# Patient Record
Sex: FEMALE | Race: WHITE | NOT HISPANIC OR LATINO | Employment: UNEMPLOYED | URBAN - METROPOLITAN AREA
[De-identification: names, ages, dates, MRNs, and addresses within clinical notes are randomized per-mention and may not be internally consistent; named-entity substitution may affect disease eponyms.]

---

## 2017-01-06 RX ORDER — CYCLOBENZAPRINE HCL 10 MG
TABLET ORAL
Qty: 20 TABLET | Refills: 2 | Status: SHIPPED | OUTPATIENT
Start: 2017-01-06 | End: 2017-03-29 | Stop reason: SDUPTHER

## 2017-01-12 RX ORDER — OMEPRAZOLE 40 MG/1
CAPSULE, DELAYED RELEASE ORAL
Qty: 30 CAPSULE | Refills: 5 | Status: SHIPPED | OUTPATIENT
Start: 2017-01-12 | End: 2017-07-11 | Stop reason: SDUPTHER

## 2017-01-13 ENCOUNTER — ANESTHESIA EVENT (OUTPATIENT)
Dept: ENDOSCOPY | Facility: HOSPITAL | Age: 62
End: 2017-01-13
Payer: COMMERCIAL

## 2017-01-13 ENCOUNTER — SURGERY (OUTPATIENT)
Age: 62
End: 2017-01-13

## 2017-01-13 ENCOUNTER — ANESTHESIA (OUTPATIENT)
Dept: ENDOSCOPY | Facility: HOSPITAL | Age: 62
End: 2017-01-13
Payer: COMMERCIAL

## 2017-01-13 PROBLEM — Z12.12 SCREENING FOR COLORECTAL CANCER: Status: ACTIVE | Noted: 2017-01-13

## 2017-01-13 PROBLEM — Z12.11 SCREENING FOR COLORECTAL CANCER: Status: ACTIVE | Noted: 2017-01-13

## 2017-01-13 PROCEDURE — 25000003 PHARM REV CODE 250: Performed by: NURSE ANESTHETIST, CERTIFIED REGISTERED

## 2017-01-13 PROCEDURE — 63600175 PHARM REV CODE 636 W HCPCS: Performed by: NURSE ANESTHETIST, CERTIFIED REGISTERED

## 2017-01-13 RX ORDER — SODIUM CHLORIDE, SODIUM LACTATE, POTASSIUM CHLORIDE, CALCIUM CHLORIDE 600; 310; 30; 20 MG/100ML; MG/100ML; MG/100ML; MG/100ML
INJECTION, SOLUTION INTRAVENOUS CONTINUOUS PRN
Status: DISCONTINUED | OUTPATIENT
Start: 2017-01-13 | End: 2017-01-13

## 2017-01-13 RX ORDER — LIDOCAINE HCL/PF 100 MG/5ML
SYRINGE (ML) INTRAVENOUS
Status: DISCONTINUED | OUTPATIENT
Start: 2017-01-13 | End: 2017-01-13

## 2017-01-13 RX ORDER — PROPOFOL 10 MG/ML
INJECTION, EMULSION INTRAVENOUS
Status: DISCONTINUED | OUTPATIENT
Start: 2017-01-13 | End: 2017-01-13

## 2017-01-13 RX ADMIN — PROPOFOL 30 MG: 10 INJECTION, EMULSION INTRAVENOUS at 07:01

## 2017-01-13 RX ADMIN — SODIUM CHLORIDE, SODIUM LACTATE, POTASSIUM CHLORIDE, AND CALCIUM CHLORIDE: 600; 310; 30; 20 INJECTION, SOLUTION INTRAVENOUS at 07:01

## 2017-01-13 RX ADMIN — PROPOFOL 120 MG: 10 INJECTION, EMULSION INTRAVENOUS at 07:01

## 2017-01-13 RX ADMIN — LIDOCAINE HYDROCHLORIDE 50 MG: 20 INJECTION, SOLUTION INTRAVENOUS at 07:01

## 2017-01-13 NOTE — TRANSFER OF CARE
"Anesthesia Transfer of Care Note    Patient: Silvia Cotton    Procedure(s) Performed: Procedure(s) (LRB):  COLONOSCOPY (N/A)    Patient location: PACU    Anesthesia Type: MAC    Transport from OR: Transported from OR on room air with adequate spontaneous ventilation    Post pain: adequate analgesia    Post assessment: no apparent anesthetic complications    Post vital signs: stable    Level of consciousness: awake and alert    Nausea/Vomiting: no nausea/vomiting    Complications: none          Last vitals:   Visit Vitals    BP (!) 113/58 (BP Location: Left arm, Patient Position: Lying, BP Method: Automatic)    Pulse 80    Temp 36.6 °C (97.9 °F) (Oral)    Resp 16    Ht 5' 7" (1.702 m)    Wt 112 kg (247 lb)    LMP  (Exact Date)    SpO2 96%    Breastfeeding No    BMI 38.69 kg/m2     "

## 2017-01-13 NOTE — ANESTHESIA PREPROCEDURE EVALUATION
01/13/2017  Silvia Cotton is a 61 y.o., female.    [facility] Anesthesia Evaluation    I have reviewed the Patient Summary Reports.    I have reviewed the Nursing Notes.   I have reviewed the Medications.     Review of Systems  Anesthesia Hx:  No problems with previous Anesthesia    Social:  Social Alcohol Use, Non-Smoker    Hematology/Oncology:     Oncology Normal    -- Anemia:   EENT/Dental:   chronic allergic rhinitis   Cardiovascular:   Hypertension, well controlled Cardiac stress test over 5 years due to palpations and placed on medication.  Normal sinus rhythm  Cannot rule out Inferior infarct ,age undetermined  Abnormal ECG   Pulmonary:   Snore   Hepatic/GI:   Bowel Prep. GERD, well controlled 0400 last drink of fluid.   Musculoskeletal:   Arthritis     Neurological:   Neuromuscular Disease,    Psych:   Psychiatric History          Physical Exam  General:  Well nourished, Morbid Obesity    Airway/Jaw/Neck:  Airway Findings: Mallampati: IV     Dental:  Dental Findings: Upper Dentures, Lower Dentures             Anesthesia Plan  Type of Anesthesia, risks & benefits discussed:  Anesthesia Type:  MAC  Patient's Preference:   Intra-op Monitoring Plan:   Intra-op Monitoring Plan Comments:   Post Op Pain Control Plan:   Post Op Pain Control Plan Comments:   Induction:   IV  Beta Blocker:  Patient is on a Beta-Blocker and has received one dose within the past 24 hours (No further documentation required).       Informed Consent: Patient understands risks and agrees with Anesthesia plan.  Questions answered. Anesthesia consent signed with patient.  ASA Score: 2     Day of Surgery Review of History & Physical: I have interviewed and examined the patient. I have reviewed the patient's H&P dated: 01/13/17. There are no significant changes.  H&P update referred to the provider.         Ready For Surgery From Anesthesia  Perspective.

## 2017-01-13 NOTE — ANESTHESIA POSTPROCEDURE EVALUATION
"Anesthesia Post Evaluation    Patient: Silvia Cotton    Procedure(s) Performed: Procedure(s) (LRB):  COLONOSCOPY (N/A)    Final Anesthesia Type: MAC  Patient location during evaluation: PACU  Patient participation: Yes- Able to Participate  Level of consciousness: awake and alert and oriented  Post-procedure vital signs: reviewed and stable  Pain management: adequate  Airway patency: patent  PONV status at discharge: No PONV  Anesthetic complications: no      Cardiovascular status: blood pressure returned to baseline  Respiratory status: unassisted, room air and spontaneous ventilation  Hydration status: euvolemic  Follow-up not needed.        Visit Vitals    BP (!) 113/58 (BP Location: Left arm, Patient Position: Lying, BP Method: Automatic)    Pulse 80    Temp 36.6 °C (97.9 °F) (Oral)    Resp 16    Ht 5' 7" (1.702 m)    Wt 112 kg (247 lb)    LMP  (Exact Date)    SpO2 96%    Breastfeeding No    BMI 38.69 kg/m2       Pain/Lashawn Score: Pain Assessment Performed: Yes (1/13/2017  7:58 AM)  Presence of Pain: denies (1/13/2017  7:58 AM)  Lashawn Score: 10 (1/13/2017  7:58 AM)      "

## 2017-01-13 NOTE — ANESTHESIA RELEASE NOTE
"Anesthesia Release from PACU Note    Patient: Silvia Cotton    Procedure(s) Performed: Procedure(s) (LRB):  COLONOSCOPY (N/A)    Anesthesia type: MAC    Post pain: Adequate analgesia    Post assessment: no apparent anesthetic complications, tolerated procedure well and no evidence of recall    Last Vitals:   Visit Vitals    BP (!) 113/58 (BP Location: Left arm, Patient Position: Lying, BP Method: Automatic)    Pulse 80    Temp 36.6 °C (97.9 °F) (Oral)    Resp 16    Ht 5' 7" (1.702 m)    Wt 112 kg (247 lb)    LMP  (Exact Date)    SpO2 96%    Breastfeeding No    BMI 38.69 kg/m2       Post vital signs: stable    Level of consciousness: awake, alert  and oriented    Nausea/Vomiting: no nausea/no vomiting    Complications: none    Airway Patency: patent    Respiratory: unassisted, spontaneous ventilation, room air    Cardiovascular: stable    Hydration: euvolemic  "

## 2017-02-01 RX ORDER — ATENOLOL 25 MG/1
TABLET ORAL
Qty: 60 TABLET | Refills: 3 | Status: SHIPPED | OUTPATIENT
Start: 2017-02-01 | End: 2017-06-13 | Stop reason: SDUPTHER

## 2017-02-08 RX ORDER — AMITRIPTYLINE HYDROCHLORIDE 50 MG/1
TABLET, FILM COATED ORAL
Qty: 30 TABLET | Refills: 3 | Status: SHIPPED | OUTPATIENT
Start: 2017-02-08 | End: 2017-06-22 | Stop reason: SDUPTHER

## 2017-03-29 RX ORDER — CYCLOBENZAPRINE HCL 10 MG
TABLET ORAL
Qty: 20 TABLET | Refills: 2 | Status: SHIPPED | OUTPATIENT
Start: 2017-03-29 | End: 2017-11-29 | Stop reason: SDUPTHER

## 2017-05-04 RX ORDER — CYANOCOBALAMIN 1000 UG/ML
INJECTION, SOLUTION INTRAMUSCULAR; SUBCUTANEOUS
Qty: 10 ML | Refills: 1 | Status: SHIPPED | OUTPATIENT
Start: 2017-05-04 | End: 2017-10-09 | Stop reason: SDUPTHER

## 2017-05-10 ENCOUNTER — OFFICE VISIT (OUTPATIENT)
Dept: INTERNAL MEDICINE | Facility: CLINIC | Age: 62
End: 2017-05-10
Payer: COMMERCIAL

## 2017-05-10 ENCOUNTER — LAB VISIT (OUTPATIENT)
Dept: LAB | Facility: HOSPITAL | Age: 62
End: 2017-05-10
Attending: FAMILY MEDICINE
Payer: COMMERCIAL

## 2017-05-10 VITALS
TEMPERATURE: 99 F | BODY MASS INDEX: 37.13 KG/M2 | SYSTOLIC BLOOD PRESSURE: 132 MMHG | HEIGHT: 67 IN | HEART RATE: 108 BPM | OXYGEN SATURATION: 98 % | WEIGHT: 236.56 LBS | DIASTOLIC BLOOD PRESSURE: 76 MMHG

## 2017-05-10 DIAGNOSIS — K21.9 GASTROESOPHAGEAL REFLUX DISEASE WITHOUT ESOPHAGITIS: ICD-10-CM

## 2017-05-10 DIAGNOSIS — D50.0 IRON DEFICIENCY ANEMIA DUE TO CHRONIC BLOOD LOSS: ICD-10-CM

## 2017-05-10 DIAGNOSIS — Z01.818 PREOP GENERAL PHYSICAL EXAM: ICD-10-CM

## 2017-05-10 DIAGNOSIS — F51.04 PSYCHOPHYSIOLOGICAL INSOMNIA: ICD-10-CM

## 2017-05-10 DIAGNOSIS — E78.2 MIXED HYPERLIPIDEMIA: ICD-10-CM

## 2017-05-10 DIAGNOSIS — Z01.818 PREOP GENERAL PHYSICAL EXAM: Primary | ICD-10-CM

## 2017-05-10 LAB
ANION GAP SERPL CALC-SCNC: 11 MMOL/L
BASOPHILS # BLD AUTO: 0.03 K/UL
BASOPHILS NFR BLD: 0.3 %
BUN SERPL-MCNC: 12 MG/DL
CALCIUM SERPL-MCNC: 9.7 MG/DL
CHLORIDE SERPL-SCNC: 103 MMOL/L
CO2 SERPL-SCNC: 24 MMOL/L
CREAT SERPL-MCNC: 1 MG/DL
DIFFERENTIAL METHOD: ABNORMAL
EOSINOPHIL # BLD AUTO: 0.2 K/UL
EOSINOPHIL NFR BLD: 2 %
ERYTHROCYTE [DISTWIDTH] IN BLOOD BY AUTOMATED COUNT: 15.3 %
EST. GFR  (AFRICAN AMERICAN): >60 ML/MIN/1.73 M^2
EST. GFR  (NON AFRICAN AMERICAN): >60 ML/MIN/1.73 M^2
GLUCOSE SERPL-MCNC: 79 MG/DL
HCT VFR BLD AUTO: 37.7 %
HGB BLD-MCNC: 12.3 G/DL
LYMPHOCYTES # BLD AUTO: 2.6 K/UL
LYMPHOCYTES NFR BLD: 25 %
MCH RBC QN AUTO: 29.2 PG
MCHC RBC AUTO-ENTMCNC: 32.6 %
MCV RBC AUTO: 90 FL
MONOCYTES # BLD AUTO: 0.7 K/UL
MONOCYTES NFR BLD: 7 %
NEUTROPHILS # BLD AUTO: 6.7 K/UL
NEUTROPHILS NFR BLD: 65.4 %
PLATELET # BLD AUTO: 298 K/UL
PMV BLD AUTO: 11.4 FL
POTASSIUM SERPL-SCNC: 4.5 MMOL/L
RBC # BLD AUTO: 4.21 M/UL
SODIUM SERPL-SCNC: 138 MMOL/L
WBC # BLD AUTO: 10.2 K/UL

## 2017-05-10 PROCEDURE — 93010 ELECTROCARDIOGRAM REPORT: CPT | Mod: S$GLB,,, | Performed by: NUCLEAR MEDICINE

## 2017-05-10 PROCEDURE — 93005 ELECTROCARDIOGRAM TRACING: CPT | Mod: S$GLB,,, | Performed by: FAMILY MEDICINE

## 2017-05-10 PROCEDURE — 80048 BASIC METABOLIC PNL TOTAL CA: CPT

## 2017-05-10 PROCEDURE — 99243 OFF/OP CNSLTJ NEW/EST LOW 30: CPT | Mod: S$GLB,,, | Performed by: FAMILY MEDICINE

## 2017-05-10 PROCEDURE — 99999 PR PBB SHADOW E&M-EST. PATIENT-LVL III: CPT | Mod: PBBFAC,,, | Performed by: FAMILY MEDICINE

## 2017-05-10 PROCEDURE — 85025 COMPLETE CBC W/AUTO DIFF WBC: CPT

## 2017-05-10 PROCEDURE — 36415 COLL VENOUS BLD VENIPUNCTURE: CPT | Mod: PO

## 2017-05-22 RX ORDER — VENLAFAXINE HYDROCHLORIDE 75 MG/1
CAPSULE, EXTENDED RELEASE ORAL
Qty: 90 CAPSULE | Refills: 3 | Status: SHIPPED | OUTPATIENT
Start: 2017-05-22 | End: 2018-05-02 | Stop reason: SDUPTHER

## 2017-06-07 ENCOUNTER — LAB VISIT (OUTPATIENT)
Dept: LAB | Facility: HOSPITAL | Age: 62
End: 2017-06-07
Attending: FAMILY MEDICINE
Payer: COMMERCIAL

## 2017-06-07 DIAGNOSIS — E53.8 B12 DEFICIENCY: ICD-10-CM

## 2017-06-07 DIAGNOSIS — E78.2 MIXED HYPERLIPIDEMIA: ICD-10-CM

## 2017-06-07 LAB
ALBUMIN SERPL BCP-MCNC: 3.5 G/DL
ALP SERPL-CCNC: 82 U/L
ALT SERPL W/O P-5'-P-CCNC: 15 U/L
ANION GAP SERPL CALC-SCNC: 10 MMOL/L
AST SERPL-CCNC: 19 U/L
BASOPHILS # BLD AUTO: 0.02 K/UL
BASOPHILS NFR BLD: 0.3 %
BILIRUB SERPL-MCNC: 0.6 MG/DL
BUN SERPL-MCNC: 10 MG/DL
CALCIUM SERPL-MCNC: 9.4 MG/DL
CHLORIDE SERPL-SCNC: 107 MMOL/L
CHOLEST/HDLC SERPL: 4.4 {RATIO}
CO2 SERPL-SCNC: 24 MMOL/L
CREAT SERPL-MCNC: 1 MG/DL
DIFFERENTIAL METHOD: ABNORMAL
EOSINOPHIL # BLD AUTO: 0.2 K/UL
EOSINOPHIL NFR BLD: 3.6 %
ERYTHROCYTE [DISTWIDTH] IN BLOOD BY AUTOMATED COUNT: 15.9 %
EST. GFR  (AFRICAN AMERICAN): >60 ML/MIN/1.73 M^2
EST. GFR  (NON AFRICAN AMERICAN): >60 ML/MIN/1.73 M^2
GLUCOSE SERPL-MCNC: 94 MG/DL
HCT VFR BLD AUTO: 36.2 %
HDL/CHOLESTEROL RATIO: 22.5 %
HDLC SERPL-MCNC: 151 MG/DL
HDLC SERPL-MCNC: 34 MG/DL
HGB BLD-MCNC: 11.4 G/DL
LDLC SERPL CALC-MCNC: 66.2 MG/DL
LYMPHOCYTES # BLD AUTO: 1.7 K/UL
LYMPHOCYTES NFR BLD: 27.6 %
MCH RBC QN AUTO: 28.9 PG
MCHC RBC AUTO-ENTMCNC: 31.5 %
MCV RBC AUTO: 92 FL
MONOCYTES # BLD AUTO: 0.7 K/UL
MONOCYTES NFR BLD: 10.6 %
NEUTROPHILS # BLD AUTO: 3.6 K/UL
NEUTROPHILS NFR BLD: 57.7 %
NONHDLC SERPL-MCNC: 117 MG/DL
PLATELET # BLD AUTO: 283 K/UL
PMV BLD AUTO: 11.4 FL
POTASSIUM SERPL-SCNC: 3.9 MMOL/L
PROT SERPL-MCNC: 6.9 G/DL
RBC # BLD AUTO: 3.94 M/UL
SODIUM SERPL-SCNC: 141 MMOL/L
TRIGL SERPL-MCNC: 254 MG/DL
WBC # BLD AUTO: 6.15 K/UL

## 2017-06-07 PROCEDURE — 83036 HEMOGLOBIN GLYCOSYLATED A1C: CPT

## 2017-06-07 PROCEDURE — 82607 VITAMIN B-12: CPT

## 2017-06-07 PROCEDURE — 36415 COLL VENOUS BLD VENIPUNCTURE: CPT | Mod: PO

## 2017-06-07 PROCEDURE — 80061 LIPID PANEL: CPT

## 2017-06-07 PROCEDURE — 85025 COMPLETE CBC W/AUTO DIFF WBC: CPT

## 2017-06-07 PROCEDURE — 80053 COMPREHEN METABOLIC PANEL: CPT

## 2017-06-08 ENCOUNTER — OFFICE VISIT (OUTPATIENT)
Dept: FAMILY MEDICINE | Facility: CLINIC | Age: 62
End: 2017-06-08
Payer: COMMERCIAL

## 2017-06-08 VITALS
WEIGHT: 236.25 LBS | RESPIRATION RATE: 16 BRPM | TEMPERATURE: 98 F | DIASTOLIC BLOOD PRESSURE: 70 MMHG | HEIGHT: 69 IN | OXYGEN SATURATION: 97 % | HEART RATE: 80 BPM | BODY MASS INDEX: 34.99 KG/M2 | SYSTOLIC BLOOD PRESSURE: 120 MMHG

## 2017-06-08 DIAGNOSIS — K21.9 GASTROESOPHAGEAL REFLUX DISEASE WITHOUT ESOPHAGITIS: ICD-10-CM

## 2017-06-08 DIAGNOSIS — E78.2 MIXED HYPERLIPIDEMIA: ICD-10-CM

## 2017-06-08 DIAGNOSIS — F51.04 PSYCHOPHYSIOLOGICAL INSOMNIA: ICD-10-CM

## 2017-06-08 DIAGNOSIS — Z00.00 WELL ADULT EXAM: Primary | ICD-10-CM

## 2017-06-08 DIAGNOSIS — E53.8 B12 DEFICIENCY: ICD-10-CM

## 2017-06-08 DIAGNOSIS — D50.0 IRON DEFICIENCY ANEMIA DUE TO CHRONIC BLOOD LOSS: ICD-10-CM

## 2017-06-08 LAB
ESTIMATED AVG GLUCOSE: 114 MG/DL
HBA1C MFR BLD HPLC: 5.6 %
VIT B12 SERPL-MCNC: 1249 PG/ML

## 2017-06-08 PROCEDURE — 99396 PREV VISIT EST AGE 40-64: CPT | Mod: S$GLB,,, | Performed by: FAMILY MEDICINE

## 2017-06-08 PROCEDURE — 99999 PR PBB SHADOW E&M-EST. PATIENT-LVL III: CPT | Mod: PBBFAC,,, | Performed by: FAMILY MEDICINE

## 2017-06-08 RX ORDER — DOCUSATE SODIUM 100 MG/1
100 CAPSULE, LIQUID FILLED ORAL 2 TIMES DAILY
Refills: 0 | COMMUNITY
Start: 2017-05-17 | End: 2017-11-29

## 2017-06-08 RX ORDER — HYDROCODONE BITARTRATE AND ACETAMINOPHEN 10; 325 MG/1; MG/1
TABLET ORAL
COMMUNITY
Start: 2017-06-01 | End: 2017-11-29

## 2017-06-08 RX ORDER — PROMETHAZINE HYDROCHLORIDE 25 MG/1
TABLET ORAL
COMMUNITY
Start: 2017-05-17 | End: 2017-11-29

## 2017-06-08 RX ORDER — MELOXICAM 15 MG/1
TABLET ORAL
Refills: 0 | COMMUNITY
Start: 2017-04-04 | End: 2017-11-29

## 2017-06-08 RX ORDER — OXYCODONE AND ACETAMINOPHEN 7.5; 325 MG/1; MG/1
TABLET ORAL
COMMUNITY
Start: 2017-05-17 | End: 2017-11-29

## 2017-06-08 RX ORDER — ZOLPIDEM TARTRATE 10 MG/1
TABLET ORAL
Qty: 30 TABLET | Refills: 5 | Status: SHIPPED | OUTPATIENT
Start: 2017-06-08 | End: 2017-11-29 | Stop reason: SDUPTHER

## 2017-06-08 NOTE — PROGRESS NOTES
Chief Complaint:    Chief Complaint   Patient presents with    Follow-up       History of Present Illness:  She is here for 6 month follow-up of chronic medical problems,  She has B12 and iron deficiency anemia doing B12 injections once a week B12 levels are finally up.  Blood pressure is normal.  Taking statin drug for hyperlipidemia cholesterol has come down no muscle pain or fatigue.  He recently had knee surgery doing well.  Has chronic insomnia taking Ambien working well.      ROS:  Review of Systems   Constitutional: Negative for activity change, chills, fatigue, fever and unexpected weight change.   HENT: Negative for congestion, ear discharge, ear pain, hearing loss, postnasal drip and rhinorrhea.    Eyes: Negative for pain and visual disturbance.   Respiratory: Negative for cough, chest tightness and shortness of breath.    Cardiovascular: Negative for chest pain and palpitations.   Gastrointestinal: Negative for abdominal pain, diarrhea and vomiting.   Endocrine: Negative for heat intolerance.   Genitourinary: Negative for dysuria, flank pain, frequency and hematuria.   Musculoskeletal: Negative for back pain, gait problem and neck pain.   Skin: Negative for color change and rash.   Neurological: Negative for dizziness, tremors, seizures, numbness and headaches.   Psychiatric/Behavioral: Negative for agitation, hallucinations, self-injury, sleep disturbance and suicidal ideas. The patient is not nervous/anxious.        Past Medical History:   Diagnosis Date    Anemia     Hyperlipidemia     Hypertension     Supraventricular tachycardia        Social History:  Social History     Social History    Marital status:      Spouse name: N/A    Number of children: N/A    Years of education: N/A     Social History Main Topics    Smoking status: Never Smoker    Smokeless tobacco: Never Used    Alcohol use No    Drug use: No    Sexual activity: Yes     Partners: Male     Birth control/ protection:  "None     Other Topics Concern    None     Social History Narrative    None       Family History:   family history is not on file.    Health Maintenance   Topic Date Due    Influenza Vaccine  08/01/2017    Mammogram  11/09/2017    Lipid Panel  06/07/2018    Pap Smear with HPV Cotest  11/04/2019    Colonoscopy  01/13/2022    TETANUS VACCINE  12/01/2024    Hepatitis C Screening  Completed       Physical Exam:    Vital Signs  Temp: 97.6 °F (36.4 °C)  Pulse: 80  Resp: 16  SpO2: 97 %  BP: 120/70  Pain Score: 0-No pain  Height and Weight  Height: 5' 9" (175.3 cm)  Weight: 107.2 kg (236 lb 3.6 oz)  BSA (Calculated - sq m): 2.28 sq meters  BMI (Calculated): 35  Weight in (lb) to have BMI = 25: 168.9]    Body mass index is 34.88 kg/m².    Physical Exam   Constitutional: She is oriented to person, place, and time. She appears well-developed.   HENT:   Mouth/Throat: Oropharynx is clear and moist.   Eyes: Conjunctivae are normal. Pupils are equal, round, and reactive to light.   Neck: Normal range of motion. Neck supple.   Cardiovascular: Normal rate, regular rhythm and normal heart sounds.    No murmur heard.  Pulmonary/Chest: Effort normal and breath sounds normal. No respiratory distress. She has no wheezes. She has no rales. She exhibits no tenderness.   Abdominal: Soft. She exhibits no distension and no mass. There is no tenderness. There is no guarding.   Musculoskeletal: She exhibits no edema or tenderness.   Lymphadenopathy:     She has no cervical adenopathy.   Neurological: She is alert and oriented to person, place, and time. She has normal reflexes.   Skin: Skin is warm and dry.   Psychiatric: She has a normal mood and affect. Her behavior is normal. Judgment and thought content normal.       Lab Results   Component Value Date    CHOL 151 06/07/2017    CHOL 199 12/06/2016    CHOL 179 06/03/2016    TRIG 254 (H) 06/07/2017    TRIG 205 (H) 12/06/2016    TRIG 365 (H) 06/03/2016    HDL 34 (L) 06/07/2017    HDL 37 " "(L) 12/06/2016    HDL 30 (L) 06/03/2016    TOTALCHOLEST 4.4 06/07/2017    TOTALCHOLEST 5.4 (H) 12/06/2016    TOTALCHOLEST 6.0 (H) 06/03/2016    NONHDLCHOL 117 06/07/2017    NONHDLCHOL 162 12/06/2016    NONHDLCHOL 149 06/03/2016       Lab Results   Component Value Date    HGBA1C 5.6 06/07/2017       Assessment:      ICD-10-CM ICD-9-CM   1. Well adult exam Z00.00 V70.0   2. B12 deficiency E53.8 266.2   3. Gastroesophageal reflux disease without esophagitis K21.9 530.81   4. Iron deficiency anemia due to chronic blood loss D50.0 280.0   5. Psychophysiological insomnia F51.04 307.42   6. Mixed hyperlipidemia E78.2 272.2         Plan:    Patient doing well continue current plan  We'll continue to monitor the CBC showed anemia worsens will send back to Dr. Todd for iron infusion  Follow-up 6 months    Orders Placed This Encounter   Procedures    Lipid panel    Comprehensive metabolic panel    CBC auto differential    Vitamin B12       Current Outpatient Prescriptions   Medication Sig Dispense Refill    amitriptyline (ELAVIL) 50 MG tablet TAKE 1 TABLET BY MOUTH EVERY EVENING 30 tablet 3    atenolol (TENORMIN) 25 MG tablet TAKE 1 TABLET BY MOUTH TWICE A DAY 60 tablet 3    cyanocobalamin 1,000 mcg/mL injection INJECT 1MLS WEEKLY FOR 8 WEEKS 10 mL 1    cyclobenzaprine (FLEXERIL) 10 MG tablet TAKE 1 TABLET BY MOUTH EVERY 8 HOURS AS NEEDED FOR MUSCLE SPASMS 20 tablet 2    diclofenac sodium (VOLTAREN) 1 % Gel Apply 2 g topically once daily. 1 Tube 2    fluconazole (DIFLUCAN) 150 MG Tab Take 1 tablet (150 mg total) by mouth every 7 days. (Patient taking differently: Take 150 mg by mouth daily as needed. ) 5 tablet 2    fluticasone (FLONASE) 50 mcg/actuation nasal spray 2 sprays by Each Nare route once daily. 1 Bottle 12    needle, disp, 18 G 18 x 1 " Ndle Use to draw b12 1000 mcg from vail every 7 days for 7 weeks 100 each 0    needle, reusable, 25 G 25 x 1 " Ndle Use to inject B12 1000 mcg every 7 days for 7 weeks " 12 each 2    nystatin (MYCOSTATIN) powder Apply TID prn to affected area 60 g 3    omeprazole (PRILOSEC) 40 MG capsule TAKE ONE CAPSULE BY MOUTH EVERY DAY 30 capsule 5    omeprazole (PRILOSEC) 40 MG capsule TAKE ONE CAPSULE BY MOUTH EVERY DAY 30 capsule 5    rosuvastatin (CRESTOR) 20 MG tablet Take 1 tablet (20 mg total) by mouth once daily. 90 tablet 3    syringe, disposable, 3 mL Syrg Inject B12 1000 mcg every 7 days for 7 weeks 25 each 0    venlafaxine (EFFEXOR-XR) 75 MG 24 hr capsule TAKE ONE CAPSULE BY MOUTH EVERY DAY 90 capsule 3    zolpidem (AMBIEN) 10 mg Tab TAKE 1 TABLET BY MOUTH EVERY EVENING 30 tablet 5    docusate sodium (COLACE) 100 MG capsule Take 100 mg by mouth 2 (two) times daily.  0    hydrocodone-acetaminophen 10-325mg (NORCO)  mg Tab       meloxicam (MOBIC) 15 MG tablet TAKE 1 TABLET BY MOUTH ONCE A DAY AFTER A MEAL AS NEEDED  0    oxycodone-acetaminophen (PERCOCET) 7.5-325 mg per tablet       promethazine (PHENERGAN) 25 MG tablet        No current facility-administered medications for this visit.        Medications Discontinued During This Encounter   Medication Reason    zolpidem (AMBIEN) 10 mg Tab Reorder       Return in about 6 months (around 12/8/2017).      Dr Stephani Valdivia MD    Disclaimer: This note is prepared using voice recognition system and as such is likely to have errors and is not proof read.

## 2017-06-13 RX ORDER — ATENOLOL 25 MG/1
TABLET ORAL
Qty: 60 TABLET | Refills: 3 | Status: SHIPPED | OUTPATIENT
Start: 2017-06-13 | End: 2017-10-04 | Stop reason: SDUPTHER

## 2017-06-13 RX ORDER — CYCLOBENZAPRINE HCL 10 MG
TABLET ORAL
Qty: 20 TABLET | Refills: 2 | Status: SHIPPED | OUTPATIENT
Start: 2017-06-13 | End: 2017-11-18 | Stop reason: SDUPTHER

## 2017-06-19 RX ORDER — AMITRIPTYLINE HYDROCHLORIDE 50 MG/1
TABLET, FILM COATED ORAL
Qty: 30 TABLET | Refills: 3 | Status: CANCELLED | OUTPATIENT
Start: 2017-06-19

## 2017-06-22 ENCOUNTER — PATIENT MESSAGE (OUTPATIENT)
Dept: FAMILY MEDICINE | Facility: CLINIC | Age: 62
End: 2017-06-22

## 2017-06-22 RX ORDER — AMITRIPTYLINE HYDROCHLORIDE 50 MG/1
50 TABLET, FILM COATED ORAL NIGHTLY
Qty: 30 TABLET | Refills: 3 | Status: SHIPPED | OUTPATIENT
Start: 2017-06-22 | End: 2017-10-22 | Stop reason: SDUPTHER

## 2017-07-11 RX ORDER — OMEPRAZOLE 40 MG/1
CAPSULE, DELAYED RELEASE ORAL
Qty: 30 CAPSULE | Refills: 5 | Status: SHIPPED | OUTPATIENT
Start: 2017-07-11 | End: 2017-11-29 | Stop reason: SDUPTHER

## 2017-10-04 RX ORDER — ATENOLOL 25 MG/1
TABLET ORAL
Qty: 60 TABLET | Refills: 3 | Status: SHIPPED | OUTPATIENT
Start: 2017-10-04 | End: 2017-10-12 | Stop reason: SDUPTHER

## 2017-10-10 RX ORDER — CYANOCOBALAMIN 1000 UG/ML
INJECTION, SOLUTION INTRAMUSCULAR; SUBCUTANEOUS
Qty: 10 ML | Refills: 1 | Status: SHIPPED | OUTPATIENT
Start: 2017-10-10 | End: 2018-03-20 | Stop reason: SDUPTHER

## 2017-10-12 RX ORDER — ATENOLOL 25 MG/1
TABLET ORAL
Qty: 60 TABLET | Refills: 3 | Status: SHIPPED | OUTPATIENT
Start: 2017-10-12 | End: 2017-11-29 | Stop reason: SDUPTHER

## 2017-10-22 RX ORDER — AMITRIPTYLINE HYDROCHLORIDE 50 MG/1
50 TABLET, FILM COATED ORAL NIGHTLY
Qty: 30 TABLET | Refills: 3 | Status: SHIPPED | OUTPATIENT
Start: 2017-10-22 | End: 2017-11-29 | Stop reason: SDUPTHER

## 2017-11-01 RX ORDER — ROSUVASTATIN CALCIUM 20 MG/1
20 TABLET, COATED ORAL DAILY
Qty: 90 TABLET | Refills: 3 | Status: SHIPPED | OUTPATIENT
Start: 2017-11-01 | End: 2018-10-15 | Stop reason: SDUPTHER

## 2017-11-19 RX ORDER — CYCLOBENZAPRINE HCL 10 MG
TABLET ORAL
Qty: 20 TABLET | Refills: 2 | Status: SHIPPED | OUTPATIENT
Start: 2017-11-19 | End: 2017-11-29 | Stop reason: SDUPTHER

## 2017-11-24 ENCOUNTER — LAB VISIT (OUTPATIENT)
Dept: LAB | Facility: HOSPITAL | Age: 62
End: 2017-11-24
Attending: FAMILY MEDICINE
Payer: COMMERCIAL

## 2017-11-24 DIAGNOSIS — E53.8 B12 DEFICIENCY: ICD-10-CM

## 2017-11-24 DIAGNOSIS — D50.0 IRON DEFICIENCY ANEMIA DUE TO CHRONIC BLOOD LOSS: ICD-10-CM

## 2017-11-24 DIAGNOSIS — E78.2 MIXED HYPERLIPIDEMIA: ICD-10-CM

## 2017-11-24 LAB
ALBUMIN SERPL BCP-MCNC: 3.6 G/DL
ALP SERPL-CCNC: 81 U/L
ALT SERPL W/O P-5'-P-CCNC: 12 U/L
ANION GAP SERPL CALC-SCNC: 7 MMOL/L
AST SERPL-CCNC: 15 U/L
BASOPHILS # BLD AUTO: 0.04 K/UL
BASOPHILS NFR BLD: 0.6 %
BILIRUB SERPL-MCNC: 0.4 MG/DL
BUN SERPL-MCNC: 11 MG/DL
CALCIUM SERPL-MCNC: 9.3 MG/DL
CHLORIDE SERPL-SCNC: 105 MMOL/L
CHOLEST SERPL-MCNC: 152 MG/DL
CHOLEST/HDLC SERPL: 3.8 {RATIO}
CO2 SERPL-SCNC: 28 MMOL/L
CREAT SERPL-MCNC: 0.9 MG/DL
DIFFERENTIAL METHOD: ABNORMAL
EOSINOPHIL # BLD AUTO: 0.2 K/UL
EOSINOPHIL NFR BLD: 3.2 %
ERYTHROCYTE [DISTWIDTH] IN BLOOD BY AUTOMATED COUNT: 14.5 %
EST. GFR  (AFRICAN AMERICAN): >60 ML/MIN/1.73 M^2
EST. GFR  (NON AFRICAN AMERICAN): >60 ML/MIN/1.73 M^2
GLUCOSE SERPL-MCNC: 102 MG/DL
HCT VFR BLD AUTO: 35.9 %
HDLC SERPL-MCNC: 40 MG/DL
HDLC SERPL: 26.3 %
HGB BLD-MCNC: 11.2 G/DL
IMM GRANULOCYTES # BLD AUTO: 0.01 K/UL
IMM GRANULOCYTES NFR BLD AUTO: 0.1 %
LDLC SERPL CALC-MCNC: 80 MG/DL
LYMPHOCYTES # BLD AUTO: 2.2 K/UL
LYMPHOCYTES NFR BLD: 31.9 %
MCH RBC QN AUTO: 28.3 PG
MCHC RBC AUTO-ENTMCNC: 31.2 G/DL
MCV RBC AUTO: 91 FL
MONOCYTES # BLD AUTO: 0.6 K/UL
MONOCYTES NFR BLD: 8.3 %
NEUTROPHILS # BLD AUTO: 3.8 K/UL
NEUTROPHILS NFR BLD: 55.9 %
NONHDLC SERPL-MCNC: 112 MG/DL
NRBC BLD-RTO: 0 /100 WBC
PLATELET # BLD AUTO: 258 K/UL
PMV BLD AUTO: 11.3 FL
POTASSIUM SERPL-SCNC: 3.8 MMOL/L
PROT SERPL-MCNC: 7 G/DL
RBC # BLD AUTO: 3.96 M/UL
SODIUM SERPL-SCNC: 140 MMOL/L
TRIGL SERPL-MCNC: 160 MG/DL
VIT B12 SERPL-MCNC: 878 PG/ML
WBC # BLD AUTO: 6.78 K/UL

## 2017-11-24 PROCEDURE — 82607 VITAMIN B-12: CPT

## 2017-11-24 PROCEDURE — 36415 COLL VENOUS BLD VENIPUNCTURE: CPT | Mod: PO

## 2017-11-24 PROCEDURE — 80053 COMPREHEN METABOLIC PANEL: CPT

## 2017-11-24 PROCEDURE — 85025 COMPLETE CBC W/AUTO DIFF WBC: CPT

## 2017-11-24 PROCEDURE — 80061 LIPID PANEL: CPT

## 2017-11-29 ENCOUNTER — OFFICE VISIT (OUTPATIENT)
Dept: FAMILY MEDICINE | Facility: CLINIC | Age: 62
End: 2017-11-29
Payer: COMMERCIAL

## 2017-11-29 VITALS
TEMPERATURE: 99 F | HEIGHT: 67 IN | WEIGHT: 242.38 LBS | SYSTOLIC BLOOD PRESSURE: 150 MMHG | DIASTOLIC BLOOD PRESSURE: 84 MMHG | HEART RATE: 97 BPM | BODY MASS INDEX: 38.04 KG/M2 | OXYGEN SATURATION: 98 %

## 2017-11-29 DIAGNOSIS — D50.0 IRON DEFICIENCY ANEMIA DUE TO CHRONIC BLOOD LOSS: ICD-10-CM

## 2017-11-29 DIAGNOSIS — R03.0 ELEVATED BLOOD PRESSURE, SITUATIONAL: ICD-10-CM

## 2017-11-29 DIAGNOSIS — E53.8 B12 DEFICIENCY: ICD-10-CM

## 2017-11-29 DIAGNOSIS — E78.2 MIXED HYPERLIPIDEMIA: ICD-10-CM

## 2017-11-29 DIAGNOSIS — F43.0 STRESS REACTION: ICD-10-CM

## 2017-11-29 DIAGNOSIS — F51.04 PSYCHOPHYSIOLOGICAL INSOMNIA: Primary | ICD-10-CM

## 2017-11-29 PROCEDURE — 99214 OFFICE O/P EST MOD 30 MIN: CPT | Mod: 25,S$GLB,, | Performed by: FAMILY MEDICINE

## 2017-11-29 PROCEDURE — 99999 PR PBB SHADOW E&M-EST. PATIENT-LVL III: CPT | Mod: PBBFAC,,, | Performed by: FAMILY MEDICINE

## 2017-11-29 PROCEDURE — 90686 IIV4 VACC NO PRSV 0.5 ML IM: CPT | Mod: S$GLB,,, | Performed by: FAMILY MEDICINE

## 2017-11-29 PROCEDURE — 90471 IMMUNIZATION ADMIN: CPT | Mod: S$GLB,,, | Performed by: FAMILY MEDICINE

## 2017-11-29 RX ORDER — AMITRIPTYLINE HYDROCHLORIDE 50 MG/1
50 TABLET, FILM COATED ORAL NIGHTLY
Qty: 30 TABLET | Refills: 3 | Status: SHIPPED | OUTPATIENT
Start: 2017-11-29 | End: 2018-06-14 | Stop reason: SDUPTHER

## 2017-11-29 RX ORDER — ZOLPIDEM TARTRATE 10 MG/1
TABLET ORAL
Qty: 30 TABLET | Refills: 5 | Status: SHIPPED | OUTPATIENT
Start: 2017-11-29 | End: 2018-05-30 | Stop reason: SDUPTHER

## 2017-11-29 RX ORDER — LISINOPRIL AND HYDROCHLOROTHIAZIDE 10; 12.5 MG/1; MG/1
1 TABLET ORAL DAILY
Qty: 90 TABLET | Refills: 3 | Status: SHIPPED | OUTPATIENT
Start: 2017-11-29 | End: 2018-05-30

## 2017-11-29 RX ORDER — CYCLOBENZAPRINE HCL 10 MG
TABLET ORAL
Qty: 20 TABLET | Refills: 2 | Status: SHIPPED | OUTPATIENT
Start: 2017-11-29 | End: 2019-01-18

## 2017-11-29 RX ORDER — ATENOLOL 25 MG/1
25 TABLET ORAL 2 TIMES DAILY
Qty: 60 TABLET | Refills: 3 | Status: SHIPPED | OUTPATIENT
Start: 2017-11-29 | End: 2018-03-22 | Stop reason: SDUPTHER

## 2017-11-29 RX ORDER — OMEPRAZOLE 40 MG/1
40 CAPSULE, DELAYED RELEASE ORAL DAILY
Qty: 30 CAPSULE | Refills: 5 | Status: SHIPPED | OUTPATIENT
Start: 2017-11-29 | End: 2018-06-12 | Stop reason: SDUPTHER

## 2017-11-29 NOTE — PROGRESS NOTES
Chief Complaint:    Chief Complaint   Patient presents with    Medication Refill       History of Present Illness:  Patient is a for six-month follow-up,  She has iron deficiency anemia she received  Infusion with hematology but has not presently receiving a long time her H&H is stable she's not taking any iron pills.  She is still on B12 supplementation.  Lipids chemistries are stable she takes Ambien for chronic insomnia.  She's gained some weight after knee surgery has not been able to exercise very much and her blood pressures running high.      ROS:  Review of Systems   Constitutional: Negative for activity change, chills, fatigue, fever and unexpected weight change.   HENT: Negative for congestion, ear discharge, ear pain, hearing loss, postnasal drip and rhinorrhea.    Eyes: Negative for pain and visual disturbance.   Respiratory: Negative for cough, chest tightness and shortness of breath.    Cardiovascular: Negative for chest pain and palpitations.   Gastrointestinal: Negative for abdominal pain, diarrhea and vomiting.   Endocrine: Negative for heat intolerance.   Genitourinary: Negative for dysuria, flank pain, frequency and hematuria.   Musculoskeletal: Negative for back pain, gait problem and neck pain.   Skin: Negative for color change and rash.   Neurological: Negative for dizziness, tremors, seizures, numbness and headaches.   Psychiatric/Behavioral: Negative for agitation, hallucinations, self-injury, sleep disturbance and suicidal ideas. The patient is not nervous/anxious.        Past Medical History:   Diagnosis Date    Anemia     Hyperlipidemia     Hypertension     Supraventricular tachycardia        Social History:  Social History     Social History    Marital status:      Spouse name: N/A    Number of children: N/A    Years of education: N/A     Social History Main Topics    Smoking status: Never Smoker    Smokeless tobacco: Never Used    Alcohol use No    Drug use: No     "Sexual activity: Yes     Partners: Male     Birth control/ protection: None     Other Topics Concern    None     Social History Narrative    None       Family History:   family history is not on file.    Health Maintenance   Topic Date Due    Influenza Vaccine  08/01/2017    Zoster Vaccine  10/12/2017    Mammogram  11/10/2017    Lipid Panel  11/24/2018    Pap Smear with HPV Cotest  11/04/2019    Colonoscopy  01/13/2022    TETANUS VACCINE  12/01/2024    Hepatitis C Screening  Completed       Physical Exam:    Vital Signs  Temp: 98.7 °F (37.1 °C)  Temp src: Tympanic  Pulse: 97  SpO2: 98 %  BP: (!) 150/84  BP Location: Left arm  Patient Position: Sitting  Pain Score: 0-No pain  Height and Weight  Height: 5' 7" (170.2 cm)  Weight: 109.9 kg (242 lb 6.3 oz)  BSA (Calculated - sq m): 2.28 sq meters  BMI (Calculated): 38  Weight in (lb) to have BMI = 25: 159.3]    Body mass index is 37.96 kg/m².    Physical Exam   Constitutional: She is oriented to person, place, and time. She appears well-developed.   HENT:   Mouth/Throat: Oropharynx is clear and moist.   Eyes: Conjunctivae are normal. Pupils are equal, round, and reactive to light.   Neck: Normal range of motion. Neck supple.   Cardiovascular: Normal rate, regular rhythm and normal heart sounds.    No murmur heard.  Pulmonary/Chest: Effort normal and breath sounds normal. No respiratory distress. She has no wheezes. She has no rales. She exhibits no tenderness.   Abdominal: Soft. She exhibits no distension and no mass. There is no tenderness. There is no guarding.   Musculoskeletal: She exhibits no edema or tenderness.   Lymphadenopathy:     She has no cervical adenopathy.   Neurological: She is alert and oriented to person, place, and time. She has normal reflexes.   Skin: Skin is warm and dry.   Psychiatric: She has a normal mood and affect. Her behavior is normal. Judgment and thought content normal.       Lab Results   Component Value Date    CHOL 152 " 11/24/2017    CHOL 151 06/07/2017    CHOL 199 12/06/2016    TRIG 160 (H) 11/24/2017    TRIG 254 (H) 06/07/2017    TRIG 205 (H) 12/06/2016    HDL 40 11/24/2017    HDL 34 (L) 06/07/2017    HDL 37 (L) 12/06/2016    TOTALCHOLEST 3.8 11/24/2017    TOTALCHOLEST 4.4 06/07/2017    TOTALCHOLEST 5.4 (H) 12/06/2016    NONHDLCHOL 112 11/24/2017    NONHDLCHOL 117 06/07/2017    NONHDLCHOL 162 12/06/2016       Lab Results   Component Value Date    HGBA1C 5.6 06/07/2017       Assessment:      ICD-10-CM ICD-9-CM   1. Psychophysiological insomnia F51.04 307.42   2. Mixed hyperlipidemia E78.2 272.2   3. B12 deficiency E53.8 266.2   4. Stress reaction F43.0 308.9   5. Iron deficiency anemia due to chronic blood loss D50.0 280.0   6. Elevated blood pressure, situational R03.0 796.2         Plan:  Patient has been instructed to start monitoring home blood pressure readings twice a day with the omeron 10 blood pressure monitor and if blood pressure starts to run over 140/80 on a consistent basis please go ahead and start on Prinzide 10-12 0.5 mg once daily and keep a log of blood pressure reading and follow-up with Sergey Chavira in one week.  Other medical problems is about stable  She does have some stress going on with her son who is drinking excessive alcohol.  We'll also need to watch venlafaxine since that can also increase blood pressure changing venlafaxine to different medicine also remains an option.  Orders Placed This Encounter   Procedures    Influenza - Quadrivalent (3 years & older) (PF)    Lipid panel    Comprehensive metabolic panel    Hemoglobin A1c    Vitamin B12    CBC auto differential    Iron and TIBC    Iron       Current Outpatient Prescriptions   Medication Sig Dispense Refill    amitriptyline (ELAVIL) 50 MG tablet Take 1 tablet (50 mg total) by mouth every evening. 30 tablet 3    atenolol (TENORMIN) 25 MG tablet Take 1 tablet (25 mg total) by mouth 2 (two) times daily. 60 tablet 3    cyanocobalamin  "1,000 mcg/mL injection INJECT 1MLS WEEKLY FOR 8 WEEKS 10 mL 1    cyclobenzaprine (FLEXERIL) 10 MG tablet TAKE 1 TABLET BY MOUTH EVERY 8 HOURS AS NEEDED FOR MUSCLE SPASMS 20 tablet 2    diclofenac sodium (VOLTAREN) 1 % Gel Apply 2 g topically once daily. 1 Tube 2    fluconazole (DIFLUCAN) 150 MG Tab Take 1 tablet (150 mg total) by mouth every 7 days. (Patient taking differently: Take 150 mg by mouth daily as needed. ) 5 tablet 2    needle, disp, 18 G 18 x 1 " Ndle Use to draw b12 1000 mcg from vail every 7 days for 7 weeks 100 each 0    needle, reusable, 25 G 25 x 1 " Ndle Use to inject B12 1000 mcg every 7 days for 7 weeks 12 each 2    nystatin (MYCOSTATIN) powder Apply TID prn to affected area 60 g 3    omeprazole (PRILOSEC) 40 MG capsule Take 1 capsule (40 mg total) by mouth once daily. 30 capsule 5    rosuvastatin (CRESTOR) 20 MG tablet TAKE 1 TABLET (20 MG TOTAL) BY MOUTH ONCE DAILY. 90 tablet 3    syringe, disposable, 3 mL Syrg Inject B12 1000 mcg every 7 days for 7 weeks 25 each 0    venlafaxine (EFFEXOR-XR) 75 MG 24 hr capsule TAKE ONE CAPSULE BY MOUTH EVERY DAY 90 capsule 3    zolpidem (AMBIEN) 10 mg Tab TAKE 1 TABLET BY MOUTH EVERY EVENING 30 tablet 5    lisinopril-hydrochlorothiazide (PRINZIDE,ZESTORETIC) 10-12.5 mg per tablet Take 1 tablet by mouth once daily. 90 tablet 3     No current facility-administered medications for this visit.        Medications Discontinued During This Encounter   Medication Reason    cyclobenzaprine (FLEXERIL) 10 MG tablet Duplicate Order    docusate sodium (COLACE) 100 MG capsule Patient no longer taking    fluticasone (FLONASE) 50 mcg/actuation nasal spray Patient no longer taking    hydrocodone-acetaminophen 10-325mg (NORCO)  mg Tab Patient no longer taking    meloxicam (MOBIC) 15 MG tablet Patient no longer taking    oxycodone-acetaminophen (PERCOCET) 7.5-325 mg per tablet Patient no longer taking    promethazine (PHENERGAN) 25 MG tablet Patient no " longer taking    zolpidem (AMBIEN) 10 mg Tab Reorder    omeprazole (PRILOSEC) 40 MG capsule Reorder    cyclobenzaprine (FLEXERIL) 10 MG tablet Reorder    atenolol (TENORMIN) 25 MG tablet Reorder    amitriptyline (ELAVIL) 50 MG tablet Reorder       Return for with Ryan in 1 wks.      Stephani Valdivia MD

## 2017-11-29 NOTE — PATIENT INSTRUCTIONS
Eating Heart-Healthy Food: Using the DASH Plan    Eating for your heart doesnt have to be hard or boring. You just need to know how to make healthier choices. The DASH eating plan has been developed to help you do just that. DASH stands for Dietary Approaches to Stop Hypertension. It is a plan that has been proven to be healthier for your heart and to lower your risk for high blood pressure. It can also help lower your risk for cancer, heart disease, osteoporosis, and diabetes.  Choosing from each food group  Choose foods from each of the food groups below each day. Try to get the recommended number of servings for each food group. The serving numbers are based on a diet of 2,000 calories a day. Talk to your doctor if youre unsure about your calorie needs. Along with getting the correct servings, the DASH plan also recommends a sodium intake less than 2,300 mg per day.        Grains  Servings: 6 to 8 a day  A serving is:  · 1 slice bread  · 1 ounce dry cereal  · Half a cup cooked rice, pasta or cereal  Best choices: Whole grains and any grains high in fiber. Vegetables  Servings: 4 to 5 a day  A serving is:  · 1 cup raw leafy vegetable  · Half a cup cut-up raw or cooked vegetable  · Half a cup vegetable juice  Best choices: Fresh or frozen vegetables prepared without added salt or fat.   Fruits  Servings: 4 to 5 a day  A serving is:  · 1 medium fruit  · One-quarter cup dried fruit  · Half a cup fresh, frozen, or canned fruit  · Half a cup of 100% fruit juices  Best choices: A variety of fresh fruits of different colors. Whole fruits are a better choice than fruit juices. Low-fat or fat-free dairy  Servings: 2 to 3 a day  A serving is:  · 1 cup milk  · 1 cup yogurt  · One and a half ounces cheese  Best choices: Skim or 1% milk, low-fat or fat-free yogurt or buttermilk, and low-fat cheeses.         Lean meats, poultry, fish  Servings: 6 or fewer a day  A serving is:  · 1 ounce cooked meats, poultry, or fish  · 1  egg  Best choices: Lean poultry and fish. Trim away visible fat. Broil, grill, roast, or boil instead of frying. Remove skin from poultry before eating. Limit how much red meat you eat.  Nuts, seeds, beans  Servings: 4 to 5 a week  A serving is:  · One-third cup nuts (one and a half ounces)  · 2 tablespoons nut butter or seeds  · Half a cup cooked dry beans or legumes  Best choices: Dry roasted nuts with no salt added, lentils, kidney beans, garbanzo beans, and whole branch beans.   Fats and oils  Servings: 2 to 3 a day  A serving is:  · 1 teaspoon vegetable oil  · 1 teaspoon soft margarine  · 1 tablespoon mayonnaise  · 2 tablespoons salad dressing  Best choices: Nut and vegetable oils (nontropical vegetable oils), such as olive and canola oil. Sweets  Servings: 5 a week or fewer  A serving is:  · 1 tablespoon sugar, maple syrup, or honey  · 1 tablespoon jam or jelly  · 1 half-ounce jelly beans (about 15)  · 1 cup lemonade  Best choices: Dried fruit can be a satisfying sweet. Choose low-fat sweets. And watch your serving sizes!      For more on the DASH eating plan, visit:  www.nhlbi.nih.gov/health/health-topics/topics/dash   Date Last Reviewed: 6/1/2016  © 6420-8074 [employer]. [address] [place] [address], [place] [address]. All rights reserved. This information is not intended as a substitute for professional medical care. Always follow your healthcare professional's instructions.        Discharge Instructions: Taking Your Blood Pressure  Blood pressure is the force of blood as it moves from the heart through the blood vessels. You can take your own blood pressure reading using a digital monitor. Take readings as often as your healthcare provider instructs. Take your readings each time in the same way, using the same arm. Here are guidelines for taking your blood pressure.  The American Heart Association (AHA) recommends purchasing a blood pressure monitor that is validated and approved by the Association  for the Advancement of Medical Instrumentation, the [place] Hypertension Society, and the International Protocol for the Validation of Automated BP Measuring Devices. If the blood pressure monitor is for a senior adult, a pregnant woman, or a child, make certain it is validated for use with such a population. For the most reliable readings, the AHA recommends an automatic, cuff-style, upper arm (bicep) monitor. The readings from finger and wrist monitors are not as reliable as the upper arm monitor.        Step 1. Relax    · Wait at least a half hour after smoking, eating, or exercising. Do not drink coffee, tea, soda, or other caffeinated beverages before checking your blood pressure.   · Sit comfortably at a table. Place the monitor near you.  · Rest for a few minutes before you begin.        Step 2. Wrap the cuff    · Place your arm on the table, palm up. Put your arm in a position that is level with your heart. Wrap the cuff around your upper arm, about an inch above your elbow. Its best to wrap the cuff on bare skin, not over clothing.  · Make sure your cuff fits. If it doesnt wrap around your upper arm, order a larger cuff. A cuff that is too large or too small can result in an inaccurate blood pressure reading.           Step 3. Inflate the cuff    · Pump the cuff until the scale reads 200. If you have a self-inflating cuff, push the button that starts the pump.  · The cuff will tighten, then loosen.  · The numbers will change. When they stop changing, your blood pressure reading will appear.  · If you get a reading that is too high or too low for you, relax for a few minutes. Then do the test again.    Step 4. Write down the results  · Write down your blood pressure numbers. Julito the date and time. Keep your results in one place, such as a notebook.  · Remove the cuff from your arm. Turn off the machine.  · Take the readings with you to your medical appointments.  · If you start a new blood pressure  medicine, or change a blood pressure medicine dose, note the day you started the new drug or dosage on your blood pressure recording sheet. This will help your healthcare provider monitor the effect of medication changes.     Date Last Reviewed: 4/27/2016  © 8634-6671 [employer]. [address] [place] [address]. All rights reserved. This information is not intended as a substitute for professional medical care. Always follow your healthcare professional's instructions.        Step-by-Step  Checking Your Blood Pressure    Date Last Reviewed: 4/27/2016  © 3031-5009 [employer]. [address] [place] [address]. All rights reserved. This information is not intended as a substitute for professional medical care. Always follow your healthcare professional's instructions.

## 2017-12-01 ENCOUNTER — OFFICE VISIT (OUTPATIENT)
Dept: OBSTETRICS AND GYNECOLOGY | Facility: CLINIC | Age: 62
End: 2017-12-01
Payer: COMMERCIAL

## 2017-12-01 VITALS
BODY MASS INDEX: 38.3 KG/M2 | WEIGHT: 244 LBS | SYSTOLIC BLOOD PRESSURE: 128 MMHG | DIASTOLIC BLOOD PRESSURE: 80 MMHG | HEIGHT: 67 IN

## 2017-12-01 DIAGNOSIS — Z12.31 VISIT FOR SCREENING MAMMOGRAM: Primary | ICD-10-CM

## 2017-12-01 DIAGNOSIS — Z01.419 WELL WOMAN EXAM WITH ROUTINE GYNECOLOGICAL EXAM: ICD-10-CM

## 2017-12-01 PROCEDURE — 99396 PREV VISIT EST AGE 40-64: CPT | Mod: S$GLB,,, | Performed by: OBSTETRICS & GYNECOLOGY

## 2017-12-01 PROCEDURE — 99999 PR PBB SHADOW E&M-EST. PATIENT-LVL II: CPT | Mod: PBBFAC,,, | Performed by: OBSTETRICS & GYNECOLOGY

## 2017-12-01 RX ORDER — CLOTRIMAZOLE AND BETAMETHASONE DIPROPIONATE 10; .64 MG/G; MG/G
CREAM TOPICAL
Qty: 45 G | Refills: 1 | Status: SHIPPED | OUTPATIENT
Start: 2017-12-01 | End: 2018-02-18 | Stop reason: SDUPTHER

## 2017-12-06 ENCOUNTER — HOSPITAL ENCOUNTER (OUTPATIENT)
Dept: RADIOLOGY | Facility: HOSPITAL | Age: 62
Discharge: HOME | End: 2017-12-06
Attending: OBSTETRICS & GYNECOLOGY
Payer: COMMERCIAL

## 2017-12-06 ENCOUNTER — OFFICE VISIT (OUTPATIENT)
Dept: FAMILY MEDICINE | Facility: CLINIC | Age: 62
End: 2017-12-06
Payer: COMMERCIAL

## 2017-12-06 VITALS — HEIGHT: 67 IN | WEIGHT: 244 LBS | BODY MASS INDEX: 38.3 KG/M2

## 2017-12-06 VITALS
BODY MASS INDEX: 38.33 KG/M2 | TEMPERATURE: 99 F | HEIGHT: 67 IN | SYSTOLIC BLOOD PRESSURE: 138 MMHG | WEIGHT: 244.19 LBS | DIASTOLIC BLOOD PRESSURE: 88 MMHG | OXYGEN SATURATION: 98 % | HEART RATE: 93 BPM

## 2017-12-06 DIAGNOSIS — I10 WHITE COAT SYNDROME WITH DIAGNOSIS OF HYPERTENSION: ICD-10-CM

## 2017-12-06 DIAGNOSIS — Z12.31 VISIT FOR SCREENING MAMMOGRAM: ICD-10-CM

## 2017-12-06 DIAGNOSIS — I10 HYPERTENSION, UNSPECIFIED TYPE: Primary | ICD-10-CM

## 2017-12-06 PROCEDURE — 99999 PR PBB SHADOW E&M-EST. PATIENT-LVL IV: CPT | Mod: PBBFAC,,,

## 2017-12-06 PROCEDURE — 77067 SCR MAMMO BI INCL CAD: CPT | Mod: 26,,, | Performed by: RADIOLOGY

## 2017-12-06 PROCEDURE — 99212 OFFICE O/P EST SF 10 MIN: CPT | Mod: S$GLB,,,

## 2017-12-06 PROCEDURE — 77067 SCR MAMMO BI INCL CAD: CPT | Mod: TC

## 2017-12-06 NOTE — PROGRESS NOTES
Subjective:       Patient ID: Silvia Cotton is a 62 y.o. female.    Chief Complaint: Follow-up    HPI   Patient is here for two-week follow-up for blood pressure.  She was seen 2 weeks ago and prescribed Zestoretic to start her blood pressure would not come down into normal range.  She bought a new automatic blood pressure cuff and is monitoring her blood pressure at home which has been running in the 110-120 systolically and into the 70s and 80s diastolically.  The patient has not started taking Zestoretic yet.  She denies any chest pain or shortness of breath.  She has started exercising some.  She voices a low-sodium diet.     Review of Systems   Constitutional: Negative for activity change, appetite change, fatigue and unexpected weight change.   HENT: Negative.    Eyes: Negative.    Respiratory: Negative for cough, chest tightness, shortness of breath and wheezing.    Cardiovascular: Negative for chest pain, palpitations and leg swelling.   Gastrointestinal: Negative for constipation, diarrhea, nausea and vomiting.   Endocrine: Negative.    Genitourinary: Negative.    Musculoskeletal: Negative.    Skin: Negative for color change.   Allergic/Immunologic: Negative.    Neurological: Negative for dizziness, weakness and light-headedness.   Hematological: Negative.    Psychiatric/Behavioral: Negative for sleep disturbance.         Objective:      Physical Exam   Constitutional: She is oriented to person, place, and time. She appears well-developed and well-nourished.   HENT:   Head: Normocephalic and atraumatic.   Eyes: Conjunctivae are normal. Pupils are equal, round, and reactive to light. No scleral icterus.   Neck: Normal range of motion. Neck supple.   Cardiovascular: Normal rate, regular rhythm, normal heart sounds and intact distal pulses.  Exam reveals no gallop and no friction rub.    No murmur heard.  Pulmonary/Chest: Effort normal and breath sounds normal. No respiratory distress. She has no wheezes.  She has no rales. She exhibits no tenderness.   Musculoskeletal: Normal range of motion.   Lymphadenopathy:     She has no cervical adenopathy.   Neurological: She is alert and oriented to person, place, and time. She exhibits normal muscle tone. Coordination normal.   Skin: Skin is warm and dry. No rash noted. No erythema. No pallor.   Psychiatric: She has a normal mood and affect. Her behavior is normal. Judgment and thought content normal.   Vitals reviewed.      Assessment:       1. Hypertension, unspecified type    2. White coat syndrome with diagnosis of hypertension          Plan:   Hypertension, unspecified type    White coat syndrome with diagnosis of hypertension  Continue current medication, atenolol, as prescribed adhere to a low-sodium diet and increase exercise.  Continue monitoring blood pressure at home and if the pressure is over 140 systolic or 90 diastolic on a regular basis, begin  taking his Zestoretic as prescribed.           Disclaimer: This note is prepared using voice recognition software.

## 2017-12-06 NOTE — PROGRESS NOTES
HPI:  62 y.o. female patient  presents today for annual exam.  She is c/o rash between her breasts.  She is  and not on HRT.  She has had an endometrial ablation in past.  No other concerns.      Past Medical History:   Diagnosis Date    Anemia     Hyperlipidemia     Hypertension     Supraventricular tachycardia        Past Surgical History:   Procedure Laterality Date    APPENDECTOMY      CHOLECYSTECTOMY      COLONOSCOPY N/A 2017    Procedure: COLONOSCOPY;  Surgeon: Taj Nicholas MD;  Location: [facility];  Service: Endoscopy;  Laterality: N/A;    SHOULDER ARTHROSCOPY      Tonsilectomy      TONSILLECTOMY      Uterine Ablation         REVIEW OF SYSTEMS:  GENERAL:  No fever, chills, fatigue, or weight loss  ABDOMEN:  Normal appetite, no weight loss or abdominal pain  URINARY:  No flank pain, dysuria, or hematuria  REPRODUCTIVE:  No abnormal vaginal bleeding  BREASTS:  No tenderness, masses, or nipple discharge noted of breasts    PHYSICAL EXAM:    APPEARANCE:  Well nourished, well developed, in no acute distress  NECK:  Symmetric without masses or thyromegaly  BREASTS:  Symmetrical, no skin changes or visible lesions.  No palpable masses, nipple discharge, or adenopathy bilaterally.  Candida rash.  ABDOMEN:  Soft, no tenderness or masses, no distension noted  PELVIC:  VULVA:  No lesions.  Normal female genitalia  URETHRAL MEATUS:  Normal size and location.  No lesions.  No prolapse  URETHRA:  No masses, tenderness, prolapse, or scarring  VAGINA:  No lesions or discharge.  No significant cystocele or rectocele  CERVIX:  No lesions.  Normal diameter, no cervical motion tenderness.  UTERUS:  4-6 week size, regular shape, mobile, non-tender, normal position, good support  ADNEXA:  No masses or tenderness  ANUS AND PERINEUM:  No lesions.  No external hemorrhoids    1. Visit for screening mammogram  Mammo Digital Screening Bilat With CAD   2. Well woman exam with routine gynecological exam            PLAN:  MMG scheduled.  Lotrisone Rx.  Patient counseled regarding recommended routine health maintenance for her age.

## 2018-01-19 ENCOUNTER — OFFICE VISIT (OUTPATIENT)
Dept: OPHTHALMOLOGY | Facility: CLINIC | Age: 63
End: 2018-01-19
Payer: COMMERCIAL

## 2018-01-19 DIAGNOSIS — H04.123 DRY EYES, BILATERAL: Primary | ICD-10-CM

## 2018-01-19 DIAGNOSIS — H52.7 REFRACTIVE ERROR: ICD-10-CM

## 2018-01-19 PROCEDURE — 99999 PR PBB SHADOW E&M-EST. PATIENT-LVL I: CPT | Mod: PBBFAC,,, | Performed by: OPTOMETRIST

## 2018-01-19 PROCEDURE — 92015 DETERMINE REFRACTIVE STATE: CPT | Mod: S$GLB,,, | Performed by: OPTOMETRIST

## 2018-01-19 PROCEDURE — 92014 COMPRE OPH EXAM EST PT 1/>: CPT | Mod: S$GLB,,, | Performed by: OPTOMETRIST

## 2018-01-19 NOTE — PROGRESS NOTES
HPI     No visual complaints. Last eye exam 10/12/2016 TRF. Update glasses RX.   Patient wearing old glasses, glasses broke.    Last edited by Tiffani Newberry on 1/19/2018  8:31 AM. (History)            Assessment /Plan     For exam results, see Encounter Report.    Dry eyes, bilateral    Refractive error      AT prn OU    Dispense Final Rx for glasses.  RTC 1 year

## 2018-02-19 RX ORDER — CLOTRIMAZOLE AND BETAMETHASONE DIPROPIONATE 10; .64 MG/G; MG/G
CREAM TOPICAL
Qty: 45 G | Refills: 1 | Status: SHIPPED | OUTPATIENT
Start: 2018-02-19 | End: 2018-04-13 | Stop reason: SDUPTHER

## 2018-02-19 RX ORDER — CYCLOBENZAPRINE HCL 10 MG
TABLET ORAL
Qty: 20 TABLET | Refills: 2 | Status: SHIPPED | OUTPATIENT
Start: 2018-02-19 | End: 2018-04-13 | Stop reason: SDUPTHER

## 2018-03-20 RX ORDER — CYANOCOBALAMIN 1000 UG/ML
INJECTION, SOLUTION INTRAMUSCULAR; SUBCUTANEOUS
Qty: 10 ML | Refills: 1 | Status: SHIPPED | OUTPATIENT
Start: 2018-03-20 | End: 2018-10-23 | Stop reason: SDUPTHER

## 2018-03-22 RX ORDER — ATENOLOL 25 MG/1
25 TABLET ORAL 2 TIMES DAILY
Qty: 60 TABLET | Refills: 3 | Status: SHIPPED | OUTPATIENT
Start: 2018-03-22 | End: 2018-11-12 | Stop reason: SDUPTHER

## 2018-03-27 RX ORDER — ATENOLOL 25 MG/1
25 TABLET ORAL 2 TIMES DAILY
Qty: 60 TABLET | Refills: 3 | Status: SHIPPED | OUTPATIENT
Start: 2018-03-27 | End: 2018-05-30 | Stop reason: SDUPTHER

## 2018-04-13 RX ORDER — CLOTRIMAZOLE AND BETAMETHASONE DIPROPIONATE 10; .64 MG/G; MG/G
CREAM TOPICAL
Qty: 45 G | Refills: 1 | Status: SHIPPED | OUTPATIENT
Start: 2018-04-13 | End: 2018-06-14 | Stop reason: SDUPTHER

## 2018-04-15 RX ORDER — CYCLOBENZAPRINE HCL 10 MG
TABLET ORAL
Qty: 20 TABLET | Refills: 2 | Status: SHIPPED | OUTPATIENT
Start: 2018-04-15 | End: 2018-05-30 | Stop reason: SDUPTHER

## 2018-05-02 RX ORDER — VENLAFAXINE HYDROCHLORIDE 75 MG/1
CAPSULE, EXTENDED RELEASE ORAL
Qty: 90 CAPSULE | Refills: 3 | Status: SHIPPED | OUTPATIENT
Start: 2018-05-02 | End: 2019-04-22 | Stop reason: SDUPTHER

## 2018-05-23 ENCOUNTER — LAB VISIT (OUTPATIENT)
Dept: LAB | Facility: HOSPITAL | Age: 63
End: 2018-05-23
Attending: FAMILY MEDICINE
Payer: COMMERCIAL

## 2018-05-23 DIAGNOSIS — E78.2 MIXED HYPERLIPIDEMIA: ICD-10-CM

## 2018-05-23 DIAGNOSIS — E53.8 B12 DEFICIENCY: ICD-10-CM

## 2018-05-23 DIAGNOSIS — D50.0 IRON DEFICIENCY ANEMIA DUE TO CHRONIC BLOOD LOSS: ICD-10-CM

## 2018-05-23 LAB
ALBUMIN SERPL BCP-MCNC: 3.9 G/DL
ALP SERPL-CCNC: 99 U/L
ALT SERPL W/O P-5'-P-CCNC: 16 U/L
ANION GAP SERPL CALC-SCNC: 10 MMOL/L
AST SERPL-CCNC: 21 U/L
BASOPHILS # BLD AUTO: 0.05 K/UL
BASOPHILS NFR BLD: 0.6 %
BILIRUB SERPL-MCNC: 0.6 MG/DL
BUN SERPL-MCNC: 14 MG/DL
CALCIUM SERPL-MCNC: 10.1 MG/DL
CHLORIDE SERPL-SCNC: 104 MMOL/L
CHOLEST SERPL-MCNC: 163 MG/DL
CHOLEST/HDLC SERPL: 4.7 {RATIO}
CO2 SERPL-SCNC: 26 MMOL/L
CREAT SERPL-MCNC: 1.1 MG/DL
DIFFERENTIAL METHOD: ABNORMAL
EOSINOPHIL # BLD AUTO: 0.3 K/UL
EOSINOPHIL NFR BLD: 3.4 %
ERYTHROCYTE [DISTWIDTH] IN BLOOD BY AUTOMATED COUNT: 14.9 %
EST. GFR  (AFRICAN AMERICAN): >60 ML/MIN/1.73 M^2
EST. GFR  (NON AFRICAN AMERICAN): 54.7 ML/MIN/1.73 M^2
ESTIMATED AVG GLUCOSE: 105 MG/DL
GLUCOSE SERPL-MCNC: 97 MG/DL
HBA1C MFR BLD HPLC: 5.3 %
HCT VFR BLD AUTO: 41.6 %
HDLC SERPL-MCNC: 35 MG/DL
HDLC SERPL: 21.5 %
HGB BLD-MCNC: 13 G/DL
IMM GRANULOCYTES # BLD AUTO: 0.02 K/UL
IMM GRANULOCYTES NFR BLD AUTO: 0.2 %
IRON SERPL-MCNC: 56 UG/DL
IRON SERPL-MCNC: 56 UG/DL
LDLC SERPL CALC-MCNC: 85 MG/DL
LYMPHOCYTES # BLD AUTO: 2.1 K/UL
LYMPHOCYTES NFR BLD: 24.7 %
MCH RBC QN AUTO: 29.1 PG
MCHC RBC AUTO-ENTMCNC: 31.3 G/DL
MCV RBC AUTO: 93 FL
MONOCYTES # BLD AUTO: 0.8 K/UL
MONOCYTES NFR BLD: 9.5 %
NEUTROPHILS # BLD AUTO: 5.3 K/UL
NEUTROPHILS NFR BLD: 61.6 %
NONHDLC SERPL-MCNC: 128 MG/DL
NRBC BLD-RTO: 0 /100 WBC
PLATELET # BLD AUTO: 296 K/UL
PMV BLD AUTO: 11.8 FL
POTASSIUM SERPL-SCNC: 5.3 MMOL/L
PROT SERPL-MCNC: 7.5 G/DL
RBC # BLD AUTO: 4.46 M/UL
SATURATED IRON: 14 %
SODIUM SERPL-SCNC: 140 MMOL/L
TOTAL IRON BINDING CAPACITY: 410 UG/DL
TRANSFERRIN SERPL-MCNC: 277 MG/DL
TRIGL SERPL-MCNC: 215 MG/DL
VIT B12 SERPL-MCNC: 1327 PG/ML
WBC # BLD AUTO: 8.54 K/UL

## 2018-05-23 PROCEDURE — 83540 ASSAY OF IRON: CPT

## 2018-05-23 PROCEDURE — 80053 COMPREHEN METABOLIC PANEL: CPT

## 2018-05-23 PROCEDURE — 83036 HEMOGLOBIN GLYCOSYLATED A1C: CPT

## 2018-05-23 PROCEDURE — 36415 COLL VENOUS BLD VENIPUNCTURE: CPT | Mod: PO

## 2018-05-23 PROCEDURE — 80061 LIPID PANEL: CPT

## 2018-05-23 PROCEDURE — 85025 COMPLETE CBC W/AUTO DIFF WBC: CPT

## 2018-05-23 PROCEDURE — 82607 VITAMIN B-12: CPT

## 2018-05-29 ENCOUNTER — PATIENT OUTREACH (OUTPATIENT)
Dept: ADMINISTRATIVE | Facility: HOSPITAL | Age: 63
End: 2018-05-29

## 2018-05-30 ENCOUNTER — OFFICE VISIT (OUTPATIENT)
Dept: FAMILY MEDICINE | Facility: CLINIC | Age: 63
End: 2018-05-30
Payer: COMMERCIAL

## 2018-05-30 VITALS
HEIGHT: 67 IN | HEART RATE: 92 BPM | OXYGEN SATURATION: 98 % | DIASTOLIC BLOOD PRESSURE: 80 MMHG | SYSTOLIC BLOOD PRESSURE: 128 MMHG | WEIGHT: 245.56 LBS | BODY MASS INDEX: 38.54 KG/M2 | TEMPERATURE: 98 F

## 2018-05-30 DIAGNOSIS — D50.0 IRON DEFICIENCY ANEMIA DUE TO CHRONIC BLOOD LOSS: ICD-10-CM

## 2018-05-30 DIAGNOSIS — F51.04 PSYCHOPHYSIOLOGICAL INSOMNIA: ICD-10-CM

## 2018-05-30 DIAGNOSIS — Z00.00 WELL ADULT EXAM: Primary | ICD-10-CM

## 2018-05-30 DIAGNOSIS — K21.9 GASTROESOPHAGEAL REFLUX DISEASE WITHOUT ESOPHAGITIS: ICD-10-CM

## 2018-05-30 DIAGNOSIS — E78.2 MIXED HYPERLIPIDEMIA: ICD-10-CM

## 2018-05-30 DIAGNOSIS — E53.8 B12 DEFICIENCY: ICD-10-CM

## 2018-05-30 PROCEDURE — 3079F DIAST BP 80-89 MM HG: CPT | Mod: CPTII,S$GLB,, | Performed by: FAMILY MEDICINE

## 2018-05-30 PROCEDURE — 3074F SYST BP LT 130 MM HG: CPT | Mod: CPTII,S$GLB,, | Performed by: FAMILY MEDICINE

## 2018-05-30 PROCEDURE — 99999 PR PBB SHADOW E&M-EST. PATIENT-LVL III: CPT | Mod: PBBFAC,,, | Performed by: FAMILY MEDICINE

## 2018-05-30 PROCEDURE — 99396 PREV VISIT EST AGE 40-64: CPT | Mod: S$GLB,,, | Performed by: FAMILY MEDICINE

## 2018-05-30 RX ORDER — CYCLOBENZAPRINE HCL 10 MG
TABLET ORAL
Qty: 20 TABLET | Refills: 2 | Status: SHIPPED | OUTPATIENT
Start: 2018-05-30 | End: 2018-09-13 | Stop reason: SDUPTHER

## 2018-05-30 RX ORDER — ZOLPIDEM TARTRATE 10 MG/1
TABLET ORAL
Qty: 30 TABLET | Refills: 5 | Status: SHIPPED | OUTPATIENT
Start: 2018-05-30 | End: 2018-11-28 | Stop reason: SDUPTHER

## 2018-05-30 NOTE — PROGRESS NOTES
Chief Complaint:    Chief Complaint   Patient presents with    Follow-up     6 mo f/u        History of Present Illness:  The patient presents today for six-month follow-up,  Doing okay home blood pressure readings are okay.  She has iron deficiency anemia and typically takes iron infusion her CBC is normal but iron levels are slightly low.  Patient's labs otherwise look okay but still doing B12 injections once weekly taking Ambien for sleep depression anxiety stable.    ROS:  Review of Systems   Constitutional: Negative for activity change, chills, fatigue, fever and unexpected weight change.   HENT: Negative for congestion, ear discharge, ear pain, hearing loss, postnasal drip and rhinorrhea.    Eyes: Negative for pain and visual disturbance.   Respiratory: Negative for cough, chest tightness and shortness of breath.    Cardiovascular: Negative for chest pain and palpitations.   Gastrointestinal: Negative for abdominal pain, diarrhea and vomiting.   Endocrine: Negative for heat intolerance.   Genitourinary: Negative for dysuria, flank pain, frequency and hematuria.   Musculoskeletal: Negative for back pain, gait problem and neck pain.   Skin: Negative for color change and rash.   Neurological: Negative for dizziness, tremors, seizures, numbness and headaches.   Psychiatric/Behavioral: Negative for agitation, hallucinations, self-injury, sleep disturbance and suicidal ideas. The patient is not nervous/anxious.        Past Medical History:   Diagnosis Date    Anemia     Hyperlipidemia     Hypertension     Supraventricular tachycardia        Social History:  Social History     Social History    Marital status:      Spouse name: N/A    Number of children: N/A    Years of education: N/A     Social History Main Topics    Smoking status: Never Smoker    Smokeless tobacco: Never Used    Alcohol use No    Drug use: No    Sexual activity: Yes     Partners: Male     Birth control/ protection: None  "    Other Topics Concern    None     Social History Narrative    None       Family History:   family history includes Cataracts in her father and mother.    Health Maintenance   Topic Date Due    Zoster Vaccine  10/12/2017    Influenza Vaccine  08/01/2018    Mammogram  12/06/2018    Lipid Panel  05/23/2019    Pap Smear with HPV Cotest  11/04/2019    Colonoscopy  01/13/2022    TETANUS VACCINE  12/01/2024    Hepatitis C Screening  Completed       Physical Exam:    Vital Signs  Temp: 98.2 °F (36.8 °C)  Temp src: Tympanic  Pulse: 92  SpO2: 98 %  BP: 128/80  BP Location: Left arm  Patient Position: Sitting  Pain Score: 0-No pain  Height and Weight  Height: 5' 7" (170.2 cm)  Weight: 111.4 kg (245 lb 9.5 oz)  BSA (Calculated - sq m): 2.29 sq meters  BMI (Calculated): 38.5  Weight in (lb) to have BMI = 25: 159.3]    Body mass index is 38.47 kg/m².    Physical Exam   Constitutional: She is oriented to person, place, and time. She appears well-developed.   HENT:   Mouth/Throat: Oropharynx is clear and moist.   Eyes: Conjunctivae are normal. Pupils are equal, round, and reactive to light.   Neck: Normal range of motion. Neck supple.   Cardiovascular: Normal rate, regular rhythm and normal heart sounds.    No murmur heard.  Pulmonary/Chest: Effort normal and breath sounds normal. No respiratory distress. She has no wheezes. She has no rales. She exhibits no tenderness.   Abdominal: Soft. She exhibits no distension and no mass. There is no tenderness. There is no guarding.   Musculoskeletal: She exhibits no edema or tenderness.   Lymphadenopathy:     She has no cervical adenopathy.   Neurological: She is alert and oriented to person, place, and time. She has normal reflexes.   Skin: Skin is warm and dry.   Psychiatric: She has a normal mood and affect. Her behavior is normal. Judgment and thought content normal.       Results for orders placed or performed in visit on 05/23/18   Lipid panel   Result Value Ref Range    " Cholesterol 163 120 - 199 mg/dL    Triglycerides 215 (H) 30 - 150 mg/dL    HDL 35 (L) 40 - 75 mg/dL    LDL Cholesterol 85.0 63.0 - 159.0 mg/dL    HDL/Chol Ratio 21.5 20.0 - 50.0 %    Total Cholesterol/HDL Ratio 4.7 2.0 - 5.0    Non-HDL Cholesterol 128 mg/dL   Comprehensive metabolic panel   Result Value Ref Range    Sodium 140 136 - 145 mmol/L    Potassium 5.3 (H) 3.5 - 5.1 mmol/L    Chloride 104 95 - 110 mmol/L    CO2 26 23 - 29 mmol/L    Glucose 97 70 - 110 mg/dL    BUN, Bld 14 6 - 20 mg/dL    Creatinine 1.1 0.5 - 1.4 mg/dL    Calcium 10.1 8.7 - 10.5 mg/dL    Total Protein 7.5 6.0 - 8.4 g/dL    Albumin 3.9 3.5 - 5.2 g/dL    Total Bilirubin 0.6 0.1 - 1.0 mg/dL    Alkaline Phosphatase 99 55 - 135 U/L    AST 21 10 - 40 U/L    ALT 16 10 - 44 U/L    Anion Gap 10 8 - 16 mmol/L    eGFR if African American >60.0 >60 mL/min/1.73 m^2    eGFR if non  54.7 (A) >60 mL/min/1.73 m^2   Hemoglobin A1c   Result Value Ref Range    Hemoglobin A1C 5.3 4.0 - 5.6 %    Estimated Avg Glucose 105 68 - 131 mg/dL   Vitamin B12   Result Value Ref Range    Vitamin B-12 1327 (H) 210 - 950 pg/mL   CBC auto differential   Result Value Ref Range    WBC 8.54 3.90 - 12.70 K/uL    RBC 4.46 4.00 - 5.40 M/uL    Hemoglobin 13.0 12.0 - 16.0 g/dL    Hematocrit 41.6 37.0 - 48.5 %    MCV 93 82 - 98 fL    MCH 29.1 27.0 - 31.0 pg    MCHC 31.3 (L) 32.0 - 36.0 g/dL    RDW 14.9 (H) 11.5 - 14.5 %    Platelets 296 150 - 350 K/uL    MPV 11.8 9.2 - 12.9 fL    Immature Granulocytes 0.2 0.0 - 0.5 %    Gran # (ANC) 5.3 1.8 - 7.7 K/uL    Immature Grans (Abs) 0.02 0.00 - 0.04 K/uL    Lymph # 2.1 1.0 - 4.8 K/uL    Mono # 0.8 0.3 - 1.0 K/uL    Eos # 0.3 0.0 - 0.5 K/uL    Baso # 0.05 0.00 - 0.20 K/uL    nRBC 0 0 /100 WBC    Gran% 61.6 38.0 - 73.0 %    Lymph% 24.7 18.0 - 48.0 %    Mono% 9.5 4.0 - 15.0 %    Eosinophil% 3.4 0.0 - 8.0 %    Basophil% 0.6 0.0 - 1.9 %    Differential Method Automated    Iron and TIBC   Result Value Ref Range    Iron 56 30 - 160  "ug/dL    Transferrin 277 200 - 375 mg/dL    TIBC 410 250 - 450 ug/dL    Saturated Iron 14 (L) 20 - 50 %   Iron   Result Value Ref Range    Iron 56 30 - 160 ug/dL         Lab Results   Component Value Date    HGBA1C 5.3 05/23/2018       Assessment:      ICD-10-CM ICD-9-CM   1. Well adult exam Z00.00 V70.0   2. B12 deficiency E53.8 266.2   3. Gastroesophageal reflux disease without esophagitis K21.9 530.81   4. Mixed hyperlipidemia E78.2 272.2   5. Iron deficiency anemia due to chronic blood loss D50.0 280.0   6. Psychophysiological insomnia F51.04 307.42         Plan:    She is to continue current medication plan  Do recommend exercise and weight loss.  Patient's Ambien refill today.      Orders Placed This Encounter   Procedures    Comprehensive metabolic panel    CBC auto differential    Lipid panel       Current Outpatient Prescriptions   Medication Sig Dispense Refill    amitriptyline (ELAVIL) 50 MG tablet Take 1 tablet (50 mg total) by mouth every evening. 30 tablet 3    atenolol (TENORMIN) 25 MG tablet TAKE 1 TABLET (25 MG TOTAL) BY MOUTH 2 (TWO) TIMES DAILY. 60 tablet 3    clotrimazole-betamethasone 1-0.05% (LOTRISONE) cream APPLY TO AFFECTED AREA 2 TIMES DAILY (Patient taking differently: APPLY TO AFFECTED AREA 2 TIMES DAILY PRN) 45 g 1    cyanocobalamin 1,000 mcg/mL injection INJECT 1MLS WEEKLY FOR 8 WEEKS 10 mL 1    cyclobenzaprine (FLEXERIL) 10 MG tablet TAKE 1 TABLET BY MOUTH EVERY 8 HOURS AS NEEDED FOR MUSCLE SPASMS 20 tablet 2    diclofenac sodium (VOLTAREN) 1 % Gel Apply 2 g topically once daily. 1 Tube 2    needle, disp, 18 G 18 x 1 " Ndle Use to draw b12 1000 mcg from vail every 7 days for 7 weeks 100 each 0    needle, reusable, 25 G 25 x 1 " Ndle Use to inject B12 1000 mcg every 7 days for 7 weeks 12 each 2    nystatin (MYCOSTATIN) powder Apply TID prn to affected area 60 g 3    omeprazole (PRILOSEC) 40 MG capsule Take 1 capsule (40 mg total) by mouth once daily. 30 capsule 5    " rosuvastatin (CRESTOR) 20 MG tablet TAKE 1 TABLET (20 MG TOTAL) BY MOUTH ONCE DAILY. 90 tablet 3    syringe, disposable, 3 mL Syrg Inject B12 1000 mcg every 7 days for 7 weeks 25 each 0    venlafaxine (EFFEXOR-XR) 75 MG 24 hr capsule TAKE ONE CAPSULE BY MOUTH EVERY DAY 90 capsule 3    zolpidem (AMBIEN) 10 mg Tab TAKE 1 TABLET BY MOUTH EVERY EVENING 30 tablet 5     No current facility-administered medications for this visit.        Medications Discontinued During This Encounter   Medication Reason    atenolol (TENORMIN) 25 MG tablet Duplicate Order    cyclobenzaprine (FLEXERIL) 10 MG tablet Duplicate Order    lisinopril-hydrochlorothiazide (PRINZIDE,ZESTORETIC) 10-12.5 mg per tablet     zolpidem (AMBIEN) 10 mg Tab Reorder       Follow-up in about 6 months (around 11/30/2018).      Stephani Valdivia MD

## 2018-06-12 RX ORDER — OMEPRAZOLE 40 MG/1
40 CAPSULE, DELAYED RELEASE ORAL DAILY
Qty: 30 CAPSULE | Refills: 5 | Status: SHIPPED | OUTPATIENT
Start: 2018-06-12 | End: 2018-12-27 | Stop reason: SDUPTHER

## 2018-06-14 RX ORDER — AMITRIPTYLINE HYDROCHLORIDE 50 MG/1
50 TABLET, FILM COATED ORAL NIGHTLY
Qty: 30 TABLET | Refills: 3 | Status: SHIPPED | OUTPATIENT
Start: 2018-06-14 | End: 2018-10-14 | Stop reason: SDUPTHER

## 2018-06-14 RX ORDER — CLOTRIMAZOLE AND BETAMETHASONE DIPROPIONATE 10; .64 MG/G; MG/G
CREAM TOPICAL
Qty: 45 G | Refills: 1 | Status: SHIPPED | OUTPATIENT
Start: 2018-06-14 | End: 2020-09-16 | Stop reason: SDUPTHER

## 2018-09-13 RX ORDER — CYCLOBENZAPRINE HCL 10 MG
TABLET ORAL
Qty: 20 TABLET | Refills: 2 | Status: SHIPPED | OUTPATIENT
Start: 2018-09-13 | End: 2019-01-10 | Stop reason: SDUPTHER

## 2018-10-14 RX ORDER — AMITRIPTYLINE HYDROCHLORIDE 50 MG/1
50 TABLET, FILM COATED ORAL NIGHTLY
Qty: 30 TABLET | Refills: 3 | Status: SHIPPED | OUTPATIENT
Start: 2018-10-14 | End: 2019-02-05 | Stop reason: SDUPTHER

## 2018-10-15 RX ORDER — ROSUVASTATIN CALCIUM 20 MG/1
20 TABLET, COATED ORAL DAILY
Qty: 90 TABLET | Refills: 3 | Status: SHIPPED | OUTPATIENT
Start: 2018-10-15 | End: 2019-11-20 | Stop reason: SDUPTHER

## 2018-10-23 RX ORDER — CYANOCOBALAMIN 1000 UG/ML
INJECTION, SOLUTION INTRAMUSCULAR; SUBCUTANEOUS
Qty: 10 ML | Refills: 1 | Status: SHIPPED | OUTPATIENT
Start: 2018-10-23 | End: 2020-09-16

## 2018-10-26 RX ORDER — CYANOCOBALAMIN 1000 UG/ML
INJECTION, SOLUTION INTRAMUSCULAR; SUBCUTANEOUS
Qty: 10 ML | Refills: 1 | Status: SHIPPED | OUTPATIENT
Start: 2018-10-26 | End: 2019-01-10 | Stop reason: SDUPTHER

## 2018-11-12 RX ORDER — ATENOLOL 25 MG/1
TABLET ORAL
Qty: 60 TABLET | Refills: 3 | Status: SHIPPED | OUTPATIENT
Start: 2018-11-12 | End: 2019-03-21 | Stop reason: SDUPTHER

## 2018-11-23 ENCOUNTER — LAB VISIT (OUTPATIENT)
Dept: LAB | Facility: HOSPITAL | Age: 63
End: 2018-11-23
Attending: FAMILY MEDICINE
Payer: COMMERCIAL

## 2018-11-23 DIAGNOSIS — D50.0 IRON DEFICIENCY ANEMIA DUE TO CHRONIC BLOOD LOSS: ICD-10-CM

## 2018-11-23 DIAGNOSIS — E78.2 MIXED HYPERLIPIDEMIA: ICD-10-CM

## 2018-11-23 LAB
ALBUMIN SERPL BCP-MCNC: 4 G/DL
ALP SERPL-CCNC: 74 U/L
ALT SERPL W/O P-5'-P-CCNC: 15 U/L
ANION GAP SERPL CALC-SCNC: 9 MMOL/L
AST SERPL-CCNC: 19 U/L
BASOPHILS # BLD AUTO: 0.05 K/UL
BASOPHILS NFR BLD: 0.6 %
BILIRUB SERPL-MCNC: 0.5 MG/DL
BUN SERPL-MCNC: 14 MG/DL
CALCIUM SERPL-MCNC: 9.8 MG/DL
CHLORIDE SERPL-SCNC: 105 MMOL/L
CHOLEST SERPL-MCNC: 113 MG/DL
CHOLEST/HDLC SERPL: 3.8 {RATIO}
CO2 SERPL-SCNC: 26 MMOL/L
CREAT SERPL-MCNC: 1 MG/DL
DIFFERENTIAL METHOD: ABNORMAL
EOSINOPHIL # BLD AUTO: 0.3 K/UL
EOSINOPHIL NFR BLD: 3.4 %
ERYTHROCYTE [DISTWIDTH] IN BLOOD BY AUTOMATED COUNT: 14.5 %
EST. GFR  (AFRICAN AMERICAN): >60 ML/MIN/1.73 M^2
EST. GFR  (NON AFRICAN AMERICAN): >60 ML/MIN/1.73 M^2
GLUCOSE SERPL-MCNC: 107 MG/DL
HCT VFR BLD AUTO: 38.8 %
HDLC SERPL-MCNC: 30 MG/DL
HDLC SERPL: 26.5 %
HGB BLD-MCNC: 11.8 G/DL
IMM GRANULOCYTES # BLD AUTO: 0.02 K/UL
IMM GRANULOCYTES NFR BLD AUTO: 0.2 %
LDLC SERPL CALC-MCNC: 53 MG/DL
LYMPHOCYTES # BLD AUTO: 2.3 K/UL
LYMPHOCYTES NFR BLD: 25.6 %
MCH RBC QN AUTO: 27.9 PG
MCHC RBC AUTO-ENTMCNC: 30.4 G/DL
MCV RBC AUTO: 92 FL
MONOCYTES # BLD AUTO: 0.7 K/UL
MONOCYTES NFR BLD: 8.2 %
NEUTROPHILS # BLD AUTO: 5.6 K/UL
NEUTROPHILS NFR BLD: 62 %
NONHDLC SERPL-MCNC: 83 MG/DL
NRBC BLD-RTO: 0 /100 WBC
PLATELET # BLD AUTO: 249 K/UL
PMV BLD AUTO: 13 FL
POTASSIUM SERPL-SCNC: 4.1 MMOL/L
PROT SERPL-MCNC: 7.3 G/DL
RBC # BLD AUTO: 4.23 M/UL
SODIUM SERPL-SCNC: 140 MMOL/L
TRIGL SERPL-MCNC: 150 MG/DL
WBC # BLD AUTO: 9.02 K/UL

## 2018-11-23 PROCEDURE — 85025 COMPLETE CBC W/AUTO DIFF WBC: CPT

## 2018-11-23 PROCEDURE — 80053 COMPREHEN METABOLIC PANEL: CPT

## 2018-11-23 PROCEDURE — 80061 LIPID PANEL: CPT

## 2018-11-23 PROCEDURE — 36415 COLL VENOUS BLD VENIPUNCTURE: CPT | Mod: PO

## 2018-11-28 ENCOUNTER — OFFICE VISIT (OUTPATIENT)
Dept: FAMILY MEDICINE | Facility: CLINIC | Age: 63
End: 2018-11-28
Payer: COMMERCIAL

## 2018-11-28 ENCOUNTER — PATIENT MESSAGE (OUTPATIENT)
Dept: FAMILY MEDICINE | Facility: CLINIC | Age: 63
End: 2018-11-28

## 2018-11-28 VITALS
OXYGEN SATURATION: 98 % | WEIGHT: 220.81 LBS | HEIGHT: 69 IN | SYSTOLIC BLOOD PRESSURE: 118 MMHG | DIASTOLIC BLOOD PRESSURE: 74 MMHG | TEMPERATURE: 99 F | BODY MASS INDEX: 32.71 KG/M2 | HEART RATE: 96 BPM

## 2018-11-28 DIAGNOSIS — D50.0 IRON DEFICIENCY ANEMIA DUE TO CHRONIC BLOOD LOSS: ICD-10-CM

## 2018-11-28 DIAGNOSIS — E78.2 MIXED HYPERLIPIDEMIA: ICD-10-CM

## 2018-11-28 DIAGNOSIS — K21.9 GASTROESOPHAGEAL REFLUX DISEASE WITHOUT ESOPHAGITIS: ICD-10-CM

## 2018-11-28 DIAGNOSIS — M53.3 CHRONIC LEFT SI JOINT PAIN: Primary | ICD-10-CM

## 2018-11-28 DIAGNOSIS — R73.01 IMPAIRED FASTING BLOOD SUGAR: ICD-10-CM

## 2018-11-28 DIAGNOSIS — G89.29 CHRONIC LEFT SI JOINT PAIN: Primary | ICD-10-CM

## 2018-11-28 DIAGNOSIS — F51.04 PSYCHOPHYSIOLOGICAL INSOMNIA: ICD-10-CM

## 2018-11-28 DIAGNOSIS — E53.8 B12 DEFICIENCY: ICD-10-CM

## 2018-11-28 DIAGNOSIS — F43.0 STRESS REACTION: ICD-10-CM

## 2018-11-28 DIAGNOSIS — R00.0 TACHYCARDIA: ICD-10-CM

## 2018-11-28 PROCEDURE — 3078F DIAST BP <80 MM HG: CPT | Mod: CPTII,S$GLB,, | Performed by: FAMILY MEDICINE

## 2018-11-28 PROCEDURE — 3074F SYST BP LT 130 MM HG: CPT | Mod: CPTII,S$GLB,, | Performed by: FAMILY MEDICINE

## 2018-11-28 PROCEDURE — 99214 OFFICE O/P EST MOD 30 MIN: CPT | Mod: S$GLB,,, | Performed by: FAMILY MEDICINE

## 2018-11-28 PROCEDURE — 99999 PR PBB SHADOW E&M-EST. PATIENT-LVL III: CPT | Mod: PBBFAC,,, | Performed by: FAMILY MEDICINE

## 2018-11-28 PROCEDURE — 3008F BODY MASS INDEX DOCD: CPT | Mod: CPTII,S$GLB,, | Performed by: FAMILY MEDICINE

## 2018-11-28 RX ORDER — ZOLPIDEM TARTRATE 10 MG/1
TABLET ORAL
Qty: 30 TABLET | Refills: 5 | Status: SHIPPED | OUTPATIENT
Start: 2018-11-28 | End: 2019-05-24 | Stop reason: SDUPTHER

## 2018-11-28 NOTE — PROGRESS NOTES
Chief Complaint:    Chief Complaint   Patient presents with    Follow-up       History of Present Illness:    Patient presents today for six-month follow-up  She has also been having some back pain starts from the left side radiates down the left leg almost up to the midthigh it can last a day or so moving makes it worse.  No trauma she gets this 3 to 4 times a month.    Patient has lost weight watching her diet and exercising.  A lipids have come down.    She has iron deficiency anemia on iron infusion has been awhile since she has seen Hematology.    Taking Ambien for chronic insomnia    ROS:  Review of Systems   Constitutional: Negative for activity change, chills, fatigue, fever and unexpected weight change.   HENT: Negative for congestion, ear discharge, ear pain, hearing loss, postnasal drip and rhinorrhea.    Eyes: Negative for pain and visual disturbance.   Respiratory: Negative for cough, chest tightness and shortness of breath.    Cardiovascular: Negative for chest pain and palpitations.   Gastrointestinal: Negative for abdominal pain, diarrhea and vomiting.   Endocrine: Negative for heat intolerance.   Genitourinary: Negative for dysuria, flank pain, frequency and hematuria.   Musculoskeletal: Positive for back pain. Negative for gait problem and neck pain.   Skin: Negative for color change and rash.   Neurological: Negative for dizziness, tremors, seizures, numbness and headaches.   Psychiatric/Behavioral: Negative for agitation, hallucinations, self-injury, sleep disturbance and suicidal ideas. The patient is not nervous/anxious.        Past Medical History:   Diagnosis Date    Anemia     Hyperlipidemia     Hypertension     Supraventricular tachycardia        Social History:  Social History     Socioeconomic History    Marital status:      Spouse name: None    Number of children: None    Years of education: None    Highest education level: None   Social Needs    Financial resource  "strain: None    Food insecurity - worry: None    Food insecurity - inability: None    Transportation needs - medical: None    Transportation needs - non-medical: None   Occupational History    None   Tobacco Use    Smoking status: Never Smoker    Smokeless tobacco: Never Used   Substance and Sexual Activity    Alcohol use: No    Drug use: No    Sexual activity: Yes     Partners: Male     Birth control/protection: None   Other Topics Concern    None   Social History Narrative    None       Family History:   family history includes Cataracts in her father and mother.    Health Maintenance   Topic Date Due    Zoster Vaccine  10/12/2017    Mammogram  12/06/2018    Pap Smear with HPV Cotest  11/04/2019    Lipid Panel  11/23/2019    Colonoscopy  01/13/2022    TETANUS VACCINE  12/01/2024    Hepatitis C Screening  Completed    Influenza Vaccine  Completed       Physical Exam:    Vital Signs  Temp: 98.5 °F (36.9 °C)  Temp src: Tympanic  Pulse: 96  SpO2: 98 %  BP: 118/74  BP Location: Left arm  Patient Position: Sitting  Pain Score: 0-No pain  Height and Weight  Height: 5' 9" (175.3 cm)  Weight: 100.1 kg (220 lb 12.7 oz)  BSA (Calculated - sq m): 2.21 sq meters  BMI (Calculated): 32.7  Weight in (lb) to have BMI = 25: 168.9]    Body mass index is 32.61 kg/m².    Physical Exam   Constitutional: She is oriented to person, place, and time. She appears well-developed.   HENT:   Mouth/Throat: Oropharynx is clear and moist.   Eyes: Conjunctivae are normal. Pupils are equal, round, and reactive to light.   Neck: Normal range of motion. Neck supple.   Cardiovascular: Normal rate, regular rhythm and normal heart sounds.   No murmur heard.  Pulmonary/Chest: Effort normal and breath sounds normal. No respiratory distress. She has no wheezes. She has no rales. She exhibits no tenderness.   Abdominal: Soft. She exhibits no distension and no mass. There is no tenderness. There is no guarding.   Musculoskeletal: She " exhibits no edema or tenderness.        Back:    Straight leg raising test negative bilaterally, bilateral knee jerk ankle jerk intact.  No sensory loss bilateral legs.   Lymphadenopathy:     She has no cervical adenopathy.   Neurological: She is alert and oriented to person, place, and time. She has normal reflexes.   Skin: Skin is warm and dry.   Psychiatric: She has a normal mood and affect. Her behavior is normal. Judgment and thought content normal.         Assessment:      ICD-10-CM ICD-9-CM   1. Chronic left SI joint pain M53.3 724.6    G89.29 338.29   2. Mixed hyperlipidemia E78.2 272.2   3. B12 deficiency E53.8 266.2   4. Psychophysiological insomnia F51.04 307.42   5. Tachycardia R00.0 785.0   6. Stress reaction F43.0 308.9   7. Gastroesophageal reflux disease without esophagitis K21.9 530.81   8. Iron deficiency anemia due to chronic blood loss D50.0 280.0   9. Impaired fasting blood sugar R73.01 790.21         Plan:    Recommend SI joint injection with pain management thereafter we can recommend physical therapy.  Please get back with Hematology for iron infusion since the anemia seems to be coming back  Continue with the lifestyle changes and weight loss.  Other medical problems as above stable continue current meds and plan  Please keep appointment for mammogram.      Orders Placed This Encounter   Procedures    Comprehensive metabolic panel    CBC auto differential    Lipid panel    Hemoglobin A1c    Ambulatory referral to Pain Clinic       Current Outpatient Medications   Medication Sig Dispense Refill    amitriptyline (ELAVIL) 50 MG tablet TAKE 1 TABLET (50 MG TOTAL) BY MOUTH EVERY EVENING. 30 tablet 3    atenolol (TENORMIN) 25 MG tablet TAKE 1 TABLET BY MOUTH TWICE A DAY 60 tablet 3    clotrimazole-betamethasone 1-0.05% (LOTRISONE) cream APPLY TO AFFECTED AREA 2 TIMES DAILY 45 g 1    cyanocobalamin 1,000 mcg/mL injection INJECT 1 ML WEEKLY FOR 8 WEEKS 10 mL 1    cyanocobalamin 1,000  "mcg/mL injection INJECT 1 ML WEEKLY FOR 8 WEEKS 10 mL 1    cyclobenzaprine (FLEXERIL) 10 MG tablet TAKE 1 TABLET BY MOUTH EVERY 8 HOURS AS NEEDED FOR MUSCLE SPASMS 20 tablet 2    cyclobenzaprine (FLEXERIL) 10 MG tablet TAKE 1 TABLET BY MOUTH EVERY 8 HOURS AS NEEDED FOR MUSCLE SPASMS 20 tablet 2    diclofenac sodium (VOLTAREN) 1 % Gel Apply 2 g topically once daily. 1 Tube 2    needle, disp, 18 G 18 x 1 " Ndle Use to draw b12 1000 mcg from vail every 7 days for 7 weeks 100 each 0    needle, reusable, 25 G 25 x 1 " Ndle Use to inject B12 1000 mcg every 7 days for 7 weeks 12 each 2    nystatin (MYCOSTATIN) powder Apply TID prn to affected area 60 g 3    omeprazole (PRILOSEC) 40 MG capsule TAKE 1 CAPSULE (40 MG TOTAL) BY MOUTH ONCE DAILY. 30 capsule 5    rosuvastatin (CRESTOR) 20 MG tablet TAKE 1 TABLET (20 MG TOTAL) BY MOUTH ONCE DAILY. 90 tablet 3    syringe, disposable, 3 mL Syrg Inject B12 1000 mcg every 7 days for 7 weeks 25 each 0    venlafaxine (EFFEXOR-XR) 75 MG 24 hr capsule TAKE ONE CAPSULE BY MOUTH EVERY DAY 90 capsule 3    zolpidem (AMBIEN) 10 mg Tab TAKE 1 TABLET BY MOUTH EVERY EVENING 30 tablet 5     No current facility-administered medications for this visit.        Medications Discontinued During This Encounter   Medication Reason    zolpidem (AMBIEN) 10 mg Tab Reorder       No Follow-up on file.      Stephani Valdivia MD              "

## 2018-12-19 ENCOUNTER — PATIENT MESSAGE (OUTPATIENT)
Dept: FAMILY MEDICINE | Facility: CLINIC | Age: 63
End: 2018-12-19

## 2018-12-19 RX ORDER — MECLIZINE HCL 12.5 MG 12.5 MG/1
12.5 TABLET ORAL 3 TIMES DAILY PRN
Qty: 20 TABLET | Refills: 0 | Status: SHIPPED | OUTPATIENT
Start: 2018-12-19 | End: 2018-12-21 | Stop reason: SDUPTHER

## 2018-12-21 RX ORDER — MECLIZINE HCL 12.5 MG 12.5 MG/1
12.5 TABLET ORAL 3 TIMES DAILY PRN
Qty: 90 TABLET | Refills: 0 | Status: SHIPPED | OUTPATIENT
Start: 2018-12-21 | End: 2021-03-08 | Stop reason: SDUPTHER

## 2018-12-27 RX ORDER — OMEPRAZOLE 40 MG/1
40 CAPSULE, DELAYED RELEASE ORAL DAILY
Qty: 90 CAPSULE | Refills: 3 | Status: SHIPPED | OUTPATIENT
Start: 2018-12-27 | End: 2020-01-11

## 2019-01-10 ENCOUNTER — OFFICE VISIT (OUTPATIENT)
Dept: OBSTETRICS AND GYNECOLOGY | Facility: CLINIC | Age: 64
End: 2019-01-10
Payer: COMMERCIAL

## 2019-01-10 ENCOUNTER — HOSPITAL ENCOUNTER (OUTPATIENT)
Dept: RADIOLOGY | Facility: HOSPITAL | Age: 64
Discharge: HOME | End: 2019-01-10
Attending: PAIN MEDICINE
Payer: COMMERCIAL

## 2019-01-10 ENCOUNTER — OFFICE VISIT (OUTPATIENT)
Dept: PAIN MEDICINE | Facility: CLINIC | Age: 64
End: 2019-01-10
Payer: COMMERCIAL

## 2019-01-10 ENCOUNTER — TELEPHONE (OUTPATIENT)
Dept: PAIN MEDICINE | Facility: CLINIC | Age: 64
End: 2019-01-10

## 2019-01-10 VITALS
HEIGHT: 69 IN | DIASTOLIC BLOOD PRESSURE: 76 MMHG | BODY MASS INDEX: 32.09 KG/M2 | WEIGHT: 216.69 LBS | SYSTOLIC BLOOD PRESSURE: 118 MMHG

## 2019-01-10 VITALS
SYSTOLIC BLOOD PRESSURE: 137 MMHG | HEIGHT: 69 IN | DIASTOLIC BLOOD PRESSURE: 77 MMHG | WEIGHT: 220.69 LBS | RESPIRATION RATE: 16 BRPM | HEART RATE: 101 BPM | BODY MASS INDEX: 32.69 KG/M2

## 2019-01-10 DIAGNOSIS — Z12.31 ENCOUNTER FOR SCREENING MAMMOGRAM FOR BREAST CANCER: ICD-10-CM

## 2019-01-10 DIAGNOSIS — M53.3 SACROILIAC JOINT DYSFUNCTION: Primary | ICD-10-CM

## 2019-01-10 DIAGNOSIS — G89.29 CHRONIC LEFT-SIDED LOW BACK PAIN WITHOUT SCIATICA: ICD-10-CM

## 2019-01-10 DIAGNOSIS — Z01.419 WELL WOMAN EXAM WITH ROUTINE GYNECOLOGICAL EXAM: Primary | ICD-10-CM

## 2019-01-10 DIAGNOSIS — M54.50 CHRONIC LEFT-SIDED LOW BACK PAIN WITHOUT SCIATICA: ICD-10-CM

## 2019-01-10 PROCEDURE — 3074F SYST BP LT 130 MM HG: CPT | Mod: CPTII,S$GLB,, | Performed by: OBSTETRICS & GYNECOLOGY

## 2019-01-10 PROCEDURE — 3078F PR MOST RECENT DIASTOLIC BLOOD PRESSURE < 80 MM HG: ICD-10-PCS | Mod: CPTII,S$GLB,, | Performed by: OBSTETRICS & GYNECOLOGY

## 2019-01-10 PROCEDURE — 3078F PR MOST RECENT DIASTOLIC BLOOD PRESSURE < 80 MM HG: ICD-10-PCS | Mod: CPTII,S$GLB,, | Performed by: PAIN MEDICINE

## 2019-01-10 PROCEDURE — 99396 PREV VISIT EST AGE 40-64: CPT | Mod: S$GLB,,, | Performed by: OBSTETRICS & GYNECOLOGY

## 2019-01-10 PROCEDURE — 3078F DIAST BP <80 MM HG: CPT | Mod: CPTII,S$GLB,, | Performed by: OBSTETRICS & GYNECOLOGY

## 2019-01-10 PROCEDURE — 72100 X-RAY EXAM L-S SPINE 2/3 VWS: CPT | Mod: 26,,, | Performed by: RADIOLOGY

## 2019-01-10 PROCEDURE — 3008F PR BODY MASS INDEX (BMI) DOCUMENTED: ICD-10-PCS | Mod: CPTII,S$GLB,, | Performed by: PAIN MEDICINE

## 2019-01-10 PROCEDURE — 3075F PR MOST RECENT SYSTOLIC BLOOD PRESS GE 130-139MM HG: ICD-10-PCS | Mod: CPTII,S$GLB,, | Performed by: PAIN MEDICINE

## 2019-01-10 PROCEDURE — 3078F DIAST BP <80 MM HG: CPT | Mod: CPTII,S$GLB,, | Performed by: PAIN MEDICINE

## 2019-01-10 PROCEDURE — 87624 HPV HI-RISK TYP POOLED RSLT: CPT

## 2019-01-10 PROCEDURE — 72100 X-RAY EXAM L-S SPINE 2/3 VWS: CPT | Mod: TC

## 2019-01-10 PROCEDURE — 99204 PR OFFICE/OUTPT VISIT, NEW, LEVL IV, 45-59 MIN: ICD-10-PCS | Mod: S$GLB,,, | Performed by: PAIN MEDICINE

## 2019-01-10 PROCEDURE — 99396 PR PREVENTIVE VISIT,EST,40-64: ICD-10-PCS | Mod: S$GLB,,, | Performed by: OBSTETRICS & GYNECOLOGY

## 2019-01-10 PROCEDURE — 3074F PR MOST RECENT SYSTOLIC BLOOD PRESSURE < 130 MM HG: ICD-10-PCS | Mod: CPTII,S$GLB,, | Performed by: OBSTETRICS & GYNECOLOGY

## 2019-01-10 PROCEDURE — 99999 PR PBB SHADOW E&M-EST. PATIENT-LVL V: CPT | Mod: PBBFAC,,, | Performed by: PAIN MEDICINE

## 2019-01-10 PROCEDURE — 99999 PR PBB SHADOW E&M-EST. PATIENT-LVL III: ICD-10-PCS | Mod: PBBFAC,,, | Performed by: OBSTETRICS & GYNECOLOGY

## 2019-01-10 PROCEDURE — 72100 XR LUMBAR SPINE AP AND LATERAL: ICD-10-PCS | Mod: 26,,, | Performed by: RADIOLOGY

## 2019-01-10 PROCEDURE — 99204 OFFICE O/P NEW MOD 45 MIN: CPT | Mod: S$GLB,,, | Performed by: PAIN MEDICINE

## 2019-01-10 PROCEDURE — 3075F SYST BP GE 130 - 139MM HG: CPT | Mod: CPTII,S$GLB,, | Performed by: PAIN MEDICINE

## 2019-01-10 PROCEDURE — 99999 PR PBB SHADOW E&M-EST. PATIENT-LVL III: CPT | Mod: PBBFAC,,, | Performed by: OBSTETRICS & GYNECOLOGY

## 2019-01-10 PROCEDURE — 3008F BODY MASS INDEX DOCD: CPT | Mod: CPTII,S$GLB,, | Performed by: PAIN MEDICINE

## 2019-01-10 PROCEDURE — 88175 CYTOPATH C/V AUTO FLUID REDO: CPT

## 2019-01-10 PROCEDURE — 99999 PR PBB SHADOW E&M-EST. PATIENT-LVL V: ICD-10-PCS | Mod: PBBFAC,,, | Performed by: PAIN MEDICINE

## 2019-01-10 RX ORDER — GABAPENTIN 300 MG/1
CAPSULE ORAL
Qty: 90 CAPSULE | Refills: 3 | Status: SHIPPED | OUTPATIENT
Start: 2019-01-10 | End: 2019-05-24

## 2019-01-10 RX ORDER — MELOXICAM 15 MG/1
15 TABLET ORAL DAILY
Qty: 30 TABLET | Refills: 3 | Status: SHIPPED | OUTPATIENT
Start: 2019-01-10 | End: 2019-03-26 | Stop reason: SDUPTHER

## 2019-01-10 NOTE — H&P (VIEW-ONLY)
Chief Pain Complaint:  Chief Complaint   Patient presents with    Hip Pain     yoan hip pain      History of Present Illness:   This patient is a 63 y.o. female who presents today complaining of the above noted pain/s. The patient describes the pain as follows.  Ms. Cotton has history of chronic left SI joint pain. She has been having symptoms on off for several years however over the last 6 months her symptoms have become constant.  She describes as a constant dull aching sensation which occasionally can be sharp and is worse when she is trying to walk up stairs, bending over, and getting up out of a chair.  She reports that there is some radiation to the left posterior thigh to approximately the inferior aspect of the buttock.  She denies having numbness and weakness in her legs.  She has been participating in physical therapy at home for several weeks due to the cost of attending physical therapy being approximately 75 dollars per visit.  She has found that she took her 's 600 mg gabapentin 1 time and found this to be helpful in addition to taking 1 of his tizanidine tablets.  She also takes Advil over-the-counter which does provide some benefit.  She has used Flexeril and Voltaren gel in the past which have not been helpful.  She also takes amitriptyline and venlafaxine currently.  Pain is currently 5/10.    Previous Therapy:  Medications:Flexeril and Amitriptyline, venlafaxine  Injections: None  Surgeries: None  Physical Therapy:  Currently participating in home physical therapy exercises    Past Surgical History:   Procedure Laterality Date    APPENDECTOMY      CHOLECYSTECTOMY      COLONOSCOPY N/A 1/13/2017    [facility]    ROBOTIC ASSISTED -LAPAROSCOPIC CHOLECYSTECTOMY N/A 3/23/2015    [facility] OR    SHOULDER ARTHROSCOPY      Tonsilectomy      TONSILLECTOMY      Uterine Ablation       Imaging / Labs / Studies (reviewed on  1/10/2019):    Review of Systems:  Review of Systems   Constitutional: Negative for fever.   Eyes: Negative for blurred vision.   Respiratory: Negative for cough and wheezing.    Cardiovascular: Negative for chest pain and orthopnea.   Gastrointestinal: Negative for constipation, diarrhea, nausea and vomiting.   Genitourinary: Negative for dysuria.   Musculoskeletal: Positive for back pain.   Skin: Negative for itching and rash.   Neurological: Negative for focal weakness.   Endo/Heme/Allergies: Does not bruise/bleed easily.       Physical Exam:  There were no vitals taken for this visit. (reviewed on 1/10/2019)\  General    Constitutional: She is oriented to person, place, and time. She appears well-developed and well-nourished.   HENT:   Head: Normocephalic and atraumatic.   Eyes: EOM are normal.   Neck: Neck supple.   Pulmonary/Chest: Effort normal.   Abdominal: She exhibits no distension.   Neurological: She is alert and oriented to person, place, and time. No cranial nerve deficit.   Psychiatric: She has a normal mood and affect.     General Musculoskeletal Exam   Gait: normal     Back (L-Spine & T-Spine) / Neck (C-Spine) Exam     Tenderness Left paramedian tenderness of the Lower L-Spine and Sacrum.     Back (L-Spine & T-Spine) Range of Motion   Lateral bend right: normal   Lateral bend left: normal   Rotation right: normal   Rotation left: normal     Spinal Sensation   Right Side Sensation  L-Spine Level: normal  Left Side Sensation  L-Spine Level: normal    Comments:  Mildly increased pain with left lateral bending of lumbar spine; positive tenderness to the left PSIS, negative on the right; positive left Sepideh's, negative right Sepideh's      Muscle Strength   Right Lower Extremity   Hip Flexion: 5/5   Quadriceps:  5/5   Hamstrin/5   Anterior tibial:  5/5/5  Left Lower Extremity   Hip Flexion: 5/5   Quadriceps:  5/5   Hamstrin/5   Anterior tibial:  5/5/5     Reflexes     Left Side  Quadriceps:   2+  Achilles:  2+  Ankle Clonus:  absent    Right Side   Quadriceps:  2+  Achilles:  2+  Ankle Clonus:  absent      Assessment  Sacroiliitis    1. 63 y.o. year old patient with PMH of   Past Medical History:   Diagnosis Date    Anemia     Hyperlipidemia     Hypertension     Supraventricular tachycardia       presenting with pain located left buttock  2. Pain Generators / Etiology:  Sacroiliac joint dysfunction  3. Failed Meds (E- Effective, NE- Not Effective):  Gabapentin-effective, tizanidine-effective, Advil-effective  4. Physical Therapy - currently participating in home physical therapy exercises for approximately last 6 weeks  5. Psychological comorbidities - None  6. Anticoagulants / Antiplatelets: None     PLAN:  1. Medications:  Prescribed gabapentin 300 mg t.i.d., prescribed Mobic 15 mg daily and caution patient to avoid other anti-inflammatory medications; can consider adding tizanidine in the future  2. PT - continue performing home physical therapy exercises  3. Psychological - none  4. Labs - obtain  none; reviewed labs from 11/23/2018, creatinine 1.0  5. Imaging - obtain lumbar x-ray to evaluate SI joints and lumbar spine  6. Interventions - schedule left SI joint injection  7. Referrals - none  8. Records - none  9. Follow up visit - follow up in clinic in 6 weeks  10. Patient Questions - answered all of the patient's questions regarding diagnosis, therapy, and treatment    CHANTAL Galvez MD  Interventional Pain  [facility] [place]

## 2019-01-10 NOTE — PROGRESS NOTES
HPI:  63 y.o. female patient  presents today for annual exam.  She is  and is not on HRT.  No complaints.      Past Medical History:   Diagnosis Date    Anemia     Hyperlipidemia     Hypertension     Supraventricular tachycardia        Past Surgical History:   Procedure Laterality Date    APPENDECTOMY      CHOLECYSTECTOMY      COLONOSCOPY N/A 2017    [facility] [place] [facility]    ROBOTIC ASSISTED -LAPAROSCOPIC CHOLECYSTECTOMY N/A 3/23/2015    [facility]    SHOULDER ARTHROSCOPY      Tonsilectomy      TONSILLECTOMY      Uterine Ablation         REVIEW OF SYSTEMS:  GENERAL:  No fever, chills, fatigue, or weight loss  ABDOMEN:  Normal appetite, no weight loss or abdominal pain  URINARY:  No flank pain, dysuria, or hematuria  REPRODUCTIVE:  No abnormal vaginal bleeding  BREASTS:  No tenderness, masses, or nipple discharge noted of breasts    PHYSICAL EXAM:    APPEARANCE:  Well nourished, well developed, in no acute distress  NECK:  Symmetric without masses or thyromegaly  BREASTS:  Symmetrical, no skin changes or visible lesions.  No palpable masses, nipple discharge, or adenopathy bilaterally.  ABDOMEN:  Soft, no tenderness or masses, no distension noted  PELVIC:  VULVA:  No lesions.  Normal female genitalia  URETHRAL MEATUS:  Normal size and location.  No lesions.  No prolapse  URETHRA:  No masses, tenderness, prolapse, or scarring  VAGINA:  No lesions or discharge.  No significant cystocele or rectocele  CERVIX:  No lesions.  Normal diameter, no cervical motion tenderness.  UTERUS:  4-6 week size, regular shape, mobile, non-tender, normal position, good support  ADNEXA:  No masses or tenderness  ANUS AND PERINEUM:  No lesions.  No external hemorrhoids    1. Well woman exam with routine gynecological exam     2. Encounter for screening mammogram for breast cancer  Mammo Digital Screening Bilat         PLAN:  MMG ordered.  Patient counseled  regarding recommended routine health maintenance for her age.

## 2019-01-10 NOTE — PATIENT INSTRUCTIONS
- prescribed gabapentin 300mg and using increasing dose schedule  - prescribed mobic 15mg daily; avoid other anti-inflammatories  - will schedule for left SI joint injection  - continue physical therapy exercises at home  - follow up in 6 weeks

## 2019-01-10 NOTE — PROGRESS NOTES
Chief Pain Complaint:  Chief Complaint   Patient presents with    Hip Pain     yoan hip pain      History of Present Illness:   This patient is a 63 y.o. female who presents today complaining of the above noted pain/s. The patient describes the pain as follows.  Ms. Cotton has history of chronic left SI joint pain. She has been having symptoms on off for several years however over the last 6 months her symptoms have become constant.  She describes as a constant dull aching sensation which occasionally can be sharp and is worse when she is trying to walk up stairs, bending over, and getting up out of a chair.  She reports that there is some radiation to the left posterior thigh to approximately the inferior aspect of the buttock.  She denies having numbness and weakness in her legs.  She has been participating in physical therapy at home for several weeks due to the cost of attending physical therapy being approximately 75 dollars per visit.  She has found that she took her 's 600 mg gabapentin 1 time and found this to be helpful in addition to taking 1 of his tizanidine tablets.  She also takes Advil over-the-counter which does provide some benefit.  She has used Flexeril and Voltaren gel in the past which have not been helpful.  She also takes amitriptyline and venlafaxine currently.  Pain is currently 5/10.    Previous Therapy:  Medications:Flexeril and Amitriptyline, venlafaxine  Injections: None  Surgeries: None  Physical Therapy:  Currently participating in home physical therapy exercises    Past Surgical History:   Procedure Laterality Date    APPENDECTOMY      CHOLECYSTECTOMY      COLONOSCOPY N/A 1/13/2017    [facility] END[facility]    ROBOTIC ASSISTED -LAPAROSCOPIC CHOLECYSTECTOMY N/A 3/23/2015    [facility] OR    SHOULDER ARTHROSCOPY      Tonsilectomy      TONSILLECTOMY      Uterine Ablation       Imaging / Labs / Studies (reviewed on  1/10/2019):    Review of Systems:  Review of Systems   Constitutional: Negative for fever.   Eyes: Negative for blurred vision.   Respiratory: Negative for cough and wheezing.    Cardiovascular: Negative for chest pain and orthopnea.   Gastrointestinal: Negative for constipation, diarrhea, nausea and vomiting.   Genitourinary: Negative for dysuria.   Musculoskeletal: Positive for back pain.   Skin: Negative for itching and rash.   Neurological: Negative for focal weakness.   Endo/Heme/Allergies: Does not bruise/bleed easily.       Physical Exam:  There were no vitals taken for this visit. (reviewed on 1/10/2019)\  General    Constitutional: She is oriented to person, place, and time. She appears well-developed and well-nourished.   HENT:   Head: Normocephalic and atraumatic.   Eyes: EOM are normal.   Neck: Neck supple.   Pulmonary/Chest: Effort normal.   Abdominal: She exhibits no distension.   Neurological: She is alert and oriented to person, place, and time. No cranial nerve deficit.   Psychiatric: She has a normal mood and affect.     General Musculoskeletal Exam   Gait: normal     Back (L-Spine & T-Spine) / Neck (C-Spine) Exam     Tenderness Left paramedian tenderness of the Lower L-Spine and Sacrum.     Back (L-Spine & T-Spine) Range of Motion   Lateral bend right: normal   Lateral bend left: normal   Rotation right: normal   Rotation left: normal     Spinal Sensation   Right Side Sensation  L-Spine Level: normal  Left Side Sensation  L-Spine Level: normal    Comments:  Mildly increased pain with left lateral bending of lumbar spine; positive tenderness to the left PSIS, negative on the right; positive left Sepideh's, negative right Sepideh's      Muscle Strength   Right Lower Extremity   Hip Flexion: 5/5   Quadriceps:  5/5   Hamstrin/5   Anterior tibial:  5/5/5  Left Lower Extremity   Hip Flexion: 5/5   Quadriceps:  5/5   Hamstrin/5   Anterior tibial:  5/5/5     Reflexes     Left Side  Quadriceps:   2+  Achilles:  2+  Ankle Clonus:  absent    Right Side   Quadriceps:  2+  Achilles:  2+  Ankle Clonus:  absent      Assessment  Sacroiliitis    1. 63 y.o. year old patient with PMH of   Past Medical History:   Diagnosis Date    Anemia     Hyperlipidemia     Hypertension     Supraventricular tachycardia       presenting with pain located left buttock  2. Pain Generators / Etiology:  Sacroiliac joint dysfunction  3. Failed Meds (E- Effective, NE- Not Effective):  Gabapentin-effective, tizanidine-effective, Advil-effective  4. Physical Therapy - currently participating in home physical therapy exercises for approximately last 6 weeks  5. Psychological comorbidities - None  6. Anticoagulants / Antiplatelets: None     PLAN:  1. Medications:  Prescribed gabapentin 300 mg t.i.d., prescribed Mobic 15 mg daily and caution patient to avoid other anti-inflammatory medications; can consider adding tizanidine in the future  2. PT - continue performing home physical therapy exercises  3. Psychological - none  4. Labs - obtain  none; reviewed labs from 11/23/2018, creatinine 1.0  5. Imaging - obtain lumbar x-ray to evaluate SI joints and lumbar spine  6. Interventions - schedule left SI joint injection  7. Referrals - none  8. Records - none  9. Follow up visit - follow up in clinic in 6 weeks  10. Patient Questions - answered all of the patient's questions regarding diagnosis, therapy, and treatment    CHANTAL Galvez MD  Interventional Pain  [facility] [place]

## 2019-01-10 NOTE — TELEPHONE ENCOUNTER
Contacted [facility] and [employer]. [facility] was able to fill medication after running medication through proper account. Called and informed patient of situation and to let her know that medication will be filled. Pt verbalized understanding.     ----- Message from Denisse Maya sent at 1/10/2019  3:10 PM CST -----  Contact: Patient  Patient called to speak with the nurse; she called [facility] to check on the status of her prescription. They told her that the physicians license was  so they couldn't fill the prescription. Please call the pharmacy.    [facility]/pharmacy #[id number] - [place], [place] - [address] [address]  [address] [address] [address]  Phone: [phone] Fax: [phone]    She can be contacted at [phone].    Thanks,  Denisse

## 2019-01-10 NOTE — LETTER
January 10, 2019      Stephani Valdivia MD  [address] [address] [address]           [facility]  [address] [facility] [address]  Phone: [phone]  Fax: [phone]          Patient: Silvia Cotton   MR Number: [id number]   YOB: 1955   Date of Visit: 1/10/2019       Dear Dr. Stephani Valdivia:    Thank you for referring Silvia Cotton to me for evaluation. Attached you will find relevant portions of my assessment and plan of care.    If you have questions, please do not hesitate to call me. I look forward to following Silvia Cotton along with you.    Sincerely,    Rojas Galvez MD    Enclosure  CC:  No Recipients    If you would like to receive this communication electronically, please contact [email or web address] or [phone] to request more information on [employer] access.    For providers and/or their staff who would like to refer a patient to [facility], please contact us through our one-stop-shop provider referral line, [facility], at [phone].    If you feel you have received this communication in error or would no longer like to receive these types of communications, please e-mail [email or web address]

## 2019-01-11 ENCOUNTER — HOSPITAL ENCOUNTER (OUTPATIENT)
Dept: RADIOLOGY | Facility: HOSPITAL | Age: 64
Discharge: HOME | End: 2019-01-11
Attending: OBSTETRICS & GYNECOLOGY
Payer: COMMERCIAL

## 2019-01-11 DIAGNOSIS — Z12.31 ENCOUNTER FOR SCREENING MAMMOGRAM FOR BREAST CANCER: ICD-10-CM

## 2019-01-11 PROCEDURE — 77067 SCR MAMMO BI INCL CAD: CPT | Mod: TC

## 2019-01-11 PROCEDURE — 77067 SCR MAMMO BI INCL CAD: CPT | Mod: 26,,, | Performed by: RADIOLOGY

## 2019-01-11 PROCEDURE — 77067 MAMMO DIGITAL SCREENING BILAT WITH CAD: ICD-10-PCS | Mod: 26,,, | Performed by: RADIOLOGY

## 2019-01-15 ENCOUNTER — HOSPITAL ENCOUNTER (OUTPATIENT)
Facility: HOSPITAL | Age: 64
Discharge: HOME | End: 2019-01-15
Attending: PAIN MEDICINE | Admitting: PAIN MEDICINE
Payer: COMMERCIAL

## 2019-01-15 VITALS
OXYGEN SATURATION: 97 % | RESPIRATION RATE: 16 BRPM | DIASTOLIC BLOOD PRESSURE: 80 MMHG | TEMPERATURE: 98 F | SYSTOLIC BLOOD PRESSURE: 143 MMHG | HEART RATE: 92 BPM

## 2019-01-15 DIAGNOSIS — G89.29 CHRONIC LEFT-SIDED LOW BACK PAIN WITHOUT SCIATICA: ICD-10-CM

## 2019-01-15 DIAGNOSIS — M53.3 SACROILIAC JOINT DYSFUNCTION: ICD-10-CM

## 2019-01-15 DIAGNOSIS — M54.50 CHRONIC LEFT-SIDED LOW BACK PAIN WITHOUT SCIATICA: ICD-10-CM

## 2019-01-15 PROCEDURE — 63600175 PHARM REV CODE 636 W HCPCS

## 2019-01-15 PROCEDURE — 63600175 PHARM REV CODE 636 W HCPCS: Performed by: PAIN MEDICINE

## 2019-01-15 PROCEDURE — 27096 PR INJECTION,SACROILIAC JOINT: ICD-10-PCS | Mod: LT,,, | Performed by: PAIN MEDICINE

## 2019-01-15 PROCEDURE — 27096 INJECT SACROILIAC JOINT: CPT | Mod: LT,,, | Performed by: PAIN MEDICINE

## 2019-01-15 RX ORDER — METHYLPREDNISOLONE ACETATE 40 MG/ML
INJECTION, SUSPENSION INTRA-ARTICULAR; INTRALESIONAL; INTRAMUSCULAR; SOFT TISSUE
Status: DISCONTINUED | OUTPATIENT
Start: 2019-01-15 | End: 2019-01-15 | Stop reason: HOSPADM

## 2019-01-15 NOTE — PLAN OF CARE
Problem: Adult Inpatient Plan of Care  Goal: Plan of Care Review  Outcome: Outcome(s) achieved Date Met: 01/15/19  Patient discharged home in stable condition via wheelchair with ride. Verbalized understanding of discharge instructions. Patient voiced no complaints at this time. Patient stood at side of bed, walked steps with no new motor or sensory deficits. Neurologically intact.

## 2019-01-15 NOTE — OP NOTE
PROCEDURE: Sacroiliac joint injection under fluoroscopic guidance     SIDE: left      PROCEDURE DATE: 1/15/2019    PREOPERATIVE DIAGNOSIS: Sacroiliitis  POSTOPERATIVE DIAGNOSIS: Sacroiliitis    PROVIDER: CHANTAL Galvez MD  Assistant(s): None    ANESTHESIA: Local, No Sedation    >> 0 mg of VERSED    >> 0 mcg of FENTANYL     INDICATION: The patient has a history of pain due to sacroiliitis unresponsive to conservative treatments. Fluoroscopy was used to optimize visualization of needle placement and to maximize safety.     PROCEDURE DESCRIPTION: The patient was seen and identified in the preoperative area. Risks, benefits, complications, and alternatives were discussed with the patient. The patient agreed to proceed with the procedure and signed the consent. The site and side of the procedure was identified and marked. An IV was not placed for this procedure.     The patient was taken to the procedural suite. The patient was positioned in prone orientation on procedure table. A time out was performed prior to any intervention. The procedure, site, side, and allergies were stated and agreed to by all present. The lumbosacral area was widely prepped with ChloraPrep. The procedural site was draped in usual sterile fashion. Vital signs were closely monitored throughout this procedure. Conscious sedation was not used for this procedure.    The fluoroscopic camera was placed in contralateral oblique view and was adjusted until the anterior and posterior joint margins of the targeted sacroiliac joint aligned in linear array. The lower pole of the joint was identified, marked, and localized with 1% Lidocaine. A 25 gauge 3.5 inch spinal needle was introduced and advanced to the joint under fluoroscopic guidance. The joint space was entered and after negative aspiration, 2 mL of injectate was posited into the joint space. The needle was then withdrawn outside of the joint space and after negative aspiration 1 mL of solution  was injected outside of the joint space. The injectant solution used was comprised of 3 mL of 1% PF Lidocaine and 1 mL of Methylprednisolone (40 mg/mL). This techniques was performed for the above noted joint/s.    Description of Findings: Not applicable    Prosthetic devices, grafts, tissues, or devices implanted: None    Specimen Removed: No    Estimated Blood Loss: minimal    COMPLICATIONS: None    DISPOSITION / PLANS: The patient was transferred to the recovery area in a stable condition for observation. The patient was reexamined prior to discharge. There was no evidence of acute neurologic injury following the procedure.  Patient was discharged from the recovery room after meeting discharge criteria. Home discharge instructions were given to the patient by the staff.

## 2019-01-16 LAB
HPV HR 12 DNA CVX QL NAA+PROBE: POSITIVE
HPV16 AG SPEC QL: NEGATIVE
HPV18 DNA SPEC QL NAA+PROBE: NEGATIVE

## 2019-01-17 ENCOUNTER — PATIENT MESSAGE (OUTPATIENT)
Dept: FAMILY MEDICINE | Facility: CLINIC | Age: 64
End: 2019-01-17

## 2019-01-17 ENCOUNTER — PATIENT MESSAGE (OUTPATIENT)
Dept: OBSTETRICS AND GYNECOLOGY | Facility: CLINIC | Age: 64
End: 2019-01-17

## 2019-01-18 ENCOUNTER — PATIENT MESSAGE (OUTPATIENT)
Dept: PAIN MEDICINE | Facility: CLINIC | Age: 64
End: 2019-01-18

## 2019-01-18 RX ORDER — METHOCARBAMOL 500 MG/1
500 TABLET, FILM COATED ORAL 2 TIMES DAILY PRN
Qty: 60 TABLET | Refills: 0 | Status: SHIPPED | OUTPATIENT
Start: 2019-01-18 | End: 2019-01-24

## 2019-01-23 ENCOUNTER — PATIENT MESSAGE (OUTPATIENT)
Dept: PAIN MEDICINE | Facility: CLINIC | Age: 64
End: 2019-01-23

## 2019-01-24 RX ORDER — TIZANIDINE 4 MG/1
TABLET ORAL
Qty: 30 TABLET | Refills: 0 | Status: SHIPPED | OUTPATIENT
Start: 2019-01-24 | End: 2019-05-24

## 2019-02-05 RX ORDER — AMITRIPTYLINE HYDROCHLORIDE 50 MG/1
50 TABLET, FILM COATED ORAL NIGHTLY
Qty: 30 TABLET | Refills: 3 | Status: SHIPPED | OUTPATIENT
Start: 2019-02-05 | End: 2019-04-20 | Stop reason: SDUPTHER

## 2019-03-21 RX ORDER — ATENOLOL 25 MG/1
TABLET ORAL
Qty: 60 TABLET | Refills: 3 | Status: SHIPPED | OUTPATIENT
Start: 2019-03-21 | End: 2019-07-21 | Stop reason: SDUPTHER

## 2019-03-26 RX ORDER — MELOXICAM 15 MG/1
TABLET ORAL
Qty: 30 TABLET | Refills: 3 | Status: SHIPPED | OUTPATIENT
Start: 2019-03-26 | End: 2019-10-18 | Stop reason: SDUPTHER

## 2019-04-18 ENCOUNTER — PATIENT MESSAGE (OUTPATIENT)
Dept: FAMILY MEDICINE | Facility: CLINIC | Age: 64
End: 2019-04-18

## 2019-04-21 RX ORDER — AMITRIPTYLINE HYDROCHLORIDE 50 MG/1
TABLET, FILM COATED ORAL
Qty: 30 TABLET | Refills: 1 | Status: SHIPPED | OUTPATIENT
Start: 2019-04-21 | End: 2020-06-16 | Stop reason: SDUPTHER

## 2019-04-22 RX ORDER — VENLAFAXINE HYDROCHLORIDE 75 MG/1
CAPSULE, EXTENDED RELEASE ORAL
Qty: 90 CAPSULE | Refills: 3 | Status: SHIPPED | OUTPATIENT
Start: 2019-04-22 | End: 2020-06-16 | Stop reason: SDUPTHER

## 2019-05-22 ENCOUNTER — LAB VISIT (OUTPATIENT)
Dept: LAB | Facility: HOSPITAL | Age: 64
End: 2019-05-22
Attending: FAMILY MEDICINE
Payer: COMMERCIAL

## 2019-05-22 DIAGNOSIS — D50.0 IRON DEFICIENCY ANEMIA DUE TO CHRONIC BLOOD LOSS: ICD-10-CM

## 2019-05-22 DIAGNOSIS — R73.01 IMPAIRED FASTING BLOOD SUGAR: ICD-10-CM

## 2019-05-22 DIAGNOSIS — E78.2 MIXED HYPERLIPIDEMIA: ICD-10-CM

## 2019-05-22 LAB
ALBUMIN SERPL BCP-MCNC: 3.8 G/DL (ref 3.5–5.2)
ALP SERPL-CCNC: 77 U/L (ref 55–135)
ALT SERPL W/O P-5'-P-CCNC: 15 U/L (ref 10–44)
ANION GAP SERPL CALC-SCNC: 9 MMOL/L (ref 8–16)
AST SERPL-CCNC: 19 U/L (ref 10–40)
BASOPHILS # BLD AUTO: 0.04 K/UL (ref 0–0.2)
BASOPHILS NFR BLD: 0.5 % (ref 0–1.9)
BILIRUB SERPL-MCNC: 0.4 MG/DL (ref 0.1–1)
BUN SERPL-MCNC: 17 MG/DL (ref 8–23)
CALCIUM SERPL-MCNC: 10 MG/DL (ref 8.7–10.5)
CHLORIDE SERPL-SCNC: 106 MMOL/L (ref 95–110)
CHOLEST SERPL-MCNC: 157 MG/DL (ref 120–199)
CHOLEST/HDLC SERPL: 4.5 {RATIO} (ref 2–5)
CO2 SERPL-SCNC: 25 MMOL/L (ref 23–29)
CREAT SERPL-MCNC: 1 MG/DL (ref 0.5–1.4)
DIFFERENTIAL METHOD: ABNORMAL
EOSINOPHIL # BLD AUTO: 0.3 K/UL (ref 0–0.5)
EOSINOPHIL NFR BLD: 4 % (ref 0–8)
ERYTHROCYTE [DISTWIDTH] IN BLOOD BY AUTOMATED COUNT: 14.9 % (ref 11.5–14.5)
EST. GFR  (AFRICAN AMERICAN): >60 ML/MIN/1.73 M^2
EST. GFR  (NON AFRICAN AMERICAN): >60 ML/MIN/1.73 M^2
GLUCOSE SERPL-MCNC: 88 MG/DL (ref 70–110)
HCT VFR BLD AUTO: 39.4 % (ref 37–48.5)
HDLC SERPL-MCNC: 35 MG/DL (ref 40–75)
HDLC SERPL: 22.3 % (ref 20–50)
HGB BLD-MCNC: 12 G/DL (ref 12–16)
IMM GRANULOCYTES # BLD AUTO: 0.02 K/UL (ref 0–0.04)
IMM GRANULOCYTES NFR BLD AUTO: 0.3 % (ref 0–0.5)
LDLC SERPL CALC-MCNC: 86.6 MG/DL (ref 63–159)
LYMPHOCYTES # BLD AUTO: 2.1 K/UL (ref 1–4.8)
LYMPHOCYTES NFR BLD: 27.4 % (ref 18–48)
MCH RBC QN AUTO: 28.2 PG (ref 27–31)
MCHC RBC AUTO-ENTMCNC: 30.5 G/DL (ref 32–36)
MCV RBC AUTO: 93 FL (ref 82–98)
MONOCYTES # BLD AUTO: 0.7 K/UL (ref 0.3–1)
MONOCYTES NFR BLD: 8.8 % (ref 4–15)
NEUTROPHILS # BLD AUTO: 4.6 K/UL (ref 1.8–7.7)
NEUTROPHILS NFR BLD: 59 % (ref 38–73)
NONHDLC SERPL-MCNC: 122 MG/DL
NRBC BLD-RTO: 0 /100 WBC
PLATELET # BLD AUTO: 254 K/UL (ref 150–350)
PMV BLD AUTO: 12.1 FL (ref 9.2–12.9)
POTASSIUM SERPL-SCNC: 4.2 MMOL/L (ref 3.5–5.1)
PROT SERPL-MCNC: 7 G/DL (ref 6–8.4)
RBC # BLD AUTO: 4.25 M/UL (ref 4–5.4)
SODIUM SERPL-SCNC: 140 MMOL/L (ref 136–145)
TRIGL SERPL-MCNC: 177 MG/DL (ref 30–150)
WBC # BLD AUTO: 7.82 K/UL (ref 3.9–12.7)

## 2019-05-22 PROCEDURE — 83036 HEMOGLOBIN GLYCOSYLATED A1C: CPT

## 2019-05-22 PROCEDURE — 80053 COMPREHEN METABOLIC PANEL: CPT

## 2019-05-22 PROCEDURE — 36415 COLL VENOUS BLD VENIPUNCTURE: CPT | Mod: PO

## 2019-05-22 PROCEDURE — 80061 LIPID PANEL: CPT

## 2019-05-22 PROCEDURE — 85025 COMPLETE CBC W/AUTO DIFF WBC: CPT

## 2019-05-23 LAB
ESTIMATED AVG GLUCOSE: 103 MG/DL (ref 68–131)
HBA1C MFR BLD HPLC: 5.2 % (ref 4–5.6)

## 2019-05-24 ENCOUNTER — PATIENT MESSAGE (OUTPATIENT)
Dept: FAMILY MEDICINE | Facility: CLINIC | Age: 64
End: 2019-05-24

## 2019-05-24 ENCOUNTER — OFFICE VISIT (OUTPATIENT)
Dept: FAMILY MEDICINE | Facility: CLINIC | Age: 64
End: 2019-05-24
Payer: COMMERCIAL

## 2019-05-24 VITALS
OXYGEN SATURATION: 99 % | TEMPERATURE: 98 F | BODY MASS INDEX: 30.36 KG/M2 | DIASTOLIC BLOOD PRESSURE: 78 MMHG | WEIGHT: 205 LBS | HEART RATE: 103 BPM | SYSTOLIC BLOOD PRESSURE: 110 MMHG | HEIGHT: 69 IN

## 2019-05-24 DIAGNOSIS — Z00.00 WELL ADULT EXAM: Primary | ICD-10-CM

## 2019-05-24 DIAGNOSIS — E78.2 MIXED HYPERLIPIDEMIA: ICD-10-CM

## 2019-05-24 DIAGNOSIS — K21.9 GASTROESOPHAGEAL REFLUX DISEASE WITHOUT ESOPHAGITIS: ICD-10-CM

## 2019-05-24 DIAGNOSIS — M76.892 TENDINITIS OF HIP, LEFT: ICD-10-CM

## 2019-05-24 DIAGNOSIS — D50.0 IRON DEFICIENCY ANEMIA DUE TO CHRONIC BLOOD LOSS: ICD-10-CM

## 2019-05-24 DIAGNOSIS — F51.04 PSYCHOPHYSIOLOGICAL INSOMNIA: ICD-10-CM

## 2019-05-24 DIAGNOSIS — E53.8 B12 DEFICIENCY: ICD-10-CM

## 2019-05-24 PROCEDURE — 3078F PR MOST RECENT DIASTOLIC BLOOD PRESSURE < 80 MM HG: ICD-10-PCS | Mod: CPTII,S$GLB,, | Performed by: FAMILY MEDICINE

## 2019-05-24 PROCEDURE — 3074F PR MOST RECENT SYSTOLIC BLOOD PRESSURE < 130 MM HG: ICD-10-PCS | Mod: CPTII,S$GLB,, | Performed by: FAMILY MEDICINE

## 2019-05-24 PROCEDURE — 99999 PR PBB SHADOW E&M-EST. PATIENT-LVL III: ICD-10-PCS | Mod: PBBFAC,,, | Performed by: FAMILY MEDICINE

## 2019-05-24 PROCEDURE — 99396 PR PREVENTIVE VISIT,EST,40-64: ICD-10-PCS | Mod: S$GLB,,, | Performed by: FAMILY MEDICINE

## 2019-05-24 PROCEDURE — 3078F DIAST BP <80 MM HG: CPT | Mod: CPTII,S$GLB,, | Performed by: FAMILY MEDICINE

## 2019-05-24 PROCEDURE — 3074F SYST BP LT 130 MM HG: CPT | Mod: CPTII,S$GLB,, | Performed by: FAMILY MEDICINE

## 2019-05-24 PROCEDURE — 99999 PR PBB SHADOW E&M-EST. PATIENT-LVL III: CPT | Mod: PBBFAC,,, | Performed by: FAMILY MEDICINE

## 2019-05-24 PROCEDURE — 99396 PREV VISIT EST AGE 40-64: CPT | Mod: S$GLB,,, | Performed by: FAMILY MEDICINE

## 2019-05-24 RX ORDER — ZOLPIDEM TARTRATE 10 MG/1
TABLET ORAL
Qty: 30 TABLET | Refills: 5 | Status: SHIPPED | OUTPATIENT
Start: 2019-05-24 | End: 2019-11-21 | Stop reason: SDUPTHER

## 2019-05-24 RX ORDER — NYSTATIN 100000 [USP'U]/G
POWDER TOPICAL
Qty: 60 G | Refills: 3 | Status: SHIPPED | OUTPATIENT
Start: 2019-05-24 | End: 2020-09-16 | Stop reason: SDUPTHER

## 2019-05-24 NOTE — PROGRESS NOTES
Chief Complaint:    Chief Complaint   Patient presents with    Annual Exam       History of Present Illness:    Presents today for six-month follow-up:  Blood pressure stable  She complains of some hip pain   undergoing some stress she has chronic anemia which is improved.  On B12 injections next  Lipid stable    ROS:  Review of Systems   Constitutional: Negative for activity change, chills, fatigue, fever and unexpected weight change.   HENT: Negative for congestion, ear discharge, ear pain, hearing loss, postnasal drip and rhinorrhea.    Eyes: Negative for pain and visual disturbance.   Respiratory: Negative for cough, chest tightness and shortness of breath.    Cardiovascular: Negative for chest pain and palpitations.   Gastrointestinal: Negative for abdominal pain, diarrhea and vomiting.   Endocrine: Negative for heat intolerance.   Genitourinary: Negative for dysuria, flank pain, frequency and hematuria.   Musculoskeletal: Negative for back pain, gait problem and neck pain.   Skin: Negative for color change and rash.   Neurological: Negative for dizziness, tremors, seizures, numbness and headaches.   Psychiatric/Behavioral: Negative for agitation, hallucinations, self-injury, sleep disturbance and suicidal ideas. The patient is not nervous/anxious.        Past Medical History:   Diagnosis Date    Anemia     Hyperlipidemia     Hypertension     Supraventricular tachycardia        Social History:  Social History     Socioeconomic History    Marital status:      Spouse name: Not on file    Number of children: Not on file    Years of education: Not on file    Highest education level: Not on file   Occupational History    Not on file   Social Needs    Financial resource strain: Hard    Food insecurity:     Worry: Sometimes true     Inability: Sometimes true    Transportation needs:     Medical: No     Non-medical: No   Tobacco Use    Smoking status: Never Smoker    Smokeless tobacco: Never  "Used   Substance and Sexual Activity    Alcohol use: No     Frequency: Never     Binge frequency: Never    Drug use: No    Sexual activity: Yes     Partners: Male     Birth control/protection: None   Lifestyle    Physical activity:     Days per week: 3 days     Minutes per session: Not on file    Stress: Rather much   Relationships    Social connections:     Talks on phone: More than three times a week     Gets together: Three times a week     Attends [employer] service: Not on file     Active member of club or organization: No     Attends meetings of clubs or organizations: Never     Relationship status:    Other Topics Concern    Not on file   Social History Narrative    Not on file       Family History:   family history includes Cataracts in her father and mother.    Health Maintenance   Topic Date Due    Influenza Vaccine  08/01/2019    Mammogram  01/11/2020    Lipid Panel  05/22/2020    Pap Smear with HPV Cotest  01/10/2022    Colonoscopy  01/13/2022    TETANUS VACCINE  12/01/2024    Hepatitis C Screening  Completed       Physical Exam:    Vital Signs  Temp: 97.9 °F (36.6 °C)  Temp src: Oral  Pulse: 103  SpO2: 99 %  BP: 110/78  BP Location: Left arm  Patient Position: Sitting  Pain Score: 0-No pain  Height and Weight  Height: 5' 9" (175.3 cm)  Weight: 93 kg (205 lb 0.4 oz)  BSA (Calculated - sq m): 2.13 sq meters  BMI (Calculated): 30.3  Weight in (lb) to have BMI = 25: 168.9]    Body mass index is 30.28 kg/m².    Physical Exam   Constitutional: She is oriented to person, place, and time. She appears well-developed.   HENT:   Mouth/Throat: Oropharynx is clear and moist.   Eyes: Pupils are equal, round, and reactive to light. Conjunctivae are normal.   Neck: Normal range of motion. Neck supple.   Cardiovascular: Normal rate, regular rhythm and normal heart sounds.   No murmur heard.  Pulmonary/Chest: Effort normal and breath sounds normal. No respiratory distress. She has no wheezes. She has " "no rales. She exhibits no tenderness.   Abdominal: Soft. She exhibits no distension and no mass. There is no tenderness. There is no guarding.   Musculoskeletal: She exhibits no edema or tenderness.        Legs:  Lymphadenopathy:     She has no cervical adenopathy.   Neurological: She is alert and oriented to person, place, and time. She has normal reflexes.   Skin: Skin is warm and dry.   Psychiatric: She has a normal mood and affect. Her behavior is normal. Judgment and thought content normal.         Assessment:      ICD-10-CM ICD-9-CM   1. Well adult exam Z00.00 V70.0   2. Tendinitis of hip, left M76.892 727.09   3. Psychophysiological insomnia F51.04 307.42   4. Mixed hyperlipidemia E78.2 272.2   5. Gastroesophageal reflux disease without esophagitis K21.9 530.81   6. Iron deficiency anemia due to chronic blood loss D50.0 280.0   7. B12 deficiency E53.8 266.2         Plan:    Recommend doing some daily hip exercises  Please work on stress reduction  Medical problems as above stable continue current meds and plan  Follow-up 6 months      Orders Placed This Encounter   Procedures    Comprehensive metabolic panel    Lipid panel    CBC auto differential    Vitamin B12       Current Outpatient Medications   Medication Sig Dispense Refill    amitriptyline (ELAVIL) 50 MG tablet TAKE 1 TABLET BY MOUTH EVERY EVENING. 30 tablet 1    atenolol (TENORMIN) 25 MG tablet TAKE 1 TABLET BY MOUTH TWICE A DAY 60 tablet 3    clotrimazole-betamethasone 1-0.05% (LOTRISONE) cream APPLY TO AFFECTED AREA 2 TIMES DAILY 45 g 1    cyanocobalamin 1,000 mcg/mL injection INJECT 1 ML WEEKLY FOR 8 WEEKS 10 mL 1    meclizine (ANTIVERT) 12.5 mg tablet Take 1 tablet (12.5 mg total) by mouth 3 (three) times daily as needed. 90 tablet 0    meloxicam (MOBIC) 15 MG tablet TAKE 1 TABLET BY MOUTH EVERY DAY 30 tablet 3    needle, disp, 18 G 18 x 1 " Ndle Use to draw b12 1000 mcg from vail every 7 days for 7 weeks 100 each 0    needle, " "reusable, 25 G 25 x 1 " Ndle Use to inject B12 1000 mcg every 7 days for 7 weeks 12 each 2    nystatin (MYCOSTATIN) powder Apply TID prn to affected area 60 g 3    omeprazole (PRILOSEC) 40 MG capsule Take 1 capsule (40 mg total) by mouth once daily. 90 capsule 3    rosuvastatin (CRESTOR) 20 MG tablet TAKE 1 TABLET (20 MG TOTAL) BY MOUTH ONCE DAILY. 90 tablet 3    syringe, disposable, 3 mL Syrg Inject B12 1000 mcg every 7 days for 7 weeks 25 each 0    venlafaxine (EFFEXOR-XR) 75 MG 24 hr capsule TAKE 1 CAPSULE BY MOUTH EVERY DAY 90 capsule 3    zolpidem (AMBIEN) 10 mg Tab TAKE 1 TABLET BY MOUTH EVERY EVENING 30 tablet 5     No current facility-administered medications for this visit.        Medications Discontinued During This Encounter   Medication Reason    diclofenac sodium (VOLTAREN) 1 % Gel Patient no longer taking    gabapentin (NEURONTIN) 300 MG capsule Patient no longer taking    tiZANidine (ZANAFLEX) 4 MG tablet Patient no longer taking    nystatin (MYCOSTATIN) powder Reorder    zolpidem (AMBIEN) 10 mg Tab Reorder       Follow up in about 6 months (around 11/24/2019).      Stephani Valdivia MD              "

## 2019-07-22 RX ORDER — ATENOLOL 25 MG/1
TABLET ORAL
Qty: 180 TABLET | Refills: 1 | Status: SHIPPED | OUTPATIENT
Start: 2019-07-22 | End: 2019-12-18 | Stop reason: SDUPTHER

## 2019-09-08 RX ORDER — AMITRIPTYLINE HYDROCHLORIDE 50 MG/1
TABLET, FILM COATED ORAL
Qty: 30 TABLET | Refills: 3 | Status: SHIPPED | OUTPATIENT
Start: 2019-09-08 | End: 2020-02-09

## 2019-10-09 ENCOUNTER — PATIENT MESSAGE (OUTPATIENT)
Dept: ADMINISTRATIVE | Facility: OTHER | Age: 64
End: 2019-10-09

## 2019-10-10 DIAGNOSIS — Z13.79 GENETIC SCREENING: ICD-10-CM

## 2019-10-22 RX ORDER — MELOXICAM 15 MG/1
TABLET ORAL
Qty: 90 TABLET | Refills: 1 | Status: SHIPPED | OUTPATIENT
Start: 2019-10-22 | End: 2019-10-29

## 2019-10-25 ENCOUNTER — LAB VISIT (OUTPATIENT)
Dept: LAB | Facility: HOSPITAL | Age: 64
End: 2019-10-25
Attending: FAMILY MEDICINE
Payer: COMMERCIAL

## 2019-10-25 DIAGNOSIS — D50.0 IRON DEFICIENCY ANEMIA DUE TO CHRONIC BLOOD LOSS: ICD-10-CM

## 2019-10-25 DIAGNOSIS — E53.8 B12 DEFICIENCY: ICD-10-CM

## 2019-10-25 DIAGNOSIS — E78.2 MIXED HYPERLIPIDEMIA: ICD-10-CM

## 2019-10-25 LAB
ALBUMIN SERPL BCP-MCNC: 3.8 G/DL (ref 3.5–5.2)
ALP SERPL-CCNC: 63 U/L (ref 55–135)
ALT SERPL W/O P-5'-P-CCNC: 14 U/L (ref 10–44)
ANION GAP SERPL CALC-SCNC: 5 MMOL/L (ref 8–16)
AST SERPL-CCNC: 18 U/L (ref 10–40)
BASOPHILS # BLD AUTO: 0.04 K/UL (ref 0–0.2)
BASOPHILS NFR BLD: 0.6 % (ref 0–1.9)
BILIRUB SERPL-MCNC: 0.4 MG/DL (ref 0.1–1)
BUN SERPL-MCNC: 18 MG/DL (ref 8–23)
CALCIUM SERPL-MCNC: 9.3 MG/DL (ref 8.7–10.5)
CHLORIDE SERPL-SCNC: 105 MMOL/L (ref 95–110)
CHOLEST SERPL-MCNC: 228 MG/DL (ref 120–199)
CHOLEST/HDLC SERPL: 5.2 {RATIO} (ref 2–5)
CO2 SERPL-SCNC: 29 MMOL/L (ref 23–29)
CREAT SERPL-MCNC: 1.1 MG/DL (ref 0.5–1.4)
DIFFERENTIAL METHOD: ABNORMAL
EOSINOPHIL # BLD AUTO: 0.3 K/UL (ref 0–0.5)
EOSINOPHIL NFR BLD: 3.5 % (ref 0–8)
ERYTHROCYTE [DISTWIDTH] IN BLOOD BY AUTOMATED COUNT: 15.1 % (ref 11.5–14.5)
EST. GFR  (AFRICAN AMERICAN): >60 ML/MIN/1.73 M^2
EST. GFR  (NON AFRICAN AMERICAN): 53.9 ML/MIN/1.73 M^2
GLUCOSE SERPL-MCNC: 89 MG/DL (ref 70–110)
HCT VFR BLD AUTO: 41.4 % (ref 37–48.5)
HDLC SERPL-MCNC: 44 MG/DL (ref 40–75)
HDLC SERPL: 19.3 % (ref 20–50)
HGB BLD-MCNC: 12.2 G/DL (ref 12–16)
IMM GRANULOCYTES # BLD AUTO: 0.02 K/UL (ref 0–0.04)
IMM GRANULOCYTES NFR BLD AUTO: 0.3 % (ref 0–0.5)
LDLC SERPL CALC-MCNC: 145.8 MG/DL (ref 63–159)
LYMPHOCYTES # BLD AUTO: 2 K/UL (ref 1–4.8)
LYMPHOCYTES NFR BLD: 28.6 % (ref 18–48)
MCH RBC QN AUTO: 27.8 PG (ref 27–31)
MCHC RBC AUTO-ENTMCNC: 29.5 G/DL (ref 32–36)
MCV RBC AUTO: 94 FL (ref 82–98)
MONOCYTES # BLD AUTO: 0.7 K/UL (ref 0.3–1)
MONOCYTES NFR BLD: 10.1 % (ref 4–15)
NEUTROPHILS # BLD AUTO: 4.1 K/UL (ref 1.8–7.7)
NEUTROPHILS NFR BLD: 56.9 % (ref 38–73)
NONHDLC SERPL-MCNC: 184 MG/DL
NRBC BLD-RTO: 0 /100 WBC
PLATELET # BLD AUTO: 287 K/UL (ref 150–350)
PMV BLD AUTO: 11.4 FL (ref 9.2–12.9)
POTASSIUM SERPL-SCNC: 4.6 MMOL/L (ref 3.5–5.1)
PROT SERPL-MCNC: 7.1 G/DL (ref 6–8.4)
RBC # BLD AUTO: 4.39 M/UL (ref 4–5.4)
SODIUM SERPL-SCNC: 139 MMOL/L (ref 136–145)
TRIGL SERPL-MCNC: 191 MG/DL (ref 30–150)
VIT B12 SERPL-MCNC: 371 PG/ML (ref 210–950)
WBC # BLD AUTO: 7.13 K/UL (ref 3.9–12.7)

## 2019-10-25 PROCEDURE — 80053 COMPREHEN METABOLIC PANEL: CPT

## 2019-10-25 PROCEDURE — 85025 COMPLETE CBC W/AUTO DIFF WBC: CPT

## 2019-10-25 PROCEDURE — 80061 LIPID PANEL: CPT

## 2019-10-25 PROCEDURE — 82607 VITAMIN B-12: CPT

## 2019-10-25 PROCEDURE — 36415 COLL VENOUS BLD VENIPUNCTURE: CPT | Mod: PO

## 2019-10-29 ENCOUNTER — OFFICE VISIT (OUTPATIENT)
Dept: FAMILY MEDICINE | Facility: CLINIC | Age: 64
End: 2019-10-29
Payer: COMMERCIAL

## 2019-10-29 ENCOUNTER — OFFICE VISIT (OUTPATIENT)
Dept: OPHTHALMOLOGY | Facility: CLINIC | Age: 64
End: 2019-10-29
Payer: COMMERCIAL

## 2019-10-29 VITALS
OXYGEN SATURATION: 98 % | HEIGHT: 69 IN | DIASTOLIC BLOOD PRESSURE: 72 MMHG | RESPIRATION RATE: 18 BRPM | HEART RATE: 90 BPM | TEMPERATURE: 98 F | BODY MASS INDEX: 32.44 KG/M2 | WEIGHT: 219 LBS | SYSTOLIC BLOOD PRESSURE: 122 MMHG

## 2019-10-29 DIAGNOSIS — F43.0 STRESS REACTION: ICD-10-CM

## 2019-10-29 DIAGNOSIS — H25.13 CATARACT, NUCLEAR SCLEROTIC SENILE, BILATERAL: Primary | ICD-10-CM

## 2019-10-29 DIAGNOSIS — E53.8 B12 DEFICIENCY: Primary | ICD-10-CM

## 2019-10-29 DIAGNOSIS — K21.9 GASTROESOPHAGEAL REFLUX DISEASE WITHOUT ESOPHAGITIS: ICD-10-CM

## 2019-10-29 DIAGNOSIS — H52.7 REFRACTIVE ERROR: ICD-10-CM

## 2019-10-29 DIAGNOSIS — E78.2 MIXED HYPERLIPIDEMIA: ICD-10-CM

## 2019-10-29 PROCEDURE — 3008F BODY MASS INDEX DOCD: CPT | Mod: CPTII,S$GLB,, | Performed by: FAMILY MEDICINE

## 2019-10-29 PROCEDURE — 3078F PR MOST RECENT DIASTOLIC BLOOD PRESSURE < 80 MM HG: ICD-10-PCS | Mod: CPTII,S$GLB,, | Performed by: FAMILY MEDICINE

## 2019-10-29 PROCEDURE — 3074F SYST BP LT 130 MM HG: CPT | Mod: CPTII,S$GLB,, | Performed by: FAMILY MEDICINE

## 2019-10-29 PROCEDURE — 92015 DETERMINE REFRACTIVE STATE: CPT | Mod: S$GLB,,, | Performed by: OPTOMETRIST

## 2019-10-29 PROCEDURE — 3074F PR MOST RECENT SYSTOLIC BLOOD PRESSURE < 130 MM HG: ICD-10-PCS | Mod: CPTII,S$GLB,, | Performed by: FAMILY MEDICINE

## 2019-10-29 PROCEDURE — 92014 PR EYE EXAM, EST PATIENT,COMPREHESV: ICD-10-PCS | Mod: S$GLB,,, | Performed by: OPTOMETRIST

## 2019-10-29 PROCEDURE — 99214 OFFICE O/P EST MOD 30 MIN: CPT | Mod: S$GLB,,, | Performed by: FAMILY MEDICINE

## 2019-10-29 PROCEDURE — 3078F DIAST BP <80 MM HG: CPT | Mod: CPTII,S$GLB,, | Performed by: FAMILY MEDICINE

## 2019-10-29 PROCEDURE — 99999 PR PBB SHADOW E&M-EST. PATIENT-LVL III: ICD-10-PCS | Mod: PBBFAC,,, | Performed by: FAMILY MEDICINE

## 2019-10-29 PROCEDURE — 92015 PR REFRACTION: ICD-10-PCS | Mod: S$GLB,,, | Performed by: OPTOMETRIST

## 2019-10-29 PROCEDURE — 99214 PR OFFICE/OUTPT VISIT, EST, LEVL IV, 30-39 MIN: ICD-10-PCS | Mod: S$GLB,,, | Performed by: FAMILY MEDICINE

## 2019-10-29 PROCEDURE — 92014 COMPRE OPH EXAM EST PT 1/>: CPT | Mod: S$GLB,,, | Performed by: OPTOMETRIST

## 2019-10-29 PROCEDURE — 99999 PR PBB SHADOW E&M-EST. PATIENT-LVL III: CPT | Mod: PBBFAC,,, | Performed by: FAMILY MEDICINE

## 2019-10-29 PROCEDURE — 99999 PR PBB SHADOW E&M-EST. PATIENT-LVL I: ICD-10-PCS | Mod: PBBFAC,,, | Performed by: OPTOMETRIST

## 2019-10-29 PROCEDURE — 3008F PR BODY MASS INDEX (BMI) DOCUMENTED: ICD-10-PCS | Mod: CPTII,S$GLB,, | Performed by: FAMILY MEDICINE

## 2019-10-29 PROCEDURE — 99999 PR PBB SHADOW E&M-EST. PATIENT-LVL I: CPT | Mod: PBBFAC,,, | Performed by: OPTOMETRIST

## 2019-10-29 NOTE — PROGRESS NOTES
Chief Complaint:    Chief Complaint   Patient presents with    Follow-up     blood work       History of Present Illness:    Presents today for six-month follow-up:  Blood pressure stable  She is not exercising lipids have gone up because she acknowledges that she is not eating healthy mostly eating junk food.  undergoing some stress she has chronic anemia which is improved.  Not on B12 injections levels are dropping  ROS:  Review of Systems   Constitutional: Negative for activity change, chills, fatigue, fever and unexpected weight change.   HENT: Negative for congestion, ear discharge, ear pain, hearing loss, postnasal drip and rhinorrhea.    Eyes: Negative for pain and visual disturbance.   Respiratory: Negative for cough, chest tightness and shortness of breath.    Cardiovascular: Negative for chest pain and palpitations.   Gastrointestinal: Negative for abdominal pain, diarrhea and vomiting.   Endocrine: Negative for heat intolerance.   Genitourinary: Negative for dysuria, flank pain, frequency and hematuria.   Musculoskeletal: Negative for back pain, gait problem and neck pain.   Skin: Negative for color change and rash.   Neurological: Negative for dizziness, tremors, seizures, numbness and headaches.   Psychiatric/Behavioral: Negative for agitation, hallucinations, self-injury, sleep disturbance and suicidal ideas. The patient is not nervous/anxious.        Past Medical History:   Diagnosis Date    Anemia     Hyperlipidemia     Hypertension     Supraventricular tachycardia        Social History:  Social History     Socioeconomic History    Marital status:      Spouse name: Not on file    Number of children: Not on file    Years of education: Not on file    Highest education level: Not on file   Occupational History    Not on file   Social Needs    Financial resource strain: Hard    Food insecurity:     Worry: Sometimes true     Inability: Sometimes true    Transportation needs:      "Medical: No     Non-medical: No   Tobacco Use    Smoking status: Never Smoker    Smokeless tobacco: Never Used   Substance and Sexual Activity    Alcohol use: No     Frequency: Never     Binge frequency: Never    Drug use: No    Sexual activity: Yes     Partners: Male     Birth control/protection: None   Lifestyle    Physical activity:     Days per week: 3 days     Minutes per session: Not on file    Stress: Rather much   Relationships    Social connections:     Talks on phone: More than three times a week     Gets together: Three times a week     Attends [employer]regational service: Not on file     Active member of club or organization: No     Attends meetings of clubs or organizations: Never     Relationship status:    Other Topics Concern    Not on file   Social History Narrative    Not on file       Family History:   family history includes Cataracts in her father and mother.    Health Maintenance   Topic Date Due    Mammogram  01/11/2020    Lipid Panel  10/25/2020    Pap Smear with HPV Cotest  01/10/2022    Colonoscopy  01/13/2022    TETANUS VACCINE  12/01/2024    Hepatitis C Screening  Completed       Physical Exam:    Vital Signs  Temp: 97.5 °F (36.4 °C)  Temp src: Temporal  Pulse: 90  Resp: 18  SpO2: 98 %  BP: 122/72  BP Location: Right arm  Patient Position: Sitting  Pain Score: 0-No pain  Height and Weight  Height: 5' 9" (175.3 cm)  Weight: 99.3 kg (219 lb 0.4 oz)  BSA (Calculated - sq m): 2.2 sq meters  BMI (Calculated): 32.4  Weight in (lb) to have BMI = 25: 168.9]    Body mass index is 32.34 kg/m².    Physical Exam   Constitutional: She is oriented to person, place, and time. She appears well-developed.   HENT:   Mouth/Throat: Oropharynx is clear and moist.   Eyes: Pupils are equal, round, and reactive to light. Conjunctivae are normal.   Neck: Normal range of motion. Neck supple.   Cardiovascular: Normal rate, regular rhythm and normal heart sounds.   No murmur heard.  Pulmonary/Chest: " "Effort normal and breath sounds normal. No respiratory distress. She has no wheezes. She has no rales. She exhibits no tenderness.   Abdominal: Soft. She exhibits no distension and no mass. There is no tenderness. There is no guarding.   Musculoskeletal: She exhibits no edema or tenderness.   Lymphadenopathy:     She has no cervical adenopathy.   Neurological: She is alert and oriented to person, place, and time. She has normal reflexes.   Skin: Skin is warm and dry.   Psychiatric: She has a normal mood and affect. Her behavior is normal. Judgment and thought content normal.         Assessment:      ICD-10-CM ICD-9-CM   1. B12 deficiency E53.8 266.2   2. Mixed hyperlipidemia E78.2 272.2   3. Stress reaction F43.0 308.9   4. Gastroesophageal reflux disease without esophagitis K21.9 530.81         Plan:    Please resume B12 injections  Start eating healthy and start on a exercise program some time for personal care is extremely important.   Follow-up 6 months      Orders Placed This Encounter   Procedures    Comprehensive metabolic panel    CBC auto differential    Lipid panel    Vitamin B12       Current Outpatient Medications   Medication Sig Dispense Refill    amitriptyline (ELAVIL) 50 MG tablet TAKE 1 TABLET BY MOUTH EVERY EVENING. 30 tablet 1    atenolol (TENORMIN) 25 MG tablet TAKE 1 TABLET BY MOUTH TWICE A  tablet 1    clotrimazole-betamethasone 1-0.05% (LOTRISONE) cream APPLY TO AFFECTED AREA 2 TIMES DAILY 45 g 1    meclizine (ANTIVERT) 12.5 mg tablet Take 1 tablet (12.5 mg total) by mouth 3 (three) times daily as needed. 90 tablet 0    needle, disp, 18 G 18 x 1 " Ndle Use to draw b12 1000 mcg from vail every 7 days for 7 weeks 100 each 0    needle, reusable, 25 G 25 x 1 " Ndle Use to inject B12 1000 mcg every 7 days for 7 weeks 12 each 2    nystatin (MYCOSTATIN) powder Apply TID prn to affected area 60 g 3    omeprazole (PRILOSEC) 40 MG capsule Take 1 capsule (40 mg total) by mouth once " daily. 90 capsule 3    rosuvastatin (CRESTOR) 20 MG tablet TAKE 1 TABLET (20 MG TOTAL) BY MOUTH ONCE DAILY. 90 tablet 3    syringe, disposable, 3 mL Syrg Inject B12 1000 mcg every 7 days for 7 weeks 25 each 0    venlafaxine (EFFEXOR-XR) 75 MG 24 hr capsule TAKE 1 CAPSULE BY MOUTH EVERY DAY 90 capsule 3    zolpidem (AMBIEN) 10 mg Tab TAKE 1 TABLET BY MOUTH EVERY EVENING 30 tablet 5    amitriptyline (ELAVIL) 50 MG tablet TAKE 1 TABLET BY MOUTH EVERY EVENING. 30 tablet 3    cyanocobalamin 1,000 mcg/mL injection INJECT 1 ML WEEKLY FOR 8 WEEKS (Patient not taking: Reported on 10/29/2019) 10 mL 1     No current facility-administered medications for this visit.        Medications Discontinued During This Encounter   Medication Reason    meloxicam (MOBIC) 15 MG tablet        Follow up in about 6 months (around 4/29/2020).      Stephani Valdivia MD

## 2019-10-29 NOTE — PROGRESS NOTES
HPI     No visual complaints.   Last eye exam 01/19/2018 [facility].   Update glasses RX.      Last edited by Tiffani Newberry on 10/29/2019 10:57 AM. (History)            Assessment /Plan     For exam results, see Encounter Report.    Cataract, nuclear sclerotic senile, bilateral    Refractive error      Minimal cataracts OU, not surgical    Dispense Final Rx for glasses.  RTC 1 year  Discussed above and answered questions.

## 2019-11-20 RX ORDER — ROSUVASTATIN CALCIUM 20 MG/1
TABLET, COATED ORAL
Qty: 90 TABLET | Refills: 3 | Status: SHIPPED | OUTPATIENT
Start: 2019-11-20 | End: 2020-09-16 | Stop reason: SDUPTHER

## 2019-11-21 RX ORDER — ZOLPIDEM TARTRATE 10 MG/1
TABLET ORAL
Qty: 30 TABLET | Refills: 4 | Status: SHIPPED | OUTPATIENT
Start: 2019-11-21 | End: 2020-04-22 | Stop reason: SDUPTHER

## 2019-12-18 RX ORDER — ATENOLOL 25 MG/1
TABLET ORAL
Qty: 180 TABLET | Refills: 1 | Status: SHIPPED | OUTPATIENT
Start: 2019-12-18 | End: 2020-06-16 | Stop reason: SDUPTHER

## 2020-01-11 RX ORDER — OMEPRAZOLE 40 MG/1
CAPSULE, DELAYED RELEASE ORAL
Qty: 90 CAPSULE | Refills: 3 | Status: SHIPPED | OUTPATIENT
Start: 2020-01-11 | End: 2020-09-16 | Stop reason: SDUPTHER

## 2020-02-09 RX ORDER — AMITRIPTYLINE HYDROCHLORIDE 50 MG/1
TABLET, FILM COATED ORAL
Qty: 30 TABLET | Refills: 3 | Status: SHIPPED | OUTPATIENT
Start: 2020-02-09 | End: 2020-06-15 | Stop reason: SDUPTHER

## 2020-04-22 RX ORDER — ZOLPIDEM TARTRATE 10 MG/1
TABLET ORAL
Qty: 30 TABLET | Refills: 4 | Status: SHIPPED | OUTPATIENT
Start: 2020-04-22 | End: 2020-09-16 | Stop reason: SDUPTHER

## 2020-04-22 NOTE — TELEPHONE ENCOUNTER
Pt says on her last visit you said you would let her fill her ambien for 1 year due to being between on insurance. Please advise

## 2020-06-15 ENCOUNTER — PATIENT MESSAGE (OUTPATIENT)
Dept: FAMILY MEDICINE | Facility: CLINIC | Age: 65
End: 2020-06-15

## 2020-06-16 RX ORDER — AMITRIPTYLINE HYDROCHLORIDE 50 MG/1
50 TABLET, FILM COATED ORAL NIGHTLY
Qty: 30 TABLET | Refills: 0 | Status: SHIPPED | OUTPATIENT
Start: 2020-06-16 | End: 2020-06-16

## 2020-06-17 RX ORDER — AMITRIPTYLINE HYDROCHLORIDE 50 MG/1
50 TABLET, FILM COATED ORAL NIGHTLY
Qty: 90 TABLET | Refills: 1 | Status: SHIPPED | OUTPATIENT
Start: 2020-06-17 | End: 2020-09-16 | Stop reason: SDUPTHER

## 2020-06-17 RX ORDER — VENLAFAXINE HYDROCHLORIDE 75 MG/1
75 CAPSULE, EXTENDED RELEASE ORAL DAILY
Qty: 90 CAPSULE | Refills: 1 | Status: SHIPPED | OUTPATIENT
Start: 2020-06-17 | End: 2020-09-16 | Stop reason: SDUPTHER

## 2020-06-17 RX ORDER — ATENOLOL 25 MG/1
25 TABLET ORAL 2 TIMES DAILY
Qty: 180 TABLET | Refills: 1 | Status: SHIPPED | OUTPATIENT
Start: 2020-06-17 | End: 2020-09-16 | Stop reason: SDUPTHER

## 2020-08-28 DIAGNOSIS — Z12.39 BREAST CANCER SCREENING: ICD-10-CM

## 2020-09-10 ENCOUNTER — LAB VISIT (OUTPATIENT)
Dept: LAB | Facility: HOSPITAL | Age: 65
End: 2020-09-10
Attending: FAMILY MEDICINE
Payer: COMMERCIAL

## 2020-09-10 DIAGNOSIS — F43.0 STRESS REACTION: ICD-10-CM

## 2020-09-10 DIAGNOSIS — K21.9 GASTROESOPHAGEAL REFLUX DISEASE WITHOUT ESOPHAGITIS: ICD-10-CM

## 2020-09-10 DIAGNOSIS — E53.8 B12 DEFICIENCY: ICD-10-CM

## 2020-09-10 DIAGNOSIS — E78.2 MIXED HYPERLIPIDEMIA: ICD-10-CM

## 2020-09-10 LAB
ALBUMIN SERPL BCP-MCNC: 4 G/DL (ref 3.5–5.2)
ALP SERPL-CCNC: 68 U/L (ref 55–135)
ALT SERPL W/O P-5'-P-CCNC: 9 U/L (ref 10–44)
ANION GAP SERPL CALC-SCNC: 7 MMOL/L (ref 8–16)
AST SERPL-CCNC: 14 U/L (ref 10–40)
BASOPHILS # BLD AUTO: 0.04 K/UL (ref 0–0.2)
BASOPHILS NFR BLD: 0.5 % (ref 0–1.9)
BILIRUB SERPL-MCNC: 0.6 MG/DL (ref 0.1–1)
BUN SERPL-MCNC: 13 MG/DL (ref 8–23)
CALCIUM SERPL-MCNC: 9.4 MG/DL (ref 8.7–10.5)
CHLORIDE SERPL-SCNC: 106 MMOL/L (ref 95–110)
CHOLEST SERPL-MCNC: 138 MG/DL (ref 120–199)
CHOLEST/HDLC SERPL: 3.5 {RATIO} (ref 2–5)
CO2 SERPL-SCNC: 27 MMOL/L (ref 23–29)
CREAT SERPL-MCNC: 1 MG/DL (ref 0.5–1.4)
DIFFERENTIAL METHOD: ABNORMAL
EOSINOPHIL # BLD AUTO: 0.2 K/UL (ref 0–0.5)
EOSINOPHIL NFR BLD: 2 % (ref 0–8)
ERYTHROCYTE [DISTWIDTH] IN BLOOD BY AUTOMATED COUNT: 14.6 % (ref 11.5–14.5)
EST. GFR  (AFRICAN AMERICAN): >60 ML/MIN/1.73 M^2
EST. GFR  (NON AFRICAN AMERICAN): >60 ML/MIN/1.73 M^2
GLUCOSE SERPL-MCNC: 102 MG/DL (ref 70–110)
HCT VFR BLD AUTO: 38.4 % (ref 37–48.5)
HDLC SERPL-MCNC: 40 MG/DL (ref 40–75)
HDLC SERPL: 29 % (ref 20–50)
HGB BLD-MCNC: 11.5 G/DL (ref 12–16)
IMM GRANULOCYTES # BLD AUTO: 0.01 K/UL (ref 0–0.04)
IMM GRANULOCYTES NFR BLD AUTO: 0.1 % (ref 0–0.5)
LDLC SERPL CALC-MCNC: 65.6 MG/DL (ref 63–159)
LYMPHOCYTES # BLD AUTO: 1.9 K/UL (ref 1–4.8)
LYMPHOCYTES NFR BLD: 25.7 % (ref 18–48)
MCH RBC QN AUTO: 28.8 PG (ref 27–31)
MCHC RBC AUTO-ENTMCNC: 29.9 G/DL (ref 32–36)
MCV RBC AUTO: 96 FL (ref 82–98)
MONOCYTES # BLD AUTO: 0.6 K/UL (ref 0.3–1)
MONOCYTES NFR BLD: 8.3 % (ref 4–15)
NEUTROPHILS # BLD AUTO: 4.8 K/UL (ref 1.8–7.7)
NEUTROPHILS NFR BLD: 63.4 % (ref 38–73)
NONHDLC SERPL-MCNC: 98 MG/DL
NRBC BLD-RTO: 0 /100 WBC
PLATELET # BLD AUTO: 247 K/UL (ref 150–350)
PMV BLD AUTO: 12.2 FL (ref 9.2–12.9)
POTASSIUM SERPL-SCNC: 4.2 MMOL/L (ref 3.5–5.1)
PROT SERPL-MCNC: 7.2 G/DL (ref 6–8.4)
RBC # BLD AUTO: 4 M/UL (ref 4–5.4)
SODIUM SERPL-SCNC: 140 MMOL/L (ref 136–145)
TRIGL SERPL-MCNC: 162 MG/DL (ref 30–150)
WBC # BLD AUTO: 7.51 K/UL (ref 3.9–12.7)

## 2020-09-10 PROCEDURE — 80061 LIPID PANEL: CPT

## 2020-09-10 PROCEDURE — 85025 COMPLETE CBC W/AUTO DIFF WBC: CPT

## 2020-09-10 PROCEDURE — 80053 COMPREHEN METABOLIC PANEL: CPT

## 2020-09-10 PROCEDURE — 36415 COLL VENOUS BLD VENIPUNCTURE: CPT | Mod: PO

## 2020-09-10 PROCEDURE — 82607 VITAMIN B-12: CPT

## 2020-09-11 ENCOUNTER — TELEPHONE (OUTPATIENT)
Dept: OBSTETRICS AND GYNECOLOGY | Facility: CLINIC | Age: 65
End: 2020-09-11

## 2020-09-11 ENCOUNTER — OFFICE VISIT (OUTPATIENT)
Dept: OBSTETRICS AND GYNECOLOGY | Facility: CLINIC | Age: 65
End: 2020-09-11
Payer: COMMERCIAL

## 2020-09-11 ENCOUNTER — HOSPITAL ENCOUNTER (OUTPATIENT)
Dept: RADIOLOGY | Facility: HOSPITAL | Age: 65
Discharge: HOME | End: 2020-09-11
Attending: FAMILY MEDICINE
Payer: COMMERCIAL

## 2020-09-11 VITALS
HEIGHT: 69 IN | WEIGHT: 219 LBS | SYSTOLIC BLOOD PRESSURE: 124 MMHG | WEIGHT: 230.63 LBS | BODY MASS INDEX: 34.16 KG/M2 | BODY MASS INDEX: 32.44 KG/M2 | DIASTOLIC BLOOD PRESSURE: 80 MMHG | HEIGHT: 69 IN

## 2020-09-11 DIAGNOSIS — Z12.39 BREAST CANCER SCREENING: ICD-10-CM

## 2020-09-11 DIAGNOSIS — Z00.00 PREVENTATIVE HEALTH CARE: Primary | ICD-10-CM

## 2020-09-11 DIAGNOSIS — Z78.0 MENOPAUSE: ICD-10-CM

## 2020-09-11 DIAGNOSIS — Z01.419 PAPANICOLAOU SMEAR, AS PART OF ROUTINE GYNECOLOGICAL EXAMINATION: ICD-10-CM

## 2020-09-11 PROBLEM — Z12.12 SCREENING FOR COLORECTAL CANCER: Status: RESOLVED | Noted: 2017-01-13 | Resolved: 2020-09-11

## 2020-09-11 PROBLEM — Z12.11 SCREENING FOR COLORECTAL CANCER: Status: RESOLVED | Noted: 2017-01-13 | Resolved: 2020-09-11

## 2020-09-11 LAB — VIT B12 SERPL-MCNC: 273 PG/ML (ref 210–950)

## 2020-09-11 PROCEDURE — 99396 PREV VISIT EST AGE 40-64: CPT | Mod: S$GLB,,, | Performed by: NURSE PRACTITIONER

## 2020-09-11 PROCEDURE — 77063 MAMMO DIGITAL SCREENING BILAT WITH TOMOSYNTHESIS_CAD: ICD-10-PCS | Mod: 26,,, | Performed by: RADIOLOGY

## 2020-09-11 PROCEDURE — 77063 BREAST TOMOSYNTHESIS BI: CPT | Mod: 26,,, | Performed by: RADIOLOGY

## 2020-09-11 PROCEDURE — 77067 SCR MAMMO BI INCL CAD: CPT | Mod: TC

## 2020-09-11 PROCEDURE — 3008F BODY MASS INDEX DOCD: CPT | Mod: CPTII,S$GLB,, | Performed by: NURSE PRACTITIONER

## 2020-09-11 PROCEDURE — 3079F PR MOST RECENT DIASTOLIC BLOOD PRESSURE 80-89 MM HG: ICD-10-PCS | Mod: CPTII,S$GLB,, | Performed by: NURSE PRACTITIONER

## 2020-09-11 PROCEDURE — 3074F PR MOST RECENT SYSTOLIC BLOOD PRESSURE < 130 MM HG: ICD-10-PCS | Mod: CPTII,S$GLB,, | Performed by: NURSE PRACTITIONER

## 2020-09-11 PROCEDURE — 77067 MAMMO DIGITAL SCREENING BILAT WITH TOMOSYNTHESIS_CAD: ICD-10-PCS | Mod: 26,,, | Performed by: RADIOLOGY

## 2020-09-11 PROCEDURE — 99396 PR PREVENTIVE VISIT,EST,40-64: ICD-10-PCS | Mod: S$GLB,,, | Performed by: NURSE PRACTITIONER

## 2020-09-11 PROCEDURE — 99999 PR PBB SHADOW E&M-EST. PATIENT-LVL III: ICD-10-PCS | Mod: PBBFAC,,, | Performed by: NURSE PRACTITIONER

## 2020-09-11 PROCEDURE — 87624 HPV HI-RISK TYP POOLED RSLT: CPT

## 2020-09-11 PROCEDURE — 3008F PR BODY MASS INDEX (BMI) DOCUMENTED: ICD-10-PCS | Mod: CPTII,S$GLB,, | Performed by: NURSE PRACTITIONER

## 2020-09-11 PROCEDURE — 3079F DIAST BP 80-89 MM HG: CPT | Mod: CPTII,S$GLB,, | Performed by: NURSE PRACTITIONER

## 2020-09-11 PROCEDURE — 88175 CYTOPATH C/V AUTO FLUID REDO: CPT

## 2020-09-11 PROCEDURE — 3074F SYST BP LT 130 MM HG: CPT | Mod: CPTII,S$GLB,, | Performed by: NURSE PRACTITIONER

## 2020-09-11 PROCEDURE — 77067 SCR MAMMO BI INCL CAD: CPT | Mod: 26,,, | Performed by: RADIOLOGY

## 2020-09-11 PROCEDURE — 99999 PR PBB SHADOW E&M-EST. PATIENT-LVL III: CPT | Mod: PBBFAC,,, | Performed by: NURSE PRACTITIONER

## 2020-09-11 NOTE — TELEPHONE ENCOUNTER
----- Message from Wilma Campos sent at 9/11/2020  8:10 AM CDT -----  Contact: Silvia Vega miss a call and would like a call back at [phone], Regards to a message she got about an early appointment.    Thanks  Td

## 2020-09-11 NOTE — PROGRESS NOTES
CC: Well woman exam    Silvia Cotton is a 64 y.o. female  presents for well woman exam.  LMP: . No AUB. No pelvic pain. Last cervical cytology was normal, HPV positive. Mammogram results pending. Is sexually active. No issues, problems, or complaints.      Past Medical History:   Diagnosis Date    Anemia     Hyperlipidemia     Hypertension     Supraventricular tachycardia      Past Surgical History:   Procedure Laterality Date    APPENDECTOMY      CHOLECYSTECTOMY      COLONOSCOPY N/A 2017    Procedure: COLONOSCOPY;  Surgeon: Taj Nicholas MD;  Location: [facility];  Service: Endoscopy;  Laterality: N/A;    SHOULDER ARTHROSCOPY      Tonsilectomy      TONSILLECTOMY      Uterine Ablation       Social History     Socioeconomic History    Marital status:      Spouse name: Not on file    Number of children: Not on file    Years of education: Not on file    Highest education level: Not on file   Occupational History    Not on file   Social Needs    Financial resource strain: Hard    Food insecurity     Worry: Sometimes true     Inability: Sometimes true    Transportation needs     Medical: No     Non-medical: No   Tobacco Use    Smoking status: Never Smoker    Smokeless tobacco: Never Used   Substance and Sexual Activity    Alcohol use: No     Frequency: Never     Binge frequency: Never    Drug use: No    Sexual activity: Yes     Partners: Male     Birth control/protection: None   Lifestyle    Physical activity     Days per week: 3 days     Minutes per session: Not on file    Stress: Rather much   Relationships    Social connections     Talks on phone: More than three times a week     Gets together: Three times a week     Attends [employer] service: Not on file     Active member of club or organization: No     Attends meetings of clubs or organizations: Never     Relationship status:    Other Topics Concern    Not on file   Social History Narrative    Not on  "file     Family History   Problem Relation Age of Onset    Cataracts Mother     Cataracts Father     Hypertension Sister     Hypertension Brother     Hypertension Sister     Glaucoma Neg Hx     Diabetes Neg Hx     Breast cancer Neg Hx     Colon cancer Neg Hx     Ovarian cancer Neg Hx      OB History        1    Para   1    Term   1            AB        Living   1       SAB        TAB        Ectopic        Multiple        Live Births   1                 /80   Ht 5' 9" (1.753 m)   Wt 104.6 kg (230 lb 9.6 oz)   BMI 34.05 kg/m²       ROS:  GENERAL: Denies weight gain or weight loss. Feeling well overall.   SKIN: Denies rash or lesions.   HEAD: Denies head injury or headache.   NODES: Denies enlarged lymph nodes.   CHEST: Denies chest pain or shortness of breath.   CARDIOVASCULAR: Denies palpitations or left sided chest pain.   ABDOMEN: No abdominal pain, constipation, diarrhea, nausea, vomiting or rectal bleeding.   URINARY: No frequency, dysuria, hematuria, or burning on urination.  REPRODUCTIVE: See HPI.   BREASTS: The patient performs breast self-examination and denies pain, lumps, or nipple discharge.   HEMATOLOGIC: No easy bruisability or excessive bleeding.   MUSCULOSKELETAL: Denies joint pain or swelling.   NEUROLOGIC: Denies syncope or weakness.   PSYCHIATRIC: Denies depression, anxiety or mood swings.    PHYSICAL EXAM:  APPEARANCE: Obese female, in no acute distress.  AFFECT: WNL, alert and oriented x 3  SKIN: No acne or hirsutism  NECK: Neck symmetric without masses or thyromegaly  NODES: No inguinal, cervical, axillary, or femoral lymph node enlargement  CHEST: Good respiratory effect  ABDOMEN: Soft.  No tenderness or masses.  No hepatosplenomegaly.  No hernias.  BREASTS: Symmetrical, no skin changes or visible lesions.  No palpable masses, nipple discharge bilaterally.  PELVIC: Normal external genitalia without lesions.  Normal hair distribution.  Adequate perineal body, " normal urethral meatus.  Vagina atrophy without lesions or discharge.  Cervix pink, without lesions, discharge or tenderness.  No significant cystocele or rectocele.  Bimanual exam shows uterus to be normal size, regular, mobile and nontender.  Adnexa without masses or tenderness.    EXTREMITIES: No edema.    1. Preventative health care  Liquid-Based Pap Smear, Screening    HPV High Risk Genotypes, PCR   2. Papanicolaou smear, as part of routine gynecological examination  Liquid-Based Pap Smear, Screening    HPV High Risk Genotypes, PCR   3. Menopause  DXA Bone Density Spine And Hip    PLAN:  Pap exam  Dexa scan  Patient was counseled today on A.C.S. Pap guidelines and recommendations for yearly pelvic exams, mammograms and monthly self breast exams; to see her PCP for other health maintenance.

## 2020-09-15 ENCOUNTER — APPOINTMENT (OUTPATIENT)
Dept: RADIOLOGY | Facility: HOSPITAL | Age: 65
End: 2020-09-15
Attending: NURSE PRACTITIONER
Payer: COMMERCIAL

## 2020-09-15 DIAGNOSIS — Z78.0 MENOPAUSE: ICD-10-CM

## 2020-09-15 PROCEDURE — 77080 DXA BONE DENSITY AXIAL: CPT | Mod: TC

## 2020-09-15 PROCEDURE — 77080 DXA BONE DENSITY AXIAL: CPT | Mod: 26,,, | Performed by: RADIOLOGY

## 2020-09-15 PROCEDURE — 77080 DEXA BONE DENSITY SPINE HIP: ICD-10-PCS | Mod: 26,,, | Performed by: RADIOLOGY

## 2020-09-16 ENCOUNTER — OFFICE VISIT (OUTPATIENT)
Dept: FAMILY MEDICINE | Facility: CLINIC | Age: 65
End: 2020-09-16
Payer: COMMERCIAL

## 2020-09-16 VITALS
OXYGEN SATURATION: 98 % | HEART RATE: 95 BPM | SYSTOLIC BLOOD PRESSURE: 130 MMHG | TEMPERATURE: 98 F | DIASTOLIC BLOOD PRESSURE: 80 MMHG | HEIGHT: 69 IN | WEIGHT: 230.81 LBS | BODY MASS INDEX: 34.18 KG/M2

## 2020-09-16 DIAGNOSIS — D50.0 IRON DEFICIENCY ANEMIA DUE TO CHRONIC BLOOD LOSS: ICD-10-CM

## 2020-09-16 DIAGNOSIS — Z00.00 WELL ADULT EXAM: Primary | ICD-10-CM

## 2020-09-16 DIAGNOSIS — E78.2 MIXED HYPERLIPIDEMIA: ICD-10-CM

## 2020-09-16 DIAGNOSIS — E53.8 B12 DEFICIENCY: ICD-10-CM

## 2020-09-16 DIAGNOSIS — K21.9 GASTROESOPHAGEAL REFLUX DISEASE WITHOUT ESOPHAGITIS: ICD-10-CM

## 2020-09-16 DIAGNOSIS — F51.04 PSYCHOPHYSIOLOGICAL INSOMNIA: ICD-10-CM

## 2020-09-16 PROCEDURE — 99999 PR PBB SHADOW E&M-EST. PATIENT-LVL IV: CPT | Mod: PBBFAC,,, | Performed by: FAMILY MEDICINE

## 2020-09-16 PROCEDURE — 99999 PR PBB SHADOW E&M-EST. PATIENT-LVL IV: ICD-10-PCS | Mod: PBBFAC,,, | Performed by: FAMILY MEDICINE

## 2020-09-16 PROCEDURE — 3075F SYST BP GE 130 - 139MM HG: CPT | Mod: CPTII,S$GLB,, | Performed by: FAMILY MEDICINE

## 2020-09-16 PROCEDURE — 3008F BODY MASS INDEX DOCD: CPT | Mod: CPTII,S$GLB,, | Performed by: FAMILY MEDICINE

## 2020-09-16 PROCEDURE — 3079F DIAST BP 80-89 MM HG: CPT | Mod: CPTII,S$GLB,, | Performed by: FAMILY MEDICINE

## 2020-09-16 PROCEDURE — 3008F PR BODY MASS INDEX (BMI) DOCUMENTED: ICD-10-PCS | Mod: CPTII,S$GLB,, | Performed by: FAMILY MEDICINE

## 2020-09-16 PROCEDURE — 90686 IIV4 VACC NO PRSV 0.5 ML IM: CPT | Mod: S$GLB,,, | Performed by: FAMILY MEDICINE

## 2020-09-16 PROCEDURE — 90686 FLU VACCINE (QUAD) GREATER THAN OR EQUAL TO 3YO PRESERVATIVE FREE IM: ICD-10-PCS | Mod: S$GLB,,, | Performed by: FAMILY MEDICINE

## 2020-09-16 PROCEDURE — 99396 PR PREVENTIVE VISIT,EST,40-64: ICD-10-PCS | Mod: 25,S$GLB,, | Performed by: FAMILY MEDICINE

## 2020-09-16 PROCEDURE — 3079F PR MOST RECENT DIASTOLIC BLOOD PRESSURE 80-89 MM HG: ICD-10-PCS | Mod: CPTII,S$GLB,, | Performed by: FAMILY MEDICINE

## 2020-09-16 PROCEDURE — 90471 IMMUNIZATION ADMIN: CPT | Mod: S$GLB,,, | Performed by: FAMILY MEDICINE

## 2020-09-16 PROCEDURE — 90471 FLU VACCINE (QUAD) GREATER THAN OR EQUAL TO 3YO PRESERVATIVE FREE IM: ICD-10-PCS | Mod: S$GLB,,, | Performed by: FAMILY MEDICINE

## 2020-09-16 PROCEDURE — 3075F PR MOST RECENT SYSTOLIC BLOOD PRESS GE 130-139MM HG: ICD-10-PCS | Mod: CPTII,S$GLB,, | Performed by: FAMILY MEDICINE

## 2020-09-16 PROCEDURE — 99396 PREV VISIT EST AGE 40-64: CPT | Mod: 25,S$GLB,, | Performed by: FAMILY MEDICINE

## 2020-09-16 RX ORDER — ROSUVASTATIN CALCIUM 20 MG/1
20 TABLET, COATED ORAL DAILY
Qty: 90 TABLET | Refills: 3 | Status: SHIPPED | OUTPATIENT
Start: 2020-09-16 | End: 2021-03-08 | Stop reason: SDUPTHER

## 2020-09-16 RX ORDER — CLOTRIMAZOLE AND BETAMETHASONE DIPROPIONATE 10; .64 MG/G; MG/G
CREAM TOPICAL
Qty: 45 G | Refills: 2 | Status: SHIPPED | OUTPATIENT
Start: 2020-09-16 | End: 2021-03-08 | Stop reason: SDUPTHER

## 2020-09-16 RX ORDER — OMEPRAZOLE 40 MG/1
40 CAPSULE, DELAYED RELEASE ORAL DAILY
Qty: 90 CAPSULE | Refills: 3 | Status: SHIPPED | OUTPATIENT
Start: 2020-09-16 | End: 2021-03-08 | Stop reason: SDUPTHER

## 2020-09-16 RX ORDER — AMITRIPTYLINE HYDROCHLORIDE 50 MG/1
50 TABLET, FILM COATED ORAL NIGHTLY
Qty: 90 TABLET | Refills: 3 | Status: SHIPPED | OUTPATIENT
Start: 2020-09-16 | End: 2021-03-08 | Stop reason: SDUPTHER

## 2020-09-16 RX ORDER — INDOMETHACIN 25 MG/1
25 CAPSULE ORAL 3 TIMES DAILY PRN
COMMUNITY
Start: 2020-09-12 | End: 2020-09-19

## 2020-09-16 RX ORDER — ZOLPIDEM TARTRATE 10 MG/1
TABLET ORAL
Qty: 30 TABLET | Refills: 5 | Status: SHIPPED | OUTPATIENT
Start: 2020-09-16 | End: 2021-03-08 | Stop reason: SDUPTHER

## 2020-09-16 RX ORDER — NYSTATIN 100000 [USP'U]/G
POWDER TOPICAL
Qty: 60 G | Refills: 3 | Status: SHIPPED | OUTPATIENT
Start: 2020-09-16 | End: 2021-03-08 | Stop reason: SDUPTHER

## 2020-09-16 RX ORDER — CYANOCOBALAMIN 1000 UG/ML
1000 INJECTION, SOLUTION INTRAMUSCULAR; SUBCUTANEOUS WEEKLY
Qty: 30 ML | Refills: 12 | Status: SHIPPED | OUTPATIENT
Start: 2020-09-16 | End: 2022-01-24 | Stop reason: SDUPTHER

## 2020-09-16 RX ORDER — VENLAFAXINE HYDROCHLORIDE 75 MG/1
75 CAPSULE, EXTENDED RELEASE ORAL DAILY
Qty: 90 CAPSULE | Refills: 3 | Status: SHIPPED | OUTPATIENT
Start: 2020-09-16 | End: 2021-03-08 | Stop reason: SDUPTHER

## 2020-09-16 RX ORDER — ATENOLOL 25 MG/1
25 TABLET ORAL 2 TIMES DAILY
Qty: 180 TABLET | Refills: 3 | Status: SHIPPED | OUTPATIENT
Start: 2020-09-16 | End: 2021-03-08 | Stop reason: SDUPTHER

## 2020-09-16 RX ORDER — SYRINGE WITH NEEDLE, 1 ML 27GX1/2"
1 SYRINGE, EMPTY DISPOSABLE MISCELLANEOUS WEEKLY
Qty: 20 SYRINGE | Refills: 2 | COMMUNITY
Start: 2020-09-16 | End: 2023-02-08 | Stop reason: SDUPTHER

## 2020-09-16 NOTE — PROGRESS NOTES
Chief Complaint:    Chief Complaint   Patient presents with    Annual Exam       History of Present Illness:    Presents today for six-month follow-up:  Blood pressure stable  She complains of some hip pain   She has chronic iron deficiency anemia will seeing iron infusion there is slight drop in her H&H.  She is unable to tolerate oral iron.  B12 levels are also low because she has not been doing B12 for a while  Taking Ambien for chronic insomnia.  Recently restarted her exercise program.    Lipid stable    ROS:  Review of Systems   Constitutional: Negative for activity change, chills, fatigue, fever and unexpected weight change.   HENT: Negative for congestion, ear discharge, ear pain, hearing loss, postnasal drip and rhinorrhea.    Eyes: Negative for pain and visual disturbance.   Respiratory: Negative for cough, chest tightness and shortness of breath.    Cardiovascular: Negative for chest pain and palpitations.   Gastrointestinal: Negative for abdominal pain, diarrhea and vomiting.   Endocrine: Negative for heat intolerance.   Genitourinary: Negative for dysuria, flank pain, frequency and hematuria.   Musculoskeletal: Negative for back pain, gait problem and neck pain.   Skin: Negative for color change and rash.   Neurological: Negative for dizziness, tremors, seizures, numbness and headaches.   Psychiatric/Behavioral: Negative for agitation, hallucinations, self-injury, sleep disturbance and suicidal ideas. The patient is not nervous/anxious.        Past Medical History:   Diagnosis Date    Anemia     Hyperlipidemia     Hypertension     Supraventricular tachycardia        Social History:  Social History     Socioeconomic History    Marital status:      Spouse name: Not on file    Number of children: Not on file    Years of education: Not on file    Highest education level: Not on file   Occupational History    Not on file   Social Needs    Financial resource strain: Somewhat hard    Food  "insecurity     Worry: Never true     Inability: Never true    Transportation needs     Medical: No     Non-medical: No   Tobacco Use    Smoking status: Never Smoker    Smokeless tobacco: Never Used   Substance and Sexual Activity    Alcohol use: No     Frequency: Never     Binge frequency: Never    Drug use: No    Sexual activity: Yes     Partners: Male     Birth control/protection: None   Lifestyle    Physical activity     Days per week: 6 days     Minutes per session: 30 min    Stress: Very much   Relationships    Social connections     Talks on phone: More than three times a week     Gets together: Once a week     Attends Spiritism service: Not on file     Active member of club or organization: No     Attends meetings of clubs or organizations: Never     Relationship status:    Other Topics Concern    Not on file   Social History Narrative    Not on file       Family History:   family history includes Cataracts in her father and mother; Hypertension in her brother, sister, and sister.    Health Maintenance   Topic Date Due    Lipid Panel  09/10/2021    Mammogram  09/11/2021    TETANUS VACCINE  12/01/2024    Hepatitis C Screening  Completed       Physical Exam:    Vital Signs  Temp: 97.5 °F (36.4 °C)  Temp src: Temporal  Pulse: 95  SpO2: 98 %  BP: 130/80  BP Location: Left arm  Patient Position: Sitting  Pain Score: 0-No pain  Height and Weight  Height: 5' 9" (175.3 cm)  Weight: 104.7 kg (230 lb 13.2 oz)  BSA (Calculated - sq m): 2.26 sq meters  BMI (Calculated): 34.1  Weight in (lb) to have BMI = 25: 168.9]    Body mass index is 34.09 kg/m².    Physical Exam  Constitutional:       Appearance: She is well-developed.   Eyes:      Conjunctiva/sclera: Conjunctivae normal.      Pupils: Pupils are equal, round, and reactive to light.   Neck:      Musculoskeletal: Normal range of motion and neck supple.   Cardiovascular:      Rate and Rhythm: Normal rate and regular rhythm.      Heart sounds: " Normal heart sounds. No murmur.   Pulmonary:      Effort: Pulmonary effort is normal. No respiratory distress.      Breath sounds: Normal breath sounds. No wheezing or rales.   Chest:      Chest wall: No tenderness.   Abdominal:      General: There is no distension.      Palpations: Abdomen is soft. There is no mass.      Tenderness: There is no abdominal tenderness. There is no guarding.   Musculoskeletal:         General: No tenderness.   Lymphadenopathy:      Cervical: No cervical adenopathy.   Skin:     General: Skin is warm and dry.   Neurological:      Mental Status: She is alert and oriented to person, place, and time.      Deep Tendon Reflexes: Reflexes are normal and symmetric.   Psychiatric:         Behavior: Behavior normal.         Thought Content: Thought content normal.         Judgment: Judgment normal.           Assessment:      ICD-10-CM ICD-9-CM   1. Well adult exam  Z00.00 V70.0   2. B12 deficiency  E53.8 266.2   3. Psychophysiological insomnia  F51.04 307.42   4. Iron deficiency anemia due to chronic blood loss  D50.0 280.0   5. Mixed hyperlipidemia  E78.2 272.2   6. Gastroesophageal reflux disease without esophagitis  K21.9 530.81         Plan:    Please continue with exercise program  Will resume B12 injections weekly  Other medical problems stable  Will recheck a CBC in 6 months if it drops further will need referral to Hematology.  Follow-up 6 months    Orders Placed This Encounter   Procedures    CBC auto differential    Comprehensive metabolic panel    Hemoglobin A1C    Lipid Panel    Vitamin B12       Current Outpatient Medications   Medication Sig Dispense Refill    amitriptyline (ELAVIL) 50 MG tablet Take 1 tablet (50 mg total) by mouth every evening. 90 tablet 3    atenoloL (TENORMIN) 25 MG tablet Take 1 tablet (25 mg total) by mouth 2 (two) times daily. 180 tablet 3    clotrimazole-betamethasone 1-0.05% (LOTRISONE) cream APPLY TO AFFECTED AREA 2 TIMES DAILY 45 g 2     "indomethacin (INDOCIN) 25 MG capsule Take 25 mg by mouth 3 (three) times daily as needed.      meclizine (ANTIVERT) 12.5 mg tablet Take 1 tablet (12.5 mg total) by mouth 3 (three) times daily as needed. 90 tablet 0    needle, disp, 18 G 18 x 1 " Ndle Use to draw b12 1000 mcg from vail every 7 days for 7 weeks 100 each 0    needle, reusable, 25 G 25 x 1 " Ndle Use to inject B12 1000 mcg every 7 days for 7 weeks 12 each 2    nystatin (MYCOSTATIN) powder Apply TID prn to affected area 60 g 3    omeprazole (PRILOSEC) 40 MG capsule Take 1 capsule (40 mg total) by mouth once daily. 90 capsule 3    rosuvastatin (CRESTOR) 20 MG tablet Take 1 tablet (20 mg total) by mouth once daily. 90 tablet 3    syringe, disposable, 3 mL Syrg Inject B12 1000 mcg every 7 days for 7 weeks 25 each 0    venlafaxine (EFFEXOR-XR) 75 MG 24 hr capsule Take 1 capsule (75 mg total) by mouth once daily. 90 capsule 3    zolpidem (AMBIEN) 10 mg Tab TAKE 1 TABLET BY MOUTH EVERY DAY IN THE EVENING 30 tablet 5    cyanocobalamin 1,000 mcg/mL injection Inject 1 mL (1,000 mcg total) into the muscle once a week. 30 mL 12    syringe with needle 1 mL 25 gauge x 1" Syrg 1 each by Misc.(Non-Drug; Combo Route) route once a week. 20 Syringe 2     No current facility-administered medications for this visit.        Medications Discontinued During This Encounter   Medication Reason    cyanocobalamin 1,000 mcg/mL injection Patient no longer taking    clotrimazole-betamethasone 1-0.05% (LOTRISONE) cream Reorder    nystatin (MYCOSTATIN) powder Reorder    rosuvastatin (CRESTOR) 20 MG tablet Reorder    omeprazole (PRILOSEC) 40 MG capsule Reorder    zolpidem (AMBIEN) 10 mg Tab Reorder    amitriptyline (ELAVIL) 50 MG tablet Reorder    atenoloL (TENORMIN) 25 MG tablet Reorder    venlafaxine (EFFEXOR-XR) 75 MG 24 hr capsule Reorder       Follow up in about 6 months (around 3/16/2021).      Stephani Valdivia MD                "

## 2020-09-16 NOTE — PROGRESS NOTES
Flu vacc. Given into right deltoid. Client tolerated well .no bleeding noted. Suggested 15 min wait

## 2020-09-18 LAB
HPV HR 12 DNA SPEC QL NAA+PROBE: POSITIVE
HPV16 AG SPEC QL: NEGATIVE
HPV18 DNA SPEC QL NAA+PROBE: NEGATIVE

## 2020-09-25 ENCOUNTER — TELEPHONE (OUTPATIENT)
Dept: OBSTETRICS AND GYNECOLOGY | Facility: CLINIC | Age: 65
End: 2020-09-25

## 2020-09-25 NOTE — TELEPHONE ENCOUNTER
Spoke with Reyna from Cytology states they will be working on this pap smear in a day or two. Notified pt. Patient verbalized understanding

## 2020-09-26 LAB
FINAL PATHOLOGIC DIAGNOSIS: NORMAL
Lab: NORMAL

## 2020-09-27 NOTE — PROGRESS NOTES
Please call patient and inform her that the pap cytology was normal, but the HPV was positive for the second yr. Needs a colpo.

## 2020-10-26 ENCOUNTER — PROCEDURE VISIT (OUTPATIENT)
Dept: OBSTETRICS AND GYNECOLOGY | Facility: CLINIC | Age: 65
End: 2020-10-26
Payer: COMMERCIAL

## 2020-10-26 VITALS
TEMPERATURE: 98 F | SYSTOLIC BLOOD PRESSURE: 116 MMHG | WEIGHT: 230.38 LBS | DIASTOLIC BLOOD PRESSURE: 78 MMHG | HEIGHT: 69 IN | BODY MASS INDEX: 34.12 KG/M2

## 2020-10-26 DIAGNOSIS — B97.7 HIGH RISK HPV INFECTION: Primary | ICD-10-CM

## 2020-10-26 PROCEDURE — 88342 IMHCHEM/IMCYTCHM 1ST ANTB: CPT | Mod: 26,,, | Performed by: PATHOLOGY

## 2020-10-26 PROCEDURE — 57454 BX/CURETT OF CERVIX W/SCOPE: CPT | Mod: S$GLB,,, | Performed by: OBSTETRICS & GYNECOLOGY

## 2020-10-26 PROCEDURE — 88342 CHG IMMUNOCYTOCHEMISTRY: ICD-10-PCS | Mod: 26,,, | Performed by: PATHOLOGY

## 2020-10-26 PROCEDURE — 88305 TISSUE EXAM BY PATHOLOGIST: CPT | Mod: 26,,, | Performed by: PATHOLOGY

## 2020-10-26 PROCEDURE — 88305 TISSUE EXAM BY PATHOLOGIST: CPT | Performed by: PATHOLOGY

## 2020-10-26 PROCEDURE — 57454 PR COLPOSC,CERVIX W/ADJ VAG,W/BX & CURRETAG: ICD-10-PCS | Mod: S$GLB,,, | Performed by: OBSTETRICS & GYNECOLOGY

## 2020-10-26 PROCEDURE — 88342 IMHCHEM/IMCYTCHM 1ST ANTB: CPT | Performed by: PATHOLOGY

## 2020-10-26 PROCEDURE — 88305 TISSUE EXAM BY PATHOLOGIST: ICD-10-PCS | Mod: 26,,, | Performed by: PATHOLOGY

## 2020-10-28 NOTE — PROCEDURES
Procedures   COLPOSCOPY:    Date: 10/28/2020  Support Staff: Ludivina Villaseñor    65 y.o. year old female patient  Postmenopausal pt presents for colposcopy.    Her most recent papsmear showed normal HPV+ (non-16/18) x 2 years on 2020    PRE-COLPOSCOPY PROCEDURE COUNSELING:  Discussed the abnormal pap test findings, HPV, need for colposcopy and possible biopsies to determine a diagnosis and plan of care, treatments available, the minimal risks of bleeding and infection with a colposcopy, alternatives to colposcopy and she agrees to proceed.    COLPOSCOPY EXAM:   TIME OUT PERFORMED. There were no vulvar lesions suggestive of condyloma present. The cervix was visualized with a speculum. Acetic acid was applied. Abnormalities: flat. Biopsy done at 11, 5. ECC done  Specimens placed in formalin and sent to pathology. Monsel's solution was applied at biopsy sites to stop bleeding.    EBL: <5cc     The speculum was removed. The patient tolerated the procedure well.    IMPRESSION:   Abnormal Pap 795.09    POST COLPOSCOPY COUNSELING:   Manage post colposcopy cramping with NSAIDs, Tylenol or Rx per [facility].  Avoid anything in vagina (intercourse, douching, tampons) one week after the procedure.  Expect a clumpy blackish vaginal discharge (Monsel's solution) for several days.  Report bleeding heavier than a period, worsening pain, fever > 101.0 F, or foul-smelling vaginal discharge.  Importance of follow-up stressed.    Counseling lasted approximately 15 minutes and all her questions were answered.    FOLLOW-UP:   Based on biopsy results.

## 2020-11-02 LAB
COMMENT: NORMAL
FINAL PATHOLOGIC DIAGNOSIS: NORMAL
GROSS: NORMAL

## 2020-11-03 ENCOUNTER — PATIENT MESSAGE (OUTPATIENT)
Dept: OBSTETRICS AND GYNECOLOGY | Facility: HOSPITAL | Age: 65
End: 2020-11-03

## 2021-03-07 ENCOUNTER — PATIENT MESSAGE (OUTPATIENT)
Dept: FAMILY MEDICINE | Facility: CLINIC | Age: 66
End: 2021-03-07

## 2021-03-08 RX ORDER — MECLIZINE HCL 12.5 MG 12.5 MG/1
12.5 TABLET ORAL 3 TIMES DAILY PRN
Qty: 90 TABLET | Refills: 1 | Status: SHIPPED | OUTPATIENT
Start: 2021-03-08 | End: 2022-11-07 | Stop reason: SDUPTHER

## 2021-03-08 RX ORDER — VENLAFAXINE HYDROCHLORIDE 75 MG/1
75 CAPSULE, EXTENDED RELEASE ORAL DAILY
Qty: 90 CAPSULE | Refills: 1 | Status: SHIPPED | OUTPATIENT
Start: 2021-03-08 | End: 2021-10-26 | Stop reason: SDUPTHER

## 2021-03-08 RX ORDER — ROSUVASTATIN CALCIUM 20 MG/1
20 TABLET, COATED ORAL DAILY
Qty: 90 TABLET | Refills: 1 | Status: SHIPPED | OUTPATIENT
Start: 2021-03-08 | End: 2021-10-26 | Stop reason: SDUPTHER

## 2021-03-08 RX ORDER — NYSTATIN 100000 [USP'U]/G
POWDER TOPICAL
Qty: 60 G | Refills: 3 | Status: SHIPPED | OUTPATIENT
Start: 2021-03-08 | End: 2022-11-07 | Stop reason: SDUPTHER

## 2021-03-08 RX ORDER — ZOLPIDEM TARTRATE 10 MG/1
TABLET ORAL
Qty: 30 TABLET | Refills: 5 | Status: SHIPPED | OUTPATIENT
Start: 2021-03-08 | End: 2022-08-08

## 2021-03-08 RX ORDER — ATENOLOL 25 MG/1
25 TABLET ORAL 2 TIMES DAILY
Qty: 180 TABLET | Refills: 1 | Status: SHIPPED | OUTPATIENT
Start: 2021-03-08 | End: 2021-10-26 | Stop reason: SDUPTHER

## 2021-03-08 RX ORDER — CLOTRIMAZOLE AND BETAMETHASONE DIPROPIONATE 10; .64 MG/G; MG/G
CREAM TOPICAL
Qty: 45 G | Refills: 2 | Status: SHIPPED | OUTPATIENT
Start: 2021-03-08 | End: 2024-02-13

## 2021-03-08 RX ORDER — AMITRIPTYLINE HYDROCHLORIDE 50 MG/1
50 TABLET, FILM COATED ORAL NIGHTLY
Qty: 90 TABLET | Refills: 1 | Status: SHIPPED | OUTPATIENT
Start: 2021-03-08 | End: 2021-10-26 | Stop reason: SDUPTHER

## 2021-03-08 RX ORDER — OMEPRAZOLE 40 MG/1
40 CAPSULE, DELAYED RELEASE ORAL DAILY
Qty: 90 CAPSULE | Refills: 1 | Status: SHIPPED | OUTPATIENT
Start: 2021-03-08 | End: 2021-10-26 | Stop reason: SDUPTHER

## 2021-09-29 DIAGNOSIS — Z12.31 OTHER SCREENING MAMMOGRAM: ICD-10-CM

## 2021-10-26 ENCOUNTER — PATIENT MESSAGE (OUTPATIENT)
Dept: FAMILY MEDICINE | Facility: CLINIC | Age: 66
End: 2021-10-26
Payer: COMMERCIAL

## 2021-10-26 ENCOUNTER — PATIENT OUTREACH (OUTPATIENT)
Dept: ADMINISTRATIVE | Facility: HOSPITAL | Age: 66
End: 2021-10-26
Payer: COMMERCIAL

## 2021-10-26 ENCOUNTER — PATIENT MESSAGE (OUTPATIENT)
Dept: ADMINISTRATIVE | Facility: HOSPITAL | Age: 66
End: 2021-10-26
Payer: COMMERCIAL

## 2021-10-26 DIAGNOSIS — F51.04 PSYCHOPHYSIOLOGICAL INSOMNIA: Primary | ICD-10-CM

## 2021-10-26 RX ORDER — ATENOLOL 25 MG/1
25 TABLET ORAL 2 TIMES DAILY
Qty: 180 TABLET | Refills: 0 | Status: SHIPPED | OUTPATIENT
Start: 2021-10-26 | End: 2022-04-18

## 2021-10-26 RX ORDER — ZOLPIDEM TARTRATE 10 MG/1
TABLET ORAL
Qty: 30 TABLET | Refills: 5 | Status: CANCELLED | OUTPATIENT
Start: 2021-10-26

## 2021-10-26 RX ORDER — AMITRIPTYLINE HYDROCHLORIDE 50 MG/1
50 TABLET, FILM COATED ORAL NIGHTLY
Qty: 90 TABLET | Refills: 0 | Status: SHIPPED | OUTPATIENT
Start: 2021-10-26 | End: 2022-01-24 | Stop reason: SDUPTHER

## 2021-10-26 RX ORDER — VENLAFAXINE HYDROCHLORIDE 75 MG/1
75 CAPSULE, EXTENDED RELEASE ORAL DAILY
Qty: 90 CAPSULE | Refills: 0 | Status: SHIPPED | OUTPATIENT
Start: 2021-10-26 | End: 2022-01-24 | Stop reason: SDUPTHER

## 2021-10-26 RX ORDER — ROSUVASTATIN CALCIUM 20 MG/1
20 TABLET, COATED ORAL DAILY
Qty: 90 TABLET | Refills: 0 | Status: SHIPPED | OUTPATIENT
Start: 2021-10-26 | End: 2022-01-24 | Stop reason: SDUPTHER

## 2021-10-26 RX ORDER — ZOLPIDEM TARTRATE 10 MG/1
TABLET ORAL
Qty: 30 TABLET | Refills: 2 | OUTPATIENT
Start: 2021-10-26

## 2021-10-26 RX ORDER — OMEPRAZOLE 40 MG/1
40 CAPSULE, DELAYED RELEASE ORAL DAILY
Qty: 90 CAPSULE | Refills: 0 | Status: SHIPPED | OUTPATIENT
Start: 2021-10-26 | End: 2022-02-01 | Stop reason: SDUPTHER

## 2021-10-27 RX ORDER — TRAZODONE HYDROCHLORIDE 50 MG/1
50 TABLET ORAL NIGHTLY
Qty: 30 TABLET | Refills: 2 | Status: SHIPPED | OUTPATIENT
Start: 2021-10-27 | End: 2022-01-24 | Stop reason: SDUPTHER

## 2022-01-03 ENCOUNTER — PATIENT MESSAGE (OUTPATIENT)
Dept: FAMILY MEDICINE | Facility: CLINIC | Age: 67
End: 2022-01-03
Payer: COMMERCIAL

## 2022-01-14 ENCOUNTER — LAB VISIT (OUTPATIENT)
Dept: LAB | Facility: HOSPITAL | Age: 67
End: 2022-01-14
Attending: FAMILY MEDICINE
Payer: COMMERCIAL

## 2022-01-14 ENCOUNTER — TELEPHONE (OUTPATIENT)
Dept: FAMILY MEDICINE | Facility: CLINIC | Age: 67
End: 2022-01-14
Payer: COMMERCIAL

## 2022-01-14 DIAGNOSIS — Z00.00 WELL ADULT EXAM: ICD-10-CM

## 2022-01-14 DIAGNOSIS — D50.0 IRON DEFICIENCY ANEMIA DUE TO CHRONIC BLOOD LOSS: ICD-10-CM

## 2022-01-14 DIAGNOSIS — E53.8 B12 DEFICIENCY: ICD-10-CM

## 2022-01-14 DIAGNOSIS — Z00.00 WELL ADULT EXAM: Primary | ICD-10-CM

## 2022-01-14 DIAGNOSIS — E78.2 MIXED HYPERLIPIDEMIA: ICD-10-CM

## 2022-01-14 PROCEDURE — 80061 LIPID PANEL: CPT | Performed by: FAMILY MEDICINE

## 2022-01-14 PROCEDURE — 85025 COMPLETE CBC W/AUTO DIFF WBC: CPT | Performed by: FAMILY MEDICINE

## 2022-01-14 PROCEDURE — 36415 COLL VENOUS BLD VENIPUNCTURE: CPT | Mod: PO | Performed by: FAMILY MEDICINE

## 2022-01-14 PROCEDURE — 82607 VITAMIN B-12: CPT | Performed by: FAMILY MEDICINE

## 2022-01-14 PROCEDURE — 80053 COMPREHEN METABOLIC PANEL: CPT | Performed by: FAMILY MEDICINE

## 2022-01-14 PROCEDURE — 83036 HEMOGLOBIN GLYCOSYLATED A1C: CPT | Performed by: FAMILY MEDICINE

## 2022-01-15 LAB
ALBUMIN SERPL BCP-MCNC: 3.6 G/DL (ref 3.5–5.2)
ALP SERPL-CCNC: 73 U/L (ref 55–135)
ALT SERPL W/O P-5'-P-CCNC: 20 U/L (ref 10–44)
ANION GAP SERPL CALC-SCNC: 11 MMOL/L (ref 8–16)
AST SERPL-CCNC: 27 U/L (ref 10–40)
BASOPHILS # BLD AUTO: 0.06 K/UL (ref 0–0.2)
BASOPHILS NFR BLD: 0.9 % (ref 0–1.9)
BILIRUB SERPL-MCNC: 0.6 MG/DL (ref 0.1–1)
BUN SERPL-MCNC: 10 MG/DL (ref 8–23)
CALCIUM SERPL-MCNC: 9.3 MG/DL (ref 8.7–10.5)
CHLORIDE SERPL-SCNC: 105 MMOL/L (ref 95–110)
CHOLEST SERPL-MCNC: 160 MG/DL (ref 120–199)
CHOLEST/HDLC SERPL: 3.9 {RATIO} (ref 2–5)
CO2 SERPL-SCNC: 23 MMOL/L (ref 23–29)
CREAT SERPL-MCNC: 1 MG/DL (ref 0.5–1.4)
DIFFERENTIAL METHOD: ABNORMAL
EOSINOPHIL # BLD AUTO: 0.2 K/UL (ref 0–0.5)
EOSINOPHIL NFR BLD: 2.9 % (ref 0–8)
ERYTHROCYTE [DISTWIDTH] IN BLOOD BY AUTOMATED COUNT: 14.8 % (ref 11.5–14.5)
EST. GFR  (AFRICAN AMERICAN): >60 ML/MIN/1.73 M^2
EST. GFR  (NON AFRICAN AMERICAN): 59.7 ML/MIN/1.73 M^2
ESTIMATED AVG GLUCOSE: 114 MG/DL (ref 68–131)
GLUCOSE SERPL-MCNC: 97 MG/DL (ref 70–110)
HBA1C MFR BLD: 5.6 % (ref 4–5.6)
HCT VFR BLD AUTO: 38.8 % (ref 37–48.5)
HDLC SERPL-MCNC: 41 MG/DL (ref 40–75)
HDLC SERPL: 25.6 % (ref 20–50)
HGB BLD-MCNC: 12 G/DL (ref 12–16)
IMM GRANULOCYTES # BLD AUTO: 0.02 K/UL (ref 0–0.04)
IMM GRANULOCYTES NFR BLD AUTO: 0.3 % (ref 0–0.5)
LDLC SERPL CALC-MCNC: 77.6 MG/DL (ref 63–159)
LYMPHOCYTES # BLD AUTO: 2 K/UL (ref 1–4.8)
LYMPHOCYTES NFR BLD: 28 % (ref 18–48)
MCH RBC QN AUTO: 28.6 PG (ref 27–31)
MCHC RBC AUTO-ENTMCNC: 30.9 G/DL (ref 32–36)
MCV RBC AUTO: 93 FL (ref 82–98)
MONOCYTES # BLD AUTO: 0.6 K/UL (ref 0.3–1)
MONOCYTES NFR BLD: 8 % (ref 4–15)
NEUTROPHILS # BLD AUTO: 4.2 K/UL (ref 1.8–7.7)
NEUTROPHILS NFR BLD: 59.9 % (ref 38–73)
NONHDLC SERPL-MCNC: 119 MG/DL
NRBC BLD-RTO: 0 /100 WBC
PLATELET # BLD AUTO: 286 K/UL (ref 150–450)
PMV BLD AUTO: 11.5 FL (ref 9.2–12.9)
POTASSIUM SERPL-SCNC: 4 MMOL/L (ref 3.5–5.1)
PROT SERPL-MCNC: 6.8 G/DL (ref 6–8.4)
RBC # BLD AUTO: 4.19 M/UL (ref 4–5.4)
SODIUM SERPL-SCNC: 139 MMOL/L (ref 136–145)
TRIGL SERPL-MCNC: 207 MG/DL (ref 30–150)
VIT B12 SERPL-MCNC: 554 PG/ML (ref 210–950)
WBC # BLD AUTO: 6.96 K/UL (ref 3.9–12.7)

## 2022-01-24 ENCOUNTER — PATIENT MESSAGE (OUTPATIENT)
Dept: FAMILY MEDICINE | Facility: CLINIC | Age: 67
End: 2022-01-24
Payer: COMMERCIAL

## 2022-01-24 RX ORDER — ROSUVASTATIN CALCIUM 20 MG/1
20 TABLET, COATED ORAL DAILY
Qty: 90 TABLET | Refills: 0 | Status: SHIPPED | OUTPATIENT
Start: 2022-01-24 | End: 2022-04-26 | Stop reason: SDUPTHER

## 2022-01-24 RX ORDER — VENLAFAXINE HYDROCHLORIDE 75 MG/1
75 CAPSULE, EXTENDED RELEASE ORAL DAILY
Qty: 90 CAPSULE | Refills: 0 | Status: SHIPPED | OUTPATIENT
Start: 2022-01-24 | End: 2022-04-29 | Stop reason: SDUPTHER

## 2022-01-24 RX ORDER — CYANOCOBALAMIN 1000 UG/ML
1000 INJECTION, SOLUTION INTRAMUSCULAR; SUBCUTANEOUS WEEKLY
Qty: 30 ML | Refills: 12 | Status: SHIPPED | OUTPATIENT
Start: 2022-01-24 | End: 2023-02-08 | Stop reason: SDUPTHER

## 2022-01-24 RX ORDER — AMITRIPTYLINE HYDROCHLORIDE 50 MG/1
50 TABLET, FILM COATED ORAL NIGHTLY
Qty: 90 TABLET | Refills: 0 | Status: SHIPPED | OUTPATIENT
Start: 2022-01-24 | End: 2022-04-29 | Stop reason: SDUPTHER

## 2022-01-24 RX ORDER — TRAZODONE HYDROCHLORIDE 50 MG/1
50 TABLET ORAL NIGHTLY
Qty: 30 TABLET | Refills: 2 | Status: SHIPPED | OUTPATIENT
Start: 2022-01-24 | End: 2022-02-01 | Stop reason: SDUPTHER

## 2022-02-01 ENCOUNTER — OFFICE VISIT (OUTPATIENT)
Dept: FAMILY MEDICINE | Facility: CLINIC | Age: 67
End: 2022-02-01
Payer: COMMERCIAL

## 2022-02-01 DIAGNOSIS — E78.2 MIXED HYPERLIPIDEMIA: ICD-10-CM

## 2022-02-01 DIAGNOSIS — E53.8 B12 DEFICIENCY: ICD-10-CM

## 2022-02-01 DIAGNOSIS — F51.04 PSYCHOPHYSIOLOGICAL INSOMNIA: ICD-10-CM

## 2022-02-01 DIAGNOSIS — D50.0 IRON DEFICIENCY ANEMIA DUE TO CHRONIC BLOOD LOSS: ICD-10-CM

## 2022-02-01 DIAGNOSIS — Z12.11 COLON CANCER SCREENING: ICD-10-CM

## 2022-02-01 DIAGNOSIS — R03.0 ELEVATED BLOOD PRESSURE READING WITHOUT DIAGNOSIS OF HYPERTENSION: Primary | ICD-10-CM

## 2022-02-01 PROCEDURE — 99214 OFFICE O/P EST MOD 30 MIN: CPT | Mod: 25,S$GLB,, | Performed by: FAMILY MEDICINE

## 2022-02-01 PROCEDURE — 3008F BODY MASS INDEX DOCD: CPT | Mod: CPTII,S$GLB,, | Performed by: FAMILY MEDICINE

## 2022-02-01 PROCEDURE — 90694 VACC AIIV4 NO PRSRV 0.5ML IM: CPT | Mod: S$GLB,,, | Performed by: FAMILY MEDICINE

## 2022-02-01 PROCEDURE — 3044F PR MOST RECENT HEMOGLOBIN A1C LEVEL <7.0%: ICD-10-PCS | Mod: CPTII,S$GLB,, | Performed by: FAMILY MEDICINE

## 2022-02-01 PROCEDURE — 3008F PR BODY MASS INDEX (BMI) DOCUMENTED: ICD-10-PCS | Mod: CPTII,S$GLB,, | Performed by: FAMILY MEDICINE

## 2022-02-01 PROCEDURE — 90471 FLU VACCINE - QUADRIVALENT - ADJUVANTED: ICD-10-PCS | Mod: S$GLB,,, | Performed by: FAMILY MEDICINE

## 2022-02-01 PROCEDURE — 90471 IMMUNIZATION ADMIN: CPT | Mod: S$GLB,,, | Performed by: FAMILY MEDICINE

## 2022-02-01 PROCEDURE — 99214 PR OFFICE/OUTPT VISIT, EST, LEVL IV, 30-39 MIN: ICD-10-PCS | Mod: 25,S$GLB,, | Performed by: FAMILY MEDICINE

## 2022-02-01 PROCEDURE — 99999 PR PBB SHADOW E&M-EST. PATIENT-LVL V: ICD-10-PCS | Mod: PBBFAC,,, | Performed by: FAMILY MEDICINE

## 2022-02-01 PROCEDURE — 99999 PR PBB SHADOW E&M-EST. PATIENT-LVL V: CPT | Mod: PBBFAC,,, | Performed by: FAMILY MEDICINE

## 2022-02-01 PROCEDURE — 3044F HG A1C LEVEL LT 7.0%: CPT | Mod: CPTII,S$GLB,, | Performed by: FAMILY MEDICINE

## 2022-02-01 PROCEDURE — 90694 FLU VACCINE - QUADRIVALENT - ADJUVANTED: ICD-10-PCS | Mod: S$GLB,,, | Performed by: FAMILY MEDICINE

## 2022-02-01 RX ORDER — TRAZODONE HYDROCHLORIDE 100 MG/1
100 TABLET ORAL NIGHTLY
Qty: 30 TABLET | Refills: 5 | Status: SHIPPED | OUTPATIENT
Start: 2022-02-01 | End: 2022-08-08 | Stop reason: SDUPTHER

## 2022-02-01 RX ORDER — TRAZODONE HYDROCHLORIDE 50 MG/1
50 TABLET ORAL NIGHTLY
Qty: 30 TABLET | Refills: 5 | Status: SHIPPED | OUTPATIENT
Start: 2022-02-01 | End: 2022-02-01

## 2022-02-01 RX ORDER — OMEPRAZOLE 40 MG/1
40 CAPSULE, DELAYED RELEASE ORAL DAILY
Qty: 90 CAPSULE | Refills: 2 | Status: SHIPPED | OUTPATIENT
Start: 2022-02-01 | End: 2022-08-08 | Stop reason: SDUPTHER

## 2022-02-01 NOTE — PROGRESS NOTES
Chief Complaint:    Chief Complaint   Patient presents with    Follow-up    Medication Refill       History of Present Illness:    Presents today for six-month follow-up:  Blood pressure elevated. Will check blood pressure next 4-5 days.  Tachycardia.   A1C is 5.6  Mammogram on 02/04.  Colonoscopy scheduled.    She has chronic iron deficiency anemia will seeing iron infusion there is slight drop in her H&H.  She is unable to tolerate oral iron.  B12 levels are 554. Compliant with B12 vitamin.   No longer taking Ambien for chronic insomnia.  Recently restarted her exercise program.    Lipid stable    ROS:  Review of Systems   Constitutional: Negative for activity change, chills, fatigue, fever and unexpected weight change.   HENT: Negative for congestion, ear discharge, ear pain, hearing loss, postnasal drip and rhinorrhea.    Eyes: Negative for pain and visual disturbance.   Respiratory: Negative for cough, chest tightness and shortness of breath.    Cardiovascular: Negative for chest pain and palpitations.   Gastrointestinal: Negative for abdominal pain, diarrhea and vomiting.   Endocrine: Negative for heat intolerance.   Genitourinary: Negative for dysuria, flank pain, frequency and hematuria.   Musculoskeletal: Negative for back pain, gait problem and neck pain.   Skin: Negative for color change and rash.   Neurological: Negative for dizziness, tremors, seizures, numbness and headaches.   Psychiatric/Behavioral: Negative for agitation, hallucinations, self-injury, sleep disturbance and suicidal ideas. The patient is not nervous/anxious.        Past Medical History:   Diagnosis Date    Anemia     Hyperlipidemia     Hypertension     Supraventricular tachycardia        Social History:  Social History     Socioeconomic History    Marital status:    Tobacco Use    Smoking status: Never Smoker    Smokeless tobacco: Never Used   Substance and Sexual Activity    Alcohol use: No    Drug use: No     "Sexual activity: Not Currently     Partners: Male     Birth control/protection: Post-menopausal       Family History:   family history includes Cataracts in her father and mother; Hypertension in her brother, sister, and sister.    Health Maintenance   Topic Date Due    Mammogram  09/11/2021    Lipid Panel  01/14/2023    TETANUS VACCINE  12/01/2024    Hepatitis C Screening  Completed       Physical Exam:    Vital Signs  Temp: 97.3 °F (36.3 °C)  Temp src: Temporal  Pulse: (!) 128  SpO2: 97 %  BP: (!) 164/102  Pain Score: 0-No pain  Height and Weight  Height: 5' 9" (175.3 cm)  Weight: 117.3 kg (258 lb 7.8 oz)  BSA (Calculated - sq m): 2.39 sq meters  BMI (Calculated): 38.2  Weight in (lb) to have BMI = 25: 168.9]    Body mass index is 38.17 kg/m².    Physical Exam  Constitutional:       Appearance: She is well-developed.   Eyes:      Conjunctiva/sclera: Conjunctivae normal.      Pupils: Pupils are equal, round, and reactive to light.   Cardiovascular:      Rate and Rhythm: Normal rate and regular rhythm.      Heart sounds: Normal heart sounds. No murmur heard.      Pulmonary:      Effort: Pulmonary effort is normal. No respiratory distress.      Breath sounds: Normal breath sounds. No wheezing or rales.   Chest:      Chest wall: No tenderness.   Abdominal:      General: There is no distension.      Palpations: Abdomen is soft. There is no mass.      Tenderness: There is no abdominal tenderness. There is no guarding.   Musculoskeletal:         General: No tenderness.      Cervical back: Normal range of motion and neck supple.   Lymphadenopathy:      Cervical: No cervical adenopathy.   Skin:     General: Skin is warm and dry.   Neurological:      Mental Status: She is alert and oriented to person, place, and time.      Deep Tendon Reflexes: Reflexes are normal and symmetric.   Psychiatric:         Behavior: Behavior normal.         Thought Content: Thought content normal.         Judgment: Judgment normal. "           Assessment:    Elevated blood pressure reading without diagnosis of hypertension    Psychophysiological insomnia    B12 deficiency  -     Vitamin B12; Future; Expected date: 08/01/2022    Mixed hyperlipidemia  -     Comprehensive Metabolic Panel; Future; Expected date: 08/03/2022  -     Lipid Panel; Future; Expected date: 08/03/2022  -     CBC Auto Differential; Future; Expected date: 08/03/2022    Colon cancer screening  -     Case Request Endoscopy: COLONOSCOPY    Iron deficiency anemia due to chronic blood loss  -     Comprehensive Metabolic Panel; Future; Expected date: 08/03/2022    Other orders  -     omeprazole (PRILOSEC) 40 MG capsule; Take 1 capsule (40 mg total) by mouth once daily.  Dispense: 90 capsule; Refill: 2  -     Discontinue: traZODone (DESYREL) 50 MG tablet; Take 1 tablet (50 mg total) by mouth every evening.  Dispense: 30 tablet; Refill: 5  -     traZODone (DESYREL) 100 MG tablet; Take 1 tablet (100 mg total) by mouth every evening.  Dispense: 30 tablet; Refill: 5        Plan:    Check blood pressure daily for the next 4-5 days. Aim for < 140/90.   Mammogram and colonoscopy.  Refill trazodone (stop 50 mg, start 100 mg) and Prilosec.   Get influenza vaccine.  Please continue with exercise program  continue B12 injections weekly  Other medical problems stable  .  Follow-up 6 months    Orders Placed This Encounter   Procedures    Comprehensive Metabolic Panel    Lipid Panel    CBC Auto Differential    Vitamin B12       Current Outpatient Medications   Medication Sig Dispense Refill    amitriptyline (ELAVIL) 50 MG tablet Take 1 tablet (50 mg total) by mouth every evening. 90 tablet 0    atenoloL (TENORMIN) 25 MG tablet Take 1 tablet (25 mg total) by mouth 2 (two) times daily. 180 tablet 0    clotrimazole-betamethasone 1-0.05% (LOTRISONE) cream APPLY TO AFFECTED AREA 2 TIMES DAILY 45 g 2    cyanocobalamin 1,000 mcg/mL injection Inject 1 mL (1,000 mcg total) into the muscle once a  "week. 30 mL 12    meclizine (ANTIVERT) 12.5 mg tablet Take 1 tablet (12.5 mg total) by mouth 3 (three) times daily as needed for Dizziness. 90 tablet 1    needle, disp, 18 G 18 x 1 " Ndle Use to draw b12 1000 mcg from vail every 7 days for 7 weeks 100 each 0    needle, reusable, 25 G 25 x 1 " Ndle Use to inject B12 1000 mcg every 7 days for 7 weeks 12 each 2    rosuvastatin (CRESTOR) 20 MG tablet Take 1 tablet (20 mg total) by mouth once daily. 90 tablet 0    syringe with needle 1 mL 25 gauge x 1" Syrg 1 each by Misc.(Non-Drug; Combo Route) route once a week. 20 Syringe 2    syringe, disposable, 3 mL Syrg Inject B12 1000 mcg every 7 days for 7 weeks 25 each 0    venlafaxine (EFFEXOR-XR) 75 MG 24 hr capsule Take 1 capsule (75 mg total) by mouth once daily. 90 capsule 0    zolpidem (AMBIEN) 10 mg Tab TAKE 1 TABLET BY MOUTH EVERY DAY IN THE EVENING 30 tablet 5    nystatin (MYCOSTATIN) powder Apply TID prn to affected area (Patient not taking: Reported on 2/1/2022) 60 g 3    omeprazole (PRILOSEC) 40 MG capsule Take 1 capsule (40 mg total) by mouth once daily. 90 capsule 2    traZODone (DESYREL) 100 MG tablet Take 1 tablet (100 mg total) by mouth every evening. 30 tablet 5     No current facility-administered medications for this visit.       Medications Discontinued During This Encounter   Medication Reason    omeprazole (PRILOSEC) 40 MG capsule Reorder    traZODone (DESYREL) 50 MG tablet Reorder    traZODone (DESYREL) 50 MG tablet        No follow-ups on file.      Stephani Valdivia MD  Scribe Attestation:   I, Carmen Pagan, am scribing for, and in the presence of, Dr.Arif Valdivia I performed the above scribed service and the documentation accurately describes the services I performed. I attest to the accuracy of the note.    I, Dr. Stephani Valdivia, reviewed documentation as scribed above. I performed the services described in this documentation.  I agree that the record reflects my personal performance and is " accurate and complete. Stephani Valdivia MD.  10/04/2021

## 2022-02-04 ENCOUNTER — CLINICAL SUPPORT (OUTPATIENT)
Dept: FAMILY MEDICINE | Facility: CLINIC | Age: 67
End: 2022-02-04
Payer: COMMERCIAL

## 2022-02-04 ENCOUNTER — TELEPHONE (OUTPATIENT)
Dept: FAMILY MEDICINE | Facility: CLINIC | Age: 67
End: 2022-02-04

## 2022-02-04 ENCOUNTER — HOSPITAL ENCOUNTER (OUTPATIENT)
Dept: RADIOLOGY | Facility: HOSPITAL | Age: 67
Discharge: HOME | End: 2022-02-04
Attending: FAMILY MEDICINE
Payer: COMMERCIAL

## 2022-02-04 VITALS — WEIGHT: 258.63 LBS | BODY MASS INDEX: 38.31 KG/M2 | HEIGHT: 69 IN

## 2022-02-04 DIAGNOSIS — R03.0 ELEVATED BLOOD PRESSURE READING WITHOUT DIAGNOSIS OF HYPERTENSION: Primary | ICD-10-CM

## 2022-02-04 DIAGNOSIS — Z12.31 OTHER SCREENING MAMMOGRAM: ICD-10-CM

## 2022-02-04 PROCEDURE — 77063 BREAST TOMOSYNTHESIS BI: CPT | Mod: 26,,, | Performed by: RADIOLOGY

## 2022-02-04 PROCEDURE — 77063 BREAST TOMOSYNTHESIS BI: CPT | Mod: TC

## 2022-02-04 PROCEDURE — 77067 SCR MAMMO BI INCL CAD: CPT | Mod: 26,,, | Performed by: RADIOLOGY

## 2022-02-04 PROCEDURE — 77067 MAMMO DIGITAL SCREENING BILAT WITH TOMO: ICD-10-PCS | Mod: 26,,, | Performed by: RADIOLOGY

## 2022-02-04 PROCEDURE — 77067 SCR MAMMO BI INCL CAD: CPT | Mod: TC

## 2022-02-04 PROCEDURE — 77063 MAMMO DIGITAL SCREENING BILAT WITH TOMO: ICD-10-PCS | Mod: 26,,, | Performed by: RADIOLOGY

## 2022-02-04 NOTE — TELEPHONE ENCOUNTER
Came in for blood pressure check , it was 156/92. She showed me her numbers from home and they range 120's/60-70's     She will come by one day next week and bring her machine so I can calibrate it

## 2022-02-04 NOTE — PROGRESS NOTES
Blood pressure check 156/92 , her numbers from home are 120's/60's I will report this to Dr Valdivia. She will come next week and bring her machine so we can calibrate

## 2022-02-06 VITALS
TEMPERATURE: 97 F | BODY MASS INDEX: 38.29 KG/M2 | WEIGHT: 258.5 LBS | HEIGHT: 69 IN | HEART RATE: 128 BPM | OXYGEN SATURATION: 97 %

## 2022-02-07 RX ORDER — SOD SULF/POT CHLORIDE/MAG SULF 1.479 G
12 TABLET ORAL DAILY
Qty: 24 TABLET | Refills: 0 | Status: ON HOLD | OUTPATIENT
Start: 2022-02-07 | End: 2022-03-10 | Stop reason: HOSPADM

## 2022-02-09 ENCOUNTER — OFFICE VISIT (OUTPATIENT)
Dept: OBSTETRICS AND GYNECOLOGY | Facility: CLINIC | Age: 67
End: 2022-02-09
Payer: COMMERCIAL

## 2022-02-09 VITALS
HEIGHT: 69 IN | BODY MASS INDEX: 39.18 KG/M2 | WEIGHT: 264.56 LBS | DIASTOLIC BLOOD PRESSURE: 92 MMHG | SYSTOLIC BLOOD PRESSURE: 156 MMHG

## 2022-02-09 DIAGNOSIS — Z12.4 PAPANICOLAOU SMEAR FOR CERVICAL CANCER SCREENING: ICD-10-CM

## 2022-02-09 DIAGNOSIS — Z01.419 ROUTINE GYNECOLOGICAL EXAMINATION: Primary | ICD-10-CM

## 2022-02-09 PROCEDURE — 1160F RVW MEDS BY RX/DR IN RCRD: CPT | Mod: CPTII,S$GLB,, | Performed by: NURSE PRACTITIONER

## 2022-02-09 PROCEDURE — 99396 PREV VISIT EST AGE 40-64: CPT | Mod: S$GLB,,, | Performed by: NURSE PRACTITIONER

## 2022-02-09 PROCEDURE — 99396 PR PREVENTIVE VISIT,EST,40-64: ICD-10-PCS | Mod: S$GLB,,, | Performed by: NURSE PRACTITIONER

## 2022-02-09 PROCEDURE — 99999 PR PBB SHADOW E&M-EST. PATIENT-LVL IV: ICD-10-PCS | Mod: PBBFAC,,, | Performed by: NURSE PRACTITIONER

## 2022-02-09 PROCEDURE — 3008F PR BODY MASS INDEX (BMI) DOCUMENTED: ICD-10-PCS | Mod: CPTII,S$GLB,, | Performed by: NURSE PRACTITIONER

## 2022-02-09 PROCEDURE — 3080F PR MOST RECENT DIASTOLIC BLOOD PRESSURE >= 90 MM HG: ICD-10-PCS | Mod: CPTII,S$GLB,, | Performed by: NURSE PRACTITIONER

## 2022-02-09 PROCEDURE — 3044F HG A1C LEVEL LT 7.0%: CPT | Mod: CPTII,S$GLB,, | Performed by: NURSE PRACTITIONER

## 2022-02-09 PROCEDURE — 1159F PR MEDICATION LIST DOCUMENTED IN MEDICAL RECORD: ICD-10-PCS | Mod: CPTII,S$GLB,, | Performed by: NURSE PRACTITIONER

## 2022-02-09 PROCEDURE — 88175 CYTOPATH C/V AUTO FLUID REDO: CPT | Performed by: PATHOLOGY

## 2022-02-09 PROCEDURE — 3077F SYST BP >= 140 MM HG: CPT | Mod: CPTII,S$GLB,, | Performed by: NURSE PRACTITIONER

## 2022-02-09 PROCEDURE — 88141 PR  CYTOPATH CERV/VAG INTERPRET: ICD-10-PCS | Mod: ,,, | Performed by: PATHOLOGY

## 2022-02-09 PROCEDURE — 3077F PR MOST RECENT SYSTOLIC BLOOD PRESSURE >= 140 MM HG: ICD-10-PCS | Mod: CPTII,S$GLB,, | Performed by: NURSE PRACTITIONER

## 2022-02-09 PROCEDURE — 3080F DIAST BP >= 90 MM HG: CPT | Mod: CPTII,S$GLB,, | Performed by: NURSE PRACTITIONER

## 2022-02-09 PROCEDURE — 87624 HPV HI-RISK TYP POOLED RSLT: CPT | Performed by: NURSE PRACTITIONER

## 2022-02-09 PROCEDURE — 3008F BODY MASS INDEX DOCD: CPT | Mod: CPTII,S$GLB,, | Performed by: NURSE PRACTITIONER

## 2022-02-09 PROCEDURE — 1159F MED LIST DOCD IN RCRD: CPT | Mod: CPTII,S$GLB,, | Performed by: NURSE PRACTITIONER

## 2022-02-09 PROCEDURE — 99999 PR PBB SHADOW E&M-EST. PATIENT-LVL IV: CPT | Mod: PBBFAC,,, | Performed by: NURSE PRACTITIONER

## 2022-02-09 PROCEDURE — 3044F PR MOST RECENT HEMOGLOBIN A1C LEVEL <7.0%: ICD-10-PCS | Mod: CPTII,S$GLB,, | Performed by: NURSE PRACTITIONER

## 2022-02-09 PROCEDURE — 88141 CYTOPATH C/V INTERPRET: CPT | Mod: ,,, | Performed by: PATHOLOGY

## 2022-02-09 PROCEDURE — 1160F PR REVIEW ALL MEDS BY PRESCRIBER/CLIN PHARMACIST DOCUMENTED: ICD-10-PCS | Mod: CPTII,S$GLB,, | Performed by: NURSE PRACTITIONER

## 2022-02-09 NOTE — PROGRESS NOTES
CC: Well woman exam    HPI  Silvia Cotton is a 66 y.o. female  presents for a well woman exam.  LMP: No LMP recorded. Patient is postmenopausal..  No issues, problems, or complaints.    Past Medical History:   Diagnosis Date    Anemia     Hyperlipidemia     Hypertension     Supraventricular tachycardia      Past Surgical History:   Procedure Laterality Date    APPENDECTOMY      CHOLECYSTECTOMY      COLONOSCOPY N/A 2017    Procedure: COLONOSCOPY;  Surgeon: Taj Nicholas MD;  Location: [facility];  Service: Endoscopy;  Laterality: N/A;    SHOULDER ARTHROSCOPY      Tonsilectomy      TONSILLECTOMY      Uterine Ablation       Social History     Socioeconomic History    Marital status:    Tobacco Use    Smoking status: Never Smoker    Smokeless tobacco: Never Used   Substance and Sexual Activity    Alcohol use: No    Drug use: No    Sexual activity: Not Currently     Partners: Male     Birth control/protection: Post-menopausal     Family History   Problem Relation Age of Onset    Cataracts Mother     Cataracts Father     Hypertension Sister     Hypertension Brother     Hypertension Sister     Glaucoma Neg Hx     Diabetes Neg Hx     Breast cancer Neg Hx     Colon cancer Neg Hx     Ovarian cancer Neg Hx      OB History        1    Para   1    Term   1            AB        Living   1       SAB        IAB        Ectopic        Multiple        Live Births   1                 Current Outpatient Medications:     amitriptyline (ELAVIL) 50 MG tablet, Take 1 tablet (50 mg total) by mouth every evening., Disp: 90 tablet, Rfl: 0    atenoloL (TENORMIN) 25 MG tablet, Take 1 tablet (25 mg total) by mouth 2 (two) times daily., Disp: 180 tablet, Rfl: 0    clotrimazole-betamethasone 1-0.05% (LOTRISONE) cream, APPLY TO AFFECTED AREA 2 TIMES DAILY, Disp: 45 g, Rfl: 2    cyanocobalamin 1,000 mcg/mL injection, Inject 1 mL (1,000 mcg total) into the muscle once a week.,  "Disp: 30 mL, Rfl: 12    meclizine (ANTIVERT) 12.5 mg tablet, Take 1 tablet (12.5 mg total) by mouth 3 (three) times daily as needed for Dizziness., Disp: 90 tablet, Rfl: 1    needle, disp, 18 G 18 x 1 " Ndle, Use to draw b12 1000 mcg from vail every 7 days for 7 weeks, Disp: 100 each, Rfl: 0    needle, reusable, 25 G 25 x 1 " Ndle, Use to inject B12 1000 mcg every 7 days for 7 weeks, Disp: 12 each, Rfl: 2    nystatin (MYCOSTATIN) powder, Apply TID prn to affected area, Disp: 60 g, Rfl: 3    omeprazole (PRILOSEC) 40 MG capsule, Take 1 capsule (40 mg total) by mouth once daily., Disp: 90 capsule, Rfl: 2    rosuvastatin (CRESTOR) 20 MG tablet, Take 1 tablet (20 mg total) by mouth once daily., Disp: 90 tablet, Rfl: 0    sod sulf-pot chloride-mag sulf (SUTAB) 1.479-0.188- 0.225 gram tablet, Take 12 tablets by mouth once daily. Take according to package instructions with indicated amount of water., Disp: 24 tablet, Rfl: 0    syringe with needle 1 mL 25 gauge x 1" Syrg, 1 each by Misc.(Non-Drug; Combo Route) route once a week., Disp: 20 Syringe, Rfl: 2    syringe, disposable, 3 mL Syrg, Inject B12 1000 mcg every 7 days for 7 weeks, Disp: 25 each, Rfl: 0    traZODone (DESYREL) 100 MG tablet, Take 1 tablet (100 mg total) by mouth every evening., Disp: 30 tablet, Rfl: 5    venlafaxine (EFFEXOR-XR) 75 MG 24 hr capsule, Take 1 capsule (75 mg total) by mouth once daily., Disp: 90 capsule, Rfl: 0    zolpidem (AMBIEN) 10 mg Tab, TAKE 1 TABLET BY MOUTH EVERY DAY IN THE EVENING, Disp: 30 tablet, Rfl: 5    GYNECOLOGY HISTORY:  postmenopausal     DATA REVIEWED:  Last pap: 2020 Colpo CIN1     BP (!) 156/92   Ht 5' 9" (1.753 m)   Wt 120 kg (264 lb 8.8 oz)   BMI 39.07 kg/m²     Review of Systems  See HPI   Physical Exam  Constitutional:       Appearance: She is well-developed.   Genitourinary:      Vulva, inguinal canal, urethra, bladder, vagina, cervix, uterus, right adnexa, left adnexa and rectum normal.      Pelvic exam " was performed with patient supine.   Breasts:      Right: Normal.      Left: Normal.       HENT:      Head: Normocephalic.      Nose: Nose normal.      Mouth/Throat:      Mouth: Mucous membranes are moist.   Eyes:      Extraocular Movements: Extraocular movements intact.   Pulmonary:      Effort: Pulmonary effort is normal.   Abdominal:      General: Abdomen is flat.      Palpations: Abdomen is soft.   Musculoskeletal:         General: Normal range of motion.      Cervical back: Normal range of motion.   Neurological:      Mental Status: She is alert and oriented to person, place, and time.   Skin:     General: Skin is warm and dry.   Psychiatric:         Mood and Affect: Mood normal.         Behavior: Behavior normal.         Thought Content: Thought content normal.         Judgment: Judgment normal.   Vitals reviewed.           Routine gynecological examination    Papanicolaou smear for cervical cancer screening  -     Liquid-Based Pap Smear, Screening  -     HPV High Risk Genotypes, PCR        Patient was counseled today on A.C.S. Pap guidelines (Q3) and recommendations for yearly pelvic exams, yearly mammograms starting age 40, and clinical breast exams; to see her PCP for other health maintenance.

## 2022-02-10 ENCOUNTER — CLINICAL SUPPORT (OUTPATIENT)
Dept: FAMILY MEDICINE | Facility: CLINIC | Age: 67
End: 2022-02-10
Payer: COMMERCIAL

## 2022-02-10 ENCOUNTER — TELEPHONE (OUTPATIENT)
Dept: FAMILY MEDICINE | Facility: CLINIC | Age: 67
End: 2022-02-10

## 2022-02-10 VITALS — DIASTOLIC BLOOD PRESSURE: 96 MMHG | SYSTOLIC BLOOD PRESSURE: 150 MMHG

## 2022-02-10 PROCEDURE — 99999 PR PBB SHADOW E&M-EST. PATIENT-LVL I: CPT | Mod: PBBFAC,,,

## 2022-02-10 PROCEDURE — 99999 PR PBB SHADOW E&M-EST. PATIENT-LVL I: ICD-10-PCS | Mod: PBBFAC,,,

## 2022-02-10 NOTE — TELEPHONE ENCOUNTER
Patient presented to the clinic for blood pressure check and to calibrate her machine.   Patient's machine registered 147/92, manually I read 150/96.  Saw GYN yesterday and they recorded 156/92    Patient has been keeping a record at home.     2/2 - 127/82  2/3 at 1022 am - 115/60  2/3 at 635 pm - 120/68  2/6 - 146/91 recheck - 144/80 stated she was a little stressed.  2/7 - 126/68  2/10 - 136/86       Only taking atenolol 25 mg twice a day.

## 2022-02-10 NOTE — PROGRESS NOTES
Patient presented to the clinic for blood pressure check and to calibrate her machine.   Patient's machine registered 147/92, manually I read 150/96.  Saw GYN yesterday and they recorded 156/92    Patient has been keeping a record at home.     2/2 - 127/82  2/3 at 1022 am - 115/60  2/3 at 635 pm - 120/68  2/6 - 146/91 recheck - 144/80 stated she was a little stressed.  2/7 - 126/68  2/10 - 136/86

## 2022-02-10 NOTE — TELEPHONE ENCOUNTER
Patient has been keeping a record at home.      2/2 - 127/82  2/3 at 1022 am - 115/60  2/3 at 635 pm - 120/68 2/6 - 146/91 recheck - 144/80 stated she was a little stressed.  2/7 - 126/68  2/10 - 136/86

## 2022-02-11 LAB
HPV HR 12 DNA SPEC QL NAA+PROBE: NEGATIVE
HPV16 AG SPEC QL: NEGATIVE
HPV18 DNA SPEC QL NAA+PROBE: NEGATIVE

## 2022-02-16 LAB
FINAL PATHOLOGIC DIAGNOSIS: NORMAL
Lab: NORMAL

## 2022-02-18 ENCOUNTER — PATIENT MESSAGE (OUTPATIENT)
Dept: FAMILY MEDICINE | Facility: CLINIC | Age: 67
End: 2022-02-18
Payer: COMMERCIAL

## 2022-03-03 ENCOUNTER — OFFICE VISIT (OUTPATIENT)
Dept: OTOLARYNGOLOGY | Facility: CLINIC | Age: 67
End: 2022-03-03
Payer: COMMERCIAL

## 2022-03-03 VITALS
SYSTOLIC BLOOD PRESSURE: 145 MMHG | TEMPERATURE: 98 F | BODY MASS INDEX: 39.21 KG/M2 | HEART RATE: 88 BPM | HEIGHT: 69 IN | WEIGHT: 264.75 LBS | DIASTOLIC BLOOD PRESSURE: 85 MMHG

## 2022-03-03 DIAGNOSIS — H69.93 ETD (EUSTACHIAN TUBE DYSFUNCTION), BILATERAL: Primary | ICD-10-CM

## 2022-03-03 DIAGNOSIS — M26.609 TMJ (TEMPOROMANDIBULAR JOINT SYNDROME): ICD-10-CM

## 2022-03-03 PROCEDURE — 3079F DIAST BP 80-89 MM HG: CPT | Mod: CPTII,S$GLB,, | Performed by: STUDENT IN AN ORGANIZED HEALTH CARE EDUCATION/TRAINING PROGRAM

## 2022-03-03 PROCEDURE — 1159F MED LIST DOCD IN RCRD: CPT | Mod: CPTII,S$GLB,, | Performed by: STUDENT IN AN ORGANIZED HEALTH CARE EDUCATION/TRAINING PROGRAM

## 2022-03-03 PROCEDURE — 99203 OFFICE O/P NEW LOW 30 MIN: CPT | Mod: S$GLB,,, | Performed by: STUDENT IN AN ORGANIZED HEALTH CARE EDUCATION/TRAINING PROGRAM

## 2022-03-03 PROCEDURE — 3077F PR MOST RECENT SYSTOLIC BLOOD PRESSURE >= 140 MM HG: ICD-10-PCS | Mod: CPTII,S$GLB,, | Performed by: STUDENT IN AN ORGANIZED HEALTH CARE EDUCATION/TRAINING PROGRAM

## 2022-03-03 PROCEDURE — 3008F PR BODY MASS INDEX (BMI) DOCUMENTED: ICD-10-PCS | Mod: CPTII,S$GLB,, | Performed by: STUDENT IN AN ORGANIZED HEALTH CARE EDUCATION/TRAINING PROGRAM

## 2022-03-03 PROCEDURE — 3044F HG A1C LEVEL LT 7.0%: CPT | Mod: CPTII,S$GLB,, | Performed by: STUDENT IN AN ORGANIZED HEALTH CARE EDUCATION/TRAINING PROGRAM

## 2022-03-03 PROCEDURE — 3077F SYST BP >= 140 MM HG: CPT | Mod: CPTII,S$GLB,, | Performed by: STUDENT IN AN ORGANIZED HEALTH CARE EDUCATION/TRAINING PROGRAM

## 2022-03-03 PROCEDURE — 99203 PR OFFICE/OUTPT VISIT, NEW, LEVL III, 30-44 MIN: ICD-10-PCS | Mod: S$GLB,,, | Performed by: STUDENT IN AN ORGANIZED HEALTH CARE EDUCATION/TRAINING PROGRAM

## 2022-03-03 PROCEDURE — 3008F BODY MASS INDEX DOCD: CPT | Mod: CPTII,S$GLB,, | Performed by: STUDENT IN AN ORGANIZED HEALTH CARE EDUCATION/TRAINING PROGRAM

## 2022-03-03 PROCEDURE — 3044F PR MOST RECENT HEMOGLOBIN A1C LEVEL <7.0%: ICD-10-PCS | Mod: CPTII,S$GLB,, | Performed by: STUDENT IN AN ORGANIZED HEALTH CARE EDUCATION/TRAINING PROGRAM

## 2022-03-03 PROCEDURE — 1159F PR MEDICATION LIST DOCUMENTED IN MEDICAL RECORD: ICD-10-PCS | Mod: CPTII,S$GLB,, | Performed by: STUDENT IN AN ORGANIZED HEALTH CARE EDUCATION/TRAINING PROGRAM

## 2022-03-03 PROCEDURE — 3079F PR MOST RECENT DIASTOLIC BLOOD PRESSURE 80-89 MM HG: ICD-10-PCS | Mod: CPTII,S$GLB,, | Performed by: STUDENT IN AN ORGANIZED HEALTH CARE EDUCATION/TRAINING PROGRAM

## 2022-03-03 PROCEDURE — 99999 PR PBB SHADOW E&M-EST. PATIENT-LVL IV: ICD-10-PCS | Mod: PBBFAC,,, | Performed by: STUDENT IN AN ORGANIZED HEALTH CARE EDUCATION/TRAINING PROGRAM

## 2022-03-03 PROCEDURE — 99999 PR PBB SHADOW E&M-EST. PATIENT-LVL IV: CPT | Mod: PBBFAC,,, | Performed by: STUDENT IN AN ORGANIZED HEALTH CARE EDUCATION/TRAINING PROGRAM

## 2022-03-03 RX ORDER — IBUPROFEN 600 MG/1
600 TABLET ORAL 2 TIMES DAILY
Qty: 20 TABLET | Refills: 0 | Status: SHIPPED | OUTPATIENT
Start: 2022-03-03 | End: 2022-03-13

## 2022-03-03 RX ORDER — FLUTICASONE PROPIONATE 50 MCG
1 SPRAY, SUSPENSION (ML) NASAL DAILY
Qty: 16 G | Refills: 0 | Status: SHIPPED | OUTPATIENT
Start: 2022-03-03 | End: 2024-02-13

## 2022-03-03 NOTE — PROGRESS NOTES
"Chief complaint:   Chief Complaint   Patient presents with    Otalgia     Right ear pain, worse in PM when laying down. Pressure throughout day with worsening, throbbing in PM. Onset "Few month."        History of Present Illness:     Ms. Cotton is a 66 y.o. female presenting for evaluation of right ear pain, notices more when she lays down on her right ear, but present all the time. Onset of this chief complaint was about a few months ago.  Additional symptoms that also have been associated are pressure, tenderness . Worsened after URI about 1 month ago and worsened after that. Not necessarily worse with eating. The patient has tried NSAIDs with some relief.  The patient denies hearing loss, vertigo, tinnitus.      Denies similar prior episode.     The patient significant eustachian tube risk factors: ear pressure, ear pain, sensation of clogging, ear symptoms with a cold or sinusitis, popping or crackling sensation, ringing in the ear, and muffled hearing.     The patient also denies pain deep within the ear, tenderness around the ear canal or pre-auricular area, or headaches.       The patient denies significant hearing loss risk factors, ototoxic medication exposure, chronic vestibular suppressant use, head/ facial/ evelyn trauma, and otologic surgery.      Review of Systems     A complete 10 point ROS was completed and are positive as per above HPI.    Otherwise negative for fever, diplopia, chest pain, shortness of breath, vomiting, blood in urine, joint pain, skin rash, seizures and unusual bleeding.       History        Past Medical History:   Past Medical History:   Diagnosis Date    Anemia     Hyperlipidemia     Hypertension     Supraventricular tachycardia     .          Past Surgical History:  Past Surgical History:   Procedure Laterality Date    APPENDECTOMY      CHOLECYSTECTOMY      COLONOSCOPY N/A 1/13/2017    Procedure: COLONOSCOPY;  Surgeon: Taj Nicholas MD;  Location: [facility] END[facility];  " Service: Endoscopy;  Laterality: N/A;    SHOULDER ARTHROSCOPY      Tonsilectomy      TONSILLECTOMY      Uterine Ablation     .         Medications: Medication list was reviewed. She  has a current medication list which includes the following prescription(s): amitriptyline, atenolol, clotrimazole-betamethasone 1-0.05%, cyanocobalamin, meclizine, needle (disp) 18 g, needle (reusable) 25 gauge, nystatin, omeprazole, rosuvastatin, sutab, syringe with needle, syringe (disposable), trazodone, venlafaxine, zolpidem, fluticasone propionate, and ibuprofen.         Allergies: Review of patient's allergies indicates:  No Known Allergies         Family history: family history includes Cataracts in her father and mother; Hypertension in her brother, sister, and sister.         Social History          Alcohol use:  reports no history of alcohol use.            Tobacco:  reports that she has never smoked. She has never used smokeless tobacco.         Please see the patient's intake form for full details of past medical history, past surgical history, family history, social history and review of systems. ?This information was reviewed by me and noted.      Physical Examination     General: Well developed, well nourished, well hydrated. Verbal with a strong voice and not dysphonic.     Head/Face: Normocephalic, atraumatic. No scars or lesions. Facial musculature equal. No TMJ tenderness    Eyes: No scleral icterus or conjunctival hemorrhage. EOMI. PERRLA.     Ears:     · Right ear: No gross deformity. EAC is clear of debris and erythema. The TM is intact with a pneumatized middle ear. No signs of retraction, fluid or infection.      · Left ear: No gross deformity. EAC is clear of debris and erythema. The TM is intact with a pneumatized middle ear. No signs of retraction, fluid or infection.     512 Hz Tuning  Fork:      Silver midline     Rinne Right air conduction >bone conduction     Rinne Left air conduction >bone  conduction     Nose: No gross deformity or lesions. No purulent discharge. No significant NSD.      Mouth/Oropharynx: Lips without any lesions. No mucosal lesions within the oropharynx. No tonsillar exudate or lesions. Pharyngeal walls symmetrical. Uvula midline. Tongue midline without lesions.     Neck: Trachea midline. No masses. No thyromegaly or nodules palpated.     Lymphatic: No lymphadenopathy in the neck.     Extremities: No cyanosis. Warm and well-perfused.     Skin: No scars or lesions on face or neck.      Neurologic: Moving all extremities without gross abnormality.CN II-XII grossly intact. House-Brackmann 1/6. No signs of nystagmus.      Psych: Alert and oriented to person, place, and time with an appropriate mood and affect.     Labs    CBC 1/14/22  WBC 6.9  Hgb 11.5    A1C - 5.6     Assessment/Plan    ETD (Eustachian tube dysfunction), bilateral   TMJ syndrome    Exam unremarkable, but symptoms could be related to TMJ. Will plan to treat with course of ibuprofen, soft diet, avoid chewing gum.     Possibly some mild Eustachian Tube Dysfunction symptoms as well - will start FLoanse consistently.     Return to clinic 3-4 weeks. Consider scan if no improvement.            Sabas Patiño MD  [facility]   [facility] [address]  [place] [address]  P: [phone]  F: [phone]

## 2022-03-10 ENCOUNTER — ANESTHESIA (OUTPATIENT)
Dept: ENDOSCOPY | Facility: HOSPITAL | Age: 67
End: 2022-03-10
Payer: COMMERCIAL

## 2022-03-10 ENCOUNTER — ANESTHESIA EVENT (OUTPATIENT)
Dept: ENDOSCOPY | Facility: HOSPITAL | Age: 67
End: 2022-03-10
Payer: COMMERCIAL

## 2022-03-10 ENCOUNTER — HOSPITAL ENCOUNTER (OUTPATIENT)
Facility: HOSPITAL | Age: 67
Discharge: HOME | End: 2022-03-10
Attending: INTERNAL MEDICINE | Admitting: INTERNAL MEDICINE
Payer: COMMERCIAL

## 2022-03-10 DIAGNOSIS — Z12.11 COLON CANCER SCREENING: Primary | ICD-10-CM

## 2022-03-10 PROCEDURE — G0105 COLORECTAL SCRN; HI RISK IND: HCPCS | Mod: ,,, | Performed by: INTERNAL MEDICINE

## 2022-03-10 PROCEDURE — 37000008 HC ANESTHESIA 1ST 15 MINUTES: Performed by: INTERNAL MEDICINE

## 2022-03-10 PROCEDURE — 37000009 HC ANESTHESIA EA ADD 15 MINS: Performed by: INTERNAL MEDICINE

## 2022-03-10 PROCEDURE — 63600175 PHARM REV CODE 636 W HCPCS: Performed by: NURSE ANESTHETIST, CERTIFIED REGISTERED

## 2022-03-10 PROCEDURE — 25000003 PHARM REV CODE 250: Performed by: NURSE ANESTHETIST, CERTIFIED REGISTERED

## 2022-03-10 PROCEDURE — G0105 COLORECTAL SCRN; HI RISK IND: HCPCS | Performed by: INTERNAL MEDICINE

## 2022-03-10 PROCEDURE — G0105 COLORECTAL SCRN; HI RISK IND: ICD-10-PCS | Mod: ,,, | Performed by: INTERNAL MEDICINE

## 2022-03-10 RX ORDER — SODIUM CHLORIDE, SODIUM LACTATE, POTASSIUM CHLORIDE, CALCIUM CHLORIDE 600; 310; 30; 20 MG/100ML; MG/100ML; MG/100ML; MG/100ML
INJECTION, SOLUTION INTRAVENOUS CONTINUOUS PRN
Status: DISCONTINUED | OUTPATIENT
Start: 2022-03-10 | End: 2022-03-10

## 2022-03-10 RX ORDER — PROPOFOL 10 MG/ML
VIAL (ML) INTRAVENOUS
Status: DISCONTINUED | OUTPATIENT
Start: 2022-03-10 | End: 2022-03-10

## 2022-03-10 RX ORDER — LIDOCAINE HYDROCHLORIDE 10 MG/ML
INJECTION, SOLUTION EPIDURAL; INFILTRATION; INTRACAUDAL; PERINEURAL
Status: DISCONTINUED | OUTPATIENT
Start: 2022-03-10 | End: 2022-03-10

## 2022-03-10 RX ADMIN — PROPOFOL 80 MG: 10 INJECTION, EMULSION INTRAVENOUS at 06:03

## 2022-03-10 RX ADMIN — PROPOFOL 50 MG: 10 INJECTION, EMULSION INTRAVENOUS at 06:03

## 2022-03-10 RX ADMIN — PROPOFOL 40 MG: 10 INJECTION, EMULSION INTRAVENOUS at 06:03

## 2022-03-10 RX ADMIN — SODIUM CHLORIDE, SODIUM LACTATE, POTASSIUM CHLORIDE, AND CALCIUM CHLORIDE: 600; 310; 30; 20 INJECTION, SOLUTION INTRAVENOUS at 06:03

## 2022-03-10 RX ADMIN — LIDOCAINE HYDROCHLORIDE 50 MG: 10 INJECTION, SOLUTION EPIDURAL; INFILTRATION; INTRACAUDAL; PERINEURAL at 06:03

## 2022-03-10 RX ADMIN — PROPOFOL 30 MG: 10 INJECTION, EMULSION INTRAVENOUS at 06:03

## 2022-03-10 NOTE — TRANSFER OF CARE
"Anesthesia Transfer of Care Note    Patient: Silvia Cotton    Procedure(s) Performed: Procedure(s) (LRB):  COLONOSCOPY (N/A)    Patient location: GI    Anesthesia Type: MAC    Transport from OR: Transported from OR on room air with adequate spontaneous ventilation    Post pain: adequate analgesia    Post assessment: no apparent anesthetic complications and tolerated procedure well    Post vital signs: stable    Level of consciousness: awake, alert and oriented    Nausea/Vomiting: no nausea/vomiting    Complications: none    Transfer of care protocol was followed      Last vitals:   Visit Vitals  BP (!) 142/72   Pulse 79   Temp 36.9 °C (98.4 °F) (Temporal)   Resp 18   Ht 5' 9" (1.753 m)   Wt 117.9 kg (260 lb)   SpO2 95%   Breastfeeding No   BMI 38.40 kg/m²     "

## 2022-03-10 NOTE — PROVATION PATIENT INSTRUCTIONS
Discharge Summary/Instructions after an Endoscopic Procedure  Patient Name: Silvia Cotton  Patient MRN: [id number]  Patient YOB: 1955  Thursday, March 10, 2022 Bee Brock MD  Dear patient,  As a result of recent federal legislation (The Federal Cures Act), you may   receive lab or pathology results from your procedure in your [facility]   account before your physician is able to contact you. Your physician or   their representative will relay the results to you with their   recommendations at their soonest availability.  Thank you,  RESTRICTIONS:  During your procedure today, you received medications for sedation.  These   medications may affect your judgment, balance and coordination.  Therefore,   for 24 hours, you have the following restrictions:   - DO NOT drive a car, operate machinery, make legal/financial decisions,   sign important papers or drink alcohol.    ACTIVITY:  Today: no heavy lifting, straining or running due to procedural   sedation/anesthesia.  The following day: return to full activity including work.  DIET:  Eat and drink normally unless instructed otherwise.     TREATMENT FOR COMMON SIDE EFFECTS:  - Mild abdominal pain, nausea, belching, bloating or excessive gas:  rest,   eat lightly and use a heating pad.  - Sore Throat: treat with throat lozenges and/or gargle with warm salt   water.  - Because air was used during the procedure, expelling large amounts of air   from your rectum or belching is normal.  - If a bowel prep was taken, you may not have a bowel movement for 1-3 days.    This is normal.  SYMPTOMS TO WATCH FOR AND REPORT TO YOUR PHYSICIAN:  1. Abdominal pain or bloating, other than gas cramps.  2. Chest pain.  3. Back pain.  4. Signs of infection such as: chills or fever occurring within 24 hours   after the procedure.  5. Rectal bleeding, which would show as bright red, maroon, or black stools.   (A tablespoon of blood from the rectum is not serious, especially if    hemorrhoids are present.)  6. Vomiting.  7. Weakness or dizziness.  GO DIRECTLY TO THE NEAREST EMERGENCY ROOM IF YOU HAVE ANY OF THE FOLLOWING:      Difficulty breathing              Chills and/or fever over 101 F   Persistent vomiting and/or vomiting blood   Severe abdominal pain   Severe chest pain   Black, tarry stools   Bleeding- more than one tablespoon   Any other symptom or condition that you feel may need urgent attention  Your doctor recommends these additional instructions:  If any biopsies were taken, your doctors clinic will contact you in 1 to 2   weeks with any results.  - Patient has a contact number available for emergencies.  The signs and   symptoms of potential delayed complications were discussed with the   patient.  Return to normal activities tomorrow.  Written discharge   instructions were provided to the patient.   - Discharge patient to home (via wheelchair).   - Resume previous diet today.   - Continue present medications.   - Repeat colonoscopy in 5 years for screening purposes.  For questions, problems or results please call your physician Bee Brock MD at Work:  [phone]  If you have any questions about the above instructions, call the GI   department at [phone] or call the endoscopy unit at [phone]   from 7am until 3 pm.  [facility] [place], EMERGENCY ROOM PHONE NUMBER:   [phone]  IF A COMPLICATION OR EMERGENCY SITUATION ARISES AND YOU ARE UNABLE TO REACH   YOUR PHYSICIAN - GO DIRECTLY TO THE EMERGENCY ROOM.  I have read or have had read to me these discharge instructions for my   procedure and have received a written copy.  I understand these   instructions and will follow-up with my physician if I have any questions.     __________________________________       _____________________________________  Nurse Signature                                          Patient/Designated   Responsible Party Signature  MD Bee Marcelino  MD Delmy  3/10/2022 7:17:53 AM  This report has been verified and signed electronically.  Dear patient,  As a result of recent federal legislation (The Federal Cures Act), you may   receive lab or pathology results from your procedure in your [facility]   account before your physician is able to contact you. Your physician or   their representative will relay the results to you with their   recommendations at their soonest availability.  Thank you,  PROVATION

## 2022-03-10 NOTE — H&P
PRE PROCEDURE H&P    Patient Name: Silvia Cotton  MRN: [id number]  : 1955  Date of Procedure:  3/10/2022  Referring Physician: Stephani Valdivia MD  Primary Physician: Stephani Valdivia MD  Procedure Physician: Bee Brock MD       Planned Procedure: Colonoscopy  Diagnosis: previous adenomatous polyp  Chief Complaint: Same as above    HPI: Patient is an 66 y.o. female is here for the above.     Last colonoscopy:   Family history: negative   Anticoagulation: none     Past Medical History:   Past Medical History:   Diagnosis Date    Anemia     Hyperlipidemia     Hypertension     Supraventricular tachycardia         Past Surgical History:  Past Surgical History:   Procedure Laterality Date    APPENDECTOMY      CHOLECYSTECTOMY      COLONOSCOPY N/A 2017    Procedure: COLONOSCOPY;  Surgeon: Taj Nicholas MD;  Location: [facility];  Service: Endoscopy;  Laterality: N/A;    SHOULDER ARTHROSCOPY      Tonsilectomy      TONSILLECTOMY      Uterine Ablation          Home Medications:  Prior to Admission medications    Medication Sig Start Date End Date Taking? Authorizing Provider   amitriptyline (ELAVIL) 50 MG tablet Take 1 tablet (50 mg total) by mouth every evening. 22  Yes Stephani Valdivia MD   atenoloL (TENORMIN) 25 MG tablet Take 1 tablet (25 mg total) by mouth 2 (two) times daily. 10/26/21  Yes Stephani Valdivia MD   clotrimazole-betamethasone 1-0.05% (LOTRISONE) cream APPLY TO AFFECTED AREA 2 TIMES DAILY 3/8/21  Yes Stephani Valdivia MD   cyanocobalamin 1,000 mcg/mL injection Inject 1 mL (1,000 mcg total) into the muscle once a week. 22  Yes Stephani Valdivia MD   fluticasone propionate (FLONASE) 50 mcg/actuation nasal spray 1 spray (50 mcg total) by Each Nostril route once daily. 3/3/22  Yes Sabas Patiño MD   ibuprofen (ADVIL,MOTRIN) 600 MG tablet Take 1 tablet (600 mg total) by mouth 2 (two) times a day. for 10 days 3/3/22 3/13/22 Yes Sabas Patiño MD   meclizine (ANTIVERT) 12.5 mg tablet  "Take 1 tablet (12.5 mg total) by mouth 3 (three) times daily as needed for Dizziness. 3/8/21  Yes Stephani Valdivia MD   needle, disp, 18 G 18 x 1 " Ndle Use to draw b12 1000 mcg from vail every 7 days for 7 weeks 12/3/15  Yes Stephani Valdivia MD   needle, reusable, 25 G 25 x 1 " Ndle Use to inject B12 1000 mcg every 7 days for 7 weeks 12/3/15  Yes Stephani Valdivia MD   nystatin (MYCOSTATIN) powder Apply TID prn to affected area 3/8/21  Yes Stephani Valdivia MD   omeprazole (PRILOSEC) 40 MG capsule Take 1 capsule (40 mg total) by mouth once daily. 2/1/22  Yes Stephani Valdivia MD   rosuvastatin (CRESTOR) 20 MG tablet Take 1 tablet (20 mg total) by mouth once daily. 1/24/22  Yes Stephani Valdivia MD   sod sulf-pot chloride-mag sulf (SUTAB) 1.479-0.188- 0.225 gram tablet Take 12 tablets by mouth once daily. Take according to package instructions with indicated amount of water. 2/7/22  Yes Garry Arce PA-C   syringe with needle 1 mL 25 gauge x 1" Syrg 1 each by Misc.(Non-Drug; Combo Route) route once a week. 9/16/20  Yes Stephani Valdivia MD   syringe, disposable, 3 mL Syrg Inject B12 1000 mcg every 7 days for 7 weeks 12/3/15  Yes Stephani Valdivia MD   traZODone (DESYREL) 100 MG tablet Take 1 tablet (100 mg total) by mouth every evening. 2/1/22 2/1/23 Yes Stephani Valdivia MD   venlafaxine (EFFEXOR-XR) 75 MG 24 hr capsule Take 1 capsule (75 mg total) by mouth once daily. 1/24/22  Yes Stephani Valdivia MD   zolpidem (AMBIEN) 10 mg Tab TAKE 1 TABLET BY MOUTH EVERY DAY IN THE EVENING 3/8/21   Stephani Valdivia MD        Allergies:  Review of patient's allergies indicates:  No Known Allergies     Social History:   Social History     Socioeconomic History    Marital status:    Tobacco Use    Smoking status: Never Smoker    Smokeless tobacco: Never Used   Substance and Sexual Activity    Alcohol use: No    Drug use: No    Sexual activity: Not Currently     Partners: Male     Birth control/protection: Post-menopausal       Family History:  Family " "History   Problem Relation Age of Onset    Cataracts Mother     Cataracts Father     Hypertension Sister     Hypertension Brother     Hypertension Sister     Glaucoma Neg Hx     Diabetes Neg Hx     Breast cancer Neg Hx     Colon cancer Neg Hx     Ovarian cancer Neg Hx        ROS: No acute cardiac events, no acute respiratory complaints.     Physical Exam (all patients):    BP (!) 142/72   Pulse 79   Temp 98.4 °F (36.9 °C) (Temporal)   Resp 18   Ht 5' 9" (1.753 m)   Wt 117.9 kg (260 lb)   SpO2 95%   Breastfeeding No   BMI 38.40 kg/m²   Lungs: Clear to auscultation bilaterally, respirations unlabored  Heart: Regular rate and rhythm, S1 and S2 normal, no obvious murmurs  Abdomen:         Soft, non-tender, bowel sounds normal, no masses, no organomegaly    Lab Results   Component Value Date    WBC 6.96 01/14/2022    MCV 93 01/14/2022    RDW 14.8 (H) 01/14/2022     01/14/2022    GLU 97 01/14/2022    HGBA1C 5.6 01/14/2022    BUN 10 01/14/2022     01/14/2022    K 4.0 01/14/2022     01/14/2022        SEDATION PLAN: per anesthesia      History reviewed, vital signs satisfactory, cardiopulmonary status satisfactory, sedation options, risks and plans have been discussed with the patient  All their questions were answered and the patient agrees to the sedation procedures as planned and the patient is deemed an appropriate candidate for the sedation as planned.    Procedure explained to patient, informed consent obtained and placed in chart.    Bee Brock  3/10/2022  6:43 AM   "

## 2022-03-10 NOTE — PLAN OF CARE
Dr Salazar came to bedside to discuss findings. Vital signs stable, no patient c/o nausea/vomitting, no abdominal pain, no gi bleeding. Patient to be discharged from unit.

## 2022-03-10 NOTE — ANESTHESIA RELEASE NOTE
"Anesthesia Release from PACU Note    Patient: Sivlia Cotton    Procedure(s) Performed: Procedure(s) (LRB):  COLONOSCOPY (N/A)    Anesthesia type: MAC    Post pain: Adequate analgesia    Post assessment: no apparent anesthetic complications and tolerated procedure well    Last Vitals:   Visit Vitals  BP (!) 106/54   Pulse 68   Temp 36.4 °C (97.5 °F) (Temporal)   Resp 20   Ht 5' 9" (1.753 m)   Wt 117.9 kg (260 lb)   SpO2 (!) 93%   Breastfeeding No   BMI 38.40 kg/m²       Post vital signs: stable    Level of consciousness: awake, alert  and oriented    Nausea/Vomiting: no nausea/no vomiting    Complications: none    Airway Patency: patent    Respiratory: unassisted, spontaneous ventilation, room air    Cardiovascular: stable and blood pressure at baseline    Hydration: euvolemic  "

## 2022-03-10 NOTE — ANESTHESIA POSTPROCEDURE EVALUATION
Anesthesia Post Evaluation    Patient: Silvia Cotton    Procedure(s) Performed: Procedure(s) (LRB):  COLONOSCOPY (N/A)    Final Anesthesia Type: MAC      Patient location during evaluation: GI PACU  Patient participation: Yes- Able to Participate  Level of consciousness: awake and alert and oriented  Post-procedure vital signs: reviewed and stable  Pain management: adequate  Airway patency: patent  JUAN DAVID mitigation strategies: Multimodal analgesia  PONV status at discharge: No PONV  Anesthetic complications: no      Cardiovascular status: hemodynamically stable  Respiratory status: unassisted, spontaneous ventilation and room air  Hydration status: euvolemic  Follow-up not needed.          Vitals Value Taken Time   /54 03/10/22 0721   Temp 36.4 °C (97.5 °F) 03/10/22 0704   Pulse 68 03/10/22 0721   Resp 20 03/10/22 0721   SpO2 93 % 03/10/22 0721         No case tracking events are documented in the log.      Pain/Lashawn Score: Lashawn Score: 10 (3/10/2022  7:21 AM)

## 2022-03-10 NOTE — ANESTHESIA PREPROCEDURE EVALUATION
03/10/2022  Silvia Cotton is a 66 y.o., female.      Pre-op Assessment    I have reviewed the Patient Summary Reports.     I have reviewed the Nursing Notes. I have reviewed the NPO Status.   I have reviewed the Medications.     Review of Systems  Anesthesia Hx:  No problems with previous Anesthesia    Social:  Non-Smoker, Social Alcohol Use    Hematology/Oncology:  Hematology Normal   Oncology Normal     EENT/Dental:EENT/Dental Normal   Cardiovascular:   Hypertension, well controlled hyperlipidemia    Pulmonary:  Pulmonary Normal    Renal/:  Renal/ Normal     Hepatic/GI:   Bowel Prep. GERD, well controlled    Musculoskeletal:   Arthritis     Neurological:   Peripheral Neuropathy    Endocrine:  Endocrine Normal  Obesity / BMI > 30  Dermatological:  Skin Normal    Psych:  Psychiatric Normal           Physical Exam  General: Well nourished, Cooperative, Alert and Oriented    Airway:  Mallampati: III   Mouth Opening: Normal  TM Distance: Normal  Tongue: Normal  Neck ROM: Normal ROM    Dental:  Dentures        Anesthesia Plan  Type of Anesthesia, risks & benefits discussed:    Anesthesia Type: MAC  Intra-op Monitoring Plan: Standard ASA Monitors  Post Op Pain Control Plan: multimodal analgesia  Informed Consent: Informed consent signed with the Patient and all parties understand the risks and agree with anesthesia plan.  All questions answered.   ASA Score: 2  Day of Surgery Review of History & Physical: H&P Update referred to the surgeon/provider.    Ready For Surgery From Anesthesia Perspective.     .

## 2022-03-11 VITALS
WEIGHT: 260 LBS | DIASTOLIC BLOOD PRESSURE: 54 MMHG | OXYGEN SATURATION: 93 % | RESPIRATION RATE: 20 BRPM | TEMPERATURE: 98 F | BODY MASS INDEX: 38.51 KG/M2 | SYSTOLIC BLOOD PRESSURE: 106 MMHG | HEIGHT: 69 IN | HEART RATE: 68 BPM

## 2022-04-18 RX ORDER — ATENOLOL 25 MG/1
TABLET ORAL
Qty: 180 TABLET | Refills: 0 | Status: SHIPPED | OUTPATIENT
Start: 2022-04-18 | End: 2022-07-28 | Stop reason: SDUPTHER

## 2022-04-18 NOTE — TELEPHONE ENCOUNTER
No new care gaps identified.  Powered by [employer]. Reference number: [id number].   4/17/2022 7:40:37 PM CDT

## 2022-04-18 NOTE — TELEPHONE ENCOUNTER
Refill Routing Note   Medication(s) are not appropriate for processing by [facility] for the following reason(s):      - Patient has been seen in the ED/Hospital since the last PCP visit    [facility] action(s):  Defer          Medication reconciliation completed: No     Appointments  past 12m or future 3m with PCP    Date Provider   Last Visit   2/1/2022 Stephani Valdivia MD   Next Visit   8/8/2022 Stephani Valdivia MD   ED visits in past 90 days: 0        Note composed:10:15 AM 04/18/2022

## 2022-04-26 RX ORDER — ROSUVASTATIN CALCIUM 20 MG/1
20 TABLET, COATED ORAL DAILY
Qty: 90 TABLET | Refills: 0 | Status: SHIPPED | OUTPATIENT
Start: 2022-04-26 | End: 2022-07-28 | Stop reason: SDUPTHER

## 2022-04-26 NOTE — TELEPHONE ENCOUNTER
No new care gaps identified.  Powered by [employer]. Reference number: [id number].   4/26/2022 2:01:30 PM CDT

## 2022-04-28 ENCOUNTER — PATIENT MESSAGE (OUTPATIENT)
Dept: FAMILY MEDICINE | Facility: CLINIC | Age: 67
End: 2022-04-28
Payer: COMMERCIAL

## 2022-04-28 RX ORDER — ROSUVASTATIN CALCIUM 20 MG/1
TABLET, COATED ORAL
Qty: 90 TABLET | Refills: 0 | OUTPATIENT
Start: 2022-04-28

## 2022-04-28 NOTE — TELEPHONE ENCOUNTER
No new care gaps identified.  Powered by [employer]. Reference number: [id number].   4/28/2022 8:00:41 AM CDT

## 2022-04-29 RX ORDER — VENLAFAXINE HYDROCHLORIDE 75 MG/1
75 CAPSULE, EXTENDED RELEASE ORAL DAILY
Qty: 90 CAPSULE | Refills: 1 | Status: SHIPPED | OUTPATIENT
Start: 2022-04-29 | End: 2022-08-08 | Stop reason: SDUPTHER

## 2022-04-29 RX ORDER — AMITRIPTYLINE HYDROCHLORIDE 50 MG/1
50 TABLET, FILM COATED ORAL NIGHTLY
Qty: 90 TABLET | Refills: 1 | Status: SHIPPED | OUTPATIENT
Start: 2022-04-29 | End: 2022-08-08 | Stop reason: SDUPTHER

## 2022-04-29 NOTE — TELEPHONE ENCOUNTER
No new care gaps identified.  Powered by [employer] by [employer]. Reference number: [id number].   4/29/2022 10:09:35 AM CDT

## 2022-07-28 ENCOUNTER — PATIENT MESSAGE (OUTPATIENT)
Dept: FAMILY MEDICINE | Facility: CLINIC | Age: 67
End: 2022-07-28
Payer: COMMERCIAL

## 2022-07-28 RX ORDER — ROSUVASTATIN CALCIUM 20 MG/1
20 TABLET, COATED ORAL DAILY
Qty: 30 TABLET | Refills: 0 | Status: SHIPPED | OUTPATIENT
Start: 2022-07-28 | End: 2022-08-08 | Stop reason: SDUPTHER

## 2022-07-28 RX ORDER — ATENOLOL 25 MG/1
25 TABLET ORAL 2 TIMES DAILY
Qty: 60 TABLET | Refills: 0 | Status: SHIPPED | OUTPATIENT
Start: 2022-07-28 | End: 2022-08-08 | Stop reason: SDUPTHER

## 2022-07-28 NOTE — TELEPHONE ENCOUNTER
No new care gaps identified.  [facility] Embedded Care Gaps. Reference number: [id number]. 7/28/2022   10:55:11 AM ANIYAT

## 2022-08-02 ENCOUNTER — LAB VISIT (OUTPATIENT)
Dept: LAB | Facility: HOSPITAL | Age: 67
End: 2022-08-02
Attending: FAMILY MEDICINE
Payer: COMMERCIAL

## 2022-08-02 DIAGNOSIS — E78.2 MIXED HYPERLIPIDEMIA: ICD-10-CM

## 2022-08-02 DIAGNOSIS — D50.0 IRON DEFICIENCY ANEMIA DUE TO CHRONIC BLOOD LOSS: ICD-10-CM

## 2022-08-02 DIAGNOSIS — E53.8 B12 DEFICIENCY: ICD-10-CM

## 2022-08-02 LAB
ALBUMIN SERPL BCP-MCNC: 4.2 G/DL (ref 3.5–5.2)
ALP SERPL-CCNC: 74 U/L (ref 55–135)
ALT SERPL W/O P-5'-P-CCNC: 14 U/L (ref 10–44)
ANION GAP SERPL CALC-SCNC: 7 MMOL/L (ref 8–16)
AST SERPL-CCNC: 19 U/L (ref 10–40)
BASOPHILS # BLD AUTO: 0.04 K/UL (ref 0–0.2)
BASOPHILS NFR BLD: 0.5 % (ref 0–1.9)
BILIRUB SERPL-MCNC: 0.7 MG/DL (ref 0.1–1)
BUN SERPL-MCNC: 13 MG/DL (ref 8–23)
CALCIUM SERPL-MCNC: 9.9 MG/DL (ref 8.7–10.5)
CHLORIDE SERPL-SCNC: 106 MMOL/L (ref 95–110)
CHOLEST SERPL-MCNC: 137 MG/DL (ref 120–199)
CHOLEST/HDLC SERPL: 3.9 {RATIO} (ref 2–5)
CO2 SERPL-SCNC: 26 MMOL/L (ref 23–29)
CREAT SERPL-MCNC: 1.1 MG/DL (ref 0.5–1.4)
DIFFERENTIAL METHOD: ABNORMAL
EOSINOPHIL # BLD AUTO: 0.3 K/UL (ref 0–0.5)
EOSINOPHIL NFR BLD: 3.6 % (ref 0–8)
ERYTHROCYTE [DISTWIDTH] IN BLOOD BY AUTOMATED COUNT: 15.5 % (ref 11.5–14.5)
EST. GFR  (NO RACE VARIABLE): 56.1 ML/MIN/1.73 M^2
GLUCOSE SERPL-MCNC: 96 MG/DL (ref 70–110)
HCT VFR BLD AUTO: 38.5 % (ref 37–48.5)
HDLC SERPL-MCNC: 35 MG/DL (ref 40–75)
HDLC SERPL: 25.5 % (ref 20–50)
HGB BLD-MCNC: 12.2 G/DL (ref 12–16)
IMM GRANULOCYTES # BLD AUTO: 0.02 K/UL (ref 0–0.04)
IMM GRANULOCYTES NFR BLD AUTO: 0.3 % (ref 0–0.5)
LDLC SERPL CALC-MCNC: 63.6 MG/DL (ref 63–159)
LYMPHOCYTES # BLD AUTO: 2.3 K/UL (ref 1–4.8)
LYMPHOCYTES NFR BLD: 30 % (ref 18–48)
MCH RBC QN AUTO: 29.8 PG (ref 27–31)
MCHC RBC AUTO-ENTMCNC: 31.7 G/DL (ref 32–36)
MCV RBC AUTO: 94 FL (ref 82–98)
MONOCYTES # BLD AUTO: 0.7 K/UL (ref 0.3–1)
MONOCYTES NFR BLD: 9.6 % (ref 4–15)
NEUTROPHILS # BLD AUTO: 4.2 K/UL (ref 1.8–7.7)
NEUTROPHILS NFR BLD: 56 % (ref 38–73)
NONHDLC SERPL-MCNC: 102 MG/DL
NRBC BLD-RTO: 0 /100 WBC
PLATELET # BLD AUTO: 252 K/UL (ref 150–450)
PMV BLD AUTO: 11.7 FL (ref 9.2–12.9)
POTASSIUM SERPL-SCNC: 4.8 MMOL/L (ref 3.5–5.1)
PROT SERPL-MCNC: 6.9 G/DL (ref 6–8.4)
RBC # BLD AUTO: 4.09 M/UL (ref 4–5.4)
SODIUM SERPL-SCNC: 139 MMOL/L (ref 136–145)
TRIGL SERPL-MCNC: 192 MG/DL (ref 30–150)
VIT B12 SERPL-MCNC: >2000 PG/ML (ref 210–950)
WBC # BLD AUTO: 7.5 K/UL (ref 3.9–12.7)

## 2022-08-02 PROCEDURE — 82607 VITAMIN B-12: CPT | Performed by: FAMILY MEDICINE

## 2022-08-02 PROCEDURE — 36415 COLL VENOUS BLD VENIPUNCTURE: CPT | Mod: PO | Performed by: FAMILY MEDICINE

## 2022-08-02 PROCEDURE — 80061 LIPID PANEL: CPT | Performed by: FAMILY MEDICINE

## 2022-08-02 PROCEDURE — 85025 COMPLETE CBC W/AUTO DIFF WBC: CPT | Performed by: FAMILY MEDICINE

## 2022-08-02 PROCEDURE — 80053 COMPREHEN METABOLIC PANEL: CPT | Performed by: FAMILY MEDICINE

## 2022-08-08 ENCOUNTER — OFFICE VISIT (OUTPATIENT)
Dept: FAMILY MEDICINE | Facility: CLINIC | Age: 67
End: 2022-08-08
Payer: COMMERCIAL

## 2022-08-08 VITALS
HEIGHT: 69 IN | OXYGEN SATURATION: 97 % | WEIGHT: 223.56 LBS | SYSTOLIC BLOOD PRESSURE: 110 MMHG | BODY MASS INDEX: 33.11 KG/M2 | DIASTOLIC BLOOD PRESSURE: 78 MMHG | HEART RATE: 92 BPM

## 2022-08-08 DIAGNOSIS — Z00.00 WELL ADULT EXAM: Primary | ICD-10-CM

## 2022-08-08 DIAGNOSIS — E78.2 MIXED HYPERLIPIDEMIA: ICD-10-CM

## 2022-08-08 DIAGNOSIS — E53.8 B12 DEFICIENCY: ICD-10-CM

## 2022-08-08 DIAGNOSIS — D50.0 IRON DEFICIENCY ANEMIA DUE TO CHRONIC BLOOD LOSS: ICD-10-CM

## 2022-08-08 DIAGNOSIS — N32.89 BLADDER SPASMS: ICD-10-CM

## 2022-08-08 DIAGNOSIS — R25.2 LEG CRAMPS: ICD-10-CM

## 2022-08-08 DIAGNOSIS — E66.9 CLASS 1 OBESITY WITH BODY MASS INDEX (BMI) OF 33.0 TO 33.9 IN ADULT, UNSPECIFIED OBESITY TYPE, UNSPECIFIED WHETHER SERIOUS COMORBIDITY PRESENT: ICD-10-CM

## 2022-08-08 PROCEDURE — 1159F PR MEDICATION LIST DOCUMENTED IN MEDICAL RECORD: ICD-10-PCS | Mod: CPTII,S$GLB,, | Performed by: FAMILY MEDICINE

## 2022-08-08 PROCEDURE — 3044F HG A1C LEVEL LT 7.0%: CPT | Mod: CPTII,S$GLB,, | Performed by: FAMILY MEDICINE

## 2022-08-08 PROCEDURE — 99396 PREV VISIT EST AGE 40-64: CPT | Mod: S$GLB,,, | Performed by: FAMILY MEDICINE

## 2022-08-08 PROCEDURE — 1159F MED LIST DOCD IN RCRD: CPT | Mod: CPTII,S$GLB,, | Performed by: FAMILY MEDICINE

## 2022-08-08 PROCEDURE — 3074F PR MOST RECENT SYSTOLIC BLOOD PRESSURE < 130 MM HG: ICD-10-PCS | Mod: CPTII,S$GLB,, | Performed by: FAMILY MEDICINE

## 2022-08-08 PROCEDURE — 3074F SYST BP LT 130 MM HG: CPT | Mod: CPTII,S$GLB,, | Performed by: FAMILY MEDICINE

## 2022-08-08 PROCEDURE — 3078F DIAST BP <80 MM HG: CPT | Mod: CPTII,S$GLB,, | Performed by: FAMILY MEDICINE

## 2022-08-08 PROCEDURE — 3044F PR MOST RECENT HEMOGLOBIN A1C LEVEL <7.0%: ICD-10-PCS | Mod: CPTII,S$GLB,, | Performed by: FAMILY MEDICINE

## 2022-08-08 PROCEDURE — 3078F PR MOST RECENT DIASTOLIC BLOOD PRESSURE < 80 MM HG: ICD-10-PCS | Mod: CPTII,S$GLB,, | Performed by: FAMILY MEDICINE

## 2022-08-08 PROCEDURE — 99396 PR PREVENTIVE VISIT,EST,40-64: ICD-10-PCS | Mod: S$GLB,,, | Performed by: FAMILY MEDICINE

## 2022-08-08 PROCEDURE — 3008F BODY MASS INDEX DOCD: CPT | Mod: CPTII,S$GLB,, | Performed by: FAMILY MEDICINE

## 2022-08-08 PROCEDURE — 3008F PR BODY MASS INDEX (BMI) DOCUMENTED: ICD-10-PCS | Mod: CPTII,S$GLB,, | Performed by: FAMILY MEDICINE

## 2022-08-08 PROCEDURE — 99999 PR PBB SHADOW E&M-EST. PATIENT-LVL III: ICD-10-PCS | Mod: PBBFAC,,, | Performed by: FAMILY MEDICINE

## 2022-08-08 PROCEDURE — 1160F RVW MEDS BY RX/DR IN RCRD: CPT | Mod: CPTII,S$GLB,, | Performed by: FAMILY MEDICINE

## 2022-08-08 PROCEDURE — 99999 PR PBB SHADOW E&M-EST. PATIENT-LVL III: CPT | Mod: PBBFAC,,, | Performed by: FAMILY MEDICINE

## 2022-08-08 PROCEDURE — 1160F PR REVIEW ALL MEDS BY PRESCRIBER/CLIN PHARMACIST DOCUMENTED: ICD-10-PCS | Mod: CPTII,S$GLB,, | Performed by: FAMILY MEDICINE

## 2022-08-08 RX ORDER — VENLAFAXINE HYDROCHLORIDE 75 MG/1
75 CAPSULE, EXTENDED RELEASE ORAL DAILY
Qty: 90 CAPSULE | Refills: 1 | Status: SHIPPED | OUTPATIENT
Start: 2022-08-08 | End: 2022-11-07 | Stop reason: SDUPTHER

## 2022-08-08 RX ORDER — TRAZODONE HYDROCHLORIDE 100 MG/1
100 TABLET ORAL NIGHTLY
Qty: 90 TABLET | Refills: 1 | Status: SHIPPED | OUTPATIENT
Start: 2022-08-08 | End: 2022-11-07 | Stop reason: SDUPTHER

## 2022-08-08 RX ORDER — ATENOLOL 25 MG/1
25 TABLET ORAL 2 TIMES DAILY
Qty: 180 TABLET | Refills: 1 | Status: SHIPPED | OUTPATIENT
Start: 2022-08-08 | End: 2022-11-07 | Stop reason: SDUPTHER

## 2022-08-08 RX ORDER — ROSUVASTATIN CALCIUM 20 MG/1
20 TABLET, COATED ORAL DAILY
Qty: 90 TABLET | Refills: 1 | Status: SHIPPED | OUTPATIENT
Start: 2022-08-08 | End: 2022-11-07 | Stop reason: SDUPTHER

## 2022-08-08 RX ORDER — TOPIRAMATE 50 MG/1
50 TABLET, FILM COATED ORAL 2 TIMES DAILY
Qty: 60 TABLET | Refills: 11 | Status: SHIPPED | OUTPATIENT
Start: 2022-08-08 | End: 2022-11-07 | Stop reason: SDUPTHER

## 2022-08-08 RX ORDER — OMEPRAZOLE 40 MG/1
40 CAPSULE, DELAYED RELEASE ORAL DAILY
Qty: 90 CAPSULE | Refills: 1 | Status: SHIPPED | OUTPATIENT
Start: 2022-08-08 | End: 2022-11-07 | Stop reason: SDUPTHER

## 2022-08-08 RX ORDER — AMITRIPTYLINE HYDROCHLORIDE 50 MG/1
50 TABLET, FILM COATED ORAL NIGHTLY
Qty: 90 TABLET | Refills: 1 | Status: SHIPPED | OUTPATIENT
Start: 2022-08-08 | End: 2022-10-30

## 2022-08-08 NOTE — PROGRESS NOTES
Chief Complaint:    Chief Complaint   Patient presents with    Follow-up     6 mo f/u        History of Present Illness:  Patient with palpitations presents today for a six month follow-up    CBC normal.   Cholesterol is good.   Liver/kidney function good.  She has restless legs. Tried magnesium in the past.   Weight loss of 37 pounds since last visit. She's following her 's diet, he had gastric sleeve surgery.     She has chronic iron deficiency anemia will seeing iron infusion there is slight drop in her H&H.  She is unable to tolerate oral iron.  B12 levels are >2000. She does B12 injections weekly.        ROS:  Review of Systems   Constitutional: Negative for activity change, chills, fatigue, fever and unexpected weight change.   HENT: Negative for congestion, ear discharge, ear pain, hearing loss, postnasal drip and rhinorrhea.    Eyes: Negative for pain and visual disturbance.   Respiratory: Negative for cough, chest tightness and shortness of breath.    Cardiovascular: Negative for chest pain and palpitations.   Gastrointestinal: Negative for abdominal pain, diarrhea and vomiting.   Endocrine: Negative for heat intolerance.   Genitourinary: Negative for dysuria, flank pain, frequency and hematuria.   Musculoskeletal: Positive for arthralgias and myalgias. Negative for back pain, gait problem and neck pain.   Skin: Negative for color change and rash.   Neurological: Negative for dizziness, tremors, seizures, numbness and headaches.   Psychiatric/Behavioral: Negative for agitation, hallucinations, self-injury, sleep disturbance and suicidal ideas. The patient is not nervous/anxious.    All other systems reviewed and are negative.      Past Medical History:   Diagnosis Date    Anemia     Hyperlipidemia     Hypertension     Supraventricular tachycardia        Social History:  Social History     Socioeconomic History    Marital status:    Tobacco Use    Smoking status: Never Smoker     "Smokeless tobacco: Never Used   Substance and Sexual Activity    Alcohol use: No    Drug use: No    Sexual activity: Not Currently     Partners: Male     Birth control/protection: Post-menopausal     Social Determinants of Health     Financial Resource Strain: Medium Risk    Difficulty of Paying Living Expenses: Somewhat hard   Food Insecurity: No Food Insecurity    Worried About Running Out of Food in the Last Year: Never true    Ran Out of Food in the Last Year: Never true   Transportation Needs: No Transportation Needs    Lack of Transportation (Medical): No    Lack of Transportation (Non-Medical): No   Physical Activity: Insufficiently Active    Days of Exercise per Week: 5 days    Minutes of Exercise per Session: 20 min   Stress: No Stress Concern Present    Feeling of Stress : Not at all   Social Connections: Unknown    Frequency of Communication with Friends and Family: More than three times a week    Frequency of Social Gatherings with Friends and Family: More than three times a week    Active Member of Clubs or Organizations: No    Marital Status:    Housing Stability: Low Risk     Unable to Pay for Housing in the Last Year: No    Number of Places Lived in the Last Year: 1    Unstable Housing in the Last Year: No       Family History:   family history includes Cataracts in her father and mother; Hypertension in her brother, sister, and sister.    Health Maintenance   Topic Date Due    Mammogram  02/04/2023    Lipid Panel  08/02/2023    TETANUS VACCINE  12/01/2024    Hepatitis C Screening  Completed       Physical Exam:    Vital Signs  Pulse: 92  SpO2: 97 %  BP: 110/78  Pain Score: 0-No pain  Height and Weight  Height: 5' 9" (175.3 cm)  Weight: 101.4 kg (223 lb 8.7 oz)  BSA (Calculated - sq m): 2.22 sq meters  BMI (Calculated): 33  Weight in (lb) to have BMI = 25: 168.9]    Body mass index is 33.01 kg/m².    Physical Exam  Vitals and nursing note reviewed.   Constitutional:       " Appearance: Normal appearance. She is not toxic-appearing.   HENT:      Head: Normocephalic and atraumatic.      Right Ear: Tympanic membrane normal.      Left Ear: Tympanic membrane normal.   Eyes:      Extraocular Movements: Extraocular movements intact.      Pupils: Pupils are equal, round, and reactive to light.   Cardiovascular:      Rate and Rhythm: Normal rate and regular rhythm.      Heart sounds: Normal heart sounds.   Pulmonary:      Effort: Pulmonary effort is normal.      Breath sounds: Normal breath sounds. No wheezing, rhonchi or rales.   Abdominal:      General: Bowel sounds are normal. There is no distension.      Palpations: Abdomen is soft.      Tenderness: There is no abdominal tenderness.   Musculoskeletal:         General: Normal range of motion.      Cervical back: Normal range of motion.   Skin:     General: Skin is warm and dry.      Capillary Refill: Capillary refill takes less than 2 seconds.   Neurological:      General: No focal deficit present.      Mental Status: She is alert and oriented to person, place, and time.   Psychiatric:         Mood and Affect: Mood normal.         Behavior: Behavior normal.         Judgment: Judgment normal.           Assessment:    Well adult exam  -     Hemoglobin A1C; Future; Expected date: 02/07/2023  -     Microalbumin/Creatinine Ratio, Urine; Future; Expected date: 02/08/2023    Iron deficiency anemia due to chronic blood loss  -     Iron and TIBC; Future; Expected date: 08/08/2022    Mixed hyperlipidemia  -     Comprehensive Metabolic Panel; Future; Expected date: 02/07/2023  -     Lipid Panel; Future; Expected date: 02/07/2023    B12 deficiency    Bladder spasms    Leg cramps    Class 1 obesity with body mass index (BMI) of 33.0 to 33.9 in adult, unspecified obesity type, unspecified whether serious comorbidity present    Other orders  -     venlafaxine (EFFEXOR-XR) 75 MG 24 hr capsule; Take 1 capsule (75 mg total) by mouth once daily.  Dispense: 90  capsule; Refill: 1  -     traZODone (DESYREL) 100 MG tablet; Take 1 tablet (100 mg total) by mouth every evening.  Dispense: 90 tablet; Refill: 1  -     rosuvastatin (CRESTOR) 20 MG tablet; Take 1 tablet (20 mg total) by mouth once daily.  Dispense: 90 tablet; Refill: 1  -     atenoloL (TENORMIN) 25 MG tablet; Take 1 tablet (25 mg total) by mouth 2 (two) times daily.  Dispense: 180 tablet; Refill: 1  -     amitriptyline (ELAVIL) 50 MG tablet; Take 1 tablet (50 mg total) by mouth every evening.  Dispense: 90 tablet; Refill: 1  -     omeprazole (PRILOSEC) 40 MG capsule; Take 1 capsule (40 mg total) by mouth once daily.  Dispense: 90 capsule; Refill: 1  -     topiramate (TOPAMAX) 50 MG tablet; Take 1 tablet (50 mg total) by mouth 2 (two) times daily.  Dispense: 60 tablet; Refill: 11            Plan:  Discussed with patient that she should avoid NSAIDs to prevent further kidney damage.   Recommend 6 oz tonic water twice per day, magnesium tablets for leg cramps.  Recommend Topamax 50 mg for class 1 obesity.   Continue B12 injections biweekly  Other medical problems stable  See labs below.   Follow-up 6 months  Orders Placed This Encounter   Procedures    Iron and TIBC    Hemoglobin A1C    Comprehensive Metabolic Panel    Lipid Panel    Microalbumin/Creatinine Ratio, Urine     Current Outpatient Medications   Medication Sig Dispense Refill    amitriptyline (ELAVIL) 50 MG tablet Take 1 tablet (50 mg total) by mouth every evening. 90 tablet 1    atenoloL (TENORMIN) 25 MG tablet Take 1 tablet (25 mg total) by mouth 2 (two) times daily. 180 tablet 1    clotrimazole-betamethasone 1-0.05% (LOTRISONE) cream APPLY TO AFFECTED AREA 2 TIMES DAILY 45 g 2    cyanocobalamin 1,000 mcg/mL injection Inject 1 mL (1,000 mcg total) into the muscle once a week. 30 mL 12    fluticasone propionate (FLONASE) 50 mcg/actuation nasal spray 1 spray (50 mcg total) by Each Nostril route once daily. 16 g 0    meclizine (ANTIVERT) 12.5 mg  "tablet Take 1 tablet (12.5 mg total) by mouth 3 (three) times daily as needed for Dizziness. 90 tablet 1    needle, disp, 18 G 18 x 1 " Ndle Use to draw b12 1000 mcg from vail every 7 days for 7 weeks 100 each 0    needle, reusable, 25 G 25 x 1 " Ndle Use to inject B12 1000 mcg every 7 days for 7 weeks 12 each 2    nystatin (MYCOSTATIN) powder Apply TID prn to affected area 60 g 3    omeprazole (PRILOSEC) 40 MG capsule Take 1 capsule (40 mg total) by mouth once daily. 90 capsule 1    rosuvastatin (CRESTOR) 20 MG tablet Take 1 tablet (20 mg total) by mouth once daily. 90 tablet 1    syringe with needle 1 mL 25 gauge x 1" Syrg 1 each by Misc.(Non-Drug; Combo Route) route once a week. 20 Syringe 2    syringe, disposable, 3 mL Syrg Inject B12 1000 mcg every 7 days for 7 weeks 25 each 0    topiramate (TOPAMAX) 50 MG tablet Take 1 tablet (50 mg total) by mouth 2 (two) times daily. 60 tablet 11    traZODone (DESYREL) 100 MG tablet Take 1 tablet (100 mg total) by mouth every evening. 90 tablet 1    venlafaxine (EFFEXOR-XR) 75 MG 24 hr capsule Take 1 capsule (75 mg total) by mouth once daily. 90 capsule 1     No current facility-administered medications for this visit.       Medications Discontinued During This Encounter   Medication Reason    zolpidem (AMBIEN) 10 mg Tab Patient no longer taking    omeprazole (PRILOSEC) 40 MG capsule Reorder    traZODone (DESYREL) 100 MG tablet Reorder    amitriptyline (ELAVIL) 50 MG tablet Reorder    venlafaxine (EFFEXOR-XR) 75 MG 24 hr capsule Reorder    atenoloL (TENORMIN) 25 MG tablet Reorder    rosuvastatin (CRESTOR) 20 MG tablet Reorder       Follow up in about 6 months (around 2/8/2023).      Stephani Valdivia MD  Scribe Attestation:   ICarmen, am scribing for, and in the presence of, Dr.Arif Valdivia I performed the above scribed service and the documentation accurately describes the services I performed. I attest to the accuracy of the note.    I, Dr. Styles " Skip, reviewed documentation as scribed above. I performed the services described in this documentation.  I agree that the record reflects my personal performance and is accurate and complete. Stephani Valdivia MD.  08/08/2022

## 2022-08-25 ENCOUNTER — PATIENT MESSAGE (OUTPATIENT)
Dept: FAMILY MEDICINE | Facility: CLINIC | Age: 67
End: 2022-08-25
Payer: COMMERCIAL

## 2022-08-25 ENCOUNTER — PATIENT MESSAGE (OUTPATIENT)
Dept: GASTROENTEROLOGY | Facility: CLINIC | Age: 67
End: 2022-08-25
Payer: COMMERCIAL

## 2022-08-31 ENCOUNTER — PATIENT MESSAGE (OUTPATIENT)
Dept: FAMILY MEDICINE | Facility: CLINIC | Age: 67
End: 2022-08-31
Payer: COMMERCIAL

## 2022-08-31 NOTE — TELEPHONE ENCOUNTER
C/o neck pain, has been painting and gardening.  She has been taking Tylenol and Ibuprofen , she said that she has CKD stage III   Asking for muscle relaxer

## 2022-08-31 NOTE — TELEPHONE ENCOUNTER
No new care gaps identified.  [facility] Embedded Care Gaps. Reference number: [id number]. 8/31/2022   5:41:37 PM CDT

## 2022-09-01 RX ORDER — TIZANIDINE 4 MG/1
4 TABLET ORAL 2 TIMES DAILY
Qty: 20 TABLET | Refills: 0 | Status: SHIPPED | OUTPATIENT
Start: 2022-09-01 | End: 2022-09-11

## 2022-10-06 ENCOUNTER — PATIENT MESSAGE (OUTPATIENT)
Dept: FAMILY MEDICINE | Facility: CLINIC | Age: 67
End: 2022-10-06
Payer: MEDICARE

## 2022-10-07 ENCOUNTER — TELEPHONE (OUTPATIENT)
Dept: FAMILY MEDICINE | Facility: CLINIC | Age: 67
End: 2022-10-07
Payer: MEDICARE

## 2022-10-07 RX ORDER — SEMAGLUTIDE 1.34 MG/ML
0.25 INJECTION, SOLUTION SUBCUTANEOUS
Qty: 4 PEN | Refills: 2 | Status: SHIPPED | OUTPATIENT
Start: 2022-10-07 | End: 2022-11-07 | Stop reason: SDUPTHER

## 2022-10-07 NOTE — TELEPHONE ENCOUNTER
No new care gaps identified.  [facility] Embedded Care Gaps. Reference number: [id number]. 10/07/2022   3:44:41 PM CDT

## 2022-10-16 ENCOUNTER — OFFICE VISIT (OUTPATIENT)
Dept: URGENT CARE | Facility: CLINIC | Age: 67
End: 2022-10-16
Payer: MEDICARE

## 2022-10-16 VITALS
SYSTOLIC BLOOD PRESSURE: 123 MMHG | HEIGHT: 69 IN | OXYGEN SATURATION: 100 % | BODY MASS INDEX: 31.84 KG/M2 | DIASTOLIC BLOOD PRESSURE: 58 MMHG | TEMPERATURE: 97 F | WEIGHT: 215 LBS | HEART RATE: 78 BPM | RESPIRATION RATE: 18 BRPM

## 2022-10-16 DIAGNOSIS — R35.0 FREQUENT URINATION: Primary | ICD-10-CM

## 2022-10-16 DIAGNOSIS — R68.83 CHILLS (WITHOUT FEVER): ICD-10-CM

## 2022-10-16 DIAGNOSIS — R30.0 DYSURIA: ICD-10-CM

## 2022-10-16 LAB
BILIRUB UR QL STRIP: NEGATIVE
GLUCOSE UR QL STRIP: NEGATIVE
KETONES UR QL STRIP: NEGATIVE
LEUKOCYTE ESTERASE UR QL STRIP: NEGATIVE
PH, POC UA: 5.5
POC BLOOD, URINE: NEGATIVE
POC NITRATES, URINE: NEGATIVE
PROT UR QL STRIP: NEGATIVE
SP GR UR STRIP: 1.01 (ref 1–1.03)
UROBILINOGEN UR STRIP-ACNC: NORMAL (ref 0.1–1.1)

## 2022-10-16 PROCEDURE — 3078F PR MOST RECENT DIASTOLIC BLOOD PRESSURE < 80 MM HG: ICD-10-PCS | Mod: CPTII,S$GLB,, | Performed by: NURSE PRACTITIONER

## 2022-10-16 PROCEDURE — 81003 POCT URINALYSIS, DIPSTICK, AUTOMATED, W/O SCOPE: ICD-10-PCS | Mod: QW,S$GLB,, | Performed by: NURSE PRACTITIONER

## 2022-10-16 PROCEDURE — 3044F HG A1C LEVEL LT 7.0%: CPT | Mod: CPTII,S$GLB,, | Performed by: NURSE PRACTITIONER

## 2022-10-16 PROCEDURE — 3074F PR MOST RECENT SYSTOLIC BLOOD PRESSURE < 130 MM HG: ICD-10-PCS | Mod: CPTII,S$GLB,, | Performed by: NURSE PRACTITIONER

## 2022-10-16 PROCEDURE — 1159F MED LIST DOCD IN RCRD: CPT | Mod: CPTII,S$GLB,, | Performed by: NURSE PRACTITIONER

## 2022-10-16 PROCEDURE — 87086 URINE CULTURE/COLONY COUNT: CPT | Performed by: NURSE PRACTITIONER

## 2022-10-16 PROCEDURE — 99214 PR OFFICE/OUTPT VISIT, EST, LEVL IV, 30-39 MIN: ICD-10-PCS | Mod: S$GLB,,, | Performed by: NURSE PRACTITIONER

## 2022-10-16 PROCEDURE — 99214 OFFICE O/P EST MOD 30 MIN: CPT | Mod: S$GLB,,, | Performed by: NURSE PRACTITIONER

## 2022-10-16 PROCEDURE — 3044F PR MOST RECENT HEMOGLOBIN A1C LEVEL <7.0%: ICD-10-PCS | Mod: CPTII,S$GLB,, | Performed by: NURSE PRACTITIONER

## 2022-10-16 PROCEDURE — 81003 URINALYSIS AUTO W/O SCOPE: CPT | Mod: QW,S$GLB,, | Performed by: NURSE PRACTITIONER

## 2022-10-16 PROCEDURE — 3074F SYST BP LT 130 MM HG: CPT | Mod: CPTII,S$GLB,, | Performed by: NURSE PRACTITIONER

## 2022-10-16 PROCEDURE — 1160F RVW MEDS BY RX/DR IN RCRD: CPT | Mod: CPTII,S$GLB,, | Performed by: NURSE PRACTITIONER

## 2022-10-16 PROCEDURE — 3078F DIAST BP <80 MM HG: CPT | Mod: CPTII,S$GLB,, | Performed by: NURSE PRACTITIONER

## 2022-10-16 PROCEDURE — 1159F PR MEDICATION LIST DOCUMENTED IN MEDICAL RECORD: ICD-10-PCS | Mod: CPTII,S$GLB,, | Performed by: NURSE PRACTITIONER

## 2022-10-16 PROCEDURE — 1160F PR REVIEW ALL MEDS BY PRESCRIBER/CLIN PHARMACIST DOCUMENTED: ICD-10-PCS | Mod: CPTII,S$GLB,, | Performed by: NURSE PRACTITIONER

## 2022-10-16 RX ORDER — PHENAZOPYRIDINE HYDROCHLORIDE 200 MG/1
200 TABLET, FILM COATED ORAL 3 TIMES DAILY PRN
Qty: 6 TABLET | Refills: 0 | Status: SHIPPED | OUTPATIENT
Start: 2022-10-16 | End: 2022-10-18

## 2022-10-16 RX ORDER — NITROFURANTOIN 25; 75 MG/1; MG/1
100 CAPSULE ORAL 2 TIMES DAILY
Qty: 14 CAPSULE | Refills: 0 | Status: SHIPPED | OUTPATIENT
Start: 2022-10-16 | End: 2022-10-23

## 2022-10-16 NOTE — PATIENT INSTRUCTIONS
Increase fluids (avoid caffeine or sugary beverages as these will irritate the bladder).   Take your antibiotics exactly as prescribed and make sure to complete entire course even if you begin to feel better. This will prevent recurrence of infection and antibiotic resistance.   We have prescribed an antibiotic that covers most bacteria that cause urinary tract infections, however, if your urine culture shows that you have any bacterial resistance to the antibiotic you were prescribed or an unusual bacterial strain, we will notify you and make any necessary changes. Urine cultures usually result in about three days.   Be sure you are wiping front to back.  Avoid holding urine when you feel as though you have to urinate.  Please follow up with your primary care provider if your symptoms do not improve within 2-3 days or sooner for any new or worsening symptoms.  For any severe pain, bright red blood in urine, difficulty urinating, fever that does not improve with Tylenol or Ibuprofen, inability to urinate, incontinence of urine or bowels, or for any other urgent concerns, please go to the ER for immediate evaluation.        Please follow up with your Primary care provider within 2-5 days if your signs and symptoms have not resolved or worsen.     If your condition worsens or fails to improve we recommend that you receive another evaluation at the emergency room immediately or contact your primary medical clinic to discuss your concerns.   You must understand that you have received an Urgent Care treatment only and that you may be released before all of your medical problems are known or treated. You, the patient, will arrange for follow up care as instructed.     RED FLAGS/WARNING SYMPTOMS DISCUSSED WITH PATIENT THAT WOULD WARRANT EMERGENT MEDICAL ATTENTION. PATIENT VERBALIZED UNDERSTANDING.

## 2022-10-16 NOTE — PROGRESS NOTES
"Subjective:       Patient ID: Silvia Cotton is a 67 y.o. female.    Vitals:  height is 5' 9" (1.753 m) and weight is 97.5 kg (215 lb). Her temperature is 96.9 °F (36.1 °C). Her blood pressure is 123/58 (abnormal) and her pulse is 78. Her respiration is 18 and oxygen saturation is 100%.     Chief Complaint: Urinary Tract Infection    PT states that she is having a burning sensation when she urine's. She also states that she is aching when she goes. All her symptoms started on Thursday       Patient reports she has a burning sensation and frequent urination since Thursday.  Reports she has chills and been feeling achy since then.  Reports in the past she would get frequent UTIs and this is how they would always start off    Urinary Tract Infection   This is a new problem. The current episode started in the past 7 days. The problem occurs every urination. The problem has been unchanged. The quality of the pain is described as aching and burning. The pain is at a severity of 9/10. The pain is moderate. There has been no fever. She is Not sexually active. Associated symptoms include frequency. Pertinent negatives include no behavior changes, chills, discharge, flank pain, hematuria, hesitancy, nausea, possible pregnancy, sweats, urgency, vomiting, weight loss, bubble bath use, constipation, rash or withholding. She has tried nothing for the symptoms. The treatment provided no relief. There is no history of catheterization, diabetes insipidus, diabetes mellitus, genitourinary reflux, hypertension, kidney stones, recurrent UTIs, a single kidney, STD, urinary stasis or a urological procedure.     Constitution: Negative for chills and fever.   Gastrointestinal:  Negative for nausea, vomiting and constipation.   Genitourinary:  Positive for dysuria and frequency. Negative for urgency, flank pain and hematuria.   Skin:  Negative for rash.     Objective:      Physical Exam   Constitutional: She appears well-developed.  " Non-toxic appearance. She does not appear ill. No distress.   HENT:   Head: Normocephalic and atraumatic.   Ears:   Right Ear: External ear normal.   Left Ear: External ear normal.   Nose: Nose normal.   Eyes: Conjunctivae and EOM are normal.   Neck: Neck supple.   Cardiovascular: Normal rate and regular rhythm.   Pulmonary/Chest: Effort normal and breath sounds normal.   Abdominal: Normal appearance. There is no left CVA tenderness and no right CVA tenderness.   Musculoskeletal: Normal range of motion.         General: Normal range of motion.   Neurological: no focal deficit. She is alert. She displays no weakness. Gait normal.   Skin: Skin is warm, dry, not diaphoretic, not pale and no rash.   Psychiatric: Her behavior is normal.     Results for orders placed or performed in visit on 10/16/22   POCT Urinalysis, Dipstick, Automated, W/O Scope   Result Value Ref Range    POC Blood, Urine Negative Negative    POC Bilirubin, Urine Negative Negative    POC Urobilinogen, Urine norm 0.1 - 1.1    POC Ketones, Urine Negative Negative    POC Protein, Urine Negative Negative    POC Nitrates, Urine Negative Negative    POC Glucose, Urine Negative Negative    pH, UA 5.5     POC Specific Gravity, Urine 1.015 1.003 - 1.029    POC Leukocytes, Urine Negative Negative           Assessment:       1. Frequent urination    2. Dysuria    3. Chills (without fever)            Plan:         Frequent urination  -     Urine culture    Dysuria  -     POCT Urinalysis, Dipstick, Automated, W/O Scope  -     Urine culture    Chills (without fever)         Pt instructed to take full course of antibiotics. Antibiotics may need to be adjusted pending urine culture results. Pt advised to drink plenty of fluids and avoid caffeine or sugary beverages. Recommend to rtc or follow up with primary care provider if symptoms do not improve within 2-3 days or sooner for any new or worsening symptoms.    Patient agreed with plan of care and verbalized  understanding       Patient Instructions   Increase fluids (avoid caffeine or sugary beverages as these will irritate the bladder).   Take your antibiotics exactly as prescribed and make sure to complete entire course even if you begin to feel better. This will prevent recurrence of infection and antibiotic resistance.   We have prescribed an antibiotic that covers most bacteria that cause urinary tract infections, however, if your urine culture shows that you have any bacterial resistance to the antibiotic you were prescribed or an unusual bacterial strain, we will notify you and make any necessary changes. Urine cultures usually result in about three days.   Be sure you are wiping front to back.  Avoid holding urine when you feel as though you have to urinate.  Please follow up with your primary care provider if your symptoms do not improve within 2-3 days or sooner for any new or worsening symptoms.  For any severe pain, bright red blood in urine, difficulty urinating, fever that does not improve with Tylenol or Ibuprofen, inability to urinate, incontinence of urine or bowels, or for any other urgent concerns, please go to the ER for immediate evaluation.        Please follow up with your Primary care provider within 2-5 days if your signs and symptoms have not resolved or worsen.     If your condition worsens or fails to improve we recommend that you receive another evaluation at the emergency room immediately or contact your primary medical clinic to discuss your concerns.   You must understand that you have received an Urgent Care treatment only and that you may be released before all of your medical problems are known or treated. You, the patient, will arrange for follow up care as instructed.     RED FLAGS/WARNING SYMPTOMS DISCUSSED WITH PATIENT THAT WOULD WARRANT EMERGENT MEDICAL ATTENTION. PATIENT VERBALIZED UNDERSTANDING.

## 2022-10-17 ENCOUNTER — TELEPHONE (OUTPATIENT)
Dept: URGENT CARE | Facility: CLINIC | Age: 67
End: 2022-10-17
Payer: MEDICARE

## 2022-10-17 LAB — BACTERIA UR CULT: NO GROWTH

## 2022-10-17 NOTE — TELEPHONE ENCOUNTER
Patient notified of no growth on urine culture.  Advised that she can stop the antibiotics.  All questions answered.    Jaziel Newberry PA-C

## 2022-11-01 ENCOUNTER — PATIENT MESSAGE (OUTPATIENT)
Dept: FAMILY MEDICINE | Facility: CLINIC | Age: 67
End: 2022-11-01
Payer: MEDICARE

## 2022-11-07 RX ORDER — OMEPRAZOLE 40 MG/1
40 CAPSULE, DELAYED RELEASE ORAL DAILY
Qty: 90 CAPSULE | Refills: 1 | Status: SHIPPED | OUTPATIENT
Start: 2022-11-07 | End: 2023-07-30

## 2022-11-07 RX ORDER — NYSTATIN 100000 [USP'U]/G
POWDER TOPICAL
Qty: 60 G | Refills: 3 | Status: SHIPPED | OUTPATIENT
Start: 2022-11-07 | End: 2024-02-13

## 2022-11-07 RX ORDER — SEMAGLUTIDE 1.34 MG/ML
0.25 INJECTION, SOLUTION SUBCUTANEOUS
Qty: 4 PEN | Refills: 2 | Status: SHIPPED | OUTPATIENT
Start: 2022-11-07 | End: 2022-11-08 | Stop reason: SDUPTHER

## 2022-11-07 RX ORDER — VENLAFAXINE HYDROCHLORIDE 75 MG/1
75 CAPSULE, EXTENDED RELEASE ORAL DAILY
Qty: 90 CAPSULE | Refills: 1 | Status: SHIPPED | OUTPATIENT
Start: 2022-11-07 | End: 2023-04-04

## 2022-11-07 RX ORDER — ROSUVASTATIN CALCIUM 20 MG/1
20 TABLET, COATED ORAL DAILY
Qty: 90 TABLET | Refills: 1 | Status: SHIPPED | OUTPATIENT
Start: 2022-11-07 | End: 2023-04-04

## 2022-11-07 RX ORDER — TIZANIDINE 4 MG/1
4 TABLET ORAL 2 TIMES DAILY
Qty: 20 TABLET | Refills: 2 | Status: SHIPPED | OUTPATIENT
Start: 2022-11-07 | End: 2023-03-13

## 2022-11-07 RX ORDER — TRAZODONE HYDROCHLORIDE 100 MG/1
100 TABLET ORAL NIGHTLY
Qty: 90 TABLET | Refills: 1 | Status: SHIPPED | OUTPATIENT
Start: 2022-11-07 | End: 2023-04-04

## 2022-11-07 RX ORDER — TOPIRAMATE 50 MG/1
50 TABLET, FILM COATED ORAL 2 TIMES DAILY
Qty: 60 TABLET | Refills: 11 | Status: SHIPPED | OUTPATIENT
Start: 2022-11-07 | End: 2023-02-27

## 2022-11-07 RX ORDER — MECLIZINE HCL 12.5 MG 12.5 MG/1
12.5 TABLET ORAL 3 TIMES DAILY PRN
Qty: 90 TABLET | Refills: 1 | Status: SHIPPED | OUTPATIENT
Start: 2022-11-07 | End: 2024-02-13

## 2022-11-07 RX ORDER — ATENOLOL 25 MG/1
25 TABLET ORAL 2 TIMES DAILY
Qty: 180 TABLET | Refills: 1 | Status: SHIPPED | OUTPATIENT
Start: 2022-11-07 | End: 2023-04-04

## 2022-11-07 RX ORDER — AMITRIPTYLINE HYDROCHLORIDE 50 MG/1
50 TABLET, FILM COATED ORAL NIGHTLY
Qty: 90 TABLET | Refills: 3 | Status: SHIPPED | OUTPATIENT
Start: 2022-11-07 | End: 2023-12-29

## 2022-11-07 NOTE — TELEPHONE ENCOUNTER
No new care gaps identified.  [facility] Embedded Care Gaps. Reference number: [id number]. 11/07/2022   4:57:21 PM CST

## 2022-11-08 ENCOUNTER — PATIENT MESSAGE (OUTPATIENT)
Dept: FAMILY MEDICINE | Facility: CLINIC | Age: 67
End: 2022-11-08
Payer: MEDICARE

## 2022-11-08 RX ORDER — SEMAGLUTIDE 1.34 MG/ML
0.25 INJECTION, SOLUTION SUBCUTANEOUS
Qty: 4 PEN | Refills: 2 | Status: SHIPPED | OUTPATIENT
Start: 2022-11-08 | End: 2023-08-15

## 2022-11-08 NOTE — TELEPHONE ENCOUNTER
No new care gaps identified.  [facility] Embedded Care Gaps. Reference number: [id number]. 11/08/2022   2:42:44 PM CST

## 2022-11-15 ENCOUNTER — OFFICE VISIT (OUTPATIENT)
Dept: URGENT CARE | Facility: CLINIC | Age: 67
End: 2022-11-15
Payer: MEDICARE

## 2022-11-15 VITALS
SYSTOLIC BLOOD PRESSURE: 130 MMHG | WEIGHT: 215 LBS | DIASTOLIC BLOOD PRESSURE: 84 MMHG | TEMPERATURE: 98 F | OXYGEN SATURATION: 96 % | HEIGHT: 69 IN | HEART RATE: 92 BPM | RESPIRATION RATE: 18 BRPM | BODY MASS INDEX: 31.84 KG/M2

## 2022-11-15 DIAGNOSIS — J20.9 ACUTE BRONCHITIS, UNSPECIFIED ORGANISM: Primary | ICD-10-CM

## 2022-11-15 PROCEDURE — 3008F PR BODY MASS INDEX (BMI) DOCUMENTED: ICD-10-PCS | Mod: CPTII,S$GLB,, | Performed by: PHYSICIAN ASSISTANT

## 2022-11-15 PROCEDURE — 3075F PR MOST RECENT SYSTOLIC BLOOD PRESS GE 130-139MM HG: ICD-10-PCS | Mod: CPTII,S$GLB,, | Performed by: PHYSICIAN ASSISTANT

## 2022-11-15 PROCEDURE — 3044F HG A1C LEVEL LT 7.0%: CPT | Mod: CPTII,S$GLB,, | Performed by: PHYSICIAN ASSISTANT

## 2022-11-15 PROCEDURE — 1159F PR MEDICATION LIST DOCUMENTED IN MEDICAL RECORD: ICD-10-PCS | Mod: CPTII,S$GLB,, | Performed by: PHYSICIAN ASSISTANT

## 2022-11-15 PROCEDURE — 99214 PR OFFICE/OUTPT VISIT, EST, LEVL IV, 30-39 MIN: ICD-10-PCS | Mod: S$GLB,,, | Performed by: PHYSICIAN ASSISTANT

## 2022-11-15 PROCEDURE — 99214 OFFICE O/P EST MOD 30 MIN: CPT | Mod: S$GLB,,, | Performed by: PHYSICIAN ASSISTANT

## 2022-11-15 PROCEDURE — 1159F MED LIST DOCD IN RCRD: CPT | Mod: CPTII,S$GLB,, | Performed by: PHYSICIAN ASSISTANT

## 2022-11-15 PROCEDURE — 3079F PR MOST RECENT DIASTOLIC BLOOD PRESSURE 80-89 MM HG: ICD-10-PCS | Mod: CPTII,S$GLB,, | Performed by: PHYSICIAN ASSISTANT

## 2022-11-15 PROCEDURE — 3079F DIAST BP 80-89 MM HG: CPT | Mod: CPTII,S$GLB,, | Performed by: PHYSICIAN ASSISTANT

## 2022-11-15 PROCEDURE — 3008F BODY MASS INDEX DOCD: CPT | Mod: CPTII,S$GLB,, | Performed by: PHYSICIAN ASSISTANT

## 2022-11-15 PROCEDURE — 3044F PR MOST RECENT HEMOGLOBIN A1C LEVEL <7.0%: ICD-10-PCS | Mod: CPTII,S$GLB,, | Performed by: PHYSICIAN ASSISTANT

## 2022-11-15 PROCEDURE — 1160F RVW MEDS BY RX/DR IN RCRD: CPT | Mod: CPTII,S$GLB,, | Performed by: PHYSICIAN ASSISTANT

## 2022-11-15 PROCEDURE — 3075F SYST BP GE 130 - 139MM HG: CPT | Mod: CPTII,S$GLB,, | Performed by: PHYSICIAN ASSISTANT

## 2022-11-15 PROCEDURE — 1160F PR REVIEW ALL MEDS BY PRESCRIBER/CLIN PHARMACIST DOCUMENTED: ICD-10-PCS | Mod: CPTII,S$GLB,, | Performed by: PHYSICIAN ASSISTANT

## 2022-11-15 RX ORDER — ALBUTEROL SULFATE 90 UG/1
1-2 AEROSOL, METERED RESPIRATORY (INHALATION) EVERY 4 HOURS PRN
Qty: 8 G | Refills: 0 | Status: SHIPPED | OUTPATIENT
Start: 2022-11-15 | End: 2023-02-13

## 2022-11-15 RX ORDER — PREDNISONE 20 MG/1
20 TABLET ORAL DAILY
Qty: 5 TABLET | Refills: 0 | Status: SHIPPED | OUTPATIENT
Start: 2022-11-15 | End: 2022-11-20

## 2022-11-15 RX ORDER — PROMETHAZINE HYDROCHLORIDE AND DEXTROMETHORPHAN HYDROBROMIDE 6.25; 15 MG/5ML; MG/5ML
5 SYRUP ORAL NIGHTLY PRN
Qty: 118 ML | Refills: 0 | Status: SHIPPED | OUTPATIENT
Start: 2022-11-15 | End: 2022-11-25

## 2022-11-15 NOTE — PROGRESS NOTES
"Subjective:       Patient ID: Silvia Cotton is a 67 y.o. female.    Vitals:  height is 5' 9" (1.753 m) and weight is 97.5 kg (215 lb). Her temperature is 97.8 °F (36.6 °C). Her blood pressure is 130/84 and her pulse is 92. Her respiration is 18 and oxygen saturation is 96%.     Chief Complaint: Cough    Pt has been sick since Saturday. Pt c/o cough and slight ear pain. Pt denies fever.  States grandkids had some URI type symptoms last week.  She has tried multiple OTC meds with little relief.  Denies fever, body aches, or chills.      Cough  This is a new problem. The current episode started in the past 7 days. The problem has been unchanged. The problem occurs constantly. The cough is Non-productive. Associated symptoms include ear pain. Pertinent negatives include no chest pain, chills, ear congestion, fever, headaches, heartburn, hemoptysis, myalgias, nasal congestion, postnasal drip, rash, rhinorrhea, sore throat, shortness of breath, sweats, weight loss or wheezing. Nothing aggravates the symptoms. She has tried nothing for the symptoms. The treatment provided no relief. There is no history of asthma, bronchiectasis, bronchitis, COPD, emphysema, environmental allergies or pneumonia.     Constitution: Negative for chills and fever.   HENT:  Positive for ear pain. Negative for postnasal drip and sore throat.    Cardiovascular:  Negative for chest pain.   Respiratory:  Positive for cough. Negative for bloody sputum, shortness of breath and wheezing.    Gastrointestinal:  Negative for heartburn.   Musculoskeletal:  Negative for muscle ache.   Skin:  Negative for rash.   Allergic/Immunologic: Negative for environmental allergies.   Neurological:  Negative for headaches.     Objective:      Physical Exam   Constitutional: She is oriented to person, place, and time. She appears well-developed.   HENT:   Head: Normocephalic and atraumatic.   Ears:   Right Ear: External ear normal.   Left Ear: External ear normal. "   Nose: Nose normal.   Mouth/Throat: Oropharynx is clear and moist. Mucous membranes are moist.   Eyes: Conjunctivae and EOM are normal. Pupils are equal, round, and reactive to light.   Neck: Neck supple.   Cardiovascular: Normal rate, regular rhythm, normal heart sounds and normal pulses.   Pulmonary/Chest: Effort normal and breath sounds normal. She has no decreased breath sounds. She has no wheezes. She has no rhonchi. She has no rales.   Musculoskeletal: Normal range of motion.         General: Normal range of motion.   Neurological: She is alert and oriented to person, place, and time.   Skin: Skin is warm and dry.   Vitals reviewed.      Assessment:       1. Acute bronchitis, unspecified organism          Plan:         Acute bronchitis, unspecified organism  -     albuterol (PROVENTIL/VENTOLIN HFA) 90 mcg/actuation inhaler; Inhale 1-2 puffs into the lungs every 4 (four) hours as needed for Wheezing or Shortness of Breath (cough).  Dispense: 8 g; Refill: 0  -     predniSONE (DELTASONE) 20 MG tablet; Take 1 tablet (20 mg total) by mouth once daily. for 5 days  Dispense: 5 tablet; Refill: 0  -     promethazine-dextromethorphan (PROMETHAZINE-DM) 6.25-15 mg/5 mL Syrp; Take 5 mLs by mouth nightly as needed (cough).  Dispense: 118 mL; Refill: 0         Increase fluids.  Get plenty of rest.   Normal saline nasal wash to irrigate sinuses and for congestion/runny nose.  Cool mist humidifier/vaporizer.  Practice good handwashing.  Mucinex for cough and chest congestion.  Tylenol or Ibuprofen for fever, headache and body aches.  Warm salt water gargles for throat comfort.  Chloraseptic spray or lozenges for throat comfort.  See PCP or go to ER if symptoms worsen or fail to improve with treatment.

## 2022-11-22 ENCOUNTER — PATIENT MESSAGE (OUTPATIENT)
Dept: FAMILY MEDICINE | Facility: CLINIC | Age: 67
End: 2022-11-22
Payer: MEDICARE

## 2022-11-22 NOTE — TELEPHONE ENCOUNTER
diagnosed with bronchitis at ,  was given tessalon pearls , albuterol inhaler and promethazine Dm and nothing is helping the cough, cannot sleep at night   Please advise

## 2023-01-16 ENCOUNTER — PATIENT MESSAGE (OUTPATIENT)
Dept: FAMILY MEDICINE | Facility: CLINIC | Age: 68
End: 2023-01-16
Payer: MEDICARE

## 2023-02-03 ENCOUNTER — LAB VISIT (OUTPATIENT)
Dept: LAB | Facility: HOSPITAL | Age: 68
End: 2023-02-03
Attending: FAMILY MEDICINE
Payer: MEDICARE

## 2023-02-03 DIAGNOSIS — Z00.00 WELL ADULT EXAM: ICD-10-CM

## 2023-02-03 DIAGNOSIS — D50.0 IRON DEFICIENCY ANEMIA DUE TO CHRONIC BLOOD LOSS: ICD-10-CM

## 2023-02-03 DIAGNOSIS — E78.2 MIXED HYPERLIPIDEMIA: ICD-10-CM

## 2023-02-03 LAB
ALBUMIN SERPL BCP-MCNC: 4 G/DL (ref 3.5–5.2)
ALP SERPL-CCNC: 67 U/L (ref 55–135)
ALT SERPL W/O P-5'-P-CCNC: 23 U/L (ref 10–44)
ANION GAP SERPL CALC-SCNC: 10 MMOL/L (ref 8–16)
AST SERPL-CCNC: 16 U/L (ref 10–40)
BILIRUB SERPL-MCNC: 0.3 MG/DL (ref 0.1–1)
BUN SERPL-MCNC: 9 MG/DL (ref 8–23)
CALCIUM SERPL-MCNC: 9.7 MG/DL (ref 8.7–10.5)
CHLORIDE SERPL-SCNC: 110 MMOL/L (ref 95–110)
CHOLEST SERPL-MCNC: 184 MG/DL (ref 120–199)
CHOLEST/HDLC SERPL: 4.8 {RATIO} (ref 2–5)
CO2 SERPL-SCNC: 22 MMOL/L (ref 23–29)
CREAT SERPL-MCNC: 1.1 MG/DL (ref 0.5–1.4)
EST. GFR  (NO RACE VARIABLE): 55.8 ML/MIN/1.73 M^2
ESTIMATED AVG GLUCOSE: 97 MG/DL (ref 68–131)
GLUCOSE SERPL-MCNC: 87 MG/DL (ref 70–110)
HBA1C MFR BLD: 5 % (ref 4–5.6)
HDLC SERPL-MCNC: 38 MG/DL (ref 40–75)
HDLC SERPL: 20.7 % (ref 20–50)
LDLC SERPL CALC-MCNC: 83.2 MG/DL (ref 63–159)
NONHDLC SERPL-MCNC: 146 MG/DL
POTASSIUM SERPL-SCNC: 4.4 MMOL/L (ref 3.5–5.1)
PROT SERPL-MCNC: 6.8 G/DL (ref 6–8.4)
SODIUM SERPL-SCNC: 142 MMOL/L (ref 136–145)
TRIGL SERPL-MCNC: 314 MG/DL (ref 30–150)

## 2023-02-03 PROCEDURE — 80053 COMPREHEN METABOLIC PANEL: CPT | Mod: HCNC | Performed by: FAMILY MEDICINE

## 2023-02-03 PROCEDURE — 80061 LIPID PANEL: CPT | Mod: HCNC | Performed by: FAMILY MEDICINE

## 2023-02-03 PROCEDURE — 36415 COLL VENOUS BLD VENIPUNCTURE: CPT | Mod: HCNC,PO | Performed by: FAMILY MEDICINE

## 2023-02-03 PROCEDURE — 83036 HEMOGLOBIN GLYCOSYLATED A1C: CPT | Mod: HCNC | Performed by: FAMILY MEDICINE

## 2023-02-03 PROCEDURE — 84466 ASSAY OF TRANSFERRIN: CPT | Mod: HCNC | Performed by: FAMILY MEDICINE

## 2023-02-04 LAB
IRON SERPL-MCNC: 56 UG/DL (ref 30–160)
SATURATED IRON: 15 % (ref 20–50)
TOTAL IRON BINDING CAPACITY: 380 UG/DL (ref 250–450)
TRANSFERRIN SERPL-MCNC: 257 MG/DL (ref 200–375)

## 2023-02-06 ENCOUNTER — PATIENT MESSAGE (OUTPATIENT)
Dept: FAMILY MEDICINE | Facility: CLINIC | Age: 68
End: 2023-02-06

## 2023-02-06 ENCOUNTER — OFFICE VISIT (OUTPATIENT)
Dept: FAMILY MEDICINE | Facility: CLINIC | Age: 68
End: 2023-02-06
Payer: MEDICARE

## 2023-02-06 ENCOUNTER — TELEPHONE (OUTPATIENT)
Dept: OPHTHALMOLOGY | Facility: CLINIC | Age: 68
End: 2023-02-06
Payer: MEDICARE

## 2023-02-06 VITALS
DIASTOLIC BLOOD PRESSURE: 65 MMHG | SYSTOLIC BLOOD PRESSURE: 107 MMHG | HEART RATE: 106 BPM | OXYGEN SATURATION: 98 % | HEIGHT: 69 IN | WEIGHT: 213.63 LBS | BODY MASS INDEX: 31.64 KG/M2

## 2023-02-06 DIAGNOSIS — F51.04 PSYCHOPHYSIOLOGICAL INSOMNIA: ICD-10-CM

## 2023-02-06 DIAGNOSIS — E53.8 B12 DEFICIENCY: ICD-10-CM

## 2023-02-06 DIAGNOSIS — D50.0 IRON DEFICIENCY ANEMIA DUE TO CHRONIC BLOOD LOSS: Primary | ICD-10-CM

## 2023-02-06 DIAGNOSIS — E78.2 MIXED HYPERLIPIDEMIA: ICD-10-CM

## 2023-02-06 DIAGNOSIS — K21.9 GASTROESOPHAGEAL REFLUX DISEASE WITHOUT ESOPHAGITIS: ICD-10-CM

## 2023-02-06 PROCEDURE — 1159F PR MEDICATION LIST DOCUMENTED IN MEDICAL RECORD: ICD-10-PCS | Mod: HCNC,CPTII,S$GLB, | Performed by: FAMILY MEDICINE

## 2023-02-06 PROCEDURE — 3074F SYST BP LT 130 MM HG: CPT | Mod: HCNC,CPTII,S$GLB, | Performed by: FAMILY MEDICINE

## 2023-02-06 PROCEDURE — 3288F FALL RISK ASSESSMENT DOCD: CPT | Mod: HCNC,CPTII,S$GLB, | Performed by: FAMILY MEDICINE

## 2023-02-06 PROCEDURE — 3044F HG A1C LEVEL LT 7.0%: CPT | Mod: HCNC,CPTII,S$GLB, | Performed by: FAMILY MEDICINE

## 2023-02-06 PROCEDURE — 3008F PR BODY MASS INDEX (BMI) DOCUMENTED: ICD-10-PCS | Mod: HCNC,CPTII,S$GLB, | Performed by: FAMILY MEDICINE

## 2023-02-06 PROCEDURE — 99999 PR PBB SHADOW E&M-EST. PATIENT-LVL V: ICD-10-PCS | Mod: PBBFAC,HCNC,, | Performed by: FAMILY MEDICINE

## 2023-02-06 PROCEDURE — 3074F PR MOST RECENT SYSTOLIC BLOOD PRESSURE < 130 MM HG: ICD-10-PCS | Mod: HCNC,CPTII,S$GLB, | Performed by: FAMILY MEDICINE

## 2023-02-06 PROCEDURE — 1101F PR PT FALLS ASSESS DOC 0-1 FALLS W/OUT INJ PAST YR: ICD-10-PCS | Mod: HCNC,CPTII,S$GLB, | Performed by: FAMILY MEDICINE

## 2023-02-06 PROCEDURE — 99214 PR OFFICE/OUTPT VISIT, EST, LEVL IV, 30-39 MIN: ICD-10-PCS | Mod: HCNC,S$GLB,, | Performed by: FAMILY MEDICINE

## 2023-02-06 PROCEDURE — 3008F BODY MASS INDEX DOCD: CPT | Mod: HCNC,CPTII,S$GLB, | Performed by: FAMILY MEDICINE

## 2023-02-06 PROCEDURE — 3078F DIAST BP <80 MM HG: CPT | Mod: HCNC,CPTII,S$GLB, | Performed by: FAMILY MEDICINE

## 2023-02-06 PROCEDURE — 1159F MED LIST DOCD IN RCRD: CPT | Mod: HCNC,CPTII,S$GLB, | Performed by: FAMILY MEDICINE

## 2023-02-06 PROCEDURE — 1126F AMNT PAIN NOTED NONE PRSNT: CPT | Mod: HCNC,CPTII,S$GLB, | Performed by: FAMILY MEDICINE

## 2023-02-06 PROCEDURE — 3044F PR MOST RECENT HEMOGLOBIN A1C LEVEL <7.0%: ICD-10-PCS | Mod: HCNC,CPTII,S$GLB, | Performed by: FAMILY MEDICINE

## 2023-02-06 PROCEDURE — 99999 PR PBB SHADOW E&M-EST. PATIENT-LVL V: CPT | Mod: PBBFAC,HCNC,, | Performed by: FAMILY MEDICINE

## 2023-02-06 PROCEDURE — 3078F PR MOST RECENT DIASTOLIC BLOOD PRESSURE < 80 MM HG: ICD-10-PCS | Mod: HCNC,CPTII,S$GLB, | Performed by: FAMILY MEDICINE

## 2023-02-06 PROCEDURE — 3288F PR FALLS RISK ASSESSMENT DOCUMENTED: ICD-10-PCS | Mod: HCNC,CPTII,S$GLB, | Performed by: FAMILY MEDICINE

## 2023-02-06 PROCEDURE — 1126F PR PAIN SEVERITY QUANTIFIED, NO PAIN PRESENT: ICD-10-PCS | Mod: HCNC,CPTII,S$GLB, | Performed by: FAMILY MEDICINE

## 2023-02-06 PROCEDURE — 1101F PT FALLS ASSESS-DOCD LE1/YR: CPT | Mod: HCNC,CPTII,S$GLB, | Performed by: FAMILY MEDICINE

## 2023-02-06 PROCEDURE — 99214 OFFICE O/P EST MOD 30 MIN: CPT | Mod: HCNC,S$GLB,, | Performed by: FAMILY MEDICINE

## 2023-02-06 NOTE — TELEPHONE ENCOUNTER
Left vm for pt to call back to schedule appt with Dr. Cortez.    ----- Message from Iveth Mello sent at 2/6/2023  4:01 PM CST -----  Contact: pt  Pt is calling in regard getting an appt for her eye exam to see dr. Cortez on the 2/13 if possible after 12 noon.  Please call her back b/c she has a question about insurance, at [phone] thanks/mdp

## 2023-02-06 NOTE — PROGRESS NOTES
Chief Complaint:    Chief Complaint   Patient presents with    6 MONTH FOLLOW UP       History of Present Illness:  Patient with palpitations presents today for a six month follow-up    Going through some stress  Staying active as tolerated, weight is down 50lbs in past year.   She has chronic iron deficiency anemia will seeing iron infusion there is slight drop in her H&H. Unable to tolerate oral iron.  She does B12 injections weekly.    Lipids elevated, was fasting at time of labs.   Has MMG appt in place.   Has received PCV from outside.   Other labs reviewed and stable.     Dx with costochondritis in past by ER      ROS:  Review of Systems   Constitutional:  Negative for activity change, chills, fatigue, fever and unexpected weight change.   HENT:  Negative for congestion, ear discharge, ear pain, hearing loss, postnasal drip and rhinorrhea.    Eyes:  Negative for pain and visual disturbance.   Respiratory:  Negative for cough, chest tightness and shortness of breath.    Cardiovascular:  Negative for chest pain and palpitations.   Gastrointestinal:  Negative for abdominal pain, diarrhea and vomiting.   Endocrine: Negative for heat intolerance.   Genitourinary:  Negative for dysuria, flank pain, frequency and hematuria.   Musculoskeletal:  Positive for arthralgias and myalgias. Negative for back pain, gait problem and neck pain.   Skin:  Negative for color change and rash.   Neurological:  Negative for dizziness, tremors, seizures, numbness and headaches.   Psychiatric/Behavioral:  Negative for agitation, hallucinations, self-injury, sleep disturbance and suicidal ideas. The patient is not nervous/anxious.    All other systems reviewed and are negative.    Past Medical History:   Diagnosis Date    Anemia     Hyperlipidemia     Hypertension     Supraventricular tachycardia        Social History:  Social History     Socioeconomic History    Marital status:    Tobacco Use    Smoking status: Never     "Smokeless tobacco: Never   Substance and Sexual Activity    Alcohol use: No    Drug use: No    Sexual activity: Not Currently     Partners: Male     Birth control/protection: Post-menopausal     Social Determinants of Health     Financial Resource Strain: Medium Risk    Difficulty of Paying Living Expenses: Somewhat hard   Food Insecurity: No Food Insecurity    Worried About Running Out of Food in the Last Year: Never true    Ran Out of Food in the Last Year: Never true   Transportation Needs: No Transportation Needs    Lack of Transportation (Medical): No    Lack of Transportation (Non-Medical): No   Physical Activity: Insufficiently Active    Days of Exercise per Week: 3 days    Minutes of Exercise per Session: 20 min   Stress: Stress Concern Present    Feeling of Stress : Rather much   Social Connections: Unknown    Frequency of Communication with Friends and Family: More than three times a week    Frequency of Social Gatherings with Friends and Family: More than three times a week    Active Member of Clubs or Organizations: No    Attends Club or Organization Meetings: Never    Marital Status:    Housing Stability: Low Risk     Unable to Pay for Housing in the Last Year: No    Number of Places Lived in the Last Year: 1    Unstable Housing in the Last Year: No       Family History:   family history includes Cataracts in her father and mother; Hypertension in her brother, sister, and sister.    Health Maintenance   Topic Date Due    Mammogram  02/04/2023    DEXA Scan  09/15/2023    Lipid Panel  02/03/2024    TETANUS VACCINE  12/01/2024    Hepatitis C Screening  Completed       Physical Exam:    Vital Signs  Pulse: 106  SpO2: 98 %  BP: 107/65  BP Location: Left arm  Patient Position: Sitting  Pain Score: 0-No pain  Height and Weight  Height: 5' 9" (175.3 cm)  Weight: 96.9 kg (213 lb 10 oz)  BSA (Calculated - sq m): 2.17 sq meters  BMI (Calculated): 31.5  Weight in (lb) to have BMI = 25: 168.9]    Body mass " index is 31.55 kg/m².    Physical Exam  Vitals and nursing note reviewed.   Constitutional:       Appearance: Normal appearance. She is not toxic-appearing.   HENT:      Head: Normocephalic and atraumatic.      Right Ear: Tympanic membrane normal.      Left Ear: Tympanic membrane normal.   Eyes:      Extraocular Movements: Extraocular movements intact.      Pupils: Pupils are equal, round, and reactive to light.   Cardiovascular:      Rate and Rhythm: Normal rate and regular rhythm.      Heart sounds: Normal heart sounds.   Pulmonary:      Effort: Pulmonary effort is normal.      Breath sounds: Normal breath sounds. No wheezing, rhonchi or rales.   Abdominal:      General: Bowel sounds are normal. There is no distension.      Palpations: Abdomen is soft.      Tenderness: There is no abdominal tenderness.   Musculoskeletal:         General: Normal range of motion.      Cervical back: Normal range of motion.   Skin:     General: Skin is warm and dry.      Capillary Refill: Capillary refill takes less than 2 seconds.   Neurological:      General: No focal deficit present.      Mental Status: She is alert and oriented to person, place, and time.   Psychiatric:         Mood and Affect: Mood normal.         Behavior: Behavior normal.         Judgment: Judgment normal.         Assessment:    Iron deficiency anemia due to chronic blood loss  -     CBC Auto Differential; Future; Expected date: 08/08/2023  -     Comprehensive Metabolic Panel; Future; Expected date: 08/08/2023  -     Ambulatory referral/consult to Hematology / Oncology; Future; Expected date: 02/13/2023    Psychophysiological insomnia    Gastroesophageal reflux disease without esophagitis    B12 deficiency    Mixed hyperlipidemia  -     Comprehensive Metabolic Panel; Future; Expected date: 08/08/2023  -     Lipid Panel; Future; Expected date: 08/08/2023            Plan:  She can wean off Topamax as she wanted  Continue B12 injections biweekly  Start fish oil  "to help with triglycerides   Refer to heme/onc for SCOTT  Other medical problems stable  See labs below.   Follow-up 6 months    Orders Placed This Encounter    CBC Auto Differential    Comprehensive Metabolic Panel    Lipid Panel    Ambulatory referral/consult to Hematology / Oncology       Current Outpatient Medications   Medication Sig Dispense Refill    amitriptyline (ELAVIL) 50 MG tablet Take 1 tablet (50 mg total) by mouth every evening. 90 tablet 3    atenoloL (TENORMIN) 25 MG tablet Take 1 tablet (25 mg total) by mouth 2 (two) times daily. 180 tablet 1    clotrimazole-betamethasone 1-0.05% (LOTRISONE) cream APPLY TO AFFECTED AREA 2 TIMES DAILY 45 g 2    cyanocobalamin 1,000 mcg/mL injection Inject 1 mL (1,000 mcg total) into the muscle once a week. 30 mL 12    fluticasone propionate (FLONASE) 50 mcg/actuation nasal spray 1 spray (50 mcg total) by Each Nostril route once daily. 16 g 0    meclizine (ANTIVERT) 12.5 mg tablet Take 1 tablet (12.5 mg total) by mouth 3 (three) times daily as needed for Dizziness. 90 tablet 1    needle, disp, 18 G 18 x 1 " Ndle Use to draw b12 1000 mcg from vail every 7 days for 7 weeks 100 each 0    needle, reusable, 25 G 25 x 1 " Ndle Use to inject B12 1000 mcg every 7 days for 7 weeks 12 each 2    nystatin (MYCOSTATIN) powder Apply TID prn to affected area 60 g 3    omeprazole (PRILOSEC) 40 MG capsule Take 1 capsule (40 mg total) by mouth once daily. 90 capsule 1    rosuvastatin (CRESTOR) 20 MG tablet Take 1 tablet (20 mg total) by mouth once daily. 90 tablet 1    semaglutide (OZEMPIC) 0.25 mg or 0.5 mg(2 mg/1.5 mL) pen injector Inject 0.25 mg into the skin every 7 days. 4 pen 2    syringe with needle 1 mL 25 gauge x 1" Syrg 1 each by Misc.(Non-Drug; Combo Route) route once a week. 20 Syringe 2    syringe, disposable, 3 mL Syrg Inject B12 1000 mcg every 7 days for 7 weeks 25 each 0    tiZANidine (ZANAFLEX) 4 MG tablet Take 1 tablet (4 mg total) by mouth 2 (two) times daily. 20 " tablet 2    topiramate (TOPAMAX) 50 MG tablet Take 1 tablet (50 mg total) by mouth 2 (two) times daily. 60 tablet 11    traZODone (DESYREL) 100 MG tablet Take 1 tablet (100 mg total) by mouth every evening. 90 tablet 1    venlafaxine (EFFEXOR-XR) 75 MG 24 hr capsule Take 1 capsule (75 mg total) by mouth once daily. 90 capsule 1    albuterol (PROVENTIL/VENTOLIN HFA) 90 mcg/actuation inhaler Inhale 1-2 puffs into the lungs every 4 (four) hours as needed for Wheezing or Shortness of Breath (cough). 8 g 0     No current facility-administered medications for this visit.       There are no discontinued medications.      Follow up in about 6 months (around 8/6/2023).      Stephani Valdivia MD  Scribe Attestation:   I, Davion Larsen, am scribing for, and in the presence of, Dr.Arif Valdivia I performed the above scribed service and the documentation accurately describes the services I performed. I attest to the accuracy of the note.    I, Dr. Stephani Valdivia, reviewed documentation as scribed above. I performed the services described in this documentation.  I agree that the record reflects my personal performance and is accurate and complete. Stephani Valdivia MD.  02/06/2023

## 2023-02-08 ENCOUNTER — PATIENT MESSAGE (OUTPATIENT)
Dept: FAMILY MEDICINE | Facility: CLINIC | Age: 68
End: 2023-02-08
Payer: MEDICARE

## 2023-02-08 ENCOUNTER — PES CALL (OUTPATIENT)
Dept: ADMINISTRATIVE | Facility: OTHER | Age: 68
End: 2023-02-08
Payer: MEDICARE

## 2023-02-08 RX ORDER — CYANOCOBALAMIN 1000 UG/ML
1000 INJECTION, SOLUTION INTRAMUSCULAR; SUBCUTANEOUS WEEKLY
Qty: 30 ML | Refills: 12 | Status: SHIPPED | OUTPATIENT
Start: 2023-02-08

## 2023-02-08 RX ORDER — SYRINGE WITH NEEDLE, 1 ML 27GX1/2"
1 SYRINGE, EMPTY DISPOSABLE MISCELLANEOUS WEEKLY
Qty: 20 EACH | Refills: 2 | Status: SHIPPED | OUTPATIENT
Start: 2023-02-08 | End: 2023-08-15 | Stop reason: SDUPTHER

## 2023-02-08 NOTE — TELEPHONE ENCOUNTER
No new care gaps identified.  [facility] Embedded Care Gaps. Reference number: [id number]. 2/08/2023   4:48:51 PM CST

## 2023-02-09 ENCOUNTER — OFFICE VISIT (OUTPATIENT)
Dept: OPHTHALMOLOGY | Facility: CLINIC | Age: 68
End: 2023-02-09
Payer: MEDICARE

## 2023-02-09 DIAGNOSIS — H43.811 POSTERIOR VITREOUS DETACHMENT, RIGHT EYE: ICD-10-CM

## 2023-02-09 DIAGNOSIS — H35.61 RETINAL HEMORRHAGE, RIGHT EYE: Primary | ICD-10-CM

## 2023-02-09 DIAGNOSIS — H25.13 NUCLEAR SCLEROSIS OF BOTH EYES: ICD-10-CM

## 2023-02-09 DIAGNOSIS — H04.123 DRY EYE SYNDROME, BILATERAL: ICD-10-CM

## 2023-02-09 DIAGNOSIS — Z00.00 ENCOUNTER FOR MEDICARE ANNUAL WELLNESS EXAM: ICD-10-CM

## 2023-02-09 DIAGNOSIS — H52.4 HYPEROPIA WITH ASTIGMATISM AND PRESBYOPIA, BILATERAL: ICD-10-CM

## 2023-02-09 DIAGNOSIS — H52.03 HYPEROPIA WITH ASTIGMATISM AND PRESBYOPIA, BILATERAL: ICD-10-CM

## 2023-02-09 DIAGNOSIS — H52.203 HYPEROPIA WITH ASTIGMATISM AND PRESBYOPIA, BILATERAL: ICD-10-CM

## 2023-02-09 PROCEDURE — 99999 PR PBB SHADOW E&M-EST. PATIENT-LVL III: ICD-10-PCS | Mod: PBBFAC,HCNC,, | Performed by: OPTOMETRIST

## 2023-02-09 PROCEDURE — 3044F PR MOST RECENT HEMOGLOBIN A1C LEVEL <7.0%: ICD-10-PCS | Mod: HCNC,CPTII,S$GLB, | Performed by: OPTOMETRIST

## 2023-02-09 PROCEDURE — 1126F AMNT PAIN NOTED NONE PRSNT: CPT | Mod: HCNC,CPTII,S$GLB, | Performed by: OPTOMETRIST

## 2023-02-09 PROCEDURE — 1160F PR REVIEW ALL MEDS BY PRESCRIBER/CLIN PHARMACIST DOCUMENTED: ICD-10-PCS | Mod: HCNC,CPTII,S$GLB, | Performed by: OPTOMETRIST

## 2023-02-09 PROCEDURE — 1126F PR PAIN SEVERITY QUANTIFIED, NO PAIN PRESENT: ICD-10-PCS | Mod: HCNC,CPTII,S$GLB, | Performed by: OPTOMETRIST

## 2023-02-09 PROCEDURE — 99999 PR PBB SHADOW E&M-EST. PATIENT-LVL III: CPT | Mod: PBBFAC,HCNC,, | Performed by: OPTOMETRIST

## 2023-02-09 PROCEDURE — 1159F PR MEDICATION LIST DOCUMENTED IN MEDICAL RECORD: ICD-10-PCS | Mod: HCNC,CPTII,S$GLB, | Performed by: OPTOMETRIST

## 2023-02-09 PROCEDURE — 92004 PR EYE EXAM, NEW PATIENT,COMPREHESV: ICD-10-PCS | Mod: HCNC,S$GLB,, | Performed by: OPTOMETRIST

## 2023-02-09 PROCEDURE — 3044F HG A1C LEVEL LT 7.0%: CPT | Mod: HCNC,CPTII,S$GLB, | Performed by: OPTOMETRIST

## 2023-02-09 PROCEDURE — 92004 COMPRE OPH EXAM NEW PT 1/>: CPT | Mod: HCNC,S$GLB,, | Performed by: OPTOMETRIST

## 2023-02-09 PROCEDURE — 1160F RVW MEDS BY RX/DR IN RCRD: CPT | Mod: HCNC,CPTII,S$GLB, | Performed by: OPTOMETRIST

## 2023-02-09 PROCEDURE — 1159F MED LIST DOCD IN RCRD: CPT | Mod: HCNC,CPTII,S$GLB, | Performed by: OPTOMETRIST

## 2023-02-10 NOTE — PROGRESS NOTES
HPI     Annual Exam            Comments: New patient to [facility]   Last eye exam was 2019 with Dr. Cortez           Comments    Vision changes since last eye exam?: None noticed     Any eye pain today: None    Other ocular symptoms: Floaters OU    Interested in contact lens fitting today? No             Last edited by Tierney Ferguson MA on 2/9/2023  8:39 AM.            Assessment /Plan     For exam results, see Encounter Report.    Retinal hemorrhage, right eye  Single isolated flame shaped heme noted in OD. Recommend retinal consult with Dr BRAY next available.     Nuclear sclerosis of both eyes  Cataracts are not visually significant and not affecting activities of daily living. Annual observation is recommended at this time. Patient to call or RTC with any significant change in vision prior to next visit.    Posterior vitreous detachment, right eye  The nature of posterior vitreous detachment was discussed with the patient.  Signs and symptoms of retinal detachment were discussed with patient.   No holes or tears were noted upon careful retinal examination after dilation today.   Return to clinic as soon as possible (same day) if you notice any new floaters, flashes of light, curtain/veil over your vision from any direction, or any change in vision.    Dry eye syndrome, bilateral  Discussed warm compresses twice daily and lid hygiene, with preservative free artificial tears instillation four times daily. Patient to return to clinic if no improvement in symptoms with current treatment.     Hyperopia with astigmatism and presbyopia, bilateral  Eyeglass Final Rx       Eyeglass Final Rx         Sphere Cylinder Axis Add    Right +0.50 +1.25 004 +2.50    Left +0.50 +1.75 169 +2.50      Type: PAL    Expiration Date: 2/9/2024                      RTC with Dr BRAY for retinal eval OD for retinal heme OD. Sooner if any changes to vision or worsening symptoms.

## 2023-02-13 ENCOUNTER — OFFICE VISIT (OUTPATIENT)
Dept: OBSTETRICS AND GYNECOLOGY | Facility: CLINIC | Age: 68
End: 2023-02-13
Payer: MEDICARE

## 2023-02-13 ENCOUNTER — HOSPITAL ENCOUNTER (OUTPATIENT)
Dept: RADIOLOGY | Facility: HOSPITAL | Age: 68
Discharge: HOME | End: 2023-02-13
Attending: NURSE PRACTITIONER
Payer: MEDICARE

## 2023-02-13 VITALS
DIASTOLIC BLOOD PRESSURE: 75 MMHG | WEIGHT: 213.63 LBS | BODY MASS INDEX: 31.64 KG/M2 | HEIGHT: 69 IN | SYSTOLIC BLOOD PRESSURE: 119 MMHG

## 2023-02-13 DIAGNOSIS — Z12.31 ENCOUNTER FOR SCREENING MAMMOGRAM FOR BREAST CANCER: ICD-10-CM

## 2023-02-13 DIAGNOSIS — Z01.419 ROUTINE GYNECOLOGICAL EXAMINATION: Primary | ICD-10-CM

## 2023-02-13 DIAGNOSIS — N95.1 VAGINAL DRYNESS, MENOPAUSAL: ICD-10-CM

## 2023-02-13 PROCEDURE — 1101F PR PT FALLS ASSESS DOC 0-1 FALLS W/OUT INJ PAST YR: ICD-10-PCS | Mod: HCNC,CPTII,S$GLB, | Performed by: NURSE PRACTITIONER

## 2023-02-13 PROCEDURE — 77063 BREAST TOMOSYNTHESIS BI: CPT | Mod: 26,HCNC,, | Performed by: RADIOLOGY

## 2023-02-13 PROCEDURE — 1126F AMNT PAIN NOTED NONE PRSNT: CPT | Mod: HCNC,CPTII,S$GLB, | Performed by: NURSE PRACTITIONER

## 2023-02-13 PROCEDURE — 3288F PR FALLS RISK ASSESSMENT DOCUMENTED: ICD-10-PCS | Mod: HCNC,CPTII,S$GLB, | Performed by: NURSE PRACTITIONER

## 2023-02-13 PROCEDURE — G0101 CA SCREEN;PELVIC/BREAST EXAM: HCPCS | Mod: HCNC,S$GLB,, | Performed by: NURSE PRACTITIONER

## 2023-02-13 PROCEDURE — 1160F PR REVIEW ALL MEDS BY PRESCRIBER/CLIN PHARMACIST DOCUMENTED: ICD-10-PCS | Mod: HCNC,CPTII,S$GLB, | Performed by: NURSE PRACTITIONER

## 2023-02-13 PROCEDURE — 77063 MAMMO DIGITAL SCREENING BILAT WITH TOMO: ICD-10-PCS | Mod: 26,HCNC,, | Performed by: RADIOLOGY

## 2023-02-13 PROCEDURE — 77067 SCR MAMMO BI INCL CAD: CPT | Mod: TC,HCNC

## 2023-02-13 PROCEDURE — 77067 MAMMO DIGITAL SCREENING BILAT WITH TOMO: ICD-10-PCS | Mod: 26,HCNC,, | Performed by: RADIOLOGY

## 2023-02-13 PROCEDURE — 1160F RVW MEDS BY RX/DR IN RCRD: CPT | Mod: HCNC,CPTII,S$GLB, | Performed by: NURSE PRACTITIONER

## 2023-02-13 PROCEDURE — 3008F BODY MASS INDEX DOCD: CPT | Mod: HCNC,CPTII,S$GLB, | Performed by: NURSE PRACTITIONER

## 2023-02-13 PROCEDURE — 1101F PT FALLS ASSESS-DOCD LE1/YR: CPT | Mod: HCNC,CPTII,S$GLB, | Performed by: NURSE PRACTITIONER

## 2023-02-13 PROCEDURE — 3288F FALL RISK ASSESSMENT DOCD: CPT | Mod: HCNC,CPTII,S$GLB, | Performed by: NURSE PRACTITIONER

## 2023-02-13 PROCEDURE — 1159F MED LIST DOCD IN RCRD: CPT | Mod: HCNC,CPTII,S$GLB, | Performed by: NURSE PRACTITIONER

## 2023-02-13 PROCEDURE — 99999 PR PBB SHADOW E&M-EST. PATIENT-LVL IV: ICD-10-PCS | Mod: PBBFAC,HCNC,, | Performed by: NURSE PRACTITIONER

## 2023-02-13 PROCEDURE — 3078F DIAST BP <80 MM HG: CPT | Mod: HCNC,CPTII,S$GLB, | Performed by: NURSE PRACTITIONER

## 2023-02-13 PROCEDURE — 3008F PR BODY MASS INDEX (BMI) DOCUMENTED: ICD-10-PCS | Mod: HCNC,CPTII,S$GLB, | Performed by: NURSE PRACTITIONER

## 2023-02-13 PROCEDURE — G0101 PR CA SCREEN;PELVIC/BREAST EXAM: ICD-10-PCS | Mod: HCNC,S$GLB,, | Performed by: NURSE PRACTITIONER

## 2023-02-13 PROCEDURE — 3078F PR MOST RECENT DIASTOLIC BLOOD PRESSURE < 80 MM HG: ICD-10-PCS | Mod: HCNC,CPTII,S$GLB, | Performed by: NURSE PRACTITIONER

## 2023-02-13 PROCEDURE — 3074F PR MOST RECENT SYSTOLIC BLOOD PRESSURE < 130 MM HG: ICD-10-PCS | Mod: HCNC,CPTII,S$GLB, | Performed by: NURSE PRACTITIONER

## 2023-02-13 PROCEDURE — 3044F HG A1C LEVEL LT 7.0%: CPT | Mod: HCNC,CPTII,S$GLB, | Performed by: NURSE PRACTITIONER

## 2023-02-13 PROCEDURE — 1159F PR MEDICATION LIST DOCUMENTED IN MEDICAL RECORD: ICD-10-PCS | Mod: HCNC,CPTII,S$GLB, | Performed by: NURSE PRACTITIONER

## 2023-02-13 PROCEDURE — 99999 PR PBB SHADOW E&M-EST. PATIENT-LVL IV: CPT | Mod: PBBFAC,HCNC,, | Performed by: NURSE PRACTITIONER

## 2023-02-13 PROCEDURE — 77067 SCR MAMMO BI INCL CAD: CPT | Mod: 26,HCNC,, | Performed by: RADIOLOGY

## 2023-02-13 PROCEDURE — 3074F SYST BP LT 130 MM HG: CPT | Mod: HCNC,CPTII,S$GLB, | Performed by: NURSE PRACTITIONER

## 2023-02-13 PROCEDURE — 1126F PR PAIN SEVERITY QUANTIFIED, NO PAIN PRESENT: ICD-10-PCS | Mod: HCNC,CPTII,S$GLB, | Performed by: NURSE PRACTITIONER

## 2023-02-13 PROCEDURE — 3044F PR MOST RECENT HEMOGLOBIN A1C LEVEL <7.0%: ICD-10-PCS | Mod: HCNC,CPTII,S$GLB, | Performed by: NURSE PRACTITIONER

## 2023-02-13 RX ORDER — ESTRADIOL 0.1 MG/G
CREAM VAGINAL
Qty: 42.5 G | Refills: 5 | Status: SHIPPED | OUTPATIENT
Start: 2023-02-13 | End: 2023-02-27

## 2023-02-13 NOTE — PROGRESS NOTES
"  Subjective:       Patient ID: Silvia Cotton is a 67 y.o. female.    Chief Complaint:  Well Woman      History of Present Illness  HPI  Presents today for [employer]   Complains of pain with intercourse due to vaginal dryness   Declines vaginal/pelvic exam   Health Maintenance   Topic Date Due    Mammogram  2023    DEXA Scan  09/15/2023    Lipid Panel  2024    TETANUS VACCINE  2024    Hepatitis C Screening  Completed     GYN & OB History  No LMP recorded. Patient is postmenopausal.   Date of Last Pap: 2022    OB History    Para Term  AB Living   1 1 1     1   SAB IAB Ectopic Multiple Live Births           1      # Outcome Date GA Lbr Max/2nd Weight Sex Delivery Anes PTL Lv   1 Term     M Vag-Spont   NA       Review of Systems  Review of Systems        Objective:   /75   Ht 5' 9" (1.753 m)   Wt 96.9 kg (213 lb 10 oz)   BMI 31.55 kg/m²    Physical Exam:   Constitutional: She is oriented to person, place, and time. She appears well-developed and well-nourished.        Pulmonary/Chest: Right breast exhibits no inverted nipple, no mass, no nipple discharge, no skin change, no tenderness and no swelling. Left breast exhibits no inverted nipple, no mass, no nipple discharge, no skin change, no tenderness and no swelling.        Abdominal: Soft.                 Neurological: She is alert and oriented to person, place, and time.    Skin: Skin is warm and dry.    Psychiatric: She has a normal mood and affect. Her behavior is normal. Judgment and thought content normal.      Assessment:        1. Routine gynecological examination    2. Encounter for screening mammogram for breast cancer    3. Vaginal dryness, menopausal               Plan:           Silvia was seen today for well woman.    Diagnoses and all orders for this visit:    Routine gynecological examination    Encounter for screening mammogram for breast cancer  -     Mammo Digital Screening Bilat w/ Zack; Future    Vaginal " dryness, menopausal  -     estradioL (ESTRACE) 0.01 % (0.1 mg/gram) vaginal cream; Insert vaginally at bedtime times 14 nights, then twice weekly    Alternatives to estrace cream are Vaseline and oil based lubricants   Informed client that she no longer needs pap smears due to no history of abnormal pap smears and she is now 67.

## 2023-02-14 ENCOUNTER — TELEPHONE (OUTPATIENT)
Dept: HEMATOLOGY/ONCOLOGY | Facility: CLINIC | Age: 68
End: 2023-02-14
Payer: MEDICARE

## 2023-02-14 ENCOUNTER — PATIENT MESSAGE (OUTPATIENT)
Dept: HEMATOLOGY/ONCOLOGY | Facility: CLINIC | Age: 68
End: 2023-02-14
Payer: MEDICARE

## 2023-02-14 NOTE — TELEPHONE ENCOUNTER
Spoke to patient in reference to Hematology referral from Dr. Valdivia.  Appointment scheduled per patient's request next available.  Appointment notice via pt portal.

## 2023-02-22 ENCOUNTER — OFFICE VISIT (OUTPATIENT)
Dept: HEMATOLOGY/ONCOLOGY | Facility: CLINIC | Age: 68
End: 2023-02-22
Payer: MEDICARE

## 2023-02-22 VITALS
HEIGHT: 69 IN | BODY MASS INDEX: 32.46 KG/M2 | WEIGHT: 219.13 LBS | HEART RATE: 84 BPM | SYSTOLIC BLOOD PRESSURE: 115 MMHG | OXYGEN SATURATION: 99 % | TEMPERATURE: 98 F | DIASTOLIC BLOOD PRESSURE: 65 MMHG

## 2023-02-22 DIAGNOSIS — D50.0 IRON DEFICIENCY ANEMIA DUE TO CHRONIC BLOOD LOSS: Primary | ICD-10-CM

## 2023-02-22 PROCEDURE — 1101F PR PT FALLS ASSESS DOC 0-1 FALLS W/OUT INJ PAST YR: ICD-10-PCS | Mod: HCNC,CPTII,S$GLB, | Performed by: INTERNAL MEDICINE

## 2023-02-22 PROCEDURE — 3044F PR MOST RECENT HEMOGLOBIN A1C LEVEL <7.0%: ICD-10-PCS | Mod: HCNC,CPTII,S$GLB, | Performed by: INTERNAL MEDICINE

## 2023-02-22 PROCEDURE — 3078F DIAST BP <80 MM HG: CPT | Mod: HCNC,CPTII,S$GLB, | Performed by: INTERNAL MEDICINE

## 2023-02-22 PROCEDURE — 3074F PR MOST RECENT SYSTOLIC BLOOD PRESSURE < 130 MM HG: ICD-10-PCS | Mod: HCNC,CPTII,S$GLB, | Performed by: INTERNAL MEDICINE

## 2023-02-22 PROCEDURE — 99999 PR PBB SHADOW E&M-EST. PATIENT-LVL V: ICD-10-PCS | Mod: PBBFAC,HCNC,, | Performed by: INTERNAL MEDICINE

## 2023-02-22 PROCEDURE — 3044F HG A1C LEVEL LT 7.0%: CPT | Mod: HCNC,CPTII,S$GLB, | Performed by: INTERNAL MEDICINE

## 2023-02-22 PROCEDURE — 1126F PR PAIN SEVERITY QUANTIFIED, NO PAIN PRESENT: ICD-10-PCS | Mod: HCNC,CPTII,S$GLB, | Performed by: INTERNAL MEDICINE

## 2023-02-22 PROCEDURE — 99203 PR OFFICE/OUTPT VISIT, NEW, LEVL III, 30-44 MIN: ICD-10-PCS | Mod: HCNC,S$GLB,, | Performed by: INTERNAL MEDICINE

## 2023-02-22 PROCEDURE — 3288F PR FALLS RISK ASSESSMENT DOCUMENTED: ICD-10-PCS | Mod: HCNC,CPTII,S$GLB, | Performed by: INTERNAL MEDICINE

## 2023-02-22 PROCEDURE — 1160F PR REVIEW ALL MEDS BY PRESCRIBER/CLIN PHARMACIST DOCUMENTED: ICD-10-PCS | Mod: HCNC,CPTII,S$GLB, | Performed by: INTERNAL MEDICINE

## 2023-02-22 PROCEDURE — 1159F PR MEDICATION LIST DOCUMENTED IN MEDICAL RECORD: ICD-10-PCS | Mod: HCNC,CPTII,S$GLB, | Performed by: INTERNAL MEDICINE

## 2023-02-22 PROCEDURE — 3008F PR BODY MASS INDEX (BMI) DOCUMENTED: ICD-10-PCS | Mod: HCNC,CPTII,S$GLB, | Performed by: INTERNAL MEDICINE

## 2023-02-22 PROCEDURE — 1159F MED LIST DOCD IN RCRD: CPT | Mod: HCNC,CPTII,S$GLB, | Performed by: INTERNAL MEDICINE

## 2023-02-22 PROCEDURE — 1126F AMNT PAIN NOTED NONE PRSNT: CPT | Mod: HCNC,CPTII,S$GLB, | Performed by: INTERNAL MEDICINE

## 2023-02-22 PROCEDURE — 3008F BODY MASS INDEX DOCD: CPT | Mod: HCNC,CPTII,S$GLB, | Performed by: INTERNAL MEDICINE

## 2023-02-22 PROCEDURE — 1101F PT FALLS ASSESS-DOCD LE1/YR: CPT | Mod: HCNC,CPTII,S$GLB, | Performed by: INTERNAL MEDICINE

## 2023-02-22 PROCEDURE — 1160F RVW MEDS BY RX/DR IN RCRD: CPT | Mod: HCNC,CPTII,S$GLB, | Performed by: INTERNAL MEDICINE

## 2023-02-22 PROCEDURE — 3078F PR MOST RECENT DIASTOLIC BLOOD PRESSURE < 80 MM HG: ICD-10-PCS | Mod: HCNC,CPTII,S$GLB, | Performed by: INTERNAL MEDICINE

## 2023-02-22 PROCEDURE — 99999 PR PBB SHADOW E&M-EST. PATIENT-LVL V: CPT | Mod: PBBFAC,HCNC,, | Performed by: INTERNAL MEDICINE

## 2023-02-22 PROCEDURE — 99203 OFFICE O/P NEW LOW 30 MIN: CPT | Mod: HCNC,S$GLB,, | Performed by: INTERNAL MEDICINE

## 2023-02-22 PROCEDURE — 3288F FALL RISK ASSESSMENT DOCD: CPT | Mod: HCNC,CPTII,S$GLB, | Performed by: INTERNAL MEDICINE

## 2023-02-22 PROCEDURE — 3074F SYST BP LT 130 MM HG: CPT | Mod: HCNC,CPTII,S$GLB, | Performed by: INTERNAL MEDICINE

## 2023-02-22 NOTE — PROGRESS NOTES
Subjective:      Patient ID: Silvia Cotton is a 67 y.o. female.    Chief Complaint: No chief complaint on file.      HPI: This is a 67 year old woman with history of Iron deficiency anemia and Vitamin B12 deficiency. She is on monthly Vitamin B12 injections. She had previously received IV iron at this clinic in 2015. Labs on 8/2/2022 showed Hb 12.2. She was referred by Dr. Stephani Valdivia. She reports she does not take oral iron regularly. She had previous GI work up and colonoscopy on 3/10/2022 was unremarkable. She has no obvious hematochezia or melena.    Review of Systems   Constitutional:  Negative for chills and fever.   HENT:  Negative for nosebleeds.    Respiratory:  Negative for cough and shortness of breath.    Cardiovascular:  Negative for chest pain and palpitations.   Gastrointestinal:  Negative for blood in stool.   Psychiatric/Behavioral:  Negative for agitation and confusion.      Objective:     Physical Exam  Constitutional:       Appearance: Normal appearance.   HENT:      Head: Normocephalic and atraumatic.      Mouth/Throat:      Pharynx: Oropharynx is clear. No oropharyngeal exudate or posterior oropharyngeal erythema.   Cardiovascular:      Heart sounds:     No friction rub. No gallop.   Pulmonary:      Effort: No respiratory distress.      Breath sounds: Normal breath sounds. No wheezing.   Abdominal:      Tenderness: There is no guarding or rebound.   Musculoskeletal:      Cervical back: Neck supple.      Right lower leg: No edema.      Left lower leg: No edema.   Skin:     Findings: No erythema or rash.   Neurological:      General: No focal deficit present.      Mental Status: She is alert and oriented to person, place, and time.   Psychiatric:         Mood and Affect: Mood normal.         Behavior: Behavior normal.         Thought Content: Thought content normal.         Judgment: Judgment normal.       Assessment:     Problem List Items Addressed This Visit          Oncology    Iron  deficiency anemia due to chronic blood loss        Iron deficiency anemia.  Vitamin B12 deficiency.     Plan:     Labs on 2/3/2023 showed low iron saturation 15 %. I advised patient to resume oral iron.  She also continues monthly B12 injection for B12 deficiency.  RTC: 2 weeks  - CBC/iron studies.

## 2023-02-24 ENCOUNTER — PATIENT MESSAGE (OUTPATIENT)
Dept: HEMATOLOGY/ONCOLOGY | Facility: CLINIC | Age: 68
End: 2023-02-24
Payer: MEDICARE

## 2023-02-27 ENCOUNTER — TELEPHONE (OUTPATIENT)
Dept: FAMILY MEDICINE | Facility: CLINIC | Age: 68
End: 2023-02-27

## 2023-02-27 ENCOUNTER — PATIENT MESSAGE (OUTPATIENT)
Dept: FAMILY MEDICINE | Facility: CLINIC | Age: 68
End: 2023-02-27

## 2023-02-27 ENCOUNTER — OFFICE VISIT (OUTPATIENT)
Dept: FAMILY MEDICINE | Facility: CLINIC | Age: 68
End: 2023-02-27
Payer: MEDICARE

## 2023-02-27 VITALS
OXYGEN SATURATION: 99 % | BODY MASS INDEX: 32.95 KG/M2 | HEIGHT: 69 IN | RESPIRATION RATE: 18 BRPM | WEIGHT: 222.44 LBS | DIASTOLIC BLOOD PRESSURE: 70 MMHG | HEART RATE: 94 BPM | SYSTOLIC BLOOD PRESSURE: 110 MMHG

## 2023-02-27 DIAGNOSIS — R00.2 PALPITATIONS: Chronic | ICD-10-CM

## 2023-02-27 DIAGNOSIS — K21.9 GASTROESOPHAGEAL REFLUX DISEASE WITHOUT ESOPHAGITIS: ICD-10-CM

## 2023-02-27 DIAGNOSIS — Z00.00 ENCOUNTER FOR PREVENTIVE HEALTH EXAMINATION: Primary | ICD-10-CM

## 2023-02-27 DIAGNOSIS — E78.2 MIXED HYPERLIPIDEMIA: ICD-10-CM

## 2023-02-27 DIAGNOSIS — N39.0 CHRONIC UTI: ICD-10-CM

## 2023-02-27 DIAGNOSIS — F51.04 PSYCHOPHYSIOLOGICAL INSOMNIA: ICD-10-CM

## 2023-02-27 DIAGNOSIS — F39 MOOD DISORDER: ICD-10-CM

## 2023-02-27 DIAGNOSIS — D50.0 IRON DEFICIENCY ANEMIA DUE TO CHRONIC BLOOD LOSS: ICD-10-CM

## 2023-02-27 DIAGNOSIS — E53.8 B12 DEFICIENCY: ICD-10-CM

## 2023-02-27 DIAGNOSIS — N18.31 CHRONIC KIDNEY DISEASE, STAGE 3A: ICD-10-CM

## 2023-02-27 PROCEDURE — 1101F PT FALLS ASSESS-DOCD LE1/YR: CPT | Mod: HCNC,CPTII,S$GLB, | Performed by: NURSE PRACTITIONER

## 2023-02-27 PROCEDURE — 1125F AMNT PAIN NOTED PAIN PRSNT: CPT | Mod: HCNC,CPTII,S$GLB, | Performed by: NURSE PRACTITIONER

## 2023-02-27 PROCEDURE — 1159F MED LIST DOCD IN RCRD: CPT | Mod: HCNC,CPTII,S$GLB, | Performed by: NURSE PRACTITIONER

## 2023-02-27 PROCEDURE — 3008F PR BODY MASS INDEX (BMI) DOCUMENTED: ICD-10-PCS | Mod: HCNC,CPTII,S$GLB, | Performed by: NURSE PRACTITIONER

## 2023-02-27 PROCEDURE — 99999 PR PBB SHADOW E&M-EST. PATIENT-LVL V: ICD-10-PCS | Mod: PBBFAC,HCNC,, | Performed by: NURSE PRACTITIONER

## 2023-02-27 PROCEDURE — 3078F DIAST BP <80 MM HG: CPT | Mod: HCNC,CPTII,S$GLB, | Performed by: NURSE PRACTITIONER

## 2023-02-27 PROCEDURE — 99999 PR PBB SHADOW E&M-EST. PATIENT-LVL V: CPT | Mod: PBBFAC,HCNC,, | Performed by: NURSE PRACTITIONER

## 2023-02-27 PROCEDURE — 1125F PR PAIN SEVERITY QUANTIFIED, PAIN PRESENT: ICD-10-PCS | Mod: HCNC,CPTII,S$GLB, | Performed by: NURSE PRACTITIONER

## 2023-02-27 PROCEDURE — 1160F RVW MEDS BY RX/DR IN RCRD: CPT | Mod: HCNC,CPTII,S$GLB, | Performed by: NURSE PRACTITIONER

## 2023-02-27 PROCEDURE — 3074F SYST BP LT 130 MM HG: CPT | Mod: HCNC,CPTII,S$GLB, | Performed by: NURSE PRACTITIONER

## 2023-02-27 PROCEDURE — 1101F PR PT FALLS ASSESS DOC 0-1 FALLS W/OUT INJ PAST YR: ICD-10-PCS | Mod: HCNC,CPTII,S$GLB, | Performed by: NURSE PRACTITIONER

## 2023-02-27 PROCEDURE — 3008F BODY MASS INDEX DOCD: CPT | Mod: HCNC,CPTII,S$GLB, | Performed by: NURSE PRACTITIONER

## 2023-02-27 PROCEDURE — 3044F PR MOST RECENT HEMOGLOBIN A1C LEVEL <7.0%: ICD-10-PCS | Mod: HCNC,CPTII,S$GLB, | Performed by: NURSE PRACTITIONER

## 2023-02-27 PROCEDURE — G0402 PR WELCOME MEDICARE PREVENTIVE VISIT NEW ENROLLEE: ICD-10-PCS | Mod: HCNC,S$GLB,, | Performed by: NURSE PRACTITIONER

## 2023-02-27 PROCEDURE — 3288F FALL RISK ASSESSMENT DOCD: CPT | Mod: HCNC,CPTII,S$GLB, | Performed by: NURSE PRACTITIONER

## 2023-02-27 PROCEDURE — 1160F PR REVIEW ALL MEDS BY PRESCRIBER/CLIN PHARMACIST DOCUMENTED: ICD-10-PCS | Mod: HCNC,CPTII,S$GLB, | Performed by: NURSE PRACTITIONER

## 2023-02-27 PROCEDURE — 3044F HG A1C LEVEL LT 7.0%: CPT | Mod: HCNC,CPTII,S$GLB, | Performed by: NURSE PRACTITIONER

## 2023-02-27 PROCEDURE — 3074F PR MOST RECENT SYSTOLIC BLOOD PRESSURE < 130 MM HG: ICD-10-PCS | Mod: HCNC,CPTII,S$GLB, | Performed by: NURSE PRACTITIONER

## 2023-02-27 PROCEDURE — 1170F FXNL STATUS ASSESSED: CPT | Mod: HCNC,CPTII,S$GLB, | Performed by: NURSE PRACTITIONER

## 2023-02-27 PROCEDURE — 1170F PR FUNCTIONAL STATUS ASSESSED: ICD-10-PCS | Mod: HCNC,CPTII,S$GLB, | Performed by: NURSE PRACTITIONER

## 2023-02-27 PROCEDURE — 1159F PR MEDICATION LIST DOCUMENTED IN MEDICAL RECORD: ICD-10-PCS | Mod: HCNC,CPTII,S$GLB, | Performed by: NURSE PRACTITIONER

## 2023-02-27 PROCEDURE — G0402 INITIAL PREVENTIVE EXAM: HCPCS | Mod: HCNC,S$GLB,, | Performed by: NURSE PRACTITIONER

## 2023-02-27 PROCEDURE — 3078F PR MOST RECENT DIASTOLIC BLOOD PRESSURE < 80 MM HG: ICD-10-PCS | Mod: HCNC,CPTII,S$GLB, | Performed by: NURSE PRACTITIONER

## 2023-02-27 PROCEDURE — 3288F PR FALLS RISK ASSESSMENT DOCUMENTED: ICD-10-PCS | Mod: HCNC,CPTII,S$GLB, | Performed by: NURSE PRACTITIONER

## 2023-02-27 RX ORDER — FERROUS GLUCONATE 324(38)MG
324 TABLET ORAL 2 TIMES DAILY
COMMUNITY

## 2023-02-27 RX ORDER — AMOXICILLIN 500 MG
CAPSULE ORAL DAILY
COMMUNITY

## 2023-02-27 NOTE — PROGRESS NOTES
"  Silvia Cotton presented for a  Medicare AWV and comprehensive Health Risk Assessment today. The following components were reviewed and updated:    Medical history  Family History  Social history  Allergies and Current Medications  Health Risk Assessment  Health Maintenance  Care Team         ** See Completed Assessments for Annual Wellness Visit within the encounter summary.**         The following assessments were completed:  Living Situation  CAGE  Depression Screening  Timed Get Up and Go  Whisper Test  Cognitive Function Screening    Nutrition Screening  ADL Screening  PAQ Screening        Vitals:    02/27/23 0957   BP: 110/70   Pulse: 94   Resp: 18   SpO2: 99%   Weight: 100.9 kg (222 lb 7.1 oz)   Height: 5' 9" (1.753 m)     Body mass index is 32.85 kg/m².  Physical Exam  Constitutional:       General: She is not in acute distress.     Appearance: Normal appearance. She is well-developed. She is not ill-appearing, toxic-appearing or diaphoretic.   HENT:      Head: Normocephalic and atraumatic.      Right Ear: External ear normal.      Left Ear: External ear normal.      Nose: Nose normal.   Eyes:      General: No scleral icterus.        Right eye: No discharge.         Left eye: No discharge.      Conjunctiva/sclera: Conjunctivae normal.   Neck:      Thyroid: No thyromegaly.      Vascular: No carotid bruit.      Trachea: No tracheal deviation.   Cardiovascular:      Rate and Rhythm: Normal rate and regular rhythm.      Pulses: Normal pulses.      Heart sounds: Normal heart sounds. No murmur heard.    No friction rub. No gallop.   Pulmonary:      Effort: Pulmonary effort is normal. No respiratory distress.      Breath sounds: Normal breath sounds. No stridor. No wheezing, rhonchi or rales.   Chest:      Chest wall: No tenderness.   Abdominal:      General: Bowel sounds are normal. There is no distension.      Palpations: Abdomen is soft. There is no mass.      Tenderness: There is no abdominal tenderness. There " is no guarding or rebound.      Hernia: No hernia is present.   Musculoskeletal:         General: No tenderness. Normal range of motion.      Cervical back: Normal range of motion and neck supple. No edema or tenderness. Normal range of motion.   Lymphadenopathy:      Head:      Right side of head: No tonsillar adenopathy.      Left side of head: No tonsillar adenopathy.      Cervical: No cervical adenopathy.      Upper Body:      Right upper body: No supraclavicular adenopathy.      Left upper body: No supraclavicular adenopathy.   Skin:     General: Skin is warm and dry.      Findings: No lesion or rash.   Neurological:      Mental Status: She is alert and oriented to person, place, and time.      Deep Tendon Reflexes: Reflexes are normal and symmetric.   Psychiatric:         Mood and Affect: Mood normal.         Behavior: Behavior normal.             Diagnoses and health risks identified today and associated recommendations/orders:    1. Encounter for preventive health examination  Screenings performed, as noted above.  Personal preventative testing needs reviewed.      2. Chronic kidney disease, stage 3a  Monitored, stable, GFR 55.8, follow up with pcp    3. Mixed hyperlipidemia  Treated with fish oil, stable, cont tx and discuss with pcp    4. Palpitations  Monitored, stable, none reported, cont tx    5. Iron deficiency anemia due to chronic blood loss  Treated with oral iron, stable, followed by hem/onc    6. B12 deficiency  Treated with injections, stable, con t tx    7. Gastroesophageal reflux disease without esophagitis  Treated with omeprazole, stable, cont tx    8. Psychophysiological insomnia  Treated with trazodone, stable, cont tx    9. Mood disorder  Treated with effexor, stable, family anxieties, no SI/HI, cont tx    10. Chronic UTI  Monitored, stable, follow up with pcp    Review for Substance Use Disorders: patient does not use substances per chart    Review for opioid screening: Patient is not  prescribed opioids           Provided Silvia with a 5-10 year written screening schedule and personal prevention plan. Recommendations were developed using the USPSTF age appropriate recommendations. Education, counseling, and referrals were provided as needed. After Visit Summary printed and given to patient which includes a list of additional screenings\tests needed.    No follow-ups on file.    Jay John, NP    I offered to discuss advanced care planning, including how to pick a person who would make decisions for you if you were unable to make them for yourself, called a health care power of , and what kind of decisions you might make such as use of life sustaining treatments such as ventilators and tube feeding when faced with a life limiting illness recorded on a living will that they will need to know. (How you want to be cared for as you near the end of your natural life)     X  Patient is unwilling to engage in a discussion regarding advance directives at this time.

## 2023-02-27 NOTE — PATIENT INSTRUCTIONS
Counseling and Referral of Other Preventative  (Italic type indicates deductible and co-insurance are waived)    Patient Name: Silvia Cotton  Today's Date: 2/27/2023    Health Maintenance       Date Due Completion Date    HIV Screening Never done ---    COVID-19 Vaccine (5 - Booster for Pfizer series) 07/27/2022 6/1/2022    DEXA Scan 09/15/2023 9/15/2020    Lipid Panel 02/03/2024 2/3/2023    Mammogram 02/13/2024 2/13/2023    Override on 5/14/2013: Done    TETANUS VACCINE 12/01/2024 12/1/2014    Hemoglobin A1c (Diabetic Prevention Screening) 02/03/2026 2/3/2023    Colorectal Cancer Screening 03/10/2027 3/10/2022    Override on 11/12/2013: Declined    Override on 5/7/2007: Done (with dr. Ponce, to repeat in 10 yrs per pt.)        No orders of the defined types were placed in this encounter.    The following information is provided to all patients.  This information is to help you find resources for any of the problems found today that may be affecting your health:                Living healthy guide: www.[facility].[place].gov      Understanding Diabetes: www.diabetes.org      Eating healthy: www.cdc.gov/healthyweight      CDC home safety checklist: www.cdc.gov/steadi/patient.html      Agency on Aging: www.goea.[place].[facility]      Alcoholics anonymous (AA): www.aa.org      Physical Activity: www.[place]ulina.nih.gov/ll4qwue      Tobacco use: www.quitwithusla.org

## 2023-03-09 ENCOUNTER — LAB VISIT (OUTPATIENT)
Dept: LAB | Facility: HOSPITAL | Age: 68
End: 2023-03-09
Attending: INTERNAL MEDICINE
Payer: MEDICARE

## 2023-03-09 DIAGNOSIS — D50.0 IRON DEFICIENCY ANEMIA DUE TO CHRONIC BLOOD LOSS: ICD-10-CM

## 2023-03-09 LAB
BASOPHILS # BLD AUTO: 0.04 K/UL (ref 0–0.2)
BASOPHILS NFR BLD: 0.6 % (ref 0–1.9)
DIFFERENTIAL METHOD: ABNORMAL
EOSINOPHIL # BLD AUTO: 0.3 K/UL (ref 0–0.5)
EOSINOPHIL NFR BLD: 3.4 % (ref 0–8)
ERYTHROCYTE [DISTWIDTH] IN BLOOD BY AUTOMATED COUNT: 14.9 % (ref 11.5–14.5)
HCT VFR BLD AUTO: 38 % (ref 37–48.5)
HGB BLD-MCNC: 11.6 G/DL (ref 12–16)
IMM GRANULOCYTES # BLD AUTO: 0.03 K/UL (ref 0–0.04)
IMM GRANULOCYTES NFR BLD AUTO: 0.4 % (ref 0–0.5)
LYMPHOCYTES # BLD AUTO: 1.9 K/UL (ref 1–4.8)
LYMPHOCYTES NFR BLD: 26.3 % (ref 18–48)
MCH RBC QN AUTO: 29.3 PG (ref 27–31)
MCHC RBC AUTO-ENTMCNC: 30.5 G/DL (ref 32–36)
MCV RBC AUTO: 96 FL (ref 82–98)
MONOCYTES # BLD AUTO: 0.6 K/UL (ref 0.3–1)
MONOCYTES NFR BLD: 7.7 % (ref 4–15)
NEUTROPHILS # BLD AUTO: 4.5 K/UL (ref 1.8–7.7)
NEUTROPHILS NFR BLD: 61.6 % (ref 38–73)
NRBC BLD-RTO: 0 /100 WBC
PLATELET # BLD AUTO: 255 K/UL (ref 150–450)
PMV BLD AUTO: 10.9 FL (ref 9.2–12.9)
RBC # BLD AUTO: 3.96 M/UL (ref 4–5.4)
WBC # BLD AUTO: 7.27 K/UL (ref 3.9–12.7)

## 2023-03-09 PROCEDURE — 85025 COMPLETE CBC W/AUTO DIFF WBC: CPT | Mod: HCNC | Performed by: INTERNAL MEDICINE

## 2023-03-09 PROCEDURE — 36415 COLL VENOUS BLD VENIPUNCTURE: CPT | Mod: HCNC,PO | Performed by: INTERNAL MEDICINE

## 2023-03-09 PROCEDURE — 82728 ASSAY OF FERRITIN: CPT | Mod: HCNC | Performed by: INTERNAL MEDICINE

## 2023-03-09 PROCEDURE — 84466 ASSAY OF TRANSFERRIN: CPT | Mod: HCNC | Performed by: INTERNAL MEDICINE

## 2023-03-10 LAB
FERRITIN SERPL-MCNC: 38 NG/ML (ref 20–300)
IRON SERPL-MCNC: 58 UG/DL (ref 30–160)
SATURATED IRON: 16 % (ref 20–50)
TOTAL IRON BINDING CAPACITY: 373 UG/DL (ref 250–450)
TRANSFERRIN SERPL-MCNC: 252 MG/DL (ref 200–375)

## 2023-03-13 ENCOUNTER — OFFICE VISIT (OUTPATIENT)
Dept: HEMATOLOGY/ONCOLOGY | Facility: CLINIC | Age: 68
End: 2023-03-13
Payer: MEDICARE

## 2023-03-13 VITALS
DIASTOLIC BLOOD PRESSURE: 80 MMHG | BODY MASS INDEX: 32.56 KG/M2 | TEMPERATURE: 97 F | WEIGHT: 220.44 LBS | SYSTOLIC BLOOD PRESSURE: 125 MMHG | HEART RATE: 81 BPM

## 2023-03-13 DIAGNOSIS — D50.0 IRON DEFICIENCY ANEMIA DUE TO CHRONIC BLOOD LOSS: Primary | ICD-10-CM

## 2023-03-13 PROCEDURE — 1159F MED LIST DOCD IN RCRD: CPT | Mod: HCNC,CPTII,S$GLB, | Performed by: INTERNAL MEDICINE

## 2023-03-13 PROCEDURE — 3008F BODY MASS INDEX DOCD: CPT | Mod: HCNC,CPTII,S$GLB, | Performed by: INTERNAL MEDICINE

## 2023-03-13 PROCEDURE — 3288F FALL RISK ASSESSMENT DOCD: CPT | Mod: HCNC,CPTII,S$GLB, | Performed by: INTERNAL MEDICINE

## 2023-03-13 PROCEDURE — 1101F PR PT FALLS ASSESS DOC 0-1 FALLS W/OUT INJ PAST YR: ICD-10-PCS | Mod: HCNC,CPTII,S$GLB, | Performed by: INTERNAL MEDICINE

## 2023-03-13 PROCEDURE — 3079F PR MOST RECENT DIASTOLIC BLOOD PRESSURE 80-89 MM HG: ICD-10-PCS | Mod: HCNC,CPTII,S$GLB, | Performed by: INTERNAL MEDICINE

## 2023-03-13 PROCEDURE — 3044F HG A1C LEVEL LT 7.0%: CPT | Mod: HCNC,CPTII,S$GLB, | Performed by: INTERNAL MEDICINE

## 2023-03-13 PROCEDURE — 1160F PR REVIEW ALL MEDS BY PRESCRIBER/CLIN PHARMACIST DOCUMENTED: ICD-10-PCS | Mod: HCNC,CPTII,S$GLB, | Performed by: INTERNAL MEDICINE

## 2023-03-13 PROCEDURE — 1126F PR PAIN SEVERITY QUANTIFIED, NO PAIN PRESENT: ICD-10-PCS | Mod: HCNC,CPTII,S$GLB, | Performed by: INTERNAL MEDICINE

## 2023-03-13 PROCEDURE — 99999 PR PBB SHADOW E&M-EST. PATIENT-LVL III: ICD-10-PCS | Mod: PBBFAC,HCNC,, | Performed by: INTERNAL MEDICINE

## 2023-03-13 PROCEDURE — 1159F PR MEDICATION LIST DOCUMENTED IN MEDICAL RECORD: ICD-10-PCS | Mod: HCNC,CPTII,S$GLB, | Performed by: INTERNAL MEDICINE

## 2023-03-13 PROCEDURE — 3044F PR MOST RECENT HEMOGLOBIN A1C LEVEL <7.0%: ICD-10-PCS | Mod: HCNC,CPTII,S$GLB, | Performed by: INTERNAL MEDICINE

## 2023-03-13 PROCEDURE — 99213 OFFICE O/P EST LOW 20 MIN: CPT | Mod: HCNC,S$GLB,, | Performed by: INTERNAL MEDICINE

## 2023-03-13 PROCEDURE — 1160F RVW MEDS BY RX/DR IN RCRD: CPT | Mod: HCNC,CPTII,S$GLB, | Performed by: INTERNAL MEDICINE

## 2023-03-13 PROCEDURE — 99999 PR PBB SHADOW E&M-EST. PATIENT-LVL III: CPT | Mod: PBBFAC,HCNC,, | Performed by: INTERNAL MEDICINE

## 2023-03-13 PROCEDURE — 1101F PT FALLS ASSESS-DOCD LE1/YR: CPT | Mod: HCNC,CPTII,S$GLB, | Performed by: INTERNAL MEDICINE

## 2023-03-13 PROCEDURE — 3079F DIAST BP 80-89 MM HG: CPT | Mod: HCNC,CPTII,S$GLB, | Performed by: INTERNAL MEDICINE

## 2023-03-13 PROCEDURE — 3008F PR BODY MASS INDEX (BMI) DOCUMENTED: ICD-10-PCS | Mod: HCNC,CPTII,S$GLB, | Performed by: INTERNAL MEDICINE

## 2023-03-13 PROCEDURE — 99213 PR OFFICE/OUTPT VISIT, EST, LEVL III, 20-29 MIN: ICD-10-PCS | Mod: HCNC,S$GLB,, | Performed by: INTERNAL MEDICINE

## 2023-03-13 PROCEDURE — 3074F PR MOST RECENT SYSTOLIC BLOOD PRESSURE < 130 MM HG: ICD-10-PCS | Mod: HCNC,CPTII,S$GLB, | Performed by: INTERNAL MEDICINE

## 2023-03-13 PROCEDURE — 1126F AMNT PAIN NOTED NONE PRSNT: CPT | Mod: HCNC,CPTII,S$GLB, | Performed by: INTERNAL MEDICINE

## 2023-03-13 PROCEDURE — 3288F PR FALLS RISK ASSESSMENT DOCUMENTED: ICD-10-PCS | Mod: HCNC,CPTII,S$GLB, | Performed by: INTERNAL MEDICINE

## 2023-03-13 PROCEDURE — 3074F SYST BP LT 130 MM HG: CPT | Mod: HCNC,CPTII,S$GLB, | Performed by: INTERNAL MEDICINE

## 2023-03-13 NOTE — PROGRESS NOTES
Subjective:      Patient ID: Silvia Cotton is a 67 y.o. female.    Chief Complaint: No chief complaint on file.      HPI: This is a 67 year old woman with history of Vitamin B12 deficiency and iron deficiency anemia. She is on Vitamin B12 injections and oral iron. She had not been taking oral iron regularly. She has no obvious hematochezia or melena. Previous colonoscopy on 3/10/2022 was unremarkable.    Review of Systems   Constitutional:  Negative for chills and fever.   HENT:  Negative for mouth sores and sore throat.    Respiratory:  Negative for cough and shortness of breath.    Cardiovascular:  Negative for chest pain and palpitations.   Gastrointestinal:  Negative for blood in stool.   Genitourinary:  Negative for hematuria.   Neurological:  Negative for dizziness and headaches.   Psychiatric/Behavioral:  Negative for agitation and confusion.      Objective:     Physical Exam  Constitutional:       Appearance: Normal appearance.   HENT:      Head: Normocephalic and atraumatic.      Mouth/Throat:      Pharynx: Oropharynx is clear. No oropharyngeal exudate or posterior oropharyngeal erythema.   Cardiovascular:      Heart sounds:     No friction rub. No gallop.   Pulmonary:      Effort: No respiratory distress.      Breath sounds: Normal breath sounds. No rhonchi or rales.   Abdominal:      Palpations: Abdomen is soft.      Tenderness: There is no rebound.   Musculoskeletal:      Cervical back: Neck supple.      Right lower leg: No edema.      Left lower leg: No edema.   Skin:     Findings: No erythema.   Neurological:      General: No focal deficit present.      Mental Status: She is alert and oriented to person, place, and time.   Psychiatric:         Mood and Affect: Mood normal.         Behavior: Behavior normal.         Thought Content: Thought content normal.         Judgment: Judgment normal.       Assessment:     Problem List Items Addressed This Visit    None       Iron deficiency  anemia.  Vitamin B12 deficiency.     Plan:     Labs on 3/9/2023 showed Hb 11.6, iron saturation 16 %.   To continue oral iron and vitamin B12.   RTC: 2 months - CBC/iron studies.

## 2023-05-12 ENCOUNTER — LAB VISIT (OUTPATIENT)
Dept: LAB | Facility: HOSPITAL | Age: 68
End: 2023-05-12
Attending: INTERNAL MEDICINE
Payer: MEDICARE

## 2023-05-12 DIAGNOSIS — D50.0 IRON DEFICIENCY ANEMIA DUE TO CHRONIC BLOOD LOSS: ICD-10-CM

## 2023-05-12 LAB
BASOPHILS # BLD AUTO: 0.03 K/UL (ref 0–0.2)
BASOPHILS NFR BLD: 0.4 % (ref 0–1.9)
DIFFERENTIAL METHOD: ABNORMAL
EOSINOPHIL # BLD AUTO: 0.2 K/UL (ref 0–0.5)
EOSINOPHIL NFR BLD: 3.3 % (ref 0–8)
ERYTHROCYTE [DISTWIDTH] IN BLOOD BY AUTOMATED COUNT: 13.7 % (ref 11.5–14.5)
FERRITIN SERPL-MCNC: 35 NG/ML (ref 20–300)
HCT VFR BLD AUTO: 37.7 % (ref 37–48.5)
HGB BLD-MCNC: 11.7 G/DL (ref 12–16)
IMM GRANULOCYTES # BLD AUTO: 0.01 K/UL (ref 0–0.04)
IMM GRANULOCYTES NFR BLD AUTO: 0.1 % (ref 0–0.5)
IRON SERPL-MCNC: 66 UG/DL (ref 30–160)
LYMPHOCYTES # BLD AUTO: 1.6 K/UL (ref 1–4.8)
LYMPHOCYTES NFR BLD: 24.6 % (ref 18–48)
MCH RBC QN AUTO: 29.5 PG (ref 27–31)
MCHC RBC AUTO-ENTMCNC: 31 G/DL (ref 32–36)
MCV RBC AUTO: 95 FL (ref 82–98)
MONOCYTES # BLD AUTO: 0.4 K/UL (ref 0.3–1)
MONOCYTES NFR BLD: 5.7 % (ref 4–15)
NEUTROPHILS # BLD AUTO: 4.4 K/UL (ref 1.8–7.7)
NEUTROPHILS NFR BLD: 65.9 % (ref 38–73)
NRBC BLD-RTO: 0 /100 WBC
PLATELET # BLD AUTO: 208 K/UL (ref 150–450)
PMV BLD AUTO: 10.9 FL (ref 9.2–12.9)
RBC # BLD AUTO: 3.97 M/UL (ref 4–5.4)
SATURATED IRON: 19 % (ref 20–50)
TOTAL IRON BINDING CAPACITY: 354 UG/DL (ref 250–450)
TRANSFERRIN SERPL-MCNC: 239 MG/DL (ref 200–375)
WBC # BLD AUTO: 6.67 K/UL (ref 3.9–12.7)

## 2023-05-12 PROCEDURE — 85025 COMPLETE CBC W/AUTO DIFF WBC: CPT | Mod: HCNC | Performed by: INTERNAL MEDICINE

## 2023-05-12 PROCEDURE — 82728 ASSAY OF FERRITIN: CPT | Mod: HCNC | Performed by: INTERNAL MEDICINE

## 2023-05-12 PROCEDURE — 84466 ASSAY OF TRANSFERRIN: CPT | Mod: HCNC | Performed by: INTERNAL MEDICINE

## 2023-05-12 PROCEDURE — 36415 COLL VENOUS BLD VENIPUNCTURE: CPT | Mod: HCNC,PO | Performed by: INTERNAL MEDICINE

## 2023-05-16 ENCOUNTER — PATIENT MESSAGE (OUTPATIENT)
Dept: HEMATOLOGY/ONCOLOGY | Facility: CLINIC | Age: 68
End: 2023-05-16

## 2023-05-16 ENCOUNTER — OFFICE VISIT (OUTPATIENT)
Dept: HEMATOLOGY/ONCOLOGY | Facility: CLINIC | Age: 68
End: 2023-05-16
Payer: MEDICARE

## 2023-05-16 ENCOUNTER — OFFICE VISIT (OUTPATIENT)
Dept: OPHTHALMOLOGY | Facility: CLINIC | Age: 68
End: 2023-05-16
Payer: MEDICARE

## 2023-05-16 VITALS
HEART RATE: 68 BPM | WEIGHT: 232.38 LBS | BODY MASS INDEX: 34.31 KG/M2 | DIASTOLIC BLOOD PRESSURE: 80 MMHG | SYSTOLIC BLOOD PRESSURE: 130 MMHG | TEMPERATURE: 98 F | OXYGEN SATURATION: 95 %

## 2023-05-16 DIAGNOSIS — H25.13 NUCLEAR SCLEROSIS OF BOTH EYES: ICD-10-CM

## 2023-05-16 DIAGNOSIS — E53.8 B12 DEFICIENCY: ICD-10-CM

## 2023-05-16 DIAGNOSIS — D50.0 IRON DEFICIENCY ANEMIA DUE TO CHRONIC BLOOD LOSS: Primary | ICD-10-CM

## 2023-05-16 DIAGNOSIS — H35.61 RETINAL HEMORRHAGE, RIGHT EYE: Primary | ICD-10-CM

## 2023-05-16 PROCEDURE — 92004 COMPRE OPH EXAM NEW PT 1/>: CPT | Mod: HCNC,,, | Performed by: OPHTHALMOLOGY

## 2023-05-16 PROCEDURE — 1159F PR MEDICATION LIST DOCUMENTED IN MEDICAL RECORD: ICD-10-PCS | Mod: HCNC,CPTII,, | Performed by: NURSE PRACTITIONER

## 2023-05-16 PROCEDURE — 1159F MED LIST DOCD IN RCRD: CPT | Mod: HCNC,CPTII,, | Performed by: NURSE PRACTITIONER

## 2023-05-16 PROCEDURE — 1126F PR PAIN SEVERITY QUANTIFIED, NO PAIN PRESENT: ICD-10-PCS | Mod: HCNC,CPTII,, | Performed by: OPHTHALMOLOGY

## 2023-05-16 PROCEDURE — 99214 PR OFFICE/OUTPT VISIT, EST, LEVL IV, 30-39 MIN: ICD-10-PCS | Mod: HCNC,,, | Performed by: NURSE PRACTITIONER

## 2023-05-16 PROCEDURE — 99999 PR PBB SHADOW E&M-EST. PATIENT-LVL III: ICD-10-PCS | Mod: PBBFAC,HCNC,, | Performed by: OPHTHALMOLOGY

## 2023-05-16 PROCEDURE — 1101F PT FALLS ASSESS-DOCD LE1/YR: CPT | Mod: HCNC,CPTII,, | Performed by: NURSE PRACTITIONER

## 2023-05-16 PROCEDURE — 3079F DIAST BP 80-89 MM HG: CPT | Mod: HCNC,CPTII,, | Performed by: NURSE PRACTITIONER

## 2023-05-16 PROCEDURE — 92134 CPTRZ OPH DX IMG PST SGM RTA: CPT | Mod: HCNC,,, | Performed by: OPHTHALMOLOGY

## 2023-05-16 PROCEDURE — 1160F PR REVIEW ALL MEDS BY PRESCRIBER/CLIN PHARMACIST DOCUMENTED: ICD-10-PCS | Mod: HCNC,CPTII,, | Performed by: NURSE PRACTITIONER

## 2023-05-16 PROCEDURE — 3288F FALL RISK ASSESSMENT DOCD: CPT | Mod: HCNC,CPTII,, | Performed by: NURSE PRACTITIONER

## 2023-05-16 PROCEDURE — 3044F PR MOST RECENT HEMOGLOBIN A1C LEVEL <7.0%: ICD-10-PCS | Mod: HCNC,CPTII,, | Performed by: NURSE PRACTITIONER

## 2023-05-16 PROCEDURE — 99999 PR PBB SHADOW E&M-EST. PATIENT-LVL III: CPT | Mod: PBBFAC,HCNC,, | Performed by: OPHTHALMOLOGY

## 2023-05-16 PROCEDURE — 3288F PR FALLS RISK ASSESSMENT DOCUMENTED: ICD-10-PCS | Mod: HCNC,CPTII,, | Performed by: NURSE PRACTITIONER

## 2023-05-16 PROCEDURE — 1126F AMNT PAIN NOTED NONE PRSNT: CPT | Mod: HCNC,CPTII,, | Performed by: OPHTHALMOLOGY

## 2023-05-16 PROCEDURE — 99999 PR PBB SHADOW E&M-EST. PATIENT-LVL IV: CPT | Mod: PBBFAC,HCNC,, | Performed by: NURSE PRACTITIONER

## 2023-05-16 PROCEDURE — 3008F PR BODY MASS INDEX (BMI) DOCUMENTED: ICD-10-PCS | Mod: HCNC,CPTII,, | Performed by: NURSE PRACTITIONER

## 2023-05-16 PROCEDURE — 1101F PR PT FALLS ASSESS DOC 0-1 FALLS W/OUT INJ PAST YR: ICD-10-PCS | Mod: HCNC,CPTII,, | Performed by: NURSE PRACTITIONER

## 2023-05-16 PROCEDURE — 3008F BODY MASS INDEX DOCD: CPT | Mod: HCNC,CPTII,, | Performed by: NURSE PRACTITIONER

## 2023-05-16 PROCEDURE — 3044F PR MOST RECENT HEMOGLOBIN A1C LEVEL <7.0%: ICD-10-PCS | Mod: HCNC,CPTII,, | Performed by: OPHTHALMOLOGY

## 2023-05-16 PROCEDURE — 3075F PR MOST RECENT SYSTOLIC BLOOD PRESS GE 130-139MM HG: ICD-10-PCS | Mod: HCNC,CPTII,, | Performed by: NURSE PRACTITIONER

## 2023-05-16 PROCEDURE — 92004 PR EYE EXAM, NEW PATIENT,COMPREHESV: ICD-10-PCS | Mod: HCNC,,, | Performed by: OPHTHALMOLOGY

## 2023-05-16 PROCEDURE — 3079F PR MOST RECENT DIASTOLIC BLOOD PRESSURE 80-89 MM HG: ICD-10-PCS | Mod: HCNC,CPTII,, | Performed by: NURSE PRACTITIONER

## 2023-05-16 PROCEDURE — 3075F SYST BP GE 130 - 139MM HG: CPT | Mod: HCNC,CPTII,, | Performed by: NURSE PRACTITIONER

## 2023-05-16 PROCEDURE — 1160F RVW MEDS BY RX/DR IN RCRD: CPT | Mod: HCNC,CPTII,, | Performed by: NURSE PRACTITIONER

## 2023-05-16 PROCEDURE — 3044F HG A1C LEVEL LT 7.0%: CPT | Mod: HCNC,CPTII,, | Performed by: OPHTHALMOLOGY

## 2023-05-16 PROCEDURE — 92134 POSTERIOR SEGMENT OCT RETINA (OCULAR COHERENCE TOMOGRAPHY)-BOTH EYES: ICD-10-PCS | Mod: HCNC,,, | Performed by: OPHTHALMOLOGY

## 2023-05-16 PROCEDURE — 99214 OFFICE O/P EST MOD 30 MIN: CPT | Mod: HCNC,,, | Performed by: NURSE PRACTITIONER

## 2023-05-16 PROCEDURE — 3044F HG A1C LEVEL LT 7.0%: CPT | Mod: HCNC,CPTII,, | Performed by: NURSE PRACTITIONER

## 2023-05-16 PROCEDURE — 99999 PR PBB SHADOW E&M-EST. PATIENT-LVL IV: ICD-10-PCS | Mod: PBBFAC,HCNC,, | Performed by: NURSE PRACTITIONER

## 2023-05-16 RX ORDER — TOPIRAMATE 50 MG/1
TABLET, FILM COATED ORAL
COMMUNITY
Start: 2023-04-05 | End: 2023-06-22

## 2023-05-16 NOTE — PROGRESS NOTES
===============================  Date today is 5/16/2023  Silvia Cotton is a 67 y.o. female  Last visit [facility]: :Visit date not found   Last visit eye dept. Visit date not found    Corrected distance visual acuity was 20/20 in the right eye and 20/20 in the left eye.  Tonometry       Tonometry (Applanation, 1:16 PM)         Right Left    Pressure 17 18                  Not recorded       Not recorded       Not recorded       Chief Complaint   Patient presents with    vitreous detachment     Ref by dr santos for od pvd with heme       Problem List Items Addressed This Visit    None  Visit Diagnoses       Retinal hemorrhage, right eye    -  Primary    Relevant Orders    Posterior Segment OCT Retina-Both eyes (Completed)    Nuclear sclerosis of both eyes              Instructed to call 24/7 for any worsening of vision, visual distortion or pain.  Check OU independently daily.    Gave my office and personal cell phone number.  ________________  5/16/2023 today  Silvia Cotton    OD PVD with heme 2/2023  OCT stable  PVD resolved OD  No holes or tears OD  No bleeding  1+ NS OD  Trace NS OS  No macular degeneration  No glaucoma    RTC with Dr. Santos as scheduled  Instructed to call 24/7 for any worsening of vision or symptoms. Check OU daily.   Gave my office and cell phone number.      =============================

## 2023-05-16 NOTE — PROGRESS NOTES
Subjective:       Patient ID: Silvia Cotton is a 67 y.o. female.    Chief Complaint: Review labs. SCOTT    HPI: 67 y.o female presenting today for follow up of iron deficiency and anemia. H/o B12 on b12 injections self administered Q 2 weeks per PCP. Taking oral iron supplementation without significant GI upset or constipation.       Social History     Socioeconomic History    Marital status:    Tobacco Use    Smoking status: Never    Smokeless tobacco: Never   Substance and Sexual Activity    Alcohol use: No    Drug use: No    Sexual activity: Not Currently     Partners: Male     Birth control/protection: Post-menopausal     Social Determinants of Health     Financial Resource Strain: Low Risk     Difficulty of Paying Living Expenses: Not very hard   Food Insecurity: No Food Insecurity    Worried About Running Out of Food in the Last Year: Never true    Ran Out of Food in the Last Year: Never true   Transportation Needs: No Transportation Needs    Lack of Transportation (Medical): No    Lack of Transportation (Non-Medical): No   Physical Activity: Inactive    Days of Exercise per Week: 0 days    Minutes of Exercise per Session: 0 min   Stress: No Stress Concern Present    Feeling of Stress : Only a little   Social Connections: Moderately Isolated    Frequency of Communication with Friends and Family: More than three times a week    Frequency of Social Gatherings with Friends and Family: More than three times a week    Attends [employer]ist Services: Never    Active Member of Clubs or Organizations: No    Attends Club or Organization Meetings: Never    Marital Status:    Housing Stability: Low Risk     Unable to Pay for Housing in the Last Year: No    Number of Places Lived in the Last Year: 1    Unstable Housing in the Last Year: No       Past Medical History:   Diagnosis Date    Anemia     Hyperlipidemia     Hypertension     Supraventricular tachycardia        Family History    Problem Relation Age of Onset    Cataracts Mother     Cataracts Father     Hypertension Sister     Hypertension Brother     Hypertension Sister     Glaucoma Neg Hx     Diabetes Neg Hx     Breast cancer Neg Hx     Colon cancer Neg Hx     Ovarian cancer Neg Hx        Past Surgical History:   Procedure Laterality Date    APPENDECTOMY      CHOLECYSTECTOMY      COLONOSCOPY N/A 1/13/2017    Procedure: COLONOSCOPY;  Surgeon: Taj Nicholas MD;  Location: [facility] ENDO;  Service: Endoscopy;  Laterality: N/A;    COLONOSCOPY N/A 3/10/2022    Procedure: COLONOSCOPY;  Surgeon: Bee Brock MD;  Location: [facility] ENDO;  Service: Endoscopy;  Laterality: N/A;    SHOULDER ARTHROSCOPY      Tonsilectomy      TONSILLECTOMY      Uterine Ablation         Review of Systems   Constitutional:  Negative for appetite change, chills, fatigue, fever and unexpected weight change.   HENT:  Negative for congestion, mouth sores, nosebleeds, sore throat, trouble swallowing and voice change.    Eyes:  Negative for visual disturbance.   Respiratory:  Negative for cough, chest tightness, shortness of breath and wheezing.    Cardiovascular:  Negative for chest pain and leg swelling.   Gastrointestinal:  Negative for abdominal distention, abdominal pain, blood in stool, constipation, diarrhea, nausea and vomiting.   Genitourinary:  Negative for difficulty urinating, dysuria and hematuria.   Musculoskeletal:  Negative for arthralgias, back pain and myalgias.   Skin:  Negative for pallor, rash and wound.   Neurological:  Negative for dizziness, syncope, weakness and headaches.   Hematological:  Negative for adenopathy. Does not bruise/bleed easily.   Psychiatric/Behavioral:  The patient is not nervous/anxious.        Medication List with Changes/Refills   Current Medications    AMITRIPTYLINE (ELAVIL) 50 MG TABLET    Take 1 tablet (50 mg total) by mouth every evening.    ATENOLOL (TENORMIN) 25 MG TABLET    TAKE 1 TABLET TWICE DAILY     "CLOTRIMAZOLE-BETAMETHASONE 1-0.05% (LOTRISONE) CREAM    APPLY TO AFFECTED AREA 2 TIMES DAILY    CYANOCOBALAMIN 1,000 MCG/ML INJECTION    Inject 1 mL (1,000 mcg total) into the muscle once a week.    FERROUS GLUCONATE (FERGON) 324 MG TABLET    Take 324 mg by mouth 2 (two) times a day.    FLUTICASONE PROPIONATE (FLONASE) 50 MCG/ACTUATION NASAL SPRAY    1 spray (50 mcg total) by Each Nostril route once daily.    MECLIZINE (ANTIVERT) 12.5 MG TABLET    Take 1 tablet (12.5 mg total) by mouth 3 (three) times daily as needed for Dizziness.    NEEDLE, DISP, 18 G 18 X 1 " NDLE    Use to draw b12 1000 mcg from vail every 7 days for 7 weeks    NEEDLE, REUSABLE, 25 G 25 X 1 " NDLE    Use to inject B12 1000 mcg every 7 days for 7 weeks    NYSTATIN (MYCOSTATIN) POWDER    Apply TID prn to affected area    OMEGA-3 FATTY ACIDS/FISH OIL (FISH OIL-OMEGA-3 FATTY ACIDS) 300-1,000 MG CAPSULE    Take by mouth once daily.    OMEPRAZOLE (PRILOSEC) 40 MG CAPSULE    Take 1 capsule (40 mg total) by mouth once daily.    ROSUVASTATIN (CRESTOR) 20 MG TABLET    TAKE 1 TABLET ONE TIME DAILY    SEMAGLUTIDE (OZEMPIC) 0.25 MG OR 0.5 MG(2 MG/1.5 ML) PEN INJECTOR    Inject 0.25 mg into the skin every 7 days.    SYRINGE WITH NEEDLE 1 ML 25 GAUGE X 1" SYRG    1 each by Misc.(Non-Drug; Combo Route) route once a week.    SYRINGE, DISPOSABLE, 3 ML SYRG    Inject B12 1000 mcg every 7 days for 7 weeks    TIZANIDINE (ZANAFLEX) 4 MG TABLET    TAKE 1 TABLET TWICE DAILY    TOPIRAMATE (TOPAMAX) 50 MG TABLET        TRAZODONE (DESYREL) 100 MG TABLET    TAKE 1 TABLET (100 MG TOTAL) BY MOUTH EVERY EVENING.    VENLAFAXINE (EFFEXOR-XR) 75 MG 24 HR CAPSULE    TAKE 1 CAPSULE ONE TIME DAILY     Objective:     Vitals:    05/16/23 0958   BP: 130/80   Pulse: 68   Temp: 97.5 °F (36.4 °C)     Lab Results   Component Value Date    WBC 6.67 05/12/2023    HGB 11.7 (L) 05/12/2023    HCT 37.7 05/12/2023    MCV 95 05/12/2023     05/12/2023       Lab Results   Component Value " Date    UIBC 264 11/09/2012    IRON 66 05/12/2023    TRANSFERRIN 239 05/12/2023    TIBC 354 05/12/2023    FESATURATED 19 (L) 05/12/2023      Lab Results   Component Value Date    ROPTNJGI56 >2000 (H) 08/02/2022     Lab Results   Component Value Date    FOLATE 5.7 12/14/2015         Physical Exam  Vitals reviewed.   Constitutional:       Appearance: She is well-developed.   HENT:      Head: Normocephalic.      Right Ear: External ear normal.      Left Ear: External ear normal.      Nose: Nose normal.   Eyes:      General: Lids are normal. No scleral icterus.        Right eye: No discharge.         Left eye: No discharge.      Conjunctiva/sclera: Conjunctivae normal.   Neck:      Thyroid: No thyroid mass.   Cardiovascular:      Rate and Rhythm: Normal rate and regular rhythm.   Pulmonary:      Effort: Pulmonary effort is normal. No respiratory distress.      Breath sounds: Normal breath sounds.   Abdominal:      General: There is no distension.   Genitourinary:     Comments: deferred  Musculoskeletal:         General: Normal range of motion.      Cervical back: Normal range of motion.   Skin:     General: Skin is warm and dry.   Neurological:      Mental Status: She is alert and oriented to person, place, and time.   Psychiatric:         Speech: Speech normal.         Behavior: Behavior normal. Behavior is cooperative.         Thought Content: Thought content normal.        Assessment:     Problem List Items Addressed This Visit          Oncology    Iron deficiency anemia due to chronic blood loss - Primary     On oral iron supplementation without GI upset or constipation. Improvement in iron levels and anemia    Continue oral iron supplementation. F/u 3  months with cbc,iron, ferritin. Recommend EGD. Unremarkable Colonoscopy 2022 reviewed           Relevant Orders    CBC Auto Differential    Iron and TIBC    Ferritin    Case Request Endoscopy: EGD (ESOPHAGOGASTRODUODENOSCOPY) (Completed)       Endocrine    B12  deficiency     Continue b12 injections per PCP. Consider monthly dosing           Relevant Orders    Folate         Plan:     Iron deficiency anemia due to chronic blood loss  -     CBC Auto Differential; Future; Expected date: 05/16/2023  -     Iron and TIBC; Future; Expected date: 05/16/2023  -     Ferritin; Future; Expected date: 05/16/2023  -     Case Request Endoscopy: EGD (ESOPHAGOGASTRODUODENOSCOPY)    B12 deficiency  -     Folate; Future; Expected date: 05/16/2023            Med Onc Chart Routing      Follow up with physician    Follow up with SAIDA 3 months.   Infusion scheduling note    Injection scheduling note    Labs CBC, ferritin and iron and TIBC   Scheduling:  Preferred lab:  Lab interval:  1-2 days prior to appt   Imaging None      Pharmacy appointment No pharmacy appointment needed      Other referrals  No additional referrals needed           SHEELA WashingtonC

## 2023-05-16 NOTE — ASSESSMENT & PLAN NOTE
On oral iron supplementation without GI upset or constipation. Improvement in iron levels and anemia    Continue oral iron supplementation. F/u 3  months with cbc,iron, ferritin. Recommend EGD. Unremarkable Colonoscopy 2022 reviewed

## 2023-06-22 RX ORDER — TOPIRAMATE 50 MG/1
TABLET, FILM COATED ORAL
Qty: 180 TABLET | Refills: 0 | Status: SHIPPED | OUTPATIENT
Start: 2023-06-22 | End: 2023-08-15

## 2023-08-07 ENCOUNTER — HOSPITAL ENCOUNTER (OUTPATIENT)
Dept: PREADMISSION TESTING | Facility: HOSPITAL | Age: 68
Discharge: HOME | End: 2023-08-07
Attending: INTERNAL MEDICINE
Payer: MEDICARE

## 2023-08-08 ENCOUNTER — LAB VISIT (OUTPATIENT)
Dept: LAB | Facility: HOSPITAL | Age: 68
End: 2023-08-08
Attending: FAMILY MEDICINE
Payer: MEDICARE

## 2023-08-08 DIAGNOSIS — D50.0 IRON DEFICIENCY ANEMIA DUE TO CHRONIC BLOOD LOSS: ICD-10-CM

## 2023-08-08 DIAGNOSIS — E78.2 MIXED HYPERLIPIDEMIA: ICD-10-CM

## 2023-08-08 LAB
ALBUMIN SERPL BCP-MCNC: 3.7 G/DL (ref 3.5–5.2)
ALP SERPL-CCNC: 63 U/L (ref 55–135)
ALT SERPL W/O P-5'-P-CCNC: 14 U/L (ref 10–44)
ANION GAP SERPL CALC-SCNC: 9 MMOL/L (ref 8–16)
AST SERPL-CCNC: 17 U/L (ref 10–40)
BASOPHILS # BLD AUTO: 0.04 K/UL (ref 0–0.2)
BASOPHILS NFR BLD: 0.6 % (ref 0–1.9)
BILIRUB SERPL-MCNC: 0.5 MG/DL (ref 0.1–1)
BUN SERPL-MCNC: 14 MG/DL (ref 8–23)
CALCIUM SERPL-MCNC: 9.8 MG/DL (ref 8.7–10.5)
CHLORIDE SERPL-SCNC: 105 MMOL/L (ref 95–110)
CHOLEST SERPL-MCNC: 166 MG/DL (ref 120–199)
CHOLEST/HDLC SERPL: 4.2 {RATIO} (ref 2–5)
CO2 SERPL-SCNC: 24 MMOL/L (ref 23–29)
CREAT SERPL-MCNC: 1.1 MG/DL (ref 0.5–1.4)
DIFFERENTIAL METHOD: ABNORMAL
EOSINOPHIL # BLD AUTO: 0.2 K/UL (ref 0–0.5)
EOSINOPHIL NFR BLD: 2.9 % (ref 0–8)
ERYTHROCYTE [DISTWIDTH] IN BLOOD BY AUTOMATED COUNT: 14.6 % (ref 11.5–14.5)
EST. GFR  (NO RACE VARIABLE): 55.8 ML/MIN/1.73 M^2
GLUCOSE SERPL-MCNC: 82 MG/DL (ref 70–110)
HCT VFR BLD AUTO: 40.5 % (ref 37–48.5)
HDLC SERPL-MCNC: 40 MG/DL (ref 40–75)
HDLC SERPL: 24.1 % (ref 20–50)
HGB BLD-MCNC: 12.6 G/DL (ref 12–16)
IMM GRANULOCYTES # BLD AUTO: 0.03 K/UL (ref 0–0.04)
IMM GRANULOCYTES NFR BLD AUTO: 0.4 % (ref 0–0.5)
LDLC SERPL CALC-MCNC: 86 MG/DL (ref 63–159)
LYMPHOCYTES # BLD AUTO: 2.4 K/UL (ref 1–4.8)
LYMPHOCYTES NFR BLD: 33.8 % (ref 18–48)
MCH RBC QN AUTO: 29.6 PG (ref 27–31)
MCHC RBC AUTO-ENTMCNC: 31.1 G/DL (ref 32–36)
MCV RBC AUTO: 95 FL (ref 82–98)
MONOCYTES # BLD AUTO: 0.7 K/UL (ref 0.3–1)
MONOCYTES NFR BLD: 9.1 % (ref 4–15)
NEUTROPHILS # BLD AUTO: 3.8 K/UL (ref 1.8–7.7)
NEUTROPHILS NFR BLD: 53.2 % (ref 38–73)
NONHDLC SERPL-MCNC: 126 MG/DL
NRBC BLD-RTO: 0 /100 WBC
PLATELET # BLD AUTO: 249 K/UL (ref 150–450)
PMV BLD AUTO: 11.2 FL (ref 9.2–12.9)
POTASSIUM SERPL-SCNC: 4.5 MMOL/L (ref 3.5–5.1)
PROT SERPL-MCNC: 6.9 G/DL (ref 6–8.4)
RBC # BLD AUTO: 4.26 M/UL (ref 4–5.4)
SODIUM SERPL-SCNC: 138 MMOL/L (ref 136–145)
TRIGL SERPL-MCNC: 200 MG/DL (ref 30–150)
WBC # BLD AUTO: 7.13 K/UL (ref 3.9–12.7)

## 2023-08-08 PROCEDURE — 36415 COLL VENOUS BLD VENIPUNCTURE: CPT | Mod: HCNC,PO | Performed by: FAMILY MEDICINE

## 2023-08-08 PROCEDURE — 85025 COMPLETE CBC W/AUTO DIFF WBC: CPT | Mod: HCNC | Performed by: FAMILY MEDICINE

## 2023-08-08 PROCEDURE — 80053 COMPREHEN METABOLIC PANEL: CPT | Mod: HCNC | Performed by: FAMILY MEDICINE

## 2023-08-08 PROCEDURE — 80061 LIPID PANEL: CPT | Mod: HCNC | Performed by: FAMILY MEDICINE

## 2023-08-15 ENCOUNTER — OFFICE VISIT (OUTPATIENT)
Dept: FAMILY MEDICINE | Facility: CLINIC | Age: 68
End: 2023-08-15
Payer: MEDICARE

## 2023-08-15 VITALS
DIASTOLIC BLOOD PRESSURE: 94 MMHG | BODY MASS INDEX: 33.5 KG/M2 | HEART RATE: 86 BPM | WEIGHT: 226.19 LBS | HEIGHT: 69 IN | SYSTOLIC BLOOD PRESSURE: 134 MMHG | OXYGEN SATURATION: 96 %

## 2023-08-15 DIAGNOSIS — F51.04 PSYCHOPHYSIOLOGICAL INSOMNIA: ICD-10-CM

## 2023-08-15 DIAGNOSIS — N32.89 BLADDER SPASMS: ICD-10-CM

## 2023-08-15 DIAGNOSIS — N18.31 CHRONIC KIDNEY DISEASE, STAGE 3A: ICD-10-CM

## 2023-08-15 DIAGNOSIS — Z00.00 WELL ADULT EXAM: Primary | ICD-10-CM

## 2023-08-15 DIAGNOSIS — Z78.0 ASYMPTOMATIC MENOPAUSAL STATE: ICD-10-CM

## 2023-08-15 DIAGNOSIS — D50.0 IRON DEFICIENCY ANEMIA DUE TO CHRONIC BLOOD LOSS: ICD-10-CM

## 2023-08-15 DIAGNOSIS — E78.2 MIXED HYPERLIPIDEMIA: ICD-10-CM

## 2023-08-15 PROCEDURE — 3044F HG A1C LEVEL LT 7.0%: CPT | Mod: CPTII,S$GLB,, | Performed by: FAMILY MEDICINE

## 2023-08-15 PROCEDURE — 99999 PR PBB SHADOW E&M-EST. PATIENT-LVL IV: ICD-10-PCS | Mod: PBBFAC,,, | Performed by: FAMILY MEDICINE

## 2023-08-15 PROCEDURE — 3288F FALL RISK ASSESSMENT DOCD: CPT | Mod: CPTII,S$GLB,, | Performed by: FAMILY MEDICINE

## 2023-08-15 PROCEDURE — 1160F RVW MEDS BY RX/DR IN RCRD: CPT | Mod: CPTII,S$GLB,, | Performed by: FAMILY MEDICINE

## 2023-08-15 PROCEDURE — 3075F SYST BP GE 130 - 139MM HG: CPT | Mod: CPTII,S$GLB,, | Performed by: FAMILY MEDICINE

## 2023-08-15 PROCEDURE — 3008F BODY MASS INDEX DOCD: CPT | Mod: CPTII,S$GLB,, | Performed by: FAMILY MEDICINE

## 2023-08-15 PROCEDURE — 1159F PR MEDICATION LIST DOCUMENTED IN MEDICAL RECORD: ICD-10-PCS | Mod: CPTII,S$GLB,, | Performed by: FAMILY MEDICINE

## 2023-08-15 PROCEDURE — 3008F PR BODY MASS INDEX (BMI) DOCUMENTED: ICD-10-PCS | Mod: CPTII,S$GLB,, | Performed by: FAMILY MEDICINE

## 2023-08-15 PROCEDURE — 3080F DIAST BP >= 90 MM HG: CPT | Mod: CPTII,S$GLB,, | Performed by: FAMILY MEDICINE

## 2023-08-15 PROCEDURE — 3044F PR MOST RECENT HEMOGLOBIN A1C LEVEL <7.0%: ICD-10-PCS | Mod: CPTII,S$GLB,, | Performed by: FAMILY MEDICINE

## 2023-08-15 PROCEDURE — 3288F PR FALLS RISK ASSESSMENT DOCUMENTED: ICD-10-PCS | Mod: CPTII,S$GLB,, | Performed by: FAMILY MEDICINE

## 2023-08-15 PROCEDURE — 1159F MED LIST DOCD IN RCRD: CPT | Mod: CPTII,S$GLB,, | Performed by: FAMILY MEDICINE

## 2023-08-15 PROCEDURE — 3075F PR MOST RECENT SYSTOLIC BLOOD PRESS GE 130-139MM HG: ICD-10-PCS | Mod: CPTII,S$GLB,, | Performed by: FAMILY MEDICINE

## 2023-08-15 PROCEDURE — 1101F PR PT FALLS ASSESS DOC 0-1 FALLS W/OUT INJ PAST YR: ICD-10-PCS | Mod: CPTII,S$GLB,, | Performed by: FAMILY MEDICINE

## 2023-08-15 PROCEDURE — 99397 PER PM REEVAL EST PAT 65+ YR: CPT | Mod: S$GLB,,, | Performed by: FAMILY MEDICINE

## 2023-08-15 PROCEDURE — 99999 PR PBB SHADOW E&M-EST. PATIENT-LVL IV: CPT | Mod: PBBFAC,,, | Performed by: FAMILY MEDICINE

## 2023-08-15 PROCEDURE — 1160F PR REVIEW ALL MEDS BY PRESCRIBER/CLIN PHARMACIST DOCUMENTED: ICD-10-PCS | Mod: CPTII,S$GLB,, | Performed by: FAMILY MEDICINE

## 2023-08-15 PROCEDURE — 3080F PR MOST RECENT DIASTOLIC BLOOD PRESSURE >= 90 MM HG: ICD-10-PCS | Mod: CPTII,S$GLB,, | Performed by: FAMILY MEDICINE

## 2023-08-15 PROCEDURE — 1101F PT FALLS ASSESS-DOCD LE1/YR: CPT | Mod: CPTII,S$GLB,, | Performed by: FAMILY MEDICINE

## 2023-08-15 PROCEDURE — 99397 PR PREVENTIVE VISIT,EST,65 & OVER: ICD-10-PCS | Mod: S$GLB,,, | Performed by: FAMILY MEDICINE

## 2023-08-15 NOTE — PROGRESS NOTES
Chief Complaint:    Chief Complaint   Patient presents with    Follow-up     6 mo f.u        History of Present Illness:  Patient with palpitations presents today for a six month follow-up    Doing well, compliant with medications.  Staying active as tolerated, weight is down 50lbs in past year. Was on Ozempic .25 mg but is not taking anymore due to cost.  Compliant with stain therapy. No side effects.  She has chronic iron deficiency anemia and follows with hematology. Has slightly improved since last visit and is still taking iron pill supplements.  She does B12 injections weekly.    Bladder spasms improved.    Triglycerides at 200, takes fish-oil pills everyday. Was at 314.  Creatinine at 1.1, stable.  Other labs reviewed and stable.    Due for bone density scan.  Due for HIV screening.    ROS:  Review of Systems   Constitutional:  Negative for activity change, chills, fatigue, fever and unexpected weight change.   HENT:  Negative for congestion, ear discharge, ear pain, hearing loss, postnasal drip and rhinorrhea.    Eyes:  Negative for pain and visual disturbance.   Respiratory:  Negative for cough, chest tightness and shortness of breath.    Cardiovascular:  Negative for chest pain and palpitations.   Gastrointestinal:  Negative for abdominal pain, diarrhea and vomiting.   Endocrine: Negative for heat intolerance.   Genitourinary:  Negative for dysuria, flank pain, frequency and hematuria.   Musculoskeletal:  Positive for arthralgias and myalgias. Negative for back pain, gait problem and neck pain.   Skin:  Negative for color change and rash.   Neurological:  Negative for dizziness, tremors, seizures, numbness and headaches.   Psychiatric/Behavioral:  Negative for agitation, hallucinations, self-injury, sleep disturbance and suicidal ideas. The patient is not nervous/anxious.    All other systems reviewed and are negative.      Past Medical History:   Diagnosis Date    Anemia     Hyperlipidemia      Hypertension     Supraventricular tachycardia        Social History:  Social History     Socioeconomic History    Marital status:    Tobacco Use    Smoking status: Never    Smokeless tobacco: Never   Substance and Sexual Activity    Alcohol use: No    Drug use: No    Sexual activity: Not Currently     Partners: Male     Birth control/protection: Post-menopausal     Social Determinants of Health     Financial Resource Strain: Unknown (8/14/2023)    Overall Financial Resource Strain (CARDIA)     Difficulty of Paying Living Expenses: Patient refused   Food Insecurity: Unknown (8/14/2023)    Hunger Vital Sign     Worried About Running Out of Food in the Last Year: Patient refused     Ran Out of Food in the Last Year: Patient refused   Transportation Needs: Unknown (8/14/2023)    PRAPARE - Transportation     Lack of Transportation (Medical): Patient refused     Lack of Transportation (Non-Medical): Patient refused   Physical Activity: Unknown (8/14/2023)    Exercise Vital Sign     Days of Exercise per Week: Patient refused     Minutes of Exercise per Session: 0 min   Stress: Unknown (8/14/2023)    [facility] - Occupational Stress Questionnaire     Feeling of Stress : Patient refused   Social Connections: Unknown (8/14/2023)    Social Connection and Isolation Panel [NHANES]     Frequency of Communication with Friends and Family: Patient refused     Frequency of Social Gatherings with Friends and Family: Patient refused     Attends Mu-ism Services: Never     Active Member of Clubs or Organizations: Patient refused     Attends Club or Organization Meetings: Patient refused     Marital Status: Patient refused   Housing Stability: Unknown (8/14/2023)    Housing Stability Vital Sign     Unable to Pay for Housing in the Last Year: Patient refused     Number of Places Lived in the Last Year: 1     Unstable Housing in the Last Year: Patient refused       Family History:   family history  "includes Cataracts in her father and mother; Hypertension in her brother, sister, and sister.    Health Maintenance   Topic Date Due    DEXA Scan  09/15/2023    Mammogram  02/13/2024    Lipid Panel  08/08/2024    TETANUS VACCINE  12/01/2024    Colorectal Cancer Screening  03/10/2027    Hepatitis C Screening  Completed    Shingles Vaccine  Completed       Physical Exam:    Vital Signs  Pulse: 86  SpO2: 96 %  BP: (!) 134/94  BP Location: Left arm  Patient Position: Sitting  Height and Weight  Height: 5' 9" (175.3 cm)  Weight: 102.6 kg (226 lb 3.1 oz)  BSA (Calculated - sq m): 2.23 sq meters  BMI (Calculated): 33.4  Weight in (lb) to have BMI = 25: 168.9]    Body mass index is 33.4 kg/m².    Physical Exam  Vitals and nursing note reviewed.   Constitutional:       Appearance: Normal appearance. She is not toxic-appearing.   HENT:      Head: Normocephalic and atraumatic.      Right Ear: Tympanic membrane normal.      Left Ear: Tympanic membrane normal.   Eyes:      Extraocular Movements: Extraocular movements intact.      Pupils: Pupils are equal, round, and reactive to light.   Cardiovascular:      Rate and Rhythm: Normal rate and regular rhythm.      Heart sounds: Normal heart sounds.   Pulmonary:      Effort: Pulmonary effort is normal.      Breath sounds: Normal breath sounds. No wheezing, rhonchi or rales.   Abdominal:      General: Bowel sounds are normal. There is no distension.      Palpations: Abdomen is soft.      Tenderness: There is no abdominal tenderness.   Musculoskeletal:         General: Normal range of motion.      Cervical back: Normal range of motion.   Skin:     General: Skin is warm and dry.      Capillary Refill: Capillary refill takes less than 2 seconds.   Neurological:      General: No focal deficit present.      Mental Status: She is alert and oriented to person, place, and time.   Psychiatric:         Mood and Affect: Mood normal.         Behavior: Behavior normal.         Judgment: Judgment " normal.           Assessment:    Well adult exam  -     Hemoglobin A1C; Future; Expected date: 02/14/2024  -     HIV 1/2 Ag/Ab (4th Gen); Future; Expected date: 02/15/2024    Iron deficiency anemia due to chronic blood loss  -     CBC Auto Differential; Future; Expected date: 02/14/2024    Chronic kidney disease, stage 3a    Psychophysiological insomnia    Bladder spasms    Asymptomatic menopausal state  -     DXA Bone Density Axial Skeleton 1 or more sites; Future; Expected date: 08/15/2023    Mixed hyperlipidemia  -     Comprehensive Metabolic Panel; Future; Expected date: 02/14/2024  -     Lipid Panel; Future; Expected date: 02/14/2024        ICD-10-CM ICD-9-CM   1. Well adult exam  Z00.00 V70.0   2. Iron deficiency anemia due to chronic blood loss  D50.0 280.0   3. Chronic kidney disease, stage 3a  N18.31 585.3   4. Psychophysiological insomnia  F51.04 307.42   5. Bladder spasms  N32.89 596.89   6. Asymptomatic menopausal state  Z78.0 V49.81   7. Mixed hyperlipidemia  E78.2 272.2          Plan:  SCOTT improving, continue current plan to help treat it  Insomnia stable.  May cut Elavil in half at times for bladder spasms.  Other medical problems stable.  Schedule a DXA bone density test.   See labs below. Will do HIV screening.  Follow-up 6 months.     Orders Placed This Encounter    DXA Bone Density Axial Skeleton 1 or more sites    Hemoglobin A1C    Comprehensive Metabolic Panel    Lipid Panel    CBC Auto Differential    HIV 1/2 Ag/Ab (4th Gen)         Current Outpatient Medications   Medication Sig Dispense Refill    amitriptyline (ELAVIL) 50 MG tablet Take 1 tablet (50 mg total) by mouth every evening. 90 tablet 3    atenoloL (TENORMIN) 25 MG tablet TAKE 1 TABLET TWICE DAILY 180 tablet 3    clotrimazole-betamethasone 1-0.05% (LOTRISONE) cream APPLY TO AFFECTED AREA 2 TIMES DAILY 45 g 2    cyanocobalamin 1,000 mcg/mL injection Inject 1 mL (1,000 mcg total) into the muscle once a week. 30 mL 12    ferrous  "gluconate (FERGON) 324 MG tablet Take 324 mg by mouth 2 (two) times a day.      fluticasone propionate (FLONASE) 50 mcg/actuation nasal spray 1 spray (50 mcg total) by Each Nostril route once daily. 16 g 0    meclizine (ANTIVERT) 12.5 mg tablet Take 1 tablet (12.5 mg total) by mouth 3 (three) times daily as needed for Dizziness. 90 tablet 1    needle, disp, 18 G 18 x 1 " Ndle Use to draw b12 1000 mcg from vail every 7 days for 7 weeks 100 each 0    needle, reusable, 25 G 25 x 1 " Ndle Use to inject B12 1000 mcg every 7 days for 7 weeks 12 each 2    nystatin (MYCOSTATIN) powder Apply TID prn to affected area 60 g 3    omega-3 fatty acids/fish oil (FISH OIL-OMEGA-3 FATTY ACIDS) 300-1,000 mg capsule Take by mouth once daily.      omeprazole (PRILOSEC) 40 MG capsule TAKE 1 CAPSULE ONE TIME DAILY 90 capsule 1    rosuvastatin (CRESTOR) 20 MG tablet TAKE 1 TABLET ONE TIME DAILY 90 tablet 3    tiZANidine (ZANAFLEX) 4 MG tablet TAKE 1 TABLET TWICE DAILY (Patient taking differently: Take 4 mg by mouth 2 (two) times daily as needed.) 60 tablet 4    traZODone (DESYREL) 100 MG tablet TAKE 1 TABLET (100 MG TOTAL) BY MOUTH EVERY EVENING. 90 tablet 3    venlafaxine (EFFEXOR-XR) 75 MG 24 hr capsule TAKE 1 CAPSULE ONE TIME DAILY 90 capsule 3     No current facility-administered medications for this visit.       Medications Discontinued During This Encounter   Medication Reason    semaglutide (OZEMPIC) 0.25 mg or 0.5 mg(2 mg/1.5 mL) pen injector Patient no longer taking    syringe with needle 1 mL 25 gauge x 1" Syrg Duplicate Order    topiramate (TOPAMAX) 50 MG tablet Patient no longer taking    syringe, disposable, 3 mL Syrg Duplicate Order         Follow up in about 6 months (around 2/15/2024).      Stephani Valdivia MD  Scribe Attestation:   Kaleb ROQUE, am scribing for, and in the presence of, Dr.Arif Valdivia I performed the above scribed service and the documentation accurately describes the services I performed. I attest to the " accuracy of the note.    I, Dr. Stephani Valdivia, reviewed documentation as scribed above. I performed the services described in this documentation.  I agree that the record reflects my personal performance and is accurate and complete. Stephani Valdivia MD.  08/15/2023

## 2023-08-23 ENCOUNTER — APPOINTMENT (OUTPATIENT)
Dept: RADIOLOGY | Facility: HOSPITAL | Age: 68
End: 2023-08-23
Attending: FAMILY MEDICINE
Payer: MEDICARE

## 2023-08-23 ENCOUNTER — PATIENT MESSAGE (OUTPATIENT)
Dept: HEMATOLOGY/ONCOLOGY | Facility: CLINIC | Age: 68
End: 2023-08-23
Payer: MEDICARE

## 2023-08-23 DIAGNOSIS — Z78.0 ASYMPTOMATIC MENOPAUSAL STATE: ICD-10-CM

## 2023-08-23 PROCEDURE — 77080 DXA BONE DENSITY AXIAL: CPT | Mod: 26,,, | Performed by: RADIOLOGY

## 2023-08-23 PROCEDURE — 77080 DXA BONE DENSITY AXIAL: CPT | Mod: TC

## 2023-08-23 PROCEDURE — 77080 DXA BONE DENSITY AXIAL SKELETON 1 OR MORE SITES: ICD-10-PCS | Mod: 26,,, | Performed by: RADIOLOGY

## 2023-08-30 ENCOUNTER — PATIENT MESSAGE (OUTPATIENT)
Dept: FAMILY MEDICINE | Facility: CLINIC | Age: 68
End: 2023-08-30
Payer: MEDICARE

## 2023-09-25 ENCOUNTER — PATIENT MESSAGE (OUTPATIENT)
Dept: FAMILY MEDICINE | Facility: CLINIC | Age: 68
End: 2023-09-25
Payer: MEDICARE

## 2023-10-05 DIAGNOSIS — M79.642 BILATERAL HAND PAIN: Primary | ICD-10-CM

## 2023-10-05 DIAGNOSIS — M79.641 BILATERAL HAND PAIN: Primary | ICD-10-CM

## 2023-10-11 ENCOUNTER — OFFICE VISIT (OUTPATIENT)
Dept: ORTHOPEDICS | Facility: CLINIC | Age: 68
End: 2023-10-11
Payer: MEDICARE

## 2023-10-11 ENCOUNTER — HOSPITAL ENCOUNTER (OUTPATIENT)
Dept: RADIOLOGY | Facility: HOSPITAL | Age: 68
Discharge: HOME | End: 2023-10-11
Attending: ORTHOPAEDIC SURGERY
Payer: MEDICARE

## 2023-10-11 VITALS — WEIGHT: 226 LBS | HEIGHT: 69 IN | BODY MASS INDEX: 33.47 KG/M2

## 2023-10-11 DIAGNOSIS — M79.641 BILATERAL HAND PAIN: ICD-10-CM

## 2023-10-11 DIAGNOSIS — M79.642 BILATERAL HAND PAIN: ICD-10-CM

## 2023-10-11 DIAGNOSIS — G56.03 CARPAL TUNNEL SYNDROME ON BOTH SIDES: Primary | ICD-10-CM

## 2023-10-11 DIAGNOSIS — Z01.818 PREOP TESTING: ICD-10-CM

## 2023-10-11 PROCEDURE — 1159F PR MEDICATION LIST DOCUMENTED IN MEDICAL RECORD: ICD-10-PCS | Mod: HCNC,CPTII,S$GLB, | Performed by: ORTHOPAEDIC SURGERY

## 2023-10-11 PROCEDURE — 1101F PR PT FALLS ASSESS DOC 0-1 FALLS W/OUT INJ PAST YR: ICD-10-PCS | Mod: HCNC,CPTII,S$GLB, | Performed by: ORTHOPAEDIC SURGERY

## 2023-10-11 PROCEDURE — 3288F PR FALLS RISK ASSESSMENT DOCUMENTED: ICD-10-PCS | Mod: HCNC,CPTII,S$GLB, | Performed by: ORTHOPAEDIC SURGERY

## 2023-10-11 PROCEDURE — 3288F FALL RISK ASSESSMENT DOCD: CPT | Mod: HCNC,CPTII,S$GLB, | Performed by: ORTHOPAEDIC SURGERY

## 2023-10-11 PROCEDURE — 1160F PR REVIEW ALL MEDS BY PRESCRIBER/CLIN PHARMACIST DOCUMENTED: ICD-10-PCS | Mod: HCNC,CPTII,S$GLB, | Performed by: ORTHOPAEDIC SURGERY

## 2023-10-11 PROCEDURE — 1101F PT FALLS ASSESS-DOCD LE1/YR: CPT | Mod: HCNC,CPTII,S$GLB, | Performed by: ORTHOPAEDIC SURGERY

## 2023-10-11 PROCEDURE — 99204 PR OFFICE/OUTPT VISIT, NEW, LEVL IV, 45-59 MIN: ICD-10-PCS | Mod: HCNC,S$GLB,, | Performed by: ORTHOPAEDIC SURGERY

## 2023-10-11 PROCEDURE — 3044F HG A1C LEVEL LT 7.0%: CPT | Mod: HCNC,CPTII,S$GLB, | Performed by: ORTHOPAEDIC SURGERY

## 2023-10-11 PROCEDURE — 99204 OFFICE O/P NEW MOD 45 MIN: CPT | Mod: HCNC,S$GLB,, | Performed by: ORTHOPAEDIC SURGERY

## 2023-10-11 PROCEDURE — 99999 PR PBB SHADOW E&M-EST. PATIENT-LVL III: CPT | Mod: PBBFAC,HCNC,, | Performed by: ORTHOPAEDIC SURGERY

## 2023-10-11 PROCEDURE — 3044F PR MOST RECENT HEMOGLOBIN A1C LEVEL <7.0%: ICD-10-PCS | Mod: HCNC,CPTII,S$GLB, | Performed by: ORTHOPAEDIC SURGERY

## 2023-10-11 PROCEDURE — 1159F MED LIST DOCD IN RCRD: CPT | Mod: HCNC,CPTII,S$GLB, | Performed by: ORTHOPAEDIC SURGERY

## 2023-10-11 PROCEDURE — 99999 PR PBB SHADOW E&M-EST. PATIENT-LVL III: ICD-10-PCS | Mod: PBBFAC,HCNC,, | Performed by: ORTHOPAEDIC SURGERY

## 2023-10-11 PROCEDURE — 3008F PR BODY MASS INDEX (BMI) DOCUMENTED: ICD-10-PCS | Mod: HCNC,CPTII,S$GLB, | Performed by: ORTHOPAEDIC SURGERY

## 2023-10-11 PROCEDURE — 1125F AMNT PAIN NOTED PAIN PRSNT: CPT | Mod: HCNC,CPTII,S$GLB, | Performed by: ORTHOPAEDIC SURGERY

## 2023-10-11 PROCEDURE — 3008F BODY MASS INDEX DOCD: CPT | Mod: HCNC,CPTII,S$GLB, | Performed by: ORTHOPAEDIC SURGERY

## 2023-10-11 PROCEDURE — 73130 X-RAY EXAM OF HAND: CPT | Mod: 26,50,HCNC, | Performed by: RADIOLOGY

## 2023-10-11 PROCEDURE — 73130 X-RAY EXAM OF HAND: CPT | Mod: TC,50,HCNC

## 2023-10-11 PROCEDURE — 1125F PR PAIN SEVERITY QUANTIFIED, PAIN PRESENT: ICD-10-PCS | Mod: HCNC,CPTII,S$GLB, | Performed by: ORTHOPAEDIC SURGERY

## 2023-10-11 PROCEDURE — 73130 XR HAND COMPLETE 3 VIEWS BILATERAL: ICD-10-PCS | Mod: 26,50,HCNC, | Performed by: RADIOLOGY

## 2023-10-11 PROCEDURE — 1160F RVW MEDS BY RX/DR IN RCRD: CPT | Mod: HCNC,CPTII,S$GLB, | Performed by: ORTHOPAEDIC SURGERY

## 2023-10-11 NOTE — PROGRESS NOTES
Subjective:     Patient ID: Silvia Cotton is a 67 y.o. female.    Chief Complaint: Pain and Numbness of the Right Hand and Pain and Numbness of the Left Hand      HPI:  The patient is a 67-year-old female with bilateral carpal tunnel syndrome documented by nerve conduction studies about 15 years ago.  She is been quite symptomatic.  She has numbness in the median nerve distribution.  She is not improved with splinting.  She wishes to have a right carpal tunnel release    Past Medical History:   Diagnosis Date    Anemia     Hyperlipidemia     Hypertension     Supraventricular tachycardia      Past Surgical History:   Procedure Laterality Date    APPENDECTOMY      CHOLECYSTECTOMY      COLONOSCOPY N/A 1/13/2017    Procedure: COLONOSCOPY;  Surgeon: Taj Nicholas MD;  Location: [facility] ENDO;  Service: Endoscopy;  Laterality: N/A;    COLONOSCOPY N/A 3/10/2022    Procedure: COLONOSCOPY;  Surgeon: Bee Brock MD;  Location: [facility] ENDO;  Service: Endoscopy;  Laterality: N/A;    SHOULDER ARTHROSCOPY      Tonsilectomy      TONSILLECTOMY      Uterine Ablation       Family History   Problem Relation Age of Onset    Cataracts Mother     Cataracts Father     Hypertension Sister     Hypertension Brother     Hypertension Sister     Glaucoma Neg Hx     Diabetes Neg Hx     Breast cancer Neg Hx     Colon cancer Neg Hx     Ovarian cancer Neg Hx      Social History     Socioeconomic History    Marital status:    Tobacco Use    Smoking status: Never    Smokeless tobacco: Never   Substance and Sexual Activity    Alcohol use: No    Drug use: No    Sexual activity: Not Currently     Partners: Male     Birth control/protection: Post-menopausal     Social Determinants of Health     Financial Resource Strain: Unknown (8/14/2023)    Overall Financial Resource Strain (CARDIA)     Difficulty of Paying Living Expenses: Patient refused   Food Insecurity: Unknown (8/14/2023)    Hunger Vital Sign     Worried About Running Out of Food  in the Last Year: Patient refused     Ran Out of Food in the Last Year: Patient refused   Transportation Needs: Unknown (8/14/2023)    PRAPARE - Transportation     Lack of Transportation (Medical): Patient refused     Lack of Transportation (Non-Medical): Patient refused   Physical Activity: Unknown (8/14/2023)    Exercise Vital Sign     Days of Exercise per Week: Patient refused     Minutes of Exercise per Session: 0 min   Stress: Unknown (8/14/2023)    [employer] - Occupational Stress Questionnaire     Feeling of Stress : Patient refused   Social Connections: Unknown (8/14/2023)    Social Connection and Isolation Panel [NHANES]     Frequency of Communication with Friends and Family: Patient refused     Frequency of Social Gatherings with Friends and Family: Patient refused     Attends Yazidism Services: Never     Active Member of Clubs or Organizations: Patient refused     Attends Club or Organization Meetings: Patient refused     Marital Status: Patient refused   Housing Stability: Unknown (8/14/2023)    Housing Stability Vital Sign     Unable to Pay for Housing in the Last Year: Patient refused     Number of Places Lived in the Last Year: 1     Unstable Housing in the Last Year: Patient refused     Medication List with Changes/Refills   Current Medications    AMITRIPTYLINE (ELAVIL) 50 MG TABLET    Take 1 tablet (50 mg total) by mouth every evening.    ATENOLOL (TENORMIN) 25 MG TABLET    TAKE 1 TABLET TWICE DAILY    CLOTRIMAZOLE-BETAMETHASONE 1-0.05% (LOTRISONE) CREAM    APPLY TO AFFECTED AREA 2 TIMES DAILY    CYANOCOBALAMIN 1,000 MCG/ML INJECTION    Inject 1 mL (1,000 mcg total) into the muscle once a week.    FERROUS GLUCONATE (FERGON) 324 MG TABLET    Take 324 mg by mouth 2 (two) times a day.    FLUTICASONE PROPIONATE (FLONASE) 50 MCG/ACTUATION NASAL SPRAY    1 spray (50 mcg total) by Each Nostril route once daily.    MECLIZINE (ANTIVERT) 12.5 MG TABLET    Take 1 tablet (12.5 mg  "total) by mouth 3 (three) times daily as needed for Dizziness.    NEEDLE, DISP, 18 G 18 X 1 " NDLE    Use to draw b12 1000 mcg from vail every 7 days for 7 weeks    NEEDLE, REUSABLE, 25 G 25 X 1 " NDLE    Use to inject B12 1000 mcg every 7 days for 7 weeks    NYSTATIN (MYCOSTATIN) POWDER    Apply TID prn to affected area    OMEGA-3 FATTY ACIDS/FISH OIL (FISH OIL-OMEGA-3 FATTY ACIDS) 300-1,000 MG CAPSULE    Take by mouth once daily.    OMEPRAZOLE (PRILOSEC) 40 MG CAPSULE    TAKE 1 CAPSULE ONE TIME DAILY    ROSUVASTATIN (CRESTOR) 20 MG TABLET    TAKE 1 TABLET ONE TIME DAILY    TIZANIDINE (ZANAFLEX) 4 MG TABLET    TAKE 1 TABLET TWICE DAILY    TRAZODONE (DESYREL) 100 MG TABLET    TAKE 1 TABLET (100 MG TOTAL) BY MOUTH EVERY EVENING.    VENLAFAXINE (EFFEXOR-XR) 75 MG 24 HR CAPSULE    TAKE 1 CAPSULE ONE TIME DAILY     Review of patient's allergies indicates:  No Known Allergies  Review of Systems   Constitutional: Negative for malaise/fatigue.   HENT:  Negative for hearing loss.    Eyes:  Negative for double vision and visual disturbance.   Cardiovascular:  Positive for palpitations. Negative for chest pain.   Respiratory:  Negative for shortness of breath.    Endocrine: Negative for cold intolerance.   Hematologic/Lymphatic: Does not bruise/bleed easily.   Skin:  Negative for poor wound healing and suspicious lesions.   Musculoskeletal:  Negative for gout, joint pain and joint swelling.   Gastrointestinal:  Positive for heartburn. Negative for nausea and vomiting.   Genitourinary:  Negative for dysuria.   Neurological:  Positive for numbness, paresthesias and sensory change.   Psychiatric/Behavioral:  Positive for altered mental status. Negative for depression, memory loss and substance abuse. The patient has insomnia. The patient is not nervous/anxious.    Allergic/Immunologic: Negative for persistent infections.       Objective:   Body mass index is 33.37 kg/m².  There were no vitals filed for this visit. "             General    Constitutional: She is oriented to person, place, and time. She appears well-developed and well-nourished. No distress.   HENT:   Head: Normocephalic.   Mouth/Throat: Oropharynx is clear and moist.   Eyes: EOM are normal.   Cardiovascular:  Normal rate.            Pulmonary/Chest: Effort normal.   Abdominal: Soft.   Neurological: She is alert and oriented to person, place, and time. No cranial nerve deficit.   Psychiatric: She has a normal mood and affect.             Right Hand/Wrist Exam     Inspection   Scars: Wrist - absent Hand -  absent  Effusion: Wrist - absent Hand -  absent    Pain   Wrist - The patient exhibits pain of the flexor/pronator group.    Tests   Phalens sign: positive  Tinel's sign (median nerve): positive  Carpal Tunnel Compression Test: positive    Atrophy   Thenar:  negative  Intrinsic:  negative    Other     Neuorologic Exam    Median Distribution: abnormal  Ulnar Distribution: normal  Radial Distribution: normal    Comments:  The patient has a positive Tinel and positive Phalen sign.  There is no thenar atrophy noted.      Left Hand/Wrist Exam     Inspection   Scars: Wrist - absent Hand -  absent  Effusion: Wrist - absent Hand -  absent    Pain   Wrist - The patient exhibits pain of the flexor/pronator group.    Tests   Phalens sign: positive  Tinel's sign (median nerve): positive  Carpal Tunnel Compression Test: positive    Atrophy  Thenar:  Negative  Intrinsic: negative    Other     Sensory Exam  Median Distribution: abnormal  Ulnar Distribution: normal  Radial Distribution: normal    Comments:  The patient has a positive Tinel and positive Phalen sign.  There is no thenar atrophy noted.          Vascular Exam       Capillary Refill  Right Hand: normal capillary refill  Left Hand: normal capillary refill          Relevant imaging results reviewed and interpreted by me, discussed with the patient and / or family today radiographs of both hands showed early  degenerative changes  Assessment:     Encounter Diagnosis   Name Primary?    Carpal tunnel syndrome on both sides Yes        Plan:     The patient is scheduled for a right carpal tunnel release at her request.  She has classic symptoms.  She is already had a positive nerve study.  She was counseled regarding a right carpal tunnel release.  Risk complications and alternatives were discussed including the risk of infection, anesthetic risk, injury to nerves and vessels, loss of motion, and possible need for additional surgeries were discussed.  She seems to understand and agree to that surgery.  All questions were answered.                Disclaimer: This note was prepared using a voice recognition system and is likely to have sound alike errors within the text.

## 2023-10-26 ENCOUNTER — PATIENT MESSAGE (OUTPATIENT)
Dept: SURGERY | Facility: HOSPITAL | Age: 68
End: 2023-10-26
Payer: MEDICARE

## 2023-10-31 NOTE — H&P
Subjective:     Patient ID: Silvia Cotton is a 67 y.o. female.    Chief Complaint: Pain and Numbness of the Right Hand and Pain and Numbness of the Left Hand      HPI:  The patient is a 67-year-old female with bilateral carpal tunnel syndrome documented by nerve conduction studies about 15 years ago.  She is been quite symptomatic.  She has numbness in the median nerve distribution.  She is not improved with splinting.  She wishes to have a right carpal tunnel release    Past Medical History:   Diagnosis Date    Anemia     Hyperlipidemia     Hypertension     Supraventricular tachycardia      Past Surgical History:   Procedure Laterality Date    APPENDECTOMY      CHOLECYSTECTOMY      COLONOSCOPY N/A 1/13/2017    Procedure: COLONOSCOPY;  Surgeon: Taj Nicholas MD;  Location: [facility] ENDO;  Service: Endoscopy;  Laterality: N/A;    COLONOSCOPY N/A 3/10/2022    Procedure: COLONOSCOPY;  Surgeon: Bee Brock MD;  Location: [facility] ENDO;  Service: Endoscopy;  Laterality: N/A;    SHOULDER ARTHROSCOPY      Tonsilectomy      TONSILLECTOMY      Uterine Ablation       Family History   Problem Relation Age of Onset    Cataracts Mother     Cataracts Father     Hypertension Sister     Hypertension Brother     Hypertension Sister     Glaucoma Neg Hx     Diabetes Neg Hx     Breast cancer Neg Hx     Colon cancer Neg Hx     Ovarian cancer Neg Hx      Social History     Socioeconomic History    Marital status:    Tobacco Use    Smoking status: Never    Smokeless tobacco: Never   Substance and Sexual Activity    Alcohol use: No    Drug use: No    Sexual activity: Not Currently     Partners: Male     Birth control/protection: Post-menopausal     Social Determinants of Health     Financial Resource Strain: Unknown (8/14/2023)    Overall Financial Resource Strain (CARDIA)     Difficulty of Paying Living Expenses: Patient refused   Food Insecurity: Unknown (8/14/2023)    Hunger Vital Sign     Worried About Running Out of Food  in the Last Year: Patient refused     Ran Out of Food in the Last Year: Patient refused   Transportation Needs: Unknown (8/14/2023)    PRAPARE - Transportation     Lack of Transportation (Medical): Patient refused     Lack of Transportation (Non-Medical): Patient refused   Physical Activity: Unknown (8/14/2023)    Exercise Vital Sign     Days of Exercise per Week: Patient refused     Minutes of Exercise per Session: 0 min   Stress: Unknown (8/14/2023)    [employer] - Occupational Stress Questionnaire     Feeling of Stress : Patient refused   Social Connections: Unknown (8/14/2023)    Social Connection and Isolation Panel [NHANES]     Frequency of Communication with Friends and Family: Patient refused     Frequency of Social Gatherings with Friends and Family: Patient refused     Attends [employer] Services: Never     Active Member of Clubs or Organizations: Patient refused     Attends Club or Organization Meetings: Patient refused     Marital Status: Patient refused   Housing Stability: Unknown (8/14/2023)    Housing Stability Vital Sign     Unable to Pay for Housing in the Last Year: Patient refused     Number of Places Lived in the Last Year: 1     Unstable Housing in the Last Year: Patient refused     Medication List with Changes/Refills   Current Medications    AMITRIPTYLINE (ELAVIL) 50 MG TABLET    Take 1 tablet (50 mg total) by mouth every evening.    ATENOLOL (TENORMIN) 25 MG TABLET    TAKE 1 TABLET TWICE DAILY    CLOTRIMAZOLE-BETAMETHASONE 1-0.05% (LOTRISONE) CREAM    APPLY TO AFFECTED AREA 2 TIMES DAILY    CYANOCOBALAMIN 1,000 MCG/ML INJECTION    Inject 1 mL (1,000 mcg total) into the muscle once a week.    FERROUS GLUCONATE (FERGON) 324 MG TABLET    Take 324 mg by mouth 2 (two) times a day.    FLUTICASONE PROPIONATE (FLONASE) 50 MCG/ACTUATION NASAL SPRAY    1 spray (50 mcg total) by Each Nostril route once daily.    MECLIZINE (ANTIVERT) 12.5 MG TABLET    Take 1 tablet (12.5 mg  "total) by mouth 3 (three) times daily as needed for Dizziness.    NEEDLE, DISP, 18 G 18 X 1 " NDLE    Use to draw b12 1000 mcg from vail every 7 days for 7 weeks    NEEDLE, REUSABLE, 25 G 25 X 1 " NDLE    Use to inject B12 1000 mcg every 7 days for 7 weeks    NYSTATIN (MYCOSTATIN) POWDER    Apply TID prn to affected area    OMEGA-3 FATTY ACIDS/FISH OIL (FISH OIL-OMEGA-3 FATTY ACIDS) 300-1,000 MG CAPSULE    Take by mouth once daily.    OMEPRAZOLE (PRILOSEC) 40 MG CAPSULE    TAKE 1 CAPSULE ONE TIME DAILY    ROSUVASTATIN (CRESTOR) 20 MG TABLET    TAKE 1 TABLET ONE TIME DAILY    TIZANIDINE (ZANAFLEX) 4 MG TABLET    TAKE 1 TABLET TWICE DAILY    TRAZODONE (DESYREL) 100 MG TABLET    TAKE 1 TABLET (100 MG TOTAL) BY MOUTH EVERY EVENING.    VENLAFAXINE (EFFEXOR-XR) 75 MG 24 HR CAPSULE    TAKE 1 CAPSULE ONE TIME DAILY     Review of patient's allergies indicates:  No Known Allergies  Review of Systems   Constitutional: Negative for malaise/fatigue.   HENT:  Negative for hearing loss.    Eyes:  Negative for double vision and visual disturbance.   Cardiovascular:  Positive for palpitations. Negative for chest pain.   Respiratory:  Negative for shortness of breath.    Endocrine: Negative for cold intolerance.   Hematologic/Lymphatic: Does not bruise/bleed easily.   Skin:  Negative for poor wound healing and suspicious lesions.   Musculoskeletal:  Negative for gout, joint pain and joint swelling.   Gastrointestinal:  Positive for heartburn. Negative for nausea and vomiting.   Genitourinary:  Negative for dysuria.   Neurological:  Positive for numbness, paresthesias and sensory change.   Psychiatric/Behavioral:  Positive for altered mental status. Negative for depression, memory loss and substance abuse. The patient has insomnia. The patient is not nervous/anxious.    Allergic/Immunologic: Negative for persistent infections.       Objective:   Body mass index is 33.37 kg/m².  There were no vitals filed for this visit. "             General    Constitutional: She is oriented to person, place, and time. She appears well-developed and well-nourished. No distress.   HENT:   Head: Normocephalic.   Mouth/Throat: Oropharynx is clear and moist.   Eyes: EOM are normal.   Cardiovascular:  Normal rate.            Pulmonary/Chest: Effort normal.   Abdominal: Soft.   Neurological: She is alert and oriented to person, place, and time. No cranial nerve deficit.   Psychiatric: She has a normal mood and affect.             Right Hand/Wrist Exam     Inspection   Scars: Wrist - absent Hand -  absent  Effusion: Wrist - absent Hand -  absent    Pain   Wrist - The patient exhibits pain of the flexor/pronator group.    Tests   Phalens sign: positive  Tinel's sign (median nerve): positive  Carpal Tunnel Compression Test: positive    Atrophy   Thenar:  negative  Intrinsic:  negative    Other     Neuorologic Exam    Median Distribution: abnormal  Ulnar Distribution: normal  Radial Distribution: normal    Comments:  The patient has a positive Tinel and positive Phalen sign.  There is no thenar atrophy noted.      Left Hand/Wrist Exam     Inspection   Scars: Wrist - absent Hand -  absent  Effusion: Wrist - absent Hand -  absent    Pain   Wrist - The patient exhibits pain of the flexor/pronator group.    Tests   Phalens sign: positive  Tinel's sign (median nerve): positive  Carpal Tunnel Compression Test: positive    Atrophy  Thenar:  Negative  Intrinsic: negative    Other     Sensory Exam  Median Distribution: abnormal  Ulnar Distribution: normal  Radial Distribution: normal    Comments:  The patient has a positive Tinel and positive Phalen sign.  There is no thenar atrophy noted.          Vascular Exam       Capillary Refill  Right Hand: normal capillary refill  Left Hand: normal capillary refill          Relevant imaging results reviewed and interpreted by me, discussed with the patient and / or family today radiographs of both hands showed early  degenerative changes  Assessment:     Encounter Diagnosis   Name Primary?    Carpal tunnel syndrome on both sides Yes        Plan:     The patient is scheduled for a right carpal tunnel release at her request.  She has classic symptoms.  She is already had a positive nerve study.  She was counseled regarding a right carpal tunnel release.  Risk complications and alternatives were discussed including the risk of infection, anesthetic risk, injury to nerves and vessels, loss of motion, and possible need for additional surgeries were discussed.  She seems to understand and agree to that surgery.  All questions were answered.                Disclaimer: This note was prepared using a voice recognition system and is likely to have sound alike errors within the text.

## 2023-11-09 ENCOUNTER — PATIENT MESSAGE (OUTPATIENT)
Dept: SURGERY | Facility: HOSPITAL | Age: 68
End: 2023-11-09
Payer: MEDICARE

## 2023-11-15 ENCOUNTER — PATIENT MESSAGE (OUTPATIENT)
Dept: PREADMISSION TESTING | Facility: HOSPITAL | Age: 68
End: 2023-11-15
Payer: MEDICARE

## 2023-11-20 ENCOUNTER — HOSPITAL ENCOUNTER (OUTPATIENT)
Dept: RADIOLOGY | Facility: HOSPITAL | Age: 68
Discharge: HOME | End: 2023-11-20
Attending: ORTHOPAEDIC SURGERY
Payer: MEDICARE

## 2023-11-20 ENCOUNTER — HOSPITAL ENCOUNTER (OUTPATIENT)
Dept: CARDIOLOGY | Facility: HOSPITAL | Age: 68
Discharge: HOME | End: 2023-11-20
Attending: ORTHOPAEDIC SURGERY
Payer: MEDICARE

## 2023-11-20 DIAGNOSIS — Z01.818 PREOP TESTING: ICD-10-CM

## 2023-11-20 PROCEDURE — 93010 ELECTROCARDIOGRAM REPORT: CPT | Mod: HCNC,,, | Performed by: INTERNAL MEDICINE

## 2023-11-20 PROCEDURE — 71046 X-RAY EXAM CHEST 2 VIEWS: CPT | Mod: TC,HCNC

## 2023-11-20 PROCEDURE — 71046 XR CHEST PA AND LATERAL PRE-OP: ICD-10-PCS | Mod: 26,HCNC,, | Performed by: RADIOLOGY

## 2023-11-20 PROCEDURE — 71046 X-RAY EXAM CHEST 2 VIEWS: CPT | Mod: 26,HCNC,, | Performed by: RADIOLOGY

## 2023-11-20 PROCEDURE — 93010 EKG 12-LEAD: ICD-10-PCS | Mod: HCNC,,, | Performed by: INTERNAL MEDICINE

## 2023-11-20 PROCEDURE — 93005 ELECTROCARDIOGRAM TRACING: CPT | Mod: HCNC

## 2023-11-28 ENCOUNTER — ANESTHESIA EVENT (OUTPATIENT)
Dept: SURGERY | Facility: HOSPITAL | Age: 68
End: 2023-11-28
Payer: MEDICARE

## 2023-11-28 NOTE — ANESTHESIA PREPROCEDURE EVALUATION
11/28/2023  Silvia Cotton is a 68 y.o., female.  Past Medical History:   Diagnosis Date    Anemia     Hyperlipidemia     Hypertension     PONV (postoperative nausea and vomiting)     Supraventricular tachycardia      Past Surgical History:   Procedure Laterality Date    APPENDECTOMY      CHOLECYSTECTOMY      COLONOSCOPY N/A 01/13/2017    Procedure: COLONOSCOPY;  Surgeon: Taj Nicholas MD;  Location: [facility];  Service: Endoscopy;  Laterality: N/A;    COLONOSCOPY N/A 03/10/2022    Procedure: COLONOSCOPY;  Surgeon: Bee Brock MD;  Location: [facility] ENDO;  Service: Endoscopy;  Laterality: N/A;    KNEE SURGERY Left 2017    SHOULDER ARTHROSCOPY      Tonsilectomy      TONSILLECTOMY      Uterine Ablation           Pre-op Assessment    I have reviewed the Patient Summary Reports.    I have reviewed the NPO Status.   I have reviewed the Medications.     Review of Systems  Anesthesia Hx:   History of prior surgery of interest to airway management or planning:           Personal Hx of Anesthesia complications, Post-Operative Nausea/Vomiting                    Hematology/Oncology:       -- Anemia:                                  Cardiovascular:     Hypertension           hyperlipidemia                       Hypertension         Pulmonary:  Pulmonary Normal                       Renal/:  Chronic Renal Disease        Kidney Function/Disease             Hepatic/GI:     GERD      Gerd          Neurological:        Peripheral Neuropathy                        Neuromuscular Disease   Psych:  Psychiatric History                  Physical Exam  General: Well nourished    Airway:  Mallampati: III   Mouth Opening: Normal  TM Distance: 4 - 6 cm  Tongue: Normal  Neck ROM: Normal ROM    Dental:  Dentures        Anesthesia Plan  Type of Anesthesia, risks & benefits discussed:    Anesthesia Type: MAC, Gen Natural  Airway  Intra-op Monitoring Plan: Standard ASA Monitors  Post Op Pain Control Plan: multimodal analgesia and IV/PO Opioids PRN  Induction:  IV  Informed Consent: Informed consent signed with the Patient and all parties understand the risks and agree with anesthesia plan.  All questions answered. Patient consented to blood products? No  ASA Score: 3  Day of Surgery Review of History & Physical: H&P Update referred to the surgeon/provider.    Ready For Surgery From Anesthesia Perspective.     .

## 2023-11-29 ENCOUNTER — TELEPHONE (OUTPATIENT)
Dept: PREADMISSION TESTING | Facility: HOSPITAL | Age: 68
End: 2023-11-29
Payer: MEDICARE

## 2023-11-29 NOTE — TELEPHONE ENCOUNTER
Called and spoke with the patient about the following:     Your Surgery arrival time is at 0600 on 11/30 at [facility] location.   The address is [address]. [place] [address].      Only 1 adult allowed (over the age of 18) to accompany you and MUST STAY through the entire length of admission.     Must have a ride home from a responsible adult that you know and trust.    Medical Transport, Uber or Lyft can only be used if patient has a responsible adult to accompany them during ride home.  Pediatric patients are encouraged to have 2 adults accompany them to the surgery center.     ~Your ride MUST STAY the entire time until you are discharged~    You may be required to provide a urine sample prior to procedure;   Please ask  for a specimen cup if you need to use the restroom prior to being called into pre-op.    Please come to the main lobby and be prepared to show your photo ID and insurance card.      Nothing to eat or drink after midnight, unless you were instructed to take specific medications discussed with the Pre-admit Nurse.      Please call with any questions or concerns.     [phone]      Thanks.

## 2023-11-30 ENCOUNTER — HOSPITAL ENCOUNTER (OUTPATIENT)
Facility: HOSPITAL | Age: 68
Discharge: HOME | End: 2023-11-30
Attending: ORTHOPAEDIC SURGERY | Admitting: ORTHOPAEDIC SURGERY
Payer: MEDICARE

## 2023-11-30 ENCOUNTER — ANESTHESIA (OUTPATIENT)
Dept: SURGERY | Facility: HOSPITAL | Age: 68
End: 2023-11-30
Payer: MEDICARE

## 2023-11-30 VITALS
TEMPERATURE: 98 F | HEART RATE: 74 BPM | WEIGHT: 247.69 LBS | RESPIRATION RATE: 16 BRPM | SYSTOLIC BLOOD PRESSURE: 117 MMHG | DIASTOLIC BLOOD PRESSURE: 62 MMHG | OXYGEN SATURATION: 97 % | BODY MASS INDEX: 36.69 KG/M2 | HEIGHT: 69 IN

## 2023-11-30 DIAGNOSIS — G56.01 RIGHT CARPAL TUNNEL SYNDROME: ICD-10-CM

## 2023-11-30 DIAGNOSIS — G56.01 CARPAL TUNNEL SYNDROME OF RIGHT WRIST: Primary | ICD-10-CM

## 2023-11-30 PROCEDURE — 71000015 HC POSTOP RECOV 1ST HR: Mod: HCNC | Performed by: ORTHOPAEDIC SURGERY

## 2023-11-30 PROCEDURE — 64721 CARPAL TUNNEL SURGERY: CPT | Mod: HCNC,RT,, | Performed by: ORTHOPAEDIC SURGERY

## 2023-11-30 PROCEDURE — 25000003 PHARM REV CODE 250: Mod: HCNC | Performed by: NURSE ANESTHETIST, CERTIFIED REGISTERED

## 2023-11-30 PROCEDURE — 64721 PR REVISE MEDIAN N/CARPAL TUNNEL SURG: ICD-10-PCS | Mod: HCNC,RT,, | Performed by: ORTHOPAEDIC SURGERY

## 2023-11-30 PROCEDURE — 63600175 PHARM REV CODE 636 W HCPCS: Mod: HCNC | Performed by: NURSE ANESTHETIST, CERTIFIED REGISTERED

## 2023-11-30 PROCEDURE — 37000009 HC ANESTHESIA EA ADD 15 MINS: Mod: HCNC | Performed by: ORTHOPAEDIC SURGERY

## 2023-11-30 PROCEDURE — D9220A PRA ANESTHESIA: ICD-10-PCS | Mod: ,,, | Performed by: NURSE ANESTHETIST, CERTIFIED REGISTERED

## 2023-11-30 PROCEDURE — 25000003 PHARM REV CODE 250: Mod: HCNC | Performed by: ORTHOPAEDIC SURGERY

## 2023-11-30 PROCEDURE — D9220A PRA ANESTHESIA: Mod: ,,, | Performed by: NURSE ANESTHETIST, CERTIFIED REGISTERED

## 2023-11-30 PROCEDURE — 36000707: Mod: HCNC | Performed by: ORTHOPAEDIC SURGERY

## 2023-11-30 PROCEDURE — 37000008 HC ANESTHESIA 1ST 15 MINUTES: Mod: HCNC | Performed by: ORTHOPAEDIC SURGERY

## 2023-11-30 PROCEDURE — 71000033 HC RECOVERY, INTIAL HOUR: Mod: HCNC | Performed by: ORTHOPAEDIC SURGERY

## 2023-11-30 PROCEDURE — 36000706: Mod: HCNC | Performed by: ORTHOPAEDIC SURGERY

## 2023-11-30 RX ORDER — LIDOCAINE HYDROCHLORIDE 20 MG/ML
INJECTION, SOLUTION EPIDURAL; INFILTRATION; INTRACAUDAL; PERINEURAL
Status: DISCONTINUED | OUTPATIENT
Start: 2023-11-30 | End: 2023-11-30 | Stop reason: HOSPADM

## 2023-11-30 RX ORDER — HYDROCODONE BITARTRATE AND ACETAMINOPHEN 5; 325 MG/1; MG/1
1 TABLET ORAL EVERY 4 HOURS PRN
Status: DISCONTINUED | OUTPATIENT
Start: 2023-11-30 | End: 2023-11-30 | Stop reason: HOSPADM

## 2023-11-30 RX ORDER — PROPOFOL 10 MG/ML
VIAL (ML) INTRAVENOUS
Status: DISCONTINUED | OUTPATIENT
Start: 2023-11-30 | End: 2023-11-30

## 2023-11-30 RX ORDER — ONDANSETRON 2 MG/ML
INJECTION INTRAMUSCULAR; INTRAVENOUS
Status: DISCONTINUED | OUTPATIENT
Start: 2023-11-30 | End: 2023-11-30

## 2023-11-30 RX ORDER — LIDOCAINE HYDROCHLORIDE 20 MG/ML
INJECTION, SOLUTION INFILTRATION; PERINEURAL
Status: DISCONTINUED
Start: 2023-11-30 | End: 2023-11-30 | Stop reason: HOSPADM

## 2023-11-30 RX ORDER — FENTANYL CITRATE 50 UG/ML
25 INJECTION, SOLUTION INTRAMUSCULAR; INTRAVENOUS EVERY 5 MIN PRN
Status: DISCONTINUED | OUTPATIENT
Start: 2023-11-30 | End: 2023-11-30 | Stop reason: HOSPADM

## 2023-11-30 RX ORDER — SODIUM CHLORIDE 0.9 % (FLUSH) 0.9 %
10 SYRINGE (ML) INJECTION
Status: DISCONTINUED | OUTPATIENT
Start: 2023-11-30 | End: 2023-11-30 | Stop reason: HOSPADM

## 2023-11-30 RX ORDER — ONDANSETRON 2 MG/ML
4 INJECTION INTRAMUSCULAR; INTRAVENOUS DAILY PRN
Status: DISCONTINUED | OUTPATIENT
Start: 2023-11-30 | End: 2023-11-30 | Stop reason: HOSPADM

## 2023-11-30 RX ORDER — FENTANYL CITRATE 50 UG/ML
INJECTION, SOLUTION INTRAMUSCULAR; INTRAVENOUS
Status: DISCONTINUED | OUTPATIENT
Start: 2023-11-30 | End: 2023-11-30

## 2023-11-30 RX ORDER — DEXMEDETOMIDINE HYDROCHLORIDE 100 UG/ML
INJECTION, SOLUTION INTRAVENOUS
Status: DISCONTINUED | OUTPATIENT
Start: 2023-11-30 | End: 2023-11-30

## 2023-11-30 RX ORDER — MIDAZOLAM HYDROCHLORIDE 1 MG/ML
INJECTION, SOLUTION INTRAMUSCULAR; INTRAVENOUS
Status: DISCONTINUED | OUTPATIENT
Start: 2023-11-30 | End: 2023-11-30

## 2023-11-30 RX ORDER — OXYCODONE AND ACETAMINOPHEN 5; 325 MG/1; MG/1
1 TABLET ORAL
Status: DISCONTINUED | OUTPATIENT
Start: 2023-11-30 | End: 2023-11-30 | Stop reason: HOSPADM

## 2023-11-30 RX ORDER — CHLORHEXIDINE GLUCONATE ORAL RINSE 1.2 MG/ML
10 SOLUTION DENTAL
Status: DISCONTINUED | OUTPATIENT
Start: 2023-11-30 | End: 2023-12-14

## 2023-11-30 RX ORDER — CEFAZOLIN SODIUM 2 G/50ML
2 SOLUTION INTRAVENOUS
Status: DISCONTINUED | OUTPATIENT
Start: 2023-11-30 | End: 2023-12-14

## 2023-11-30 RX ORDER — HYDROCODONE BITARTRATE AND ACETAMINOPHEN 5; 325 MG/1; MG/1
1 TABLET ORAL EVERY 6 HOURS PRN
Qty: 15 TABLET | Refills: 0 | Status: ON HOLD | OUTPATIENT
Start: 2023-11-30 | End: 2023-12-28 | Stop reason: SDUPTHER

## 2023-11-30 RX ORDER — LIDOCAINE HYDROCHLORIDE 20 MG/ML
INJECTION, SOLUTION EPIDURAL; INFILTRATION; INTRACAUDAL; PERINEURAL
Status: DISCONTINUED | OUTPATIENT
Start: 2023-11-30 | End: 2023-11-30

## 2023-11-30 RX ORDER — CHLORHEXIDINE GLUCONATE ORAL RINSE 1.2 MG/ML
10 SOLUTION DENTAL 2 TIMES DAILY
Status: DISCONTINUED | OUTPATIENT
Start: 2023-11-30 | End: 2023-11-30 | Stop reason: HOSPADM

## 2023-11-30 RX ORDER — DEXAMETHASONE SODIUM PHOSPHATE 4 MG/ML
INJECTION, SOLUTION INTRA-ARTICULAR; INTRALESIONAL; INTRAMUSCULAR; INTRAVENOUS; SOFT TISSUE
Status: DISCONTINUED | OUTPATIENT
Start: 2023-11-30 | End: 2023-11-30

## 2023-11-30 RX ORDER — MEPERIDINE HYDROCHLORIDE 25 MG/ML
12.5 INJECTION INTRAMUSCULAR; INTRAVENOUS; SUBCUTANEOUS ONCE AS NEEDED
Status: DISCONTINUED | OUTPATIENT
Start: 2023-11-30 | End: 2023-11-30 | Stop reason: HOSPADM

## 2023-11-30 RX ADMIN — DEXAMETHASONE SODIUM PHOSPHATE 4 MG: 4 INJECTION, SOLUTION INTRA-ARTICULAR; INTRALESIONAL; INTRAMUSCULAR; INTRAVENOUS; SOFT TISSUE at 07:11

## 2023-11-30 RX ADMIN — DEXMEDETOMIDINE HYDROCHLORIDE 4 MCG: 100 INJECTION, SOLUTION INTRAVENOUS at 07:11

## 2023-11-30 RX ADMIN — PROPOFOL 50 MG: 10 INJECTION, EMULSION INTRAVENOUS at 07:11

## 2023-11-30 RX ADMIN — FENTANYL CITRATE 25 MCG: 50 INJECTION, SOLUTION INTRAMUSCULAR; INTRAVENOUS at 07:11

## 2023-11-30 RX ADMIN — ONDANSETRON 4 MG: 2 INJECTION INTRAMUSCULAR; INTRAVENOUS at 07:11

## 2023-11-30 RX ADMIN — LIDOCAINE HYDROCHLORIDE 40 MG: 20 INJECTION, SOLUTION EPIDURAL; INFILTRATION; INTRACAUDAL; PERINEURAL at 07:11

## 2023-11-30 RX ADMIN — MIDAZOLAM 2 MG: 1 INJECTION INTRAMUSCULAR; INTRAVENOUS at 07:11

## 2023-11-30 RX ADMIN — DEXTROSE 2 G: 50 INJECTION, SOLUTION INTRAVENOUS at 07:11

## 2023-11-30 RX ADMIN — PROPOFOL 80 MG: 10 INJECTION, EMULSION INTRAVENOUS at 07:11

## 2023-11-30 NOTE — DISCHARGE INSTRUCTIONS
After Hand Surgery  After surgery, the better you take care of yourself--especially your hand--the sooner it will heal. Follow your surgeons instructions. Try not to bump your hand, and dont move or lift anything while youre still wearing bandages, a splint, or a cast.  Care for your hand    Keep your hand elevated above heart level as much as possible for the first several days after surgery. This helps reduce swelling and pain.  To help prevent infection and speed healing, take care not to get your cast or bandages wet.  Keep dressing clean, dry, & intact until your follow up appointment.   Relieve pain as directed  Your surgeon may prescribe pain medicine or suggest you take an anti-inflammatory medicine. You might also be instructed to apply ice (or another cold source) to your hand. If you use ice cubes, put them in a plastic bag and rest it on top of your bandages. Leave the cold source on your hand for as long as its comfortable. Do this several times a day for the first few days after surgery. It may take several minutes before you can feel the cold through the cast or bandages.  Follow up with your surgeon  During a follow-up visit after surgery, your surgeon will check your progress. The stitches, bandages, splint, or cast may be removed. A new cast or splint may be placed. If your hand has healed enough, your surgeon may prescribe exercises.  Do prescribed hand exercises  Your surgeon may recommend that you do exercises. These may be done under the guidance of a physical or occupational therapist. The exercises strengthen your hand, help you regain flexibility, and restore proper function. Do the exercises as advised.  Call your surgeon if you have...  A fever higher than 100.4°F (38.0°C) taken by mouth  Side effects from your medicine, such as prolonged nausea  A wet or loose dressing, or a dressing that is too tight  Excessive bleeding  Increased, ongoing pain or numbness  Signs of infection (such  as drainage, warmth, or redness) at the incision site   Date Last Reviewed: 11/11/2015  © 2892-3212 [employer], [employer]. [address] [place] [address], [place] [address]. All rights reserved. This information is not intended as a substitute for professional medical care. Always follow your healthcare professional's instructions.           Nozin Instructions  Goal: the goal of Nozin is to reduce the risk of post-procedural infections by bacteria in the nasal cavity. Think of it as hand  for your nose.    How to use:    1. Shake Nozin bottle well    2. Take a cotton swab and apply 4 drops to one tip    3. Insert cotton tip into one nostril, being sure not to go deeper into nose than tip of the swab.    4. Swab nostril 6 times counterclockwise and 6 times clockwise. Make sure to swab the inside front pocket of the nostril.    5. Take swab out and apply 2 drops to the same cotton tip. Repeat steps 3 and 4 in the other nostril.        Do steps 1-5 twice a day for 7 days.

## 2023-11-30 NOTE — ANESTHESIA POSTPROCEDURE EVALUATION
Anesthesia Post Evaluation    Patient: Silvia Cotton    Procedure(s) Performed: Procedure(s) (LRB):  RELEASE, CARPAL TUNNEL (Right)    Final Anesthesia Type: general      Patient location during evaluation: PACU  Patient participation: Yes- Able to Participate  Level of consciousness: awake  Post-procedure vital signs: reviewed and stable  Pain management: adequate  Airway patency: patent    PONV status at discharge: No PONV  Anesthetic complications: no      Cardiovascular status: stable  Respiratory status: unassisted  Hydration status: euvolemic  Follow-up not needed.              Vitals Value Taken Time   /57 11/30/23 0811     11/30/23 0812   Pulse 79 11/30/23 0811   Resp 30 11/30/23 0811   SpO2 97 % 11/30/23 0811   Vitals shown include unvalidated device data.      No case tracking events are documented in the log.      Pain/Lashawn Score: Lashawn Score: 7 (11/30/2023  8:03 AM)

## 2023-11-30 NOTE — OP NOTE
[facility]  Orthopedic Surgery  Operative Note    SUMMARY     Date of Procedure: 11/30/2023   Assistant: None    Procedure: Procedure(s) (LRB):  RELEASE, CARPAL TUNNEL (Right)       Surgeon(s) and Role:     * Kiran Finley MD - Primary    Assisting Surgeon: None    Pre-Operative Diagnosis: Carpal tunnel syndrome right wrist    Post-Operative Diagnosis:  Right carpal tunnel syndrome    Anesthesia:  Local with sedation    Technical Procedures Used:  right carpal tunnel release    Description of the Findings of the Procedure:  The patient was taken to the operating room where 10 cc of 2% plain lidocaine was used for local anesthetic and was administered.  After exsanguination of the extremity a proximal tourniquet was inflated to 250 mm of mercury.  Satisfactory anesthesia had been achieved the right hand was prepped and draped in the usual sterile fashion.  The patient did receive 2 g of Ancef intravenously preoperatively.  At this time a longitudinal incision was made in line with the 4th ray over the transverse carpal ligament.  The incision was extended using blunt and sharp dissection under 3.5 loupe magnification.  The transverse carpal ligament was identified and sharply incised under direct vision.  A complete release was performed and verified by the surgeon's small finger both proximally and distally.  No additional pathology was encountered.  Satisfactory release had been achieved skin was closed using horizontal mattress 4 0 nylon suture.  Xeroform gauze 4x4s and 2 ABD pads were over wrapped with 3 in gauze dressing.  The patient tolerated the procedure well and was transferred to the recovery room in satisfactory condition.      Complications: No    Estimated Blood Loss (EBL): 5cc                        Condition: Good    Disposition: PACU - hemodynamically stable.    Attestation: I was present and scrubbed for the entire procedure.

## 2023-11-30 NOTE — TRANSFER OF CARE
"Anesthesia Transfer of Care Note    Patient: Silvia Cotton    Procedure(s) Performed: Procedure(s) (LRB):  RELEASE, CARPAL TUNNEL (Right)    Patient location: PACU    Anesthesia Type: general    Transport from OR: Transported from OR on room air with adequate spontaneous ventilation    Post pain: adequate analgesia    Post assessment: no apparent anesthetic complications and tolerated procedure well    Post vital signs: stable    Level of consciousness: awake    Nausea/Vomiting: no nausea/vomiting    Complications: none    Transfer of care protocol was followed      Last vitals: Visit Vitals  BP (!) 144/71 (BP Location: Right arm, Patient Position: Sitting)   Pulse 81   Temp 36.4 °C (97.5 °F)   Resp 18   Ht 5' 9" (1.753 m)   Wt 112.4 kg (247 lb 11 oz)   SpO2 98%   Breastfeeding No   BMI 36.58 kg/m²     "

## 2023-11-30 NOTE — PLAN OF CARE
Reviewed and completed all discharge orders. Printed AVS and educated patient and spouse of its entirety, including physician's orders, follow-up appt, medications, when to call, and when to report to the emergency room. I encouraged questions, answered them thoroughly, and evaluated my instructions via teach-back method. Patient has met all hospital discharge criteria at this point.

## 2023-11-30 NOTE — DISCHARGE SUMMARY
[facility]  Discharge Note  Short Stay    Procedure(s) (LRB):  RELEASE, CARPAL TUNNEL (Right)      OUTCOME: Patient tolerated treatment/procedure well without complication and is now ready for discharge.    DISPOSITION: Home or Self Care    FINAL DIAGNOSIS:  Right carpal tunnel syndrome    FOLLOWUP: In clinic    DISCHARGE INSTRUCTIONS:    Discharge Procedure Orders   Diet general     Call MD for:  temperature >100.4     Call MD for:  persistent nausea and vomiting     Call MD for:  severe uncontrolled pain     Call MD for:  difficulty breathing, headache or visual disturbances     Call MD for:  redness, tenderness, or signs of infection (pain, swelling, redness, odor or green/yellow discharge around incision site)     Call MD for:  hives     Call MD for:  persistent dizziness or light-headedness     Call MD for:  extreme fatigue        TIME SPENT ON DISCHARGE:  20 minutes

## 2023-12-01 NOTE — PROGRESS NOTES
SUBJECTIVE:      Patient ID: Silvia Cotton is a 68 y.o. female.    HPI: Ms. Cotton is here today for post-operative visit #1.  She is 5 days status post right carpal tunnel release by Dr. Finley on 11/30/23. She reports that she is doing well.  Pain is 2/10. She is taking the Norco as directed for pain.  She has been compliant with postop instructions and keeping the extremity dry. She denies fever, chills, and sweats since the time of the surgery.     Past Medical History:   Diagnosis Date    Anemia     Hyperlipidemia     Hypertension     PONV (postoperative nausea and vomiting)     Supraventricular tachycardia      Past Surgical History:   Procedure Laterality Date    APPENDECTOMY      CARPAL TUNNEL RELEASE Right 11/30/2023    Procedure: RELEASE, CARPAL TUNNEL;  Surgeon: Kiran Finley MD;  Location: [facility] OR;  Service: Orthopedics;  Laterality: Right;  right carpal tunnel release    CHOLECYSTECTOMY      COLONOSCOPY N/A 01/13/2017    Procedure: COLONOSCOPY;  Surgeon: Taj Nicholas MD;  Location: [facility] ENDO;  Service: Endoscopy;  Laterality: N/A;    COLONOSCOPY N/A 03/10/2022    Procedure: COLONOSCOPY;  Surgeon: Bee Brock MD;  Location: [facility] ENDO;  Service: Endoscopy;  Laterality: N/A;    KNEE SURGERY Left 2017    SHOULDER ARTHROSCOPY      Tonsilectomy      TONSILLECTOMY      Uterine Ablation       Family History   Problem Relation Age of Onset    Cataracts Mother     Heart block Mother     Cataracts Father     Hypertension Sister     Hypertension Sister     Hypertension Brother     Glaucoma Neg Hx     Diabetes Neg Hx     Breast cancer Neg Hx     Colon cancer Neg Hx     Ovarian cancer Neg Hx      Social History     Socioeconomic History    Marital status:    Tobacco Use    Smoking status: Never     Passive exposure: Never    Smokeless tobacco: Never   Substance and Sexual Activity    Alcohol use: Not Currently    Drug use: Not Currently    Sexual activity: Not Currently     Partners:  Male     Birth control/protection: Post-menopausal     Social Determinants of Health     Financial Resource Strain: Unknown (8/14/2023)    Overall Financial Resource Strain (CARDIA)     Difficulty of Paying Living Expenses: Patient refused   Food Insecurity: Unknown (8/14/2023)    Hunger Vital Sign     Worried About Running Out of Food in the Last Year: Patient refused     Ran Out of Food in the Last Year: Patient refused   Transportation Needs: Unknown (8/14/2023)    PRAPARE - Transportation     Lack of Transportation (Medical): Patient refused     Lack of Transportation (Non-Medical): Patient refused   Physical Activity: Unknown (8/14/2023)    Exercise Vital Sign     Days of Exercise per Week: Patient refused     Minutes of Exercise per Session: 0 min   Stress: Unknown (8/14/2023)    [employer] [employer] - Occupational Stress Questionnaire     Feeling of Stress : Patient refused   Social Connections: Unknown (8/14/2023)    Social Connection and Isolation Panel [NHANES]     Frequency of Communication with Friends and Family: Patient refused     Frequency of Social Gatherings with Friends and Family: Patient refused     Attends Mu-ism Services: Never     Active Member of Clubs or Organizations: Patient refused     Attends Club or Organization Meetings: Patient refused     Marital Status: Patient refused   Housing Stability: Unknown (8/14/2023)    Housing Stability Vital Sign     Unable to Pay for Housing in the Last Year: Patient refused     Number of Places Lived in the Last Year: 1     Unstable Housing in the Last Year: Patient refused     Medication List with Changes/Refills   Current Medications    AMITRIPTYLINE (ELAVIL) 50 MG TABLET    Take 1 tablet (50 mg total) by mouth every evening.    ATENOLOL (TENORMIN) 25 MG TABLET    TAKE 1 TABLET TWICE DAILY    CLOTRIMAZOLE-BETAMETHASONE 1-0.05% (LOTRISONE) CREAM    APPLY TO AFFECTED AREA 2 TIMES DAILY    CYANOCOBALAMIN 1,000 MCG/ML INJECTION     "Inject 1 mL (1,000 mcg total) into the muscle once a week.    FERROUS GLUCONATE (FERGON) 324 MG TABLET    Take 324 mg by mouth 2 (two) times a day.    FLUTICASONE PROPIONATE (FLONASE) 50 MCG/ACTUATION NASAL SPRAY    1 spray (50 mcg total) by Each Nostril route once daily.    HYDROCODONE-ACETAMINOPHEN (NORCO) 5-325 MG PER TABLET    Take 1 tablet by mouth every 6 (six) hours as needed for Pain.    MECLIZINE (ANTIVERT) 12.5 MG TABLET    Take 1 tablet (12.5 mg total) by mouth 3 (three) times daily as needed for Dizziness.    NEEDLE, DISP, 18 G 18 X 1 " NDLE    Use to draw b12 1000 mcg from [place] every 7 days for 7 weeks    NEEDLE, REUSABLE, 25 G 25 X 1 " NDLE    Use to inject B12 1000 mcg every 7 days for 7 weeks    NYSTATIN (MYCOSTATIN) POWDER    Apply TID prn to affected area    OMEGA-3 FATTY ACIDS/FISH OIL (FISH OIL-OMEGA-3 FATTY ACIDS) 300-1,000 MG CAPSULE    Take by mouth once daily.    OMEPRAZOLE (PRILOSEC) 40 MG CAPSULE    TAKE 1 CAPSULE ONE TIME DAILY    ROSUVASTATIN (CRESTOR) 20 MG TABLET    TAKE 1 TABLET ONE TIME DAILY    TIZANIDINE (ZANAFLEX) 4 MG TABLET    TAKE 1 TABLET TWICE DAILY    TRAZODONE (DESYREL) 100 MG TABLET    TAKE 1 TABLET (100 MG TOTAL) BY MOUTH EVERY EVENING.    VENLAFAXINE (EFFEXOR-XR) 75 MG 24 HR CAPSULE    TAKE 1 CAPSULE ONE TIME DAILY     Review of patient's allergies indicates:  No Known Allergies    OBJECTIVE:     Physical exam:    Vitals:    12/05/23 1320   Weight: 112 kg (247 lb)   Height: 5' 9" (1.753 m)   PainSc:   2   PainLoc: Wrist     Vital signs are stable, patient is afebrile.  Patient is well dressed and well groomed, no acute distress.  Alert and oriented to person, place, and time.    Right UE:  Post op dressing taken down.   Incision is clean, dry, and intact.   There is no erythema or exudate. There is no sign of any infection.   Mild swelling to hand  Mild ecchymosis locally  She is NVI.   Sutures in place.   2+ pulses noted.  Cap refill <2 seconds  Motor intact to " hand    ASSESSMENT         Encounter Diagnosis   Name Primary?    S/P carpal tunnel release Yes            5 days status post right carpal tunnel release    PLAN:           Silvia was seen today for pain and post-op evaluation.    Diagnoses and all orders for this visit:    S/P carpal tunnel release      - PO instruction reviewed and provided to patient  - The incision was cleaned with hydrogen peroxide and normal saline. A sterile Band-Aid was applied.   - Patient may clean the incision daily as above.   - carpal tunnel brace provided today.  Patient may use brace as needed for symptomatic relief.    - Patient should notify the office of any signs or symptoms of infection including fevers, erythema, purulent drainage, increasing pain.    - Follow up for suture removal     POST OPERATIVE INSTRUCTIONS - Visit #1    BANDAGES/DRESSING/BATHING:  We have removed the dressing, cleaned your incision, and put on a band-aid at your first post op visit today. You may clean the incision daily as we did today. Keep the incision clean and dry. When showering, you may cover the incision with a plastic bag/umbrella bag.   Do not use Neosporin or other topical ointments or creams on the incision. If you are concerned about any redness or drainage of the incisions, please call the office.    SWELLING  Some swelling of your arm, hand and fingers is normal. The swelling can be decreased by  elevating your arm on a few pillows when lying down. Swelling can be further controlled by cold therapy over the surgical site. Flexion/extension of the fingers (opening and closing your hands) will also help to relieve swelling and prevent stiffness.    ACTIVITY  You may use the hand and wrist as tolerated for light activity. You are encouraged to bend the wrist, elbow and fingers as soon as the initial surgical dressing is removed. Avoid lifting, pushing, or pulling any object greater than 5-10 pounds for the first 10-14 days.     BRACE  Wear  your brace or splint as directed.    MEDICATIONS  Take pain medicines exactly as directed.  If the doctor gave you a prescription medicine for pain, take it as prescribed.  If you are not taking a prescription pain medicine, take an over-the-counter medicine such as acetaminophen (Tylenol), ibuprofen (Advil, Motrin), or naproxen (Aleve) as long as you do not have an allergy or medical condition that prevents you from taking them.  Do not take two or more pain medicines at the same time unless the doctor told you to. Many pain medicines have acetaminophen, which is Tylenol. Too much acetaminophen (Tylenol) can be harmful.    FOLLOW -UP  You should be scheduled for a post-op appointment within the 10-14 days following surgery, at which time we will review your surgery, remove sutures and evaluate incisions, review your post-operative program and answer any of your questions.          Christina Mario PA-C   [facility]

## 2023-12-05 ENCOUNTER — OFFICE VISIT (OUTPATIENT)
Dept: ORTHOPEDICS | Facility: CLINIC | Age: 68
End: 2023-12-05
Payer: MEDICARE

## 2023-12-05 VITALS — WEIGHT: 247 LBS | HEIGHT: 69 IN | BODY MASS INDEX: 36.58 KG/M2

## 2023-12-05 DIAGNOSIS — Z98.890 S/P CARPAL TUNNEL RELEASE: Primary | ICD-10-CM

## 2023-12-05 PROCEDURE — 1159F MED LIST DOCD IN RCRD: CPT | Mod: HCNC,CPTII,S$GLB,

## 2023-12-05 PROCEDURE — 3044F HG A1C LEVEL LT 7.0%: CPT | Mod: HCNC,CPTII,S$GLB,

## 2023-12-05 PROCEDURE — 99024 POSTOP FOLLOW-UP VISIT: CPT | Mod: HCNC,S$GLB,,

## 2023-12-05 PROCEDURE — 3044F PR MOST RECENT HEMOGLOBIN A1C LEVEL <7.0%: ICD-10-PCS | Mod: HCNC,CPTII,S$GLB,

## 2023-12-05 PROCEDURE — 1101F PT FALLS ASSESS-DOCD LE1/YR: CPT | Mod: HCNC,CPTII,S$GLB,

## 2023-12-05 PROCEDURE — 1101F PR PT FALLS ASSESS DOC 0-1 FALLS W/OUT INJ PAST YR: ICD-10-PCS | Mod: HCNC,CPTII,S$GLB,

## 2023-12-05 PROCEDURE — 99999 PR PBB SHADOW E&M-EST. PATIENT-LVL III: CPT | Mod: PBBFAC,HCNC,,

## 2023-12-05 PROCEDURE — 3288F FALL RISK ASSESSMENT DOCD: CPT | Mod: HCNC,CPTII,S$GLB,

## 2023-12-05 PROCEDURE — 3288F PR FALLS RISK ASSESSMENT DOCUMENTED: ICD-10-PCS | Mod: HCNC,CPTII,S$GLB,

## 2023-12-05 PROCEDURE — 1125F AMNT PAIN NOTED PAIN PRSNT: CPT | Mod: HCNC,CPTII,S$GLB,

## 2023-12-05 PROCEDURE — 99024 PR POST-OP FOLLOW-UP VISIT: ICD-10-PCS | Mod: HCNC,S$GLB,,

## 2023-12-05 PROCEDURE — 1159F PR MEDICATION LIST DOCUMENTED IN MEDICAL RECORD: ICD-10-PCS | Mod: HCNC,CPTII,S$GLB,

## 2023-12-05 PROCEDURE — 99999 PR PBB SHADOW E&M-EST. PATIENT-LVL III: ICD-10-PCS | Mod: PBBFAC,HCNC,,

## 2023-12-05 PROCEDURE — 1125F PR PAIN SEVERITY QUANTIFIED, PAIN PRESENT: ICD-10-PCS | Mod: HCNC,CPTII,S$GLB,

## 2023-12-12 ENCOUNTER — OFFICE VISIT (OUTPATIENT)
Dept: ORTHOPEDICS | Facility: CLINIC | Age: 68
End: 2023-12-12
Payer: MEDICARE

## 2023-12-12 VITALS — HEIGHT: 69 IN | WEIGHT: 247 LBS | BODY MASS INDEX: 36.58 KG/M2

## 2023-12-12 DIAGNOSIS — G56.03 CARPAL TUNNEL SYNDROME ON BOTH SIDES: Primary | ICD-10-CM

## 2023-12-12 DIAGNOSIS — Z01.818 PREOP TESTING: ICD-10-CM

## 2023-12-12 PROCEDURE — 3044F PR MOST RECENT HEMOGLOBIN A1C LEVEL <7.0%: ICD-10-PCS | Mod: CPTII,S$GLB,, | Performed by: ORTHOPAEDIC SURGERY

## 2023-12-12 PROCEDURE — 1125F PR PAIN SEVERITY QUANTIFIED, PAIN PRESENT: ICD-10-PCS | Mod: CPTII,S$GLB,, | Performed by: ORTHOPAEDIC SURGERY

## 2023-12-12 PROCEDURE — 99024 POSTOP FOLLOW-UP VISIT: CPT | Mod: S$GLB,,, | Performed by: ORTHOPAEDIC SURGERY

## 2023-12-12 PROCEDURE — 3008F PR BODY MASS INDEX (BMI) DOCUMENTED: ICD-10-PCS | Mod: CPTII,S$GLB,, | Performed by: ORTHOPAEDIC SURGERY

## 2023-12-12 PROCEDURE — 1101F PT FALLS ASSESS-DOCD LE1/YR: CPT | Mod: CPTII,S$GLB,, | Performed by: ORTHOPAEDIC SURGERY

## 2023-12-12 PROCEDURE — 99214 OFFICE O/P EST MOD 30 MIN: CPT | Mod: 24,S$GLB,, | Performed by: ORTHOPAEDIC SURGERY

## 2023-12-12 PROCEDURE — 1101F PR PT FALLS ASSESS DOC 0-1 FALLS W/OUT INJ PAST YR: ICD-10-PCS | Mod: CPTII,S$GLB,, | Performed by: ORTHOPAEDIC SURGERY

## 2023-12-12 PROCEDURE — 1160F RVW MEDS BY RX/DR IN RCRD: CPT | Mod: CPTII,S$GLB,, | Performed by: ORTHOPAEDIC SURGERY

## 2023-12-12 PROCEDURE — 1160F PR REVIEW ALL MEDS BY PRESCRIBER/CLIN PHARMACIST DOCUMENTED: ICD-10-PCS | Mod: CPTII,S$GLB,, | Performed by: ORTHOPAEDIC SURGERY

## 2023-12-12 PROCEDURE — 99999 PR PBB SHADOW E&M-EST. PATIENT-LVL III: CPT | Mod: PBBFAC,HCNC,, | Performed by: ORTHOPAEDIC SURGERY

## 2023-12-12 PROCEDURE — 1125F AMNT PAIN NOTED PAIN PRSNT: CPT | Mod: CPTII,S$GLB,, | Performed by: ORTHOPAEDIC SURGERY

## 2023-12-12 PROCEDURE — 99024 PR POST-OP FOLLOW-UP VISIT: ICD-10-PCS | Mod: S$GLB,,, | Performed by: ORTHOPAEDIC SURGERY

## 2023-12-12 PROCEDURE — 3288F FALL RISK ASSESSMENT DOCD: CPT | Mod: CPTII,S$GLB,, | Performed by: ORTHOPAEDIC SURGERY

## 2023-12-12 PROCEDURE — 3008F BODY MASS INDEX DOCD: CPT | Mod: CPTII,S$GLB,, | Performed by: ORTHOPAEDIC SURGERY

## 2023-12-12 PROCEDURE — 99214 PR OFFICE/OUTPT VISIT, EST, LEVL IV, 30-39 MIN: ICD-10-PCS | Mod: 24,S$GLB,, | Performed by: ORTHOPAEDIC SURGERY

## 2023-12-12 PROCEDURE — 1159F PR MEDICATION LIST DOCUMENTED IN MEDICAL RECORD: ICD-10-PCS | Mod: CPTII,S$GLB,, | Performed by: ORTHOPAEDIC SURGERY

## 2023-12-12 PROCEDURE — 99999 PR PBB SHADOW E&M-EST. PATIENT-LVL III: ICD-10-PCS | Mod: PBBFAC,HCNC,, | Performed by: ORTHOPAEDIC SURGERY

## 2023-12-12 PROCEDURE — 3288F PR FALLS RISK ASSESSMENT DOCUMENTED: ICD-10-PCS | Mod: CPTII,S$GLB,, | Performed by: ORTHOPAEDIC SURGERY

## 2023-12-12 PROCEDURE — 1159F MED LIST DOCD IN RCRD: CPT | Mod: CPTII,S$GLB,, | Performed by: ORTHOPAEDIC SURGERY

## 2023-12-12 PROCEDURE — 3044F HG A1C LEVEL LT 7.0%: CPT | Mod: CPTII,S$GLB,, | Performed by: ORTHOPAEDIC SURGERY

## 2023-12-12 NOTE — H&P (VIEW-ONLY)
Subjective:     Patient ID: Silvia Cotton is a 68 y.o. female.    Chief Complaint: Pain and Post-op Evaluation of the Right Hand      HPI:  The patient is a 68-year-old female status post right carpal tunnel release 11/30/2023.  She is pleased with that result.  She wishes to schedule a left carpal tunnel release    Past Medical History:   Diagnosis Date    Anemia     Hyperlipidemia     Hypertension     PONV (postoperative nausea and vomiting)     Supraventricular tachycardia      Past Surgical History:   Procedure Laterality Date    APPENDECTOMY      CARPAL TUNNEL RELEASE Right 11/30/2023    Procedure: RELEASE, CARPAL TUNNEL;  Surgeon: Kiran Finley MD;  Location: [facility] OR;  Service: Orthopedics;  Laterality: Right;  right carpal tunnel release    CHOLECYSTECTOMY      COLONOSCOPY N/A 01/13/2017    Procedure: COLONOSCOPY;  Surgeon: Taj Nicholas MD;  Location: [facility] ENDO;  Service: Endoscopy;  Laterality: N/A;    COLONOSCOPY N/A 03/10/2022    Procedure: COLONOSCOPY;  Surgeon: Bee Brock MD;  Location: [facility] ENDO;  Service: Endoscopy;  Laterality: N/A;    KNEE SURGERY Left 2017    SHOULDER ARTHROSCOPY      Tonsilectomy      TONSILLECTOMY      Uterine Ablation       Family History   Problem Relation Age of Onset    Cataracts Mother     Heart block Mother     Cataracts Father     Hypertension Sister     Hypertension Sister     Hypertension Brother     Glaucoma Neg Hx     Diabetes Neg Hx     Breast cancer Neg Hx     Colon cancer Neg Hx     Ovarian cancer Neg Hx      Social History     Socioeconomic History    Marital status:    Tobacco Use    Smoking status: Never     Passive exposure: Never    Smokeless tobacco: Never   Substance and Sexual Activity    Alcohol use: Not Currently    Drug use: Not Currently    Sexual activity: Not Currently     Partners: Male     Birth control/protection: Post-menopausal     Social Determinants of Health     Financial Resource Strain: Unknown (8/14/2023)     Overall Financial Resource Strain (CARDIA)     Difficulty of Paying Living Expenses: Patient refused   Food Insecurity: Unknown (8/14/2023)    Hunger Vital Sign     Worried About Running Out of Food in the Last Year: Patient refused     Ran Out of Food in the Last Year: Patient refused   Transportation Needs: Unknown (8/14/2023)    PRAPARE - Transportation     Lack of Transportation (Medical): Patient refused     Lack of Transportation (Non-Medical): Patient refused   Physical Activity: Unknown (8/14/2023)    Exercise Vital Sign     Days of Exercise per Week: Patient refused     Minutes of Exercise per Session: 0 min   Stress: Unknown (8/14/2023)    [employer] [place] [employer] - Occupational Stress Questionnaire     Feeling of Stress : Patient refused   Social Connections: Unknown (8/14/2023)    Social Connection and Isolation Panel [NHANES]     Frequency of Communication with Friends and Family: Patient refused     Frequency of Social Gatherings with Friends and Family: Patient refused     Attends Orthodoxy Services: Never     Active Member of Clubs or Organizations: Patient refused     Attends Club or Organization Meetings: Patient refused     Marital Status: Patient refused   Housing Stability: Unknown (8/14/2023)    Housing Stability Vital Sign     Unable to Pay for Housing in the Last Year: Patient refused     Number of Places Lived in the Last Year: 1     Unstable Housing in the Last Year: Patient refused     Medication List with Changes/Refills   Current Medications    AMITRIPTYLINE (ELAVIL) 50 MG TABLET    Take 1 tablet (50 mg total) by mouth every evening.    ATENOLOL (TENORMIN) 25 MG TABLET    TAKE 1 TABLET TWICE DAILY    CLOTRIMAZOLE-BETAMETHASONE 1-0.05% (LOTRISONE) CREAM    APPLY TO AFFECTED AREA 2 TIMES DAILY    CYANOCOBALAMIN 1,000 MCG/ML INJECTION    Inject 1 mL (1,000 mcg total) into the muscle once a week.    FERROUS GLUCONATE (FERGON) 324 MG TABLET    Take 324 mg by mouth 2 (two)  "times a day.    FLUTICASONE PROPIONATE (FLONASE) 50 MCG/ACTUATION NASAL SPRAY    1 spray (50 mcg total) by Each Nostril route once daily.    HYDROCODONE-ACETAMINOPHEN (NORCO) 5-325 MG PER TABLET    Take 1 tablet by mouth every 6 (six) hours as needed for Pain.    MECLIZINE (ANTIVERT) 12.5 MG TABLET    Take 1 tablet (12.5 mg total) by mouth 3 (three) times daily as needed for Dizziness.    NEEDLE, DISP, 18 G 18 X 1 " NDLE    Use to draw b12 1000 mcg from [place] every 7 days for 7 weeks    NEEDLE, REUSABLE, 25 G 25 X 1 " NDLE    Use to inject B12 1000 mcg every 7 days for 7 weeks    NYSTATIN (MYCOSTATIN) POWDER    Apply TID prn to affected area    OMEGA-3 FATTY ACIDS/FISH OIL (FISH OIL-OMEGA-3 FATTY ACIDS) 300-1,000 MG CAPSULE    Take by mouth once daily.    OMEPRAZOLE (PRILOSEC) 40 MG CAPSULE    TAKE 1 CAPSULE ONE TIME DAILY    ROSUVASTATIN (CRESTOR) 20 MG TABLET    TAKE 1 TABLET ONE TIME DAILY    TIZANIDINE (ZANAFLEX) 4 MG TABLET    TAKE 1 TABLET TWICE DAILY    TRAZODONE (DESYREL) 100 MG TABLET    TAKE 1 TABLET (100 MG TOTAL) BY MOUTH EVERY EVENING.    VENLAFAXINE (EFFEXOR-XR) 75 MG 24 HR CAPSULE    TAKE 1 CAPSULE ONE TIME DAILY     Review of patient's allergies indicates:  No Known Allergies  Review of Systems   Constitutional: Negative for malaise/fatigue.   HENT:  Negative for hearing loss.    Eyes:  Negative for double vision and visual disturbance.   Cardiovascular:  Positive for palpitations. Negative for chest pain.   Respiratory:  Negative for shortness of breath.    Endocrine: Negative for cold intolerance.   Hematologic/Lymphatic: Does not bruise/bleed easily.   Skin:  Negative for poor wound healing and suspicious lesions.   Musculoskeletal:  Negative for gout, joint pain and joint swelling.   Gastrointestinal:  Positive for heartburn. Negative for nausea and vomiting.   Genitourinary:  Negative for dysuria.   Neurological:  Positive for numbness, paresthesias and sensory change.   Psychiatric/Behavioral:  " Positive for altered mental status. Negative for depression, memory loss and substance abuse. The patient has insomnia. The patient is not nervous/anxious.    Allergic/Immunologic: Negative for persistent infections.       Objective:   Body mass index is 36.48 kg/m².  There were no vitals filed for this visit.             General    Constitutional: She is oriented to person, place, and time. She appears well-developed and well-nourished. No distress.   HENT:   Head: Normocephalic.   Mouth/Throat: Oropharynx is clear and moist.   Eyes: EOM are normal.   Cardiovascular:  Normal rate.            Pulmonary/Chest: Effort normal.   Abdominal: Soft.   Neurological: She is alert and oriented to person, place, and time. No cranial nerve deficit.   Psychiatric: She has a normal mood and affect.             Right Hand/Wrist Exam     Inspection   Scars: Wrist - present Hand -  present  Effusion: Wrist - absent Hand -  absent    Other     Neuorologic Exam    Median Distribution: normal  Ulnar Distribution: normal  Radial Distribution: normal    Comments:  The suture lines intact right carpal tunnel incision.  There is no sign of infection.  There are no motor or sensory deficits.      Left Hand/Wrist Exam     Inspection   Scars: Wrist - absent Hand -  absent  Effusion: Wrist - absent Hand -  absent    Pain   Wrist - The patient exhibits pain of the flexor/pronator group.    Tests   Phalens sign: positive  Tinel's sign (median nerve): positive  Carpal Tunnel Compression Test: positive    Atrophy  Thenar:  Negative  Intrinsic: negative    Other     Sensory Exam  Median Distribution: abnormal  Ulnar Distribution: normal  Radial Distribution: normal    Comments:  The patient has a positive Tinel and positive Phalen sign.  There is no thenar atrophy noted.          Vascular Exam       Capillary Refill  Right Hand: normal capillary refill  Left Hand: normal capillary refill          Relevant imaging results reviewed and interpreted  by me, discussed with the patient and / or family today radiographs were not obtained today  Assessment:     Encounter Diagnosis   Name Primary?    Carpal tunnel syndrome on both sides Yes    Status post right carpal tunnel release    Plan:     The patient had the sutures removed from the right carpal tunnel incision.  Steri-Strips were applied.  Wound care was discussed.  She is pleased with that result.  She wishes to have a left carpal tunnel release.  Risk complications and alternatives were discussed including the risk of infection, anesthetic risk, injury to nerves and vessels, loss of motion, and possible need for additional surgeries were discussed.  She seems to understand and agree that surgery.  All questions were answered.                Disclaimer: This note was prepared using a voice recognition system and is likely to have sound alike errors within the text.

## 2023-12-12 NOTE — PROGRESS NOTES
Subjective:     Patient ID: Silvia Cotton is a 68 y.o. female.    Chief Complaint: Pain and Post-op Evaluation of the Right Hand      HPI:  The patient is a 68-year-old female status post right carpal tunnel release 11/30/2023.  She is pleased with that result.  She wishes to schedule a left carpal tunnel release    Past Medical History:   Diagnosis Date    Anemia     Hyperlipidemia     Hypertension     PONV (postoperative nausea and vomiting)     Supraventricular tachycardia      Past Surgical History:   Procedure Laterality Date    APPENDECTOMY      CARPAL TUNNEL RELEASE Right 11/30/2023    Procedure: RELEASE, CARPAL TUNNEL;  Surgeon: Kiran Finley MD;  Location: [facility] OR;  Service: Orthopedics;  Laterality: Right;  right carpal tunnel release    CHOLECYSTECTOMY      COLONOSCOPY N/A 01/13/2017    Procedure: COLONOSCOPY;  Surgeon: Taj Nicholas MD;  Location: [facility] ENDO;  Service: Endoscopy;  Laterality: N/A;    COLONOSCOPY N/A 03/10/2022    Procedure: COLONOSCOPY;  Surgeon: Bee Brock MD;  Location: [facility] ENDO;  Service: Endoscopy;  Laterality: N/A;    KNEE SURGERY Left 2017    SHOULDER ARTHROSCOPY      Tonsilectomy      TONSILLECTOMY      Uterine Ablation       Family History   Problem Relation Age of Onset    Cataracts Mother     Heart block Mother     Cataracts Father     Hypertension Sister     Hypertension Sister     Hypertension Brother     Glaucoma Neg Hx     Diabetes Neg Hx     Breast cancer Neg Hx     Colon cancer Neg Hx     Ovarian cancer Neg Hx      Social History     Socioeconomic History    Marital status:    Tobacco Use    Smoking status: Never     Passive exposure: Never    Smokeless tobacco: Never   Substance and Sexual Activity    Alcohol use: Not Currently    Drug use: Not Currently    Sexual activity: Not Currently     Partners: Male     Birth control/protection: Post-menopausal     Social Determinants of Health     Financial Resource Strain: Unknown (8/14/2023)     Overall Financial Resource Strain (CARDIA)     Difficulty of Paying Living Expenses: Patient refused   Food Insecurity: Unknown (8/14/2023)    Hunger Vital Sign     Worried About Running Out of Food in the Last Year: Patient refused     Ran Out of Food in the Last Year: Patient refused   Transportation Needs: Unknown (8/14/2023)    PRAPARE - Transportation     Lack of Transportation (Medical): Patient refused     Lack of Transportation (Non-Medical): Patient refused   Physical Activity: Unknown (8/14/2023)    Exercise Vital Sign     Days of Exercise per Week: Patient refused     Minutes of Exercise per Session: 0 min   Stress: Unknown (8/14/2023)    [employer] - Occupational Stress Questionnaire     Feeling of Stress : Patient refused   Social Connections: Unknown (8/14/2023)    Social Connection and Isolation Panel [NHANES]     Frequency of Communication with Friends and Family: Patient refused     Frequency of Social Gatherings with Friends and Family: Patient refused     Attends Hoahaoism Services: Never     Active Member of Clubs or Organizations: Patient refused     Attends Club or Organization Meetings: Patient refused     Marital Status: Patient refused   Housing Stability: Unknown (8/14/2023)    Housing Stability Vital Sign     Unable to Pay for Housing in the Last Year: Patient refused     Number of Places Lived in the Last Year: 1     Unstable Housing in the Last Year: Patient refused     Medication List with Changes/Refills   Current Medications    AMITRIPTYLINE (ELAVIL) 50 MG TABLET    Take 1 tablet (50 mg total) by mouth every evening.    ATENOLOL (TENORMIN) 25 MG TABLET    TAKE 1 TABLET TWICE DAILY    CLOTRIMAZOLE-BETAMETHASONE 1-0.05% (LOTRISONE) CREAM    APPLY TO AFFECTED AREA 2 TIMES DAILY    CYANOCOBALAMIN 1,000 MCG/ML INJECTION    Inject 1 mL (1,000 mcg total) into the muscle once a week.    FERROUS GLUCONATE (FERGON) 324 MG TABLET    Take 324 mg by mouth 2 (two)  "times a day.    FLUTICASONE PROPIONATE (FLONASE) 50 MCG/ACTUATION NASAL SPRAY    1 spray (50 mcg total) by Each Nostril route once daily.    HYDROCODONE-ACETAMINOPHEN (NORCO) 5-325 MG PER TABLET    Take 1 tablet by mouth every 6 (six) hours as needed for Pain.    MECLIZINE (ANTIVERT) 12.5 MG TABLET    Take 1 tablet (12.5 mg total) by mouth 3 (three) times daily as needed for Dizziness.    NEEDLE, DISP, 18 G 18 X 1 " NDLE    Use to draw b12 1000 mcg from vail every 7 days for 7 weeks    NEEDLE, REUSABLE, 25 G 25 X 1 " NDLE    Use to inject B12 1000 mcg every 7 days for 7 weeks    NYSTATIN (MYCOSTATIN) POWDER    Apply TID prn to affected area    OMEGA-3 FATTY ACIDS/FISH OIL (FISH OIL-OMEGA-3 FATTY ACIDS) 300-1,000 MG CAPSULE    Take by mouth once daily.    OMEPRAZOLE (PRILOSEC) 40 MG CAPSULE    TAKE 1 CAPSULE ONE TIME DAILY    ROSUVASTATIN (CRESTOR) 20 MG TABLET    TAKE 1 TABLET ONE TIME DAILY    TIZANIDINE (ZANAFLEX) 4 MG TABLET    TAKE 1 TABLET TWICE DAILY    TRAZODONE (DESYREL) 100 MG TABLET    TAKE 1 TABLET (100 MG TOTAL) BY MOUTH EVERY EVENING.    VENLAFAXINE (EFFEXOR-XR) 75 MG 24 HR CAPSULE    TAKE 1 CAPSULE ONE TIME DAILY     Review of patient's allergies indicates:  No Known Allergies  Review of Systems   Constitutional: Negative for malaise/fatigue.   HENT:  Negative for hearing loss.    Eyes:  Negative for double vision and visual disturbance.   Cardiovascular:  Positive for palpitations. Negative for chest pain.   Respiratory:  Negative for shortness of breath.    Endocrine: Negative for cold intolerance.   Hematologic/Lymphatic: Does not bruise/bleed easily.   Skin:  Negative for poor wound healing and suspicious lesions.   Musculoskeletal:  Negative for gout, joint pain and joint swelling.   Gastrointestinal:  Positive for heartburn. Negative for nausea and vomiting.   Genitourinary:  Negative for dysuria.   Neurological:  Positive for numbness, paresthesias and sensory change.   Psychiatric/Behavioral:  " Positive for altered mental status. Negative for depression, memory loss and substance abuse. The patient has insomnia. The patient is not nervous/anxious.    Allergic/Immunologic: Negative for persistent infections.       Objective:   Body mass index is 36.48 kg/m².  There were no vitals filed for this visit.             General    Constitutional: She is oriented to person, place, and time. She appears well-developed and well-nourished. No distress.   HENT:   Head: Normocephalic.   Mouth/Throat: Oropharynx is clear and moist.   Eyes: EOM are normal.   Cardiovascular:  Normal rate.            Pulmonary/Chest: Effort normal.   Abdominal: Soft.   Neurological: She is alert and oriented to person, place, and time. No cranial nerve deficit.   Psychiatric: She has a normal mood and affect.             Right Hand/Wrist Exam     Inspection   Scars: Wrist - present Hand -  present  Effusion: Wrist - absent Hand -  absent    Other     Neuorologic Exam    Median Distribution: normal  Ulnar Distribution: normal  Radial Distribution: normal    Comments:  The suture lines intact right carpal tunnel incision.  There is no sign of infection.  There are no motor or sensory deficits.      Left Hand/Wrist Exam     Inspection   Scars: Wrist - absent Hand -  absent  Effusion: Wrist - absent Hand -  absent    Pain   Wrist - The patient exhibits pain of the flexor/pronator group.    Tests   Phalens sign: positive  Tinel's sign (median nerve): positive  Carpal Tunnel Compression Test: positive    Atrophy  Thenar:  Negative  Intrinsic: negative    Other     Sensory Exam  Median Distribution: abnormal  Ulnar Distribution: normal  Radial Distribution: normal    Comments:  The patient has a positive Tinel and positive Phalen sign.  There is no thenar atrophy noted.          Vascular Exam       Capillary Refill  Right Hand: normal capillary refill  Left Hand: normal capillary refill          Relevant imaging results reviewed and interpreted  by me, discussed with the patient and / or family today radiographs were not obtained today  Assessment:     Encounter Diagnosis   Name Primary?    Carpal tunnel syndrome on both sides Yes    Status post right carpal tunnel release    Plan:     The patient had the sutures removed from the right carpal tunnel incision.  Steri-Strips were applied.  Wound care was discussed.  She is pleased with that result.  She wishes to have a left carpal tunnel release.  Risk complications and alternatives were discussed including the risk of infection, anesthetic risk, injury to nerves and vessels, loss of motion, and possible need for additional surgeries were discussed.  She seems to understand and agree that surgery.  All questions were answered.                Disclaimer: This note was prepared using a voice recognition system and is likely to have sound alike errors within the text.

## 2023-12-13 ENCOUNTER — PATIENT MESSAGE (OUTPATIENT)
Dept: FAMILY MEDICINE | Facility: CLINIC | Age: 68
End: 2023-12-13
Payer: MEDICARE

## 2023-12-13 ENCOUNTER — PATIENT MESSAGE (OUTPATIENT)
Dept: ORTHOPEDICS | Facility: CLINIC | Age: 68
End: 2023-12-13
Payer: MEDICARE

## 2023-12-18 ENCOUNTER — PATIENT MESSAGE (OUTPATIENT)
Dept: PREADMISSION TESTING | Facility: HOSPITAL | Age: 68
End: 2023-12-18
Payer: MEDICARE

## 2023-12-20 ENCOUNTER — PATIENT MESSAGE (OUTPATIENT)
Dept: ORTHOPEDICS | Facility: CLINIC | Age: 68
End: 2023-12-20
Payer: MEDICARE

## 2023-12-27 ENCOUNTER — ANESTHESIA EVENT (OUTPATIENT)
Dept: SURGERY | Facility: HOSPITAL | Age: 68
End: 2023-12-27
Payer: MEDICARE

## 2023-12-27 ENCOUNTER — PATIENT MESSAGE (OUTPATIENT)
Dept: PREADMISSION TESTING | Facility: HOSPITAL | Age: 68
End: 2023-12-27
Payer: MEDICARE

## 2023-12-27 NOTE — ANESTHESIA PREPROCEDURE EVALUATION
12/27/2023  Silvia Cotton is a 68 y.o., female.  Past Medical History:   Diagnosis Date    Anemia     Hyperlipidemia     Hypertension     PONV (postoperative nausea and vomiting)     Supraventricular tachycardia      Past Surgical History:   Procedure Laterality Date    APPENDECTOMY      CARPAL TUNNEL RELEASE Right 11/30/2023    Procedure: RELEASE, CARPAL TUNNEL;  Surgeon: Kiran Finley MD;  Location: [facility];  Service: Orthopedics;  Laterality: Right;  right carpal tunnel release    CHOLECYSTECTOMY      COLONOSCOPY N/A 01/13/2017    Procedure: COLONOSCOPY;  Surgeon: Taj Nicholas MD;  Location: [facility] ENDO;  Service: Endoscopy;  Laterality: N/A;    COLONOSCOPY N/A 03/10/2022    Procedure: COLONOSCOPY;  Surgeon: Bee Brock MD;  Location: [facility] ENDO;  Service: Endoscopy;  Laterality: N/A;    KNEE SURGERY Left 2017    SHOULDER ARTHROSCOPY      Tonsilectomy      TONSILLECTOMY      Uterine Ablation           Pre-op Assessment    I have reviewed the Patient Summary Reports.    I have reviewed the NPO Status.   I have reviewed the Medications.     Review of Systems  Anesthesia Hx:    PONV. History of prior surgery of interest to airway management or planning:           Personal Hx of Anesthesia complications, Post-Operative Nausea/Vomiting                    Social:  Non-Smoker       Hematology/Oncology:       -- Anemia:                                  Cardiovascular:     Hypertension           hyperlipidemia                       Hypertension         Pulmonary:  Pulmonary Normal                       Renal/:  Chronic Renal Disease        Kidney Function/Disease             Hepatic/GI:     GERD      Gerd          Neurological:        Peripheral Neuropathy                        Neuromuscular Disease   Endocrine:        Obesity / BMI > 30  Psych:  Psychiatric History                   Physical Exam  General: Well nourished    Airway:  Mallampati: III   Mouth Opening: Normal  TM Distance: 4 - 6 cm  Tongue: Normal  Neck ROM: Normal ROM    Dental:  Dentures    Chest/Lungs:  Clear to auscultation, Normal Respiratory Rate    Heart:  Rate: Normal  Rhythm: Regular Rhythm        Anesthesia Plan  Type of Anesthesia, risks & benefits discussed:    Anesthesia Type: MAC, Gen Natural Airway  Intra-op Monitoring Plan: Standard ASA Monitors  Post Op Pain Control Plan: multimodal analgesia and IV/PO Opioids PRN  Induction:  IV  Informed Consent: Informed consent signed with the Patient and all parties understand the risks and agree with anesthesia plan.  All questions answered. Patient consented to blood products? No  ASA Score: 3  Day of Surgery Review of History & Physical: H&P Update referred to the surgeon/provider.    Ready For Surgery From Anesthesia Perspective.     .

## 2023-12-28 ENCOUNTER — HOSPITAL ENCOUNTER (OUTPATIENT)
Facility: HOSPITAL | Age: 68
Discharge: HOME | End: 2023-12-28
Attending: ORTHOPAEDIC SURGERY | Admitting: ORTHOPAEDIC SURGERY
Payer: MEDICARE

## 2023-12-28 ENCOUNTER — ANESTHESIA (OUTPATIENT)
Dept: SURGERY | Facility: HOSPITAL | Age: 68
End: 2023-12-28
Payer: MEDICARE

## 2023-12-28 VITALS
RESPIRATION RATE: 20 BRPM | HEART RATE: 73 BPM | SYSTOLIC BLOOD PRESSURE: 133 MMHG | HEIGHT: 69 IN | OXYGEN SATURATION: 95 % | WEIGHT: 251 LBS | DIASTOLIC BLOOD PRESSURE: 71 MMHG | TEMPERATURE: 99 F | BODY MASS INDEX: 37.18 KG/M2

## 2023-12-28 DIAGNOSIS — G56.02 CARPAL TUNNEL SYNDROME OF LEFT WRIST: Primary | ICD-10-CM

## 2023-12-28 DIAGNOSIS — G56.02 LEFT CARPAL TUNNEL SYNDROME: ICD-10-CM

## 2023-12-28 PROCEDURE — 64721 PR REVISE MEDIAN N/CARPAL TUNNEL SURG: ICD-10-PCS | Mod: 79,LT,, | Performed by: ORTHOPAEDIC SURGERY

## 2023-12-28 PROCEDURE — 63600175 PHARM REV CODE 636 W HCPCS

## 2023-12-28 PROCEDURE — 71000033 HC RECOVERY, INTIAL HOUR: Performed by: ORTHOPAEDIC SURGERY

## 2023-12-28 PROCEDURE — 37000008 HC ANESTHESIA 1ST 15 MINUTES: Performed by: ORTHOPAEDIC SURGERY

## 2023-12-28 PROCEDURE — 36000706: Performed by: ORTHOPAEDIC SURGERY

## 2023-12-28 PROCEDURE — 63600175 PHARM REV CODE 636 W HCPCS: Performed by: ANESTHESIOLOGY

## 2023-12-28 PROCEDURE — D9220A PRA ANESTHESIA: Mod: ,,, | Performed by: NURSE ANESTHETIST, CERTIFIED REGISTERED

## 2023-12-28 PROCEDURE — 63600175 PHARM REV CODE 636 W HCPCS: Performed by: NURSE ANESTHETIST, CERTIFIED REGISTERED

## 2023-12-28 PROCEDURE — 36000707: Performed by: ORTHOPAEDIC SURGERY

## 2023-12-28 PROCEDURE — 71000015 HC POSTOP RECOV 1ST HR: Performed by: ORTHOPAEDIC SURGERY

## 2023-12-28 PROCEDURE — 25000003 PHARM REV CODE 250: Performed by: NURSE ANESTHETIST, CERTIFIED REGISTERED

## 2023-12-28 PROCEDURE — 37000009 HC ANESTHESIA EA ADD 15 MINS: Performed by: ORTHOPAEDIC SURGERY

## 2023-12-28 PROCEDURE — 64721 CARPAL TUNNEL SURGERY: CPT | Mod: 79,LT,, | Performed by: ORTHOPAEDIC SURGERY

## 2023-12-28 PROCEDURE — 25000003 PHARM REV CODE 250: Performed by: ANESTHESIOLOGY

## 2023-12-28 PROCEDURE — D9220A PRA ANESTHESIA: ICD-10-PCS | Mod: ,,, | Performed by: NURSE ANESTHETIST, CERTIFIED REGISTERED

## 2023-12-28 RX ORDER — CHLORHEXIDINE GLUCONATE ORAL RINSE 1.2 MG/ML
10 SOLUTION DENTAL 2 TIMES DAILY
Status: DISCONTINUED | OUTPATIENT
Start: 2023-12-28 | End: 2023-12-28 | Stop reason: HOSPADM

## 2023-12-28 RX ORDER — MIDAZOLAM HYDROCHLORIDE 1 MG/ML
INJECTION INTRAMUSCULAR; INTRAVENOUS
Status: DISCONTINUED | OUTPATIENT
Start: 2023-12-28 | End: 2023-12-28

## 2023-12-28 RX ORDER — FENTANYL CITRATE 50 UG/ML
25 INJECTION, SOLUTION INTRAMUSCULAR; INTRAVENOUS EVERY 5 MIN PRN
Status: COMPLETED | OUTPATIENT
Start: 2023-12-28 | End: 2023-12-28

## 2023-12-28 RX ORDER — HYDROCODONE BITARTRATE AND ACETAMINOPHEN 5; 325 MG/1; MG/1
1 TABLET ORAL EVERY 6 HOURS PRN
Qty: 15 TABLET | Refills: 0 | Status: SHIPPED | OUTPATIENT
Start: 2023-12-28 | End: 2024-01-02 | Stop reason: ALTCHOICE

## 2023-12-28 RX ORDER — CHLORHEXIDINE GLUCONATE ORAL RINSE 1.2 MG/ML
10 SOLUTION DENTAL
Status: ACTIVE | OUTPATIENT
Start: 2023-12-28

## 2023-12-28 RX ORDER — DIPHENHYDRAMINE HYDROCHLORIDE 50 MG/ML
25 INJECTION INTRAMUSCULAR; INTRAVENOUS EVERY 6 HOURS PRN
Status: DISCONTINUED | OUTPATIENT
Start: 2023-12-28 | End: 2023-12-28 | Stop reason: HOSPADM

## 2023-12-28 RX ORDER — MEPERIDINE HYDROCHLORIDE 25 MG/ML
12.5 INJECTION INTRAMUSCULAR; INTRAVENOUS; SUBCUTANEOUS ONCE
Status: DISCONTINUED | OUTPATIENT
Start: 2023-12-28 | End: 2023-12-28 | Stop reason: HOSPADM

## 2023-12-28 RX ORDER — FENTANYL CITRATE 50 UG/ML
INJECTION, SOLUTION INTRAMUSCULAR; INTRAVENOUS
Status: DISCONTINUED | OUTPATIENT
Start: 2023-12-28 | End: 2023-12-28

## 2023-12-28 RX ORDER — ONDANSETRON 2 MG/ML
INJECTION INTRAMUSCULAR; INTRAVENOUS
Status: DISCONTINUED | OUTPATIENT
Start: 2023-12-28 | End: 2023-12-28

## 2023-12-28 RX ORDER — SODIUM CHLORIDE, SODIUM LACTATE, POTASSIUM CHLORIDE, CALCIUM CHLORIDE 600; 310; 30; 20 MG/100ML; MG/100ML; MG/100ML; MG/100ML
INJECTION, SOLUTION INTRAVENOUS CONTINUOUS
Status: DISCONTINUED | OUTPATIENT
Start: 2023-12-28 | End: 2023-12-28 | Stop reason: HOSPADM

## 2023-12-28 RX ORDER — HYDROCODONE BITARTRATE AND ACETAMINOPHEN 5; 325 MG/1; MG/1
1 TABLET ORAL
Status: DISCONTINUED | OUTPATIENT
Start: 2023-12-28 | End: 2023-12-28 | Stop reason: HOSPADM

## 2023-12-28 RX ORDER — DEXAMETHASONE SODIUM PHOSPHATE 4 MG/ML
INJECTION, SOLUTION INTRA-ARTICULAR; INTRALESIONAL; INTRAMUSCULAR; INTRAVENOUS; SOFT TISSUE
Status: DISCONTINUED | OUTPATIENT
Start: 2023-12-28 | End: 2023-12-28

## 2023-12-28 RX ORDER — CEFAZOLIN SODIUM 2 G/50ML
2 SOLUTION INTRAVENOUS
Status: COMPLETED | OUTPATIENT
Start: 2023-12-28 | End: 2023-12-28

## 2023-12-28 RX ORDER — LIDOCAINE HYDROCHLORIDE 20 MG/ML
INJECTION INTRAVENOUS
Status: DISCONTINUED | OUTPATIENT
Start: 2023-12-28 | End: 2023-12-28

## 2023-12-28 RX ORDER — HYDROCODONE BITARTRATE AND ACETAMINOPHEN 5; 325 MG/1; MG/1
1 TABLET ORAL EVERY 4 HOURS PRN
Status: DISCONTINUED | OUTPATIENT
Start: 2023-12-28 | End: 2023-12-28 | Stop reason: HOSPADM

## 2023-12-28 RX ORDER — ONDANSETRON 2 MG/ML
4 INJECTION INTRAMUSCULAR; INTRAVENOUS ONCE AS NEEDED
Status: DISCONTINUED | OUTPATIENT
Start: 2023-12-28 | End: 2023-12-28 | Stop reason: HOSPADM

## 2023-12-28 RX ORDER — SODIUM CHLORIDE 0.9 % (FLUSH) 0.9 %
10 SYRINGE (ML) INJECTION
Status: DISCONTINUED | OUTPATIENT
Start: 2023-12-28 | End: 2023-12-28 | Stop reason: HOSPADM

## 2023-12-28 RX ORDER — PROPOFOL 10 MG/ML
INJECTION, EMULSION INTRAVENOUS
Status: DISCONTINUED | OUTPATIENT
Start: 2023-12-28 | End: 2023-12-28

## 2023-12-28 RX ADMIN — FENTANYL CITRATE 25 MCG: 50 INJECTION, SOLUTION INTRAMUSCULAR; INTRAVENOUS at 02:12

## 2023-12-28 RX ADMIN — SODIUM CHLORIDE, SODIUM LACTATE, POTASSIUM CHLORIDE, AND CALCIUM CHLORIDE: 600; 310; 30; 20 INJECTION, SOLUTION INTRAVENOUS at 01:12

## 2023-12-28 RX ADMIN — PROPOFOL 50 MG: 10 INJECTION, EMULSION INTRAVENOUS at 01:12

## 2023-12-28 RX ADMIN — DEXAMETHASONE SODIUM PHOSPHATE 4 MG: 4 INJECTION, SOLUTION INTRA-ARTICULAR; INTRALESIONAL; INTRAMUSCULAR; INTRAVENOUS; SOFT TISSUE at 01:12

## 2023-12-28 RX ADMIN — FENTANYL CITRATE 50 MCG: 50 INJECTION, SOLUTION INTRAMUSCULAR; INTRAVENOUS at 01:12

## 2023-12-28 RX ADMIN — CEFAZOLIN SODIUM 2 G: 2 SOLUTION INTRAVENOUS at 01:12

## 2023-12-28 RX ADMIN — HYDROCODONE BITARTRATE AND ACETAMINOPHEN 1 TABLET: 5; 325 TABLET ORAL at 01:12

## 2023-12-28 RX ADMIN — ONDANSETRON 4 MG: 2 INJECTION INTRAMUSCULAR; INTRAVENOUS at 01:12

## 2023-12-28 RX ADMIN — LIDOCAINE HYDROCHLORIDE 50 MG: 20 INJECTION INTRAVENOUS at 01:12

## 2023-12-28 RX ADMIN — MIDAZOLAM HYDROCHLORIDE 2 MG: 1 INJECTION INTRAMUSCULAR; INTRAVENOUS at 01:12

## 2023-12-28 NOTE — DISCHARGE INSTRUCTIONS
After Hand Surgery  After surgery, the better you take care of yourself--especially your hand--the sooner it will heal. Follow your surgeons instructions. Try not to bump your hand, and dont move or lift anything while youre still wearing bandages, a splint, or a cast.  Care for your hand    Keep your hand elevated above heart level as much as possible for the first several days after surgery. This helps reduce swelling and pain.  To help prevent infection and speed healing, take care not to get your cast or bandages wet.  Keep dressing clean, dry, & intact until your follow up appointment.   Relieve pain as directed  Your surgeon may prescribe pain medicine or suggest you take an anti-inflammatory medicine. You might also be instructed to apply ice (or another cold source) to your hand. If you use ice cubes, put them in a plastic bag and rest it on top of your bandages. Leave the cold source on your hand for as long as its comfortable. Do this several times a day for the first few days after surgery. It may take several minutes before you can feel the cold through the cast or bandages.  Follow up with your surgeon  During a follow-up visit after surgery, your surgeon will check your progress. The stitches, bandages, splint, or cast may be removed. A new cast or splint may be placed. If your hand has healed enough, your surgeon may prescribe exercises.  Do prescribed hand exercises  Your surgeon may recommend that you do exercises. These may be done under the guidance of a physical or occupational therapist. The exercises strengthen your hand, help you regain flexibility, and restore proper function. Do the exercises as advised.  Call your surgeon if you have...  A fever higher than 100.4°F (38.0°C) taken by mouth  Side effects from your medicine, such as prolonged nausea  A wet or loose dressing, or a dressing that is too tight  Excessive bleeding  Increased, ongoing pain or numbness  Signs of infection (such  as drainage, warmth, or redness) at the incision site   Date Last Reviewed: 11/11/2015  © 8066-8359 [employer]. [address] [place] [address], [place] [address]. All rights reserved. This information is not intended as a substitute for professional medical care. Always follow your healthcare professional's instructions.         Nozin Instructions  Goal: the goal of Nozin is to reduce the risk of post-procedural infections by bacteria in the nasal cavity. Think of it as hand  for your nose.    How to use:    1. Shake Nozin bottle well    2. Take a cotton swab and apply 4 drops to one tip    3. Insert cotton tip into one nostril, being sure not to go deeper into nose than tip of the swab.    4. Swab nostril 6 times counterclockwise and 6 times clockwise. Make sure to swab the inside front pocket of the nostril.    5. Take swab out and apply 2 drops to the same cotton tip. Repeat steps 3 and 4 in the other nostril.        Do steps 1-5 twice a day for 7 days.

## 2023-12-28 NOTE — OP NOTE
[facility]  Orthopedic Surgery  Operative Note    SUMMARY     Date of Procedure: 12/28/2023   Assistant: None    Procedure: Procedure(s) (LRB):  RELEASE, CARPAL TUNNEL (Left)       Surgeon(s) and Role:     * Kiran Finley MD - Primary    Assisting Surgeon: None    Pre-Operative Diagnosis: Carpal tunnel syndrome left wrist    Post-Operative Diagnosis:  Left carpal tunnel syndrome    Anesthesia:  Local with sedation    Technical Procedures Used:  left carpal tunnel release    Description of the Findings of the Procedure:  The patient was taken to the operating room where 10 cc of 2% plain lidocaine was used for local anesthetic and was administered.  After exsanguination of the extremity a proximal tourniquet was inflated to 250 mm of mercury.  Satisfactory anesthesia had been achieved the left hand was prepped and draped in the usual sterile fashion.  The patient did receive 2 g of Ancef intravenously preoperatively.  At this time a longitudinal incision was made in line with the 4th ray over the transverse carpal ligament.  The incision was extended using blunt and sharp dissection under 3.5 loupe magnification.  The transverse carpal ligament was identified and sharply incised under direct vision.  A complete release was performed and verified by the surgeon's small finger both proximally and distally.  No additional pathology was encountered.  Satisfactory release had been achieved skin was closed using horizontal mattress 4 0 nylon suture.  Xeroform gauze 4x4s and 2 ABD pads were over wrapped with 3 in gauze dressing.  The patient tolerated the procedure well and was transferred to the recovery room in satisfactory condition.      Complications: No    Estimated Blood Loss (EBL): 5cc                        Condition: Good    Disposition: PACU - hemodynamically stable.    Attestation: I was present and scrubbed for the entire procedure.

## 2023-12-28 NOTE — TRANSFER OF CARE
"Anesthesia Transfer of Care Note    Patient: Silvia Cotton    Procedure(s) Performed: Procedure(s) (LRB):  RELEASE, CARPAL TUNNEL (Left)    Patient location: PACU    Anesthesia Type: MAC    Transport from OR: Transported from OR on room air with adequate spontaneous ventilation    Post pain: adequate analgesia    Post assessment: no apparent anesthetic complications    Post vital signs: stable    Level of consciousness: awake    Nausea/Vomiting: no nausea/vomiting    Complications: none    Transfer of care protocol was followed      Last vitals: Visit Vitals  BP (!) 164/75 (BP Location: Right arm, Patient Position: Sitting)   Pulse 80   Temp 36.4 °C (97.5 °F) (Temporal)   Resp 18   Ht 5' 9" (1.753 m)   Wt 113.9 kg (250 lb 15.9 oz)   SpO2 97%   Breastfeeding No   BMI 37.07 kg/m²     "

## 2023-12-28 NOTE — DISCHARGE SUMMARY
[facility]  Discharge Note  Short Stay    Procedure(s) (LRB):  RELEASE, CARPAL TUNNEL (Left)      OUTCOME: Patient tolerated treatment/procedure well without complication and is now ready for discharge.    DISPOSITION: Home or Self Care    FINAL DIAGNOSIS:  Left carpal tunnel syndrome    FOLLOWUP: In clinic    DISCHARGE INSTRUCTIONS:    Discharge Procedure Orders   Diet general     Call MD for:  temperature >100.4     Call MD for:  persistent nausea and vomiting     Call MD for:  severe uncontrolled pain     Call MD for:  difficulty breathing, headache or visual disturbances     Call MD for:  redness, tenderness, or signs of infection (pain, swelling, redness, odor or green/yellow discharge around incision site)     Call MD for:  hives     Call MD for:  persistent dizziness or light-headedness     Call MD for:  extreme fatigue        TIME SPENT ON DISCHARGE:  20 minutes

## 2023-12-28 NOTE — ANESTHESIA POSTPROCEDURE EVALUATION
Anesthesia Post Evaluation    Patient: Silvia Cotton    Procedure(s) Performed: Procedure(s) (LRB):  RELEASE, CARPAL TUNNEL (Left)    Final Anesthesia Type: general      Patient location during evaluation: PACU  Patient participation: Yes- Able to Participate  Level of consciousness: awake and alert and oriented  Post-procedure vital signs: reviewed and stable  Pain management: adequate  Airway patency: patent    PONV status at discharge: No PONV  Anesthetic complications: no      Cardiovascular status: blood pressure returned to baseline, stable and hemodynamically stable  Respiratory status: unassisted  Hydration status: euvolemic  Follow-up not needed.              Vitals Value Taken Time   /71 12/28/23 1447   Temp 37.1 °C (98.8 °F) 12/28/23 1352   Pulse 75 12/28/23 1449   Resp 27 12/28/23 1449   SpO2 96 % 12/28/23 1448   Vitals shown include unvalidated device data.      Event Time   Out of Recovery 14:40:00         Pain/Lashawn Score: Pain Rating Prior to Med Admin: 8 (12/28/2023  2:35 PM)  Lashawn Score: 10 (12/28/2023  2:47 PM)

## 2023-12-29 ENCOUNTER — PATIENT MESSAGE (OUTPATIENT)
Dept: ORTHOPEDICS | Facility: CLINIC | Age: 68
End: 2023-12-29
Payer: MEDICARE

## 2023-12-29 DIAGNOSIS — Z98.890 S/P CARPAL TUNNEL RELEASE: Primary | ICD-10-CM

## 2023-12-29 RX ORDER — LIDOCAINE HYDROCHLORIDE 20 MG/ML
INJECTION, SOLUTION EPIDURAL; INFILTRATION; INTRACAUDAL; PERINEURAL
Status: DISCONTINUED | OUTPATIENT
Start: 2023-12-29 | End: 2023-12-29 | Stop reason: HOSPADM

## 2023-12-29 RX ORDER — AMITRIPTYLINE HYDROCHLORIDE 50 MG/1
50 TABLET, FILM COATED ORAL NIGHTLY
Qty: 90 TABLET | Refills: 2 | Status: SHIPPED | OUTPATIENT
Start: 2023-12-29 | End: 2024-02-15 | Stop reason: SDUPTHER

## 2023-12-29 NOTE — TELEPHONE ENCOUNTER
Refill Routing Note   Medication(s) are not appropriate for processing by [facility] for the following reason(s):        Outside of protocol    [facility] action(s):  Route  Approve        Medication Therapy Plan: Geriatric Warning: amitriptyline, override by provider 11/22/22; acceptable use per [facility] protocol    Alert overridden per protocol: Yes     Appointments  past 12m or future 3m with PCP    Date Provider   Last Visit   8/15/2023 Stephani Valdivia MD   Next Visit   2/12/2024 Stephani Valdivia MD   ED visits in past 90 days: 0        Note composed:7:41 AM 12/29/2023

## 2023-12-29 NOTE — TELEPHONE ENCOUNTER
No care due was identified.  [facility] Embedded Care Due Messages. Reference number: [id number].   12/28/2023 11:43:44 PM CST

## 2024-01-01 RX ORDER — TIZANIDINE 4 MG/1
TABLET ORAL
Qty: 180 TABLET | Refills: 3 | Status: SHIPPED | OUTPATIENT
Start: 2024-01-01

## 2024-01-02 RX ORDER — HYDROCODONE BITARTRATE AND ACETAMINOPHEN 10; 325 MG/1; MG/1
1 TABLET ORAL EVERY 8 HOURS PRN
Qty: 15 TABLET | Refills: 0 | Status: SHIPPED | OUTPATIENT
Start: 2024-01-02 | End: 2024-02-13

## 2024-01-03 ENCOUNTER — OFFICE VISIT (OUTPATIENT)
Dept: ORTHOPEDICS | Facility: CLINIC | Age: 69
End: 2024-01-03
Payer: MEDICARE

## 2024-01-03 VITALS — BODY MASS INDEX: 37.2 KG/M2 | HEIGHT: 69 IN | WEIGHT: 251.13 LBS

## 2024-01-03 DIAGNOSIS — Z98.890 S/P CARPAL TUNNEL RELEASE: Primary | ICD-10-CM

## 2024-01-03 PROCEDURE — 1125F AMNT PAIN NOTED PAIN PRSNT: CPT | Mod: CPTII,S$GLB,,

## 2024-01-03 PROCEDURE — 99999 PR PBB SHADOW E&M-EST. PATIENT-LVL III: CPT | Mod: PBBFAC,,,

## 2024-01-03 PROCEDURE — 1159F MED LIST DOCD IN RCRD: CPT | Mod: CPTII,S$GLB,,

## 2024-01-03 PROCEDURE — 99024 POSTOP FOLLOW-UP VISIT: CPT | Mod: S$GLB,,,

## 2024-01-03 PROCEDURE — 1101F PT FALLS ASSESS-DOCD LE1/YR: CPT | Mod: CPTII,S$GLB,,

## 2024-01-03 PROCEDURE — 3288F FALL RISK ASSESSMENT DOCD: CPT | Mod: CPTII,S$GLB,,

## 2024-01-03 NOTE — PROGRESS NOTES
SUBJECTIVE:      Patient ID: Silvia Cotton is a 68 y.o. female.    HPI: Ms. Cotton is here today for post-operative visit #1.  She is 6 days status post RELEASE, CARPAL TUNNEL (Left) by Dr. Finley on 12/28/23. She reports that she is having shooting burning pain to the hand, especially in the ring and small fingers.  Pain is 7/10. She is taking the Norco 10mg mostly at nighttime for pain.  She has been compliant with postop instructions and keeping the extremity dry. She denies fever, chills, and sweats since the time of the surgery.     Past Medical History:   Diagnosis Date    Anemia     Hyperlipidemia     Hypertension     PONV (postoperative nausea and vomiting)     Supraventricular tachycardia      Past Surgical History:   Procedure Laterality Date    APPENDECTOMY      CARPAL TUNNEL RELEASE Right 11/30/2023    Procedure: RELEASE, CARPAL TUNNEL;  Surgeon: Kiran Finley MD;  Location: [facility] OR;  Service: Orthopedics;  Laterality: Right;  right carpal tunnel release    CARPAL TUNNEL RELEASE Left 12/28/2023    Procedure: RELEASE, CARPAL TUNNEL;  Surgeon: Kiran Finley MD;  Location: [facility] OR;  Service: Orthopedics;  Laterality: Left;    CHOLECYSTECTOMY      COLONOSCOPY N/A 01/13/2017    Procedure: COLONOSCOPY;  Surgeon: Taj Nicholas MD;  Location: [facility] ENDO;  Service: Endoscopy;  Laterality: N/A;    COLONOSCOPY N/A 03/10/2022    Procedure: COLONOSCOPY;  Surgeon: Bee Brock MD;  Location: [facility] ENDO;  Service: Endoscopy;  Laterality: N/A;    KNEE SURGERY Left 2017    SHOULDER ARTHROSCOPY      Tonsilectomy      TONSILLECTOMY      Uterine Ablation       Family History   Problem Relation Age of Onset    Cataracts Mother     Heart block Mother     Cataracts Father     Hypertension Sister     Hypertension Sister     Hypertension Brother     Glaucoma Neg Hx     Diabetes Neg Hx     Breast cancer Neg Hx     Colon cancer Neg Hx     Ovarian cancer Neg Hx      Social History     Socioeconomic  History    Marital status:    Tobacco Use    Smoking status: Never     Passive exposure: Never    Smokeless tobacco: Never   Substance and Sexual Activity    Alcohol use: Not Currently    Drug use: Not Currently    Sexual activity: Not Currently     Partners: Male     Birth control/protection: Post-menopausal     Social Determinants of Health     Financial Resource Strain: Patient Declined (8/14/2023)    Overall Financial Resource Strain (CARDIA)     Difficulty of Paying Living Expenses: Patient declined   Food Insecurity: Patient Declined (8/14/2023)    Hunger Vital Sign     Worried About Running Out of Food in the Last Year: Patient declined     Ran Out of Food in the Last Year: Patient declined   Transportation Needs: Patient Declined (8/14/2023)    PRAPARE - Transportation     Lack of Transportation (Medical): Patient declined     Lack of Transportation (Non-Medical): Patient declined   Physical Activity: Unknown (8/14/2023)    Exercise Vital Sign     Days of Exercise per Week: Patient declined     Minutes of Exercise per Session: 0 min   Stress: Patient Declined (8/14/2023)    [employer] - Occupational Stress Questionnaire     Feeling of Stress : Patient declined   Social Connections: Unknown (8/14/2023)    Social Connection and Isolation Panel [NHANES]     Frequency of Communication with Friends and Family: Patient declined     Frequency of Social Gatherings with Friends and Family: Patient declined     Attends Sabianism Services: Never     Active Member of Clubs or Organizations: Patient declined     Attends Club or Organization Meetings: Patient declined     Marital Status: Patient declined   Housing Stability: Unknown (8/14/2023)    Housing Stability Vital Sign     Unable to Pay for Housing in the Last Year: Patient refused     Number of Places Lived in the Last Year: 1     Unstable Housing in the Last Year: Patient refused     Medication List with Changes/Refills  "  Current Medications    AMITRIPTYLINE (ELAVIL) 50 MG TABLET    TAKE 1 TABLET EVERY EVENING    ATENOLOL (TENORMIN) 25 MG TABLET    TAKE 1 TABLET TWICE DAILY    CLOTRIMAZOLE-BETAMETHASONE 1-0.05% (LOTRISONE) CREAM    APPLY TO AFFECTED AREA 2 TIMES DAILY    CYANOCOBALAMIN 1,000 MCG/ML INJECTION    Inject 1 mL (1,000 mcg total) into the muscle once a week.    FERROUS GLUCONATE (FERGON) 324 MG TABLET    Take 324 mg by mouth 2 (two) times a day.    FLUTICASONE PROPIONATE (FLONASE) 50 MCG/ACTUATION NASAL SPRAY    1 spray (50 mcg total) by Each Nostril route once daily.    HYDROCODONE-ACETAMINOPHEN (NORCO)  MG PER TABLET    Take 1 tablet by mouth every 8 (eight) hours as needed for Pain.    MECLIZINE (ANTIVERT) 12.5 MG TABLET    Take 1 tablet (12.5 mg total) by mouth 3 (three) times daily as needed for Dizziness.    NEEDLE, DISP, 18 G 18 X 1 " NDLE    Use to draw b12 1000 mcg from [place] every 7 days for 7 weeks    NEEDLE, REUSABLE, 25 G 25 X 1 " NDLE    Use to inject B12 1000 mcg every 7 days for 7 weeks    NYSTATIN (MYCOSTATIN) POWDER    Apply TID prn to affected area    OMEGA-3 FATTY ACIDS/FISH OIL (FISH OIL-OMEGA-3 FATTY ACIDS) 300-1,000 MG CAPSULE    Take by mouth once daily.    OMEPRAZOLE (PRILOSEC) 40 MG CAPSULE    TAKE 1 CAPSULE ONE TIME DAILY    ROSUVASTATIN (CRESTOR) 20 MG TABLET    TAKE 1 TABLET ONE TIME DAILY    TIZANIDINE (ZANAFLEX) 4 MG TABLET    TAKE 1 TABLET TWICE DAILY    TRAZODONE (DESYREL) 100 MG TABLET    TAKE 1 TABLET (100 MG TOTAL) BY MOUTH EVERY EVENING.    VENLAFAXINE (EFFEXOR-XR) 75 MG 24 HR CAPSULE    TAKE 1 CAPSULE ONE TIME DAILY     Review of patient's allergies indicates:  No Known Allergies    OBJECTIVE:     Physical exam:    Vitals:    01/03/24 1324   Weight: 113.9 kg (251 lb 1.7 oz)   Height: 5' 9" (1.753 m)   PainSc:   7   PainLoc: Wrist     Vital signs are stable, patient is afebrile.  Patient is well dressed and well groomed, no acute distress.  Alert and oriented to person, place, " and time.    Left UE:  Post op dressing taken down.   Incision is clean, dry, and intact. Slightly tender to palpation  There is no erythema or exudate. There is no sign of any infection.   Mild ecchymosis locally  Mild swelling to hand and wrist  She is NVI.   Sutures in place.   2+ pulses noted.  Cap refill <2 seconds  Motor intact to hand    ASSESSMENT         Encounter Diagnosis   Name Primary?    S/P carpal tunnel release Yes             6 days status post RELEASE, CARPAL TUNNEL (Left)     PLAN:           Silvia was seen today for post-op evaluation.    Diagnoses and all orders for this visit:    S/P carpal tunnel release      - PO instruction reviewed and provided to patient  - The incision was cleaned with hydrogen peroxide and normal saline. A sterile Band-Aid was applied.   - Patient may clean the incision daily as above.   - CT brace provided today. Patient may use brace as needed for symptomatic relief.    - Patient should notify the office of any signs or symptoms of infection including fevers, erythema, purulent drainage, increasing pain.    - Follow up for suture removal     POST OPERATIVE INSTRUCTIONS - Visit #1    BANDAGES/DRESSING/BATHING:  We have removed the dressing, cleaned your incision, and put on a band-aid at your first post op visit today. You may clean the incision daily as we did today. Keep the incision clean and dry. When showering, you may cover the incision with a plastic bag/umbrella bag.   Do not use Neosporin or other topical ointments or creams on the incision. If you are concerned about any redness or drainage of the incisions, please call the office.    SWELLING  Some swelling of your arm, hand and fingers is normal. The swelling can be decreased by  elevating your arm on a few pillows when lying down. Swelling can be further controlled by cold therapy over the surgical site. Flexion/extension of the fingers (opening and closing your hands) will also help to relieve swelling and  prevent stiffness.    ACTIVITY  You may use the hand and wrist as tolerated for light activity. You are encouraged to bend the wrist, elbow and fingers as soon as the initial surgical dressing is removed. Avoid lifting, pushing, or pulling any object greater than 5-10 pounds for the first 10-14 days.     BRACE  Wear your brace or splint as directed.    MEDICATIONS  Take pain medicines exactly as directed.  If the doctor gave you a prescription medicine for pain, take it as prescribed.  If you are not taking a prescription pain medicine, take an over-the-counter medicine such as acetaminophen (Tylenol), ibuprofen (Advil, Motrin), or naproxen (Aleve) as long as you do not have an allergy or medical condition that prevents you from taking them.  Do not take two or more pain medicines at the same time unless the doctor told you to. Many pain medicines have acetaminophen, which is Tylenol. Too much acetaminophen (Tylenol) can be harmful.    FOLLOW -UP  You should be scheduled for a post-op appointment within the 10-14 days following surgery, at which time we will review your surgery, remove sutures and evaluate incisions, review your post-operative program and answer any of your questions.          LUCINAD NunezHonor[facility]

## 2024-01-09 NOTE — PROGRESS NOTES
SUBJECTIVE:      Patient ID: Silvia Cotton is a 68 y.o. female.    HPI: Ms. Cotton is here today for post-operative visit #2.  She is 14 days status post RELEASE, CARPAL TUNNEL (Left) by Dr. Finley on 12/28/23. She reports that she is still having hand pain and numbness to the ring and small fingers. Pain is 7/10.  She is taking advil as needed for pain.  She has been compliant with postop instructions and keeping the extremity dry. She denies fever, chills, and sweats since the time of the surgery.     Interval hx 1/3/24: Ms. Cotton is here today for post-operative visit #1.  She is 6 days status post RELEASE, CARPAL TUNNEL (Left) by Dr. Finley on 12/28/23. She reports that she is having shooting burning pain to the hand, especially in the ring and small fingers.  Pain is 7/10. She is taking the Norco 10mg mostly at nighttime for pain.  She has been compliant with postop instructions and keeping the extremity dry. She denies fever, chills, and sweats since the time of the surgery.     Past Medical History:   Diagnosis Date    Anemia     Hyperlipidemia     Hypertension     PONV (postoperative nausea and vomiting)     Supraventricular tachycardia      Past Surgical History:   Procedure Laterality Date    APPENDECTOMY      CARPAL TUNNEL RELEASE Right 11/30/2023    Procedure: RELEASE, CARPAL TUNNEL;  Surgeon: Kiran Finley MD;  Location: [facility] OR;  Service: Orthopedics;  Laterality: Right;  right carpal tunnel release    CARPAL TUNNEL RELEASE Left 12/28/2023    Procedure: RELEASE, CARPAL TUNNEL;  Surgeon: Kiran Finley MD;  Location: [facility] OR;  Service: Orthopedics;  Laterality: Left;    CHOLECYSTECTOMY      COLONOSCOPY N/A 01/13/2017    Procedure: COLONOSCOPY;  Surgeon: Taj Nicholas MD;  Location: [facility] ENDO;  Service: Endoscopy;  Laterality: N/A;    COLONOSCOPY N/A 03/10/2022    Procedure: COLONOSCOPY;  Surgeon: Bee Brock MD;  Location: [facility] ENDO;  Service: Endoscopy;  Laterality:  N/A;    KNEE SURGERY Left 2017    SHOULDER ARTHROSCOPY      Tonsilectomy      TONSILLECTOMY      Uterine Ablation       Family History   Problem Relation Age of Onset    Cataracts Mother     Heart block Mother     Cataracts Father     Hypertension Sister     Hypertension Sister     Hypertension Brother     Glaucoma Neg Hx     Diabetes Neg Hx     Breast cancer Neg Hx     Colon cancer Neg Hx     Ovarian cancer Neg Hx      Social History     Socioeconomic History    Marital status:    Tobacco Use    Smoking status: Never     Passive exposure: Never    Smokeless tobacco: Never   Substance and Sexual Activity    Alcohol use: Not Currently    Drug use: Not Currently    Sexual activity: Not Currently     Partners: Male     Birth control/protection: Post-menopausal     Social Determinants of Health     Financial Resource Strain: Patient Declined (8/14/2023)    Overall Financial Resource Strain (CARDIA)     Difficulty of Paying Living Expenses: Patient declined   Food Insecurity: Patient Declined (8/14/2023)    Hunger Vital Sign     Worried About Running Out of Food in the Last Year: Patient declined     Ran Out of Food in the Last Year: Patient declined   Transportation Needs: Patient Declined (8/14/2023)    PRAPARE - Transportation     Lack of Transportation (Medical): Patient declined     Lack of Transportation (Non-Medical): Patient declined   Physical Activity: Unknown (8/14/2023)    Exercise Vital Sign     Days of Exercise per Week: Patient declined     Minutes of Exercise per Session: 0 min   Stress: Patient Declined (8/14/2023)    [employer] - Occupational Stress Questionnaire     Feeling of Stress : Patient declined   Social Connections: Unknown (8/14/2023)    Social Connection and Isolation Panel [NHANES]     Frequency of Communication with Friends and Family: Patient declined     Frequency of Social Gatherings with Friends and Family: Patient declined     Attends Lutheran  "Services: Never     Active Member of Clubs or Organizations: Patient declined     Attends Club or Organization Meetings: Patient declined     Marital Status: Patient declined   Housing Stability: Unknown (8/14/2023)    Housing Stability Vital Sign     Unable to Pay for Housing in the Last Year: Patient refused     Number of Places Lived in the Last Year: 1     Unstable Housing in the Last Year: Patient refused     Medication List with Changes/Refills   Current Medications    AMITRIPTYLINE (ELAVIL) 50 MG TABLET    TAKE 1 TABLET EVERY EVENING    ATENOLOL (TENORMIN) 25 MG TABLET    TAKE 1 TABLET TWICE DAILY    CLOTRIMAZOLE-BETAMETHASONE 1-0.05% (LOTRISONE) CREAM    APPLY TO AFFECTED AREA 2 TIMES DAILY    CYANOCOBALAMIN 1,000 MCG/ML INJECTION    Inject 1 mL (1,000 mcg total) into the muscle once a week.    FERROUS GLUCONATE (FERGON) 324 MG TABLET    Take 324 mg by mouth 2 (two) times a day.    FLUTICASONE PROPIONATE (FLONASE) 50 MCG/ACTUATION NASAL SPRAY    1 spray (50 mcg total) by Each Nostril route once daily.    HYDROCODONE-ACETAMINOPHEN (NORCO)  MG PER TABLET    Take 1 tablet by mouth every 8 (eight) hours as needed for Pain.    MECLIZINE (ANTIVERT) 12.5 MG TABLET    Take 1 tablet (12.5 mg total) by mouth 3 (three) times daily as needed for Dizziness.    NEEDLE, DISP, 18 G 18 X 1 " NDLE    Use to draw b12 1000 mcg from [place] every 7 days for 7 weeks    NEEDLE, REUSABLE, 25 G 25 X 1 " NDLE    Use to inject B12 1000 mcg every 7 days for 7 weeks    NYSTATIN (MYCOSTATIN) POWDER    Apply TID prn to affected area    OMEGA-3 FATTY ACIDS/FISH OIL (FISH OIL-OMEGA-3 FATTY ACIDS) 300-1,000 MG CAPSULE    Take by mouth once daily.    OMEPRAZOLE (PRILOSEC) 40 MG CAPSULE    TAKE 1 CAPSULE ONE TIME DAILY    ROSUVASTATIN (CRESTOR) 20 MG TABLET    TAKE 1 TABLET ONE TIME DAILY    TIZANIDINE (ZANAFLEX) 4 MG TABLET    TAKE 1 TABLET TWICE DAILY    TRAZODONE (DESYREL) 100 MG TABLET    TAKE 1 TABLET (100 MG TOTAL) BY MOUTH EVERY " "EVENING.    VENLAFAXINE (EFFEXOR-XR) 75 MG 24 HR CAPSULE    TAKE 1 CAPSULE ONE TIME DAILY     Review of patient's allergies indicates:  No Known Allergies    OBJECTIVE:     Physical exam:    Vitals:    01/11/24 0937   Weight: 113.9 kg (251 lb 1.7 oz)   Height: 5' 9" (1.753 m)   PainSc:   7   PainLoc: Wrist     Vital signs are stable, patient is afebrile.  Patient is well dressed and well groomed, no acute distress.  Alert and oriented to person, place, and time.    Left UE:  Incision is clean, dry, and intact.   There is no erythema or exudate. There is no sign of any infection.   Mild swelling to hand  She is NVI.   Sutures in place.   2+ pulses noted.  Cap refill <2 seconds  Motor intact to hand    ASSESSMENT         Encounter Diagnosis   Name Primary?    S/P carpal tunnel release Yes            14 days status post RELEASE, CARPAL TUNNEL (Left)     PLAN:           Silvia was seen today for post-op evaluation.    Diagnoses and all orders for this visit:    S/P carpal tunnel release      - PO instruction reviewed and provided to patient  - Explained to pt that small and ring finger sensation is from ulnar n so she may have compression in the elbow/cubital tunnel syndrome. Consider ordering EMG in a few months if symptoms persist  - The incision was cleaned with hydrogen peroxide. Sutures removed with no difficulty. Steri-Strips applied.   - Patient may use brace as needed for symptomatic relief.    - Patient should notify the office of any signs or symptoms of infection including fevers, erythema, purulent drainage, increasing pain.    - Follow up PRN     POST OPERATIVE VISIT INSTRUCTIONS - Visit #2    1. No soaking the incision for at least 7-10 days. You may get it wet in the shower and use regular soap.    2. To avoid a hard, painful scar, we recommend you use Mederma and/or Silicone Scar patches. You may also use Cocoa Butter, Vitamin E oil, Coconut oil, etc.    You may start this 5-7 days after stitches are " removed and when wound is completely closed.    - Mederma scar cream: scar massage over incision 1-2 times per day. You may use topical lotion or Vitamin E oil. Massage an additional few times a day to help the scar become soft and less sensitive.    - Silicone Scar Patches: cut and place over the incision, then massage over the patch to help with scarring and sensitivity. Can be reused up to 10 days if you rinse it clean, let it dry out, then reapply.    Brands: Mepiform Silicone Scar Sheets (recommended), Scar Away, [employer] brand or generic pharmacy brand ([employer], [employer], [facility] [facility])    3. Continue range of motion exercises as instructed at todays visit.    4. You may take Tylenol 500mg and/or Ibuprofen 400mg every 4-6 hours with food for pain as tolerated as long as you do not have an allergy or medical condition that prevents you from taking them.    5. Therapy recommended: none at this time    6. Immobilization: brace as needed    7. Lifting restrictions: start light at about 10lb and increase gradually as tolerated    8. Follow up: prn Randi [place], PA-C [facility]

## 2024-01-11 ENCOUNTER — OFFICE VISIT (OUTPATIENT)
Dept: ORTHOPEDICS | Facility: CLINIC | Age: 69
End: 2024-01-11
Payer: MEDICARE

## 2024-01-11 VITALS — BODY MASS INDEX: 37.2 KG/M2 | WEIGHT: 251.13 LBS | HEIGHT: 69 IN

## 2024-01-11 DIAGNOSIS — Z98.890 S/P CARPAL TUNNEL RELEASE: Primary | ICD-10-CM

## 2024-01-11 PROCEDURE — 1125F AMNT PAIN NOTED PAIN PRSNT: CPT | Mod: CPTII,S$GLB,,

## 2024-01-11 PROCEDURE — 1159F MED LIST DOCD IN RCRD: CPT | Mod: CPTII,S$GLB,,

## 2024-01-11 PROCEDURE — 3288F FALL RISK ASSESSMENT DOCD: CPT | Mod: CPTII,S$GLB,,

## 2024-01-11 PROCEDURE — 1101F PT FALLS ASSESS-DOCD LE1/YR: CPT | Mod: CPTII,S$GLB,,

## 2024-01-11 PROCEDURE — 99999 PR PBB SHADOW E&M-EST. PATIENT-LVL III: CPT | Mod: PBBFAC,,,

## 2024-01-11 PROCEDURE — 99024 POSTOP FOLLOW-UP VISIT: CPT | Mod: S$GLB,,,

## 2024-02-06 ENCOUNTER — LAB VISIT (OUTPATIENT)
Dept: LAB | Facility: HOSPITAL | Age: 69
End: 2024-02-06
Attending: FAMILY MEDICINE
Payer: MEDICARE

## 2024-02-06 DIAGNOSIS — D50.0 IRON DEFICIENCY ANEMIA DUE TO CHRONIC BLOOD LOSS: ICD-10-CM

## 2024-02-06 DIAGNOSIS — Z00.00 WELL ADULT EXAM: ICD-10-CM

## 2024-02-06 DIAGNOSIS — E78.2 MIXED HYPERLIPIDEMIA: ICD-10-CM

## 2024-02-06 LAB
ALBUMIN SERPL BCP-MCNC: 3.8 G/DL (ref 3.5–5.2)
ALP SERPL-CCNC: 60 U/L (ref 55–135)
ALT SERPL W/O P-5'-P-CCNC: 17 U/L (ref 10–44)
ANION GAP SERPL CALC-SCNC: 8 MMOL/L (ref 8–16)
AST SERPL-CCNC: 18 U/L (ref 10–40)
BASOPHILS # BLD AUTO: 0.05 K/UL (ref 0–0.2)
BASOPHILS NFR BLD: 0.8 % (ref 0–1.9)
BILIRUB SERPL-MCNC: 0.6 MG/DL (ref 0.1–1)
BUN SERPL-MCNC: 13 MG/DL (ref 8–23)
CALCIUM SERPL-MCNC: 9.6 MG/DL (ref 8.7–10.5)
CHLORIDE SERPL-SCNC: 108 MMOL/L (ref 95–110)
CHOLEST SERPL-MCNC: 176 MG/DL (ref 120–199)
CHOLEST/HDLC SERPL: 4.5 {RATIO} (ref 2–5)
CO2 SERPL-SCNC: 24 MMOL/L (ref 23–29)
CREAT SERPL-MCNC: 1.2 MG/DL (ref 0.5–1.4)
DIFFERENTIAL METHOD BLD: ABNORMAL
EOSINOPHIL # BLD AUTO: 0.3 K/UL (ref 0–0.5)
EOSINOPHIL NFR BLD: 4.8 % (ref 0–8)
ERYTHROCYTE [DISTWIDTH] IN BLOOD BY AUTOMATED COUNT: 13.6 % (ref 11.5–14.5)
EST. GFR  (NO RACE VARIABLE): 49.9 ML/MIN/1.73 M^2
ESTIMATED AVG GLUCOSE: 108 MG/DL (ref 68–131)
GLUCOSE SERPL-MCNC: 97 MG/DL (ref 70–110)
HBA1C MFR BLD: 5.4 % (ref 4–5.6)
HCT VFR BLD AUTO: 42.7 % (ref 37–48.5)
HDLC SERPL-MCNC: 39 MG/DL (ref 40–75)
HDLC SERPL: 22.2 % (ref 20–50)
HGB BLD-MCNC: 13.4 G/DL (ref 12–16)
HIV 1+2 AB+HIV1 P24 AG SERPL QL IA: NORMAL
IMM GRANULOCYTES # BLD AUTO: 0.02 K/UL (ref 0–0.04)
IMM GRANULOCYTES NFR BLD AUTO: 0.3 % (ref 0–0.5)
LDLC SERPL CALC-MCNC: 93.8 MG/DL (ref 63–159)
LYMPHOCYTES # BLD AUTO: 2.5 K/UL (ref 1–4.8)
LYMPHOCYTES NFR BLD: 41.5 % (ref 18–48)
MCH RBC QN AUTO: 30.8 PG (ref 27–31)
MCHC RBC AUTO-ENTMCNC: 31.4 G/DL (ref 32–36)
MCV RBC AUTO: 98 FL (ref 82–98)
MONOCYTES # BLD AUTO: 0.6 K/UL (ref 0.3–1)
MONOCYTES NFR BLD: 9.3 % (ref 4–15)
NEUTROPHILS # BLD AUTO: 2.6 K/UL (ref 1.8–7.7)
NEUTROPHILS NFR BLD: 43.3 % (ref 38–73)
NONHDLC SERPL-MCNC: 137 MG/DL
NRBC BLD-RTO: 0 /100 WBC
PLATELET # BLD AUTO: 226 K/UL (ref 150–450)
PMV BLD AUTO: 10.9 FL (ref 9.2–12.9)
POTASSIUM SERPL-SCNC: 4.6 MMOL/L (ref 3.5–5.1)
PROT SERPL-MCNC: 6.5 G/DL (ref 6–8.4)
RBC # BLD AUTO: 4.35 M/UL (ref 4–5.4)
SODIUM SERPL-SCNC: 140 MMOL/L (ref 136–145)
TRIGL SERPL-MCNC: 216 MG/DL (ref 30–150)
WBC # BLD AUTO: 6 K/UL (ref 3.9–12.7)

## 2024-02-06 PROCEDURE — 87389 HIV-1 AG W/HIV-1&-2 AB AG IA: CPT | Mod: HCNC | Performed by: FAMILY MEDICINE

## 2024-02-06 PROCEDURE — 83036 HEMOGLOBIN GLYCOSYLATED A1C: CPT | Mod: DBM,HCNC | Performed by: FAMILY MEDICINE

## 2024-02-06 PROCEDURE — 85025 COMPLETE CBC W/AUTO DIFF WBC: CPT | Mod: HCNC | Performed by: FAMILY MEDICINE

## 2024-02-06 PROCEDURE — 80061 LIPID PANEL: CPT | Mod: HCNC | Performed by: FAMILY MEDICINE

## 2024-02-06 PROCEDURE — 36415 COLL VENOUS BLD VENIPUNCTURE: CPT | Mod: HCNC,PO | Performed by: FAMILY MEDICINE

## 2024-02-06 PROCEDURE — 80053 COMPREHEN METABOLIC PANEL: CPT | Mod: HCNC | Performed by: FAMILY MEDICINE

## 2024-02-12 ENCOUNTER — PATIENT MESSAGE (OUTPATIENT)
Dept: FAMILY MEDICINE | Facility: CLINIC | Age: 69
End: 2024-02-12

## 2024-02-12 ENCOUNTER — OFFICE VISIT (OUTPATIENT)
Dept: FAMILY MEDICINE | Facility: CLINIC | Age: 69
End: 2024-02-12
Payer: MEDICARE

## 2024-02-12 VITALS
SYSTOLIC BLOOD PRESSURE: 128 MMHG | BODY MASS INDEX: 37.59 KG/M2 | HEART RATE: 96 BPM | OXYGEN SATURATION: 97 % | WEIGHT: 254.5 LBS | DIASTOLIC BLOOD PRESSURE: 90 MMHG

## 2024-02-12 DIAGNOSIS — G89.29 CHRONIC LEFT SI JOINT PAIN: Primary | ICD-10-CM

## 2024-02-12 DIAGNOSIS — M79.2 NERVE PAIN: ICD-10-CM

## 2024-02-12 DIAGNOSIS — M53.3 CHRONIC LEFT SI JOINT PAIN: Primary | ICD-10-CM

## 2024-02-12 DIAGNOSIS — E78.2 MIXED HYPERLIPIDEMIA: ICD-10-CM

## 2024-02-12 DIAGNOSIS — E53.8 B12 DEFICIENCY: ICD-10-CM

## 2024-02-12 DIAGNOSIS — Z12.31 BREAST CANCER SCREENING BY MAMMOGRAM: ICD-10-CM

## 2024-02-12 DIAGNOSIS — N18.31 CHRONIC KIDNEY DISEASE, STAGE 3A: ICD-10-CM

## 2024-02-12 DIAGNOSIS — D50.0 IRON DEFICIENCY ANEMIA DUE TO CHRONIC BLOOD LOSS: ICD-10-CM

## 2024-02-12 DIAGNOSIS — F39 MOOD DISORDER: ICD-10-CM

## 2024-02-12 PROCEDURE — 99214 OFFICE O/P EST MOD 30 MIN: CPT | Mod: S$GLB,,, | Performed by: FAMILY MEDICINE

## 2024-02-12 PROCEDURE — 1101F PT FALLS ASSESS-DOCD LE1/YR: CPT | Mod: CPTII,S$GLB,, | Performed by: FAMILY MEDICINE

## 2024-02-12 PROCEDURE — 3074F SYST BP LT 130 MM HG: CPT | Mod: CPTII,S$GLB,, | Performed by: FAMILY MEDICINE

## 2024-02-12 PROCEDURE — 3044F HG A1C LEVEL LT 7.0%: CPT | Mod: CPTII,S$GLB,, | Performed by: FAMILY MEDICINE

## 2024-02-12 PROCEDURE — 99999 PR PBB SHADOW E&M-EST. PATIENT-LVL V: CPT | Mod: PBBFAC,,, | Performed by: FAMILY MEDICINE

## 2024-02-12 PROCEDURE — 3080F DIAST BP >= 90 MM HG: CPT | Mod: CPTII,S$GLB,, | Performed by: FAMILY MEDICINE

## 2024-02-12 PROCEDURE — 1125F AMNT PAIN NOTED PAIN PRSNT: CPT | Mod: CPTII,S$GLB,, | Performed by: FAMILY MEDICINE

## 2024-02-12 PROCEDURE — 3288F FALL RISK ASSESSMENT DOCD: CPT | Mod: CPTII,S$GLB,, | Performed by: FAMILY MEDICINE

## 2024-02-12 PROCEDURE — 3008F BODY MASS INDEX DOCD: CPT | Mod: CPTII,S$GLB,, | Performed by: FAMILY MEDICINE

## 2024-02-12 RX ORDER — TIRZEPATIDE 2.5 MG/.5ML
2.5 INJECTION, SOLUTION SUBCUTANEOUS
Qty: 4 PEN | Refills: 0 | Status: SHIPPED | OUTPATIENT
Start: 2024-02-12 | End: 2024-02-28

## 2024-02-12 RX ORDER — GABAPENTIN 100 MG/1
100 CAPSULE ORAL 3 TIMES DAILY
Qty: 90 CAPSULE | Refills: 11 | Status: SHIPPED | OUTPATIENT
Start: 2024-02-12 | End: 2025-02-11

## 2024-02-12 NOTE — PROGRESS NOTES
Chief Complaint:    Chief Complaint   Patient presents with    Annual Exam     L SI Joint pain       History of Present Illness:  Patient with palpitations presents today for a six month follow-up    Doing well, compliant with medications.  Staying active as tolerated, weight gain of 20 lbs. No longer on Ozempic due to cost.     A1C stable but has increased some, lipid chemistries stable with statin therapy, Stage 3a CKD, creatinine 1.2.  chronic iron deficiency anemia and follows with hematology. Has resolved is still taking iron pills.    Chronic SI joint pain, does not radiate down legs. Used to receive injections    Recent carpal tunnel release. Right arm is feeling fine but her left now has some pain/numbness and cubital tunnel syndrome. Will wait 3 months for a nerve study. The pain is improving but numbness is persistent.     Due for mammogram.    ROS:  Review of Systems   Constitutional:  Negative for activity change, chills, fatigue, fever and unexpected weight change.   HENT:  Negative for congestion, ear discharge, ear pain, hearing loss, postnasal drip and rhinorrhea.    Eyes:  Negative for pain and visual disturbance.   Respiratory:  Negative for cough, chest tightness and shortness of breath.    Cardiovascular:  Negative for chest pain and palpitations.   Gastrointestinal:  Negative for abdominal pain, diarrhea and vomiting.   Endocrine: Negative for heat intolerance.   Genitourinary:  Negative for dysuria, flank pain, frequency and hematuria.   Musculoskeletal:  Positive for arthralgias and myalgias. Negative for back pain, gait problem and neck pain.   Skin:  Negative for color change and rash.   Neurological:  Negative for dizziness, tremors, seizures, numbness and headaches.   Psychiatric/Behavioral:  Negative for agitation, hallucinations, self-injury, sleep disturbance and suicidal ideas. The patient is not nervous/anxious.    All other systems reviewed and are negative.      Past Medical  History:   Diagnosis Date    Anemia     Hyperlipidemia     Hypertension     PONV (postoperative nausea and vomiting)     Supraventricular tachycardia        Social History:  Social History     Socioeconomic History    Marital status:    Tobacco Use    Smoking status: Never     Passive exposure: Never    Smokeless tobacco: Never   Substance and Sexual Activity    Alcohol use: Not Currently    Drug use: Not Currently    Sexual activity: Not Currently     Partners: Male     Birth control/protection: Post-menopausal     Social Determinants of Health     Financial Resource Strain: Medium Risk (2/7/2024)    Overall Financial Resource Strain (CARDIA)     Difficulty of Paying Living Expenses: Somewhat hard   Food Insecurity: No Food Insecurity (2/7/2024)    Hunger Vital Sign     Worried About Running Out of Food in the Last Year: Never true     Ran Out of Food in the Last Year: Never true   Transportation Needs: No Transportation Needs (2/7/2024)    PRAPARE - Transportation     Lack of Transportation (Medical): No     Lack of Transportation (Non-Medical): No   Physical Activity: Insufficiently Active (2/7/2024)    Exercise Vital Sign     Days of Exercise per Week: 1 day     Minutes of Exercise per Session: 20 min   Stress: Stress Concern Present (2/7/2024)    [employer] - Occupational Stress Questionnaire     Feeling of Stress : To some extent   Social Connections: Moderately Isolated (2/7/2024)    Social Connection and Isolation Panel [NHANES]     Frequency of Communication with Friends and Family: More than three times a week     Frequency of Social Gatherings with Friends and Family: Once a week     Attends [employer] Services: Never     Active Member of Clubs or Organizations: No     Attends Club or Organization Meetings: Never     Marital Status:    Housing Stability: Low Risk  (2/7/2024)    Housing Stability Vital Sign     Unable to Pay for Housing in the Last Year: No      Number of Places Lived in the Last Year: 1     Unstable Housing in the Last Year: No       Family History:   family history includes Cataracts in her father and mother; Heart block in her mother; Hypertension in her brother, sister, and sister.    Health Maintenance   Topic Date Due    Mammogram  02/13/2024    TETANUS VACCINE  12/01/2024    Lipid Panel  02/06/2025    DEXA Scan  08/23/2026    Colorectal Cancer Screening  03/10/2027    Hepatitis C Screening  Completed    Shingles Vaccine  Completed       Physical Exam:    Vital Signs  Pulse: 96  SpO2: 97 %  BP: (!) 128/90  BP Location: Left arm  Patient Position: Sitting  Pain Score:   7  Height and Weight  Weight: 115.5 kg (254 lb 8.3 oz)]    Body mass index is 37.59 kg/m².    Physical Exam  Vitals and nursing note reviewed.   Constitutional:       Appearance: Normal appearance. She is not toxic-appearing.   HENT:      Head: Normocephalic and atraumatic.      Right Ear: Tympanic membrane normal.      Left Ear: Tympanic membrane normal.   Eyes:      Extraocular Movements: Extraocular movements intact.      Pupils: Pupils are equal, round, and reactive to light.   Cardiovascular:      Rate and Rhythm: Normal rate and regular rhythm.      Heart sounds: Normal heart sounds.   Pulmonary:      Effort: Pulmonary effort is normal.      Breath sounds: Normal breath sounds. No wheezing, rhonchi or rales.   Abdominal:      General: Bowel sounds are normal. There is no distension.      Palpations: Abdomen is soft.      Tenderness: There is no abdominal tenderness.   Musculoskeletal:         General: Normal range of motion.      Cervical back: Normal range of motion.   Skin:     General: Skin is warm and dry.      Capillary Refill: Capillary refill takes less than 2 seconds.   Neurological:      General: No focal deficit present.      Mental Status: She is alert and oriented to person, place, and time.   Psychiatric:         Mood and Affect: Mood normal.         Behavior:  "Behavior normal.         Judgment: Judgment normal.           Assessment:      ICD-10-CM ICD-9-CM   1. Chronic left SI joint pain  M53.3 724.6    G89.29 338.29   2. Nerve pain  M79.2 729.2   3. Iron deficiency anemia due to chronic blood loss  D50.0 280.0   4. Mood disorder  F39 296.90   5. B12 deficiency  E53.8 266.2   6. Mixed hyperlipidemia  E78.2 272.2   7. Breast cancer screening by mammogram  Z12.31 V76.12   8. Chronic kidney disease, stage 3a  N18.31 585.3            Plan:  Continue current meds and plan.  Refer to pain clinic for chronic left SI joint pain.   Start Gabapentin 100 mg for nerve pain.  Start Mounjaro 2.5 mg.   Other medical problems stable.  Avoid use of NSAIDs.  Schedule mammogram  See labs below  Follow-up 6 months.     Orders Placed This Encounter   Procedures    Mammo Digital Screening Bilat    Comprehensive Metabolic Panel    CBC Auto Differential    Lipid Panel    Microalbumin/Creatinine Ratio, Urine    Ambulatory referral/consult to Pain Clinic       Current Outpatient Medications   Medication Sig Dispense Refill    amitriptyline (ELAVIL) 50 MG tablet TAKE 1 TABLET EVERY EVENING 90 tablet 2    atenoloL (TENORMIN) 25 MG tablet TAKE 1 TABLET TWICE DAILY 180 tablet 3    cyanocobalamin 1,000 mcg/mL injection Inject 1 mL (1,000 mcg total) into the muscle once a week. 30 mL 12    ferrous gluconate (FERGON) 324 MG tablet Take 324 mg by mouth 2 (two) times a day.      needle, disp, 18 G 18 x 1 " Ndle Use to draw b12 1000 mcg from vail every 7 days for 7 weeks 100 each 0    needle, reusable, 25 G 25 x 1 " Ndle Use to inject B12 1000 mcg every 7 days for 7 weeks 12 each 2    omega-3 fatty acids/fish oil (FISH OIL-OMEGA-3 FATTY ACIDS) 300-1,000 mg capsule Take by mouth once daily.      omeprazole (PRILOSEC) 40 MG capsule TAKE 1 CAPSULE ONE TIME DAILY 90 capsule 1    rosuvastatin (CRESTOR) 20 MG tablet TAKE 1 TABLET ONE TIME DAILY 90 tablet 3    tiZANidine (ZANAFLEX) 4 MG tablet TAKE 1 TABLET TWICE " DAILY 180 tablet 3    traZODone (DESYREL) 100 MG tablet TAKE 1 TABLET (100 MG TOTAL) BY MOUTH EVERY EVENING. 90 tablet 3    venlafaxine (EFFEXOR-XR) 75 MG 24 hr capsule TAKE 1 CAPSULE ONE TIME DAILY 90 capsule 3    clotrimazole-betamethasone 1-0.05% (LOTRISONE) cream APPLY TO AFFECTED AREA 2 TIMES DAILY 45 g 2    fluticasone propionate (FLONASE) 50 mcg/actuation nasal spray 1 spray (50 mcg total) by Each Nostril route once daily. 16 g 0    gabapentin (NEURONTIN) 100 MG capsule Take 1 capsule (100 mg total) by mouth 3 (three) times daily. 90 capsule 11    HYDROcodone-acetaminophen (NORCO)  mg per tablet Take 1 tablet by mouth every 8 (eight) hours as needed for Pain. (Patient not taking: Reported on 2/12/2024) 15 tablet 0    meclizine (ANTIVERT) 12.5 mg tablet Take 1 tablet (12.5 mg total) by mouth 3 (three) times daily as needed for Dizziness. 90 tablet 1    nystatin (MYCOSTATIN) powder Apply TID prn to affected area 60 g 3    tirzepatide (MOUNJARO) 2.5 mg/0.5 mL PnIj Inject 2.5 mg into the skin every 7 days. 4 pen 0     No current facility-administered medications for this visit.     Facility-Administered Medications Ordered in Other Visits   Medication Dose Route Frequency Provider Last Rate Last Admin    chlorhexidine 0.12 % solution 10 mL  10 mL Mouth/Throat On Call Procedure Christina Mario PA-C           There are no discontinued medications.        Follow up in about 6 months (around 8/12/2024).      Stephani Valdivia MD  Scribe Attestation:   I, Kaleb High, am scribing for, and in the presence of, Dr.Arif Valdivia I performed the above scribed service and the documentation accurately describes the services I performed. I attest to the accuracy of the note.    I, Dr. Stephani Valdivia, reviewed documentation as scribed above. I performed the services described in this documentation.  I agree that the record reflects my personal performance and is accurate and complete. Stephani Valdivia MD.  02/12/2024

## 2024-02-13 ENCOUNTER — PATIENT MESSAGE (OUTPATIENT)
Dept: FAMILY MEDICINE | Facility: CLINIC | Age: 69
End: 2024-02-13
Payer: MEDICARE

## 2024-02-13 DIAGNOSIS — R11.0 NAUSEA: Primary | ICD-10-CM

## 2024-02-13 RX ORDER — ONDANSETRON 4 MG/1
4 TABLET, ORALLY DISINTEGRATING ORAL EVERY 6 HOURS PRN
Qty: 30 TABLET | Refills: 0 | Status: SHIPPED | OUTPATIENT
Start: 2024-02-13

## 2024-02-13 NOTE — TELEPHONE ENCOUNTER
No care due was identified.  Health [facility] Embedded Care Due Messages. Reference number: [id number].   2/13/2024 10:03:29 AM CST

## 2024-02-15 ENCOUNTER — OFFICE VISIT (OUTPATIENT)
Dept: PAIN MEDICINE | Facility: CLINIC | Age: 69
End: 2024-02-15
Payer: MEDICARE

## 2024-02-15 VITALS
BODY MASS INDEX: 37.88 KG/M2 | HEIGHT: 69 IN | SYSTOLIC BLOOD PRESSURE: 136 MMHG | RESPIRATION RATE: 17 BRPM | WEIGHT: 255.75 LBS | DIASTOLIC BLOOD PRESSURE: 82 MMHG

## 2024-02-15 DIAGNOSIS — M46.1 SACROILIITIS: Primary | ICD-10-CM

## 2024-02-15 DIAGNOSIS — M47.816 LUMBAR FACET ARTHROPATHY: ICD-10-CM

## 2024-02-15 DIAGNOSIS — G89.29 CHRONIC LEFT SI JOINT PAIN: ICD-10-CM

## 2024-02-15 DIAGNOSIS — M53.3 CHRONIC LEFT SI JOINT PAIN: ICD-10-CM

## 2024-02-15 PROCEDURE — 3075F SYST BP GE 130 - 139MM HG: CPT | Mod: CPTII,S$GLB,, | Performed by: ANESTHESIOLOGY

## 2024-02-15 PROCEDURE — 3008F BODY MASS INDEX DOCD: CPT | Mod: CPTII,S$GLB,, | Performed by: ANESTHESIOLOGY

## 2024-02-15 PROCEDURE — 3079F DIAST BP 80-89 MM HG: CPT | Mod: CPTII,S$GLB,, | Performed by: ANESTHESIOLOGY

## 2024-02-15 PROCEDURE — 99999 PR PBB SHADOW E&M-EST. PATIENT-LVL V: CPT | Mod: PBBFAC,HCNC,, | Performed by: ANESTHESIOLOGY

## 2024-02-15 PROCEDURE — 3044F HG A1C LEVEL LT 7.0%: CPT | Mod: CPTII,S$GLB,, | Performed by: ANESTHESIOLOGY

## 2024-02-15 PROCEDURE — 1160F RVW MEDS BY RX/DR IN RCRD: CPT | Mod: CPTII,S$GLB,, | Performed by: ANESTHESIOLOGY

## 2024-02-15 PROCEDURE — 99204 OFFICE O/P NEW MOD 45 MIN: CPT | Mod: S$GLB,,, | Performed by: ANESTHESIOLOGY

## 2024-02-15 PROCEDURE — 1159F MED LIST DOCD IN RCRD: CPT | Mod: CPTII,S$GLB,, | Performed by: ANESTHESIOLOGY

## 2024-02-15 PROCEDURE — 1125F AMNT PAIN NOTED PAIN PRSNT: CPT | Mod: CPTII,S$GLB,, | Performed by: ANESTHESIOLOGY

## 2024-02-15 NOTE — PROGRESS NOTES
New Patient Chronic Pain Note (Initial Visit)    Referring Physician: Stephani Valdivia MD    PCP: Stephani Valdivia MD    Chief Complaint:   Chief Complaint   Patient presents with    Low-back Pain        SUBJECTIVE:    Silvia Cotton is a 68 y.o. female with past medical history significant for hyperlipidemia, stage 3 chronic kidney disease, GERD who presents to the clinic for the evaluation of left-sided sacroiliac joint pain, previous Dr. Galvez patient.  Today she reports pain has been present for over 5 years without inciting accident injury or trauma.  Pain is constant and today is rated a 5/10.  Pain is described as sharp and stabbing in nature.  Patient reports pain overlying the left sacroiliac joint.  Pain is exacerbated when moving from sitting to standing and with standing and ambulation.  She reports she is only able to ambulate approximately 5-10 minutes when pain is severe.  Pain is improved with topical application of heat and ice as well as prior left-sided sacroiliac joint injection.  She reports she received greater than 80% relief lasting several years in duration.  Patient has completed conventional land-based physical therapy in the past at [email or web address]  in [place].  She has continued physician directed physical therapy exercises at home over the last 8 weeks from 12/15/2023 through 02/15/2024 with marginal improvement in her symptoms.  Today she denies any right-sided pain, lower extremity radiculopathy, lower extremity weakness or bowel or bladder incontinence.    Patient denies night fever/night sweats, urinary incontinence, bowel incontinence, significant weight loss, significant motor weakness, and loss of sensations.      Pain Disability Index Review:         2/15/2024     1:20 PM   Last 3 PDI Scores   Pain Disability Index (PDI) 30       Non-Pharmacologic Treatments:  Physical Therapy/Home Exercise: yes  Ice/Heat:yes  TENS: no  Acupuncture: no  Massage: no  Chiropractic: no    Other: no      Pain  "Medications:  - Adjuvant Medications: Elavil (Amitriptyline), Neurontin (Gabapentin), Trazodone (Desyrel), Venlafaxine (Effexor), and Zanaflex ( Tizanidine)    Pain Procedures:   Dr. Galvez:  -01/15/2019: Left-sided sacroiliac joint injection    Past Medical History:   Diagnosis Date    Anemia     Hyperlipidemia     Hypertension     PONV (postoperative nausea and vomiting)     Supraventricular tachycardia      Past Surgical History:   Procedure Laterality Date    APPENDECTOMY      CARPAL TUNNEL RELEASE Right 11/30/2023    Procedure: RELEASE, CARPAL TUNNEL;  Surgeon: Kiran Finley MD;  Location: [facility] OR;  Service: Orthopedics;  Laterality: Right;  right carpal tunnel release    CARPAL TUNNEL RELEASE Left 12/28/2023    Procedure: RELEASE, CARPAL TUNNEL;  Surgeon: Kiran Finley MD;  Location: [facility] OR;  Service: Orthopedics;  Laterality: Left;    CHOLECYSTECTOMY      COLONOSCOPY N/A 01/13/2017    Procedure: COLONOSCOPY;  Surgeon: Taj Nicholas MD;  Location: [facility] ENDO;  Service: Endoscopy;  Laterality: N/A;    COLONOSCOPY N/A 03/10/2022    Procedure: COLONOSCOPY;  Surgeon: Bee Brock MD;  Location: [facility] ENDO;  Service: Endoscopy;  Laterality: N/A;    KNEE SURGERY Left 2017    SHOULDER ARTHROSCOPY      Tonsilectomy      TONSILLECTOMY      Uterine Ablation       Review of patient's allergies indicates:  No Known Allergies    Current Outpatient Medications   Medication Sig    atenoloL (TENORMIN) 25 MG tablet TAKE 1 TABLET TWICE DAILY    cyanocobalamin 1,000 mcg/mL injection Inject 1 mL (1,000 mcg total) into the muscle once a week.    ferrous gluconate (FERGON) 324 MG tablet Take 324 mg by mouth 2 (two) times a day.    gabapentin (NEURONTIN) 100 MG capsule Take 1 capsule (100 mg total) by mouth 3 (three) times daily.    needle, disp, 18 G 18 x 1 " Ndle Use to draw b12 1000 mcg from vail every 7 days for 7 weeks    needle, reusable, 25 G 25 x 1 " Ndle Use to inject B12 1000 mcg every 7 days " "for 7 weeks    omega-3 fatty acids/fish oil (FISH OIL-OMEGA-3 FATTY ACIDS) 300-1,000 mg capsule Take by mouth once daily.    omeprazole (PRILOSEC) 40 MG capsule TAKE 1 CAPSULE ONE TIME DAILY    ondansetron (ZOFRAN-ODT) 4 MG TbDL Take 1 tablet (4 mg total) by mouth every 6 (six) hours as needed (nausea).    rosuvastatin (CRESTOR) 20 MG tablet TAKE 1 TABLET ONE TIME DAILY    tirzepatide (MOUNJARO) 2.5 mg/0.5 mL PnIj Inject 2.5 mg into the skin every 7 days.    tiZANidine (ZANAFLEX) 4 MG tablet TAKE 1 TABLET TWICE DAILY    traZODone (DESYREL) 100 MG tablet TAKE 1 TABLET (100 MG TOTAL) BY MOUTH EVERY EVENING.    venlafaxine (EFFEXOR-XR) 75 MG 24 hr capsule TAKE 1 CAPSULE ONE TIME DAILY     No current facility-administered medications for this visit.     Facility-Administered Medications Ordered in Other Visits   Medication    chlorhexidine 0.12 % solution 10 mL       Review of Systems     GENERAL:  No weight loss, malaise or fevers.  HEENT:   No recent changes in vision or hearing  NECK:  Negative for lumps, no difficulty with swallowing.  RESPIRATORY:  Negative for cough, wheezing or shortness of breath, patient denies any recent URI.  CARDIOVASCULAR:  Negative for chest pain or palpitations.  GI:  Negative for abdominal discomfort, blood in stools or black stools or change in bowel habits.  MUSCULOSKELETAL:  See HPI.  SKIN:  Negative for lesions, rash, and itching.  PSYCH:  No mood disorder or recent psychosocial stressors.   HEMATOLOGY/LYMPHOLOGY:  Negative for prolonged bleeding, bruising easily or swollen nodes.    NEURO:   No history of syncope, paralysis, seizures or tremors.  All other reviewed and negative other than HPI.    OBJECTIVE:    /82   Pulse (P) 95   Resp 17   Ht 5' 9" (1.753 m)   Wt 116 kg (255 lb 11.7 oz)   BMI 37.77 kg/m²       Physical Exam    GENERAL: Well appearing, in no acute distress, alert and oriented x3.  PSYCH:  Mood and affect appropriate.  SKIN: Skin color, texture, turgor " normal, no rashes or lesions.  HEAD/FACE:  Normocephalic, atraumatic. Cranial nerves grossly intact.    CV: RRR with palpation of the radial artery.  PULM: No evidence of respiratory difficulty, symmetric chest rise.  GI:  Soft and non-tender.    BACK: Straight leg raising in the sitting and supine positions is negative to radicular pain. No pain to palpation over the facet joints of the lumbar spine or spinous processes. Normal range of motion without pain reproduction.  EXTREMITIES: Peripheral joint ROM is full and pain free without obvious instability or laxity in all four extremities. No deformities, edema, or skin discoloration. Good capillary refill.  MUSCULOSKELETAL: Able to stand on heels & toes.   hip, and knee provocative maneuvers are negative.    SIJ testing: LEFT  - TTP over SI joint: Present  - Sepideh's/ Uriel's: Positive    - Sacroiliac Distraction Test (anterior pressure): Positive  - Sacroiliac Compression Test (lateral pressure): Positive   - SacralThrust Test (posterior pressure): Positive    Facet loading test is negative bilaterally.   Bilateral upper and lower extremity strength is normal and symmetric.  No atrophy or tone abnormalities are noted.    RIGHT Lower extremity: Hip flexion 5/5, Hip Abduction 5/5, Hip Adduction 5/5, Knee extension 5/5, Knee flexion 5/5, Ankle dorsiflexion5/5, Extensor hallucis longus 5/5, Ankle plantarflexion 5/5  LEFT Lower extremity:  Hip flexion 5/5, Hip Abduction 5/5,Hip Adduction 5/5, Knee extension 5/5, Knee flexion 5/5, Ankle dorsiflexion 5/5, Extensor hallucis longus 5/5, Ankle plantarflexion 5/5  -Normal testing knee (patellar) jerk and ankle (achilles) jerk    NEURO: Bilateral upper and lower extremity coordination and muscle stretch reflexes are physiologic and symmetric. No loss of sensation is noted.  GAIT: normal.    Imagin/10/19    X-Ray Lumbar Spine AP And Lateral  FINDINGS:  No scoliosis.  No acute fracture.  No listhesis.  Mild degenerative  changes with spurring of the endplates and facet arthropathy is noted.  Degenerative changes of the sacroiliac joints are also noted.            ASSESSMENT: 68 y.o. year old female with     1. Sacroiliitis  Case Request-RAD/Other Procedure Area: Left SIJ Injection with local      2. Chronic left SI joint pain  Ambulatory referral/consult to Pain Clinic    Case Request-RAD/Other Procedure Area: Left SIJ Injection with local      3. Lumbar facet arthropathy              PLAN:   - Interventions:  Schedule for left-sided sacroiliac joint injection for sacroiliitis of note patient received greater than 80% relief lasting several years in duration with her prior procedure.. Explained the risks and benefits of the procedure in detail with the patient today in clinic along with alternative treatment options, and the patient elected to pursue the intervention at this time.      - Anticoagulation use: No no anticoagulation     report:  Reviewed and consistent with medication use as prescribed.    - Medications:  -Continue Gabapentin 100 mg TID per Dr. Valdivia.  We have discussed considering increase in dosage pending initial response.    - Therapy:   We discussed initiating physical therapy to help manage the patient/s painful condition. The patient was counseled that muscle strengthening will improve the long term prognosis in regards to pain and may also help increase range of motion and mobility. They were told that one of the goals of physical therapy is that they learn how to do the exercises so that they can do them independently at home daily upon completion. The patient's questions were answered and they were agreeable to this course. A referral for EXT physical therapy: [facility] in [place] was provided to the patient.    - Imaging: Reviewed available imaging with patient and answered any questions they had regarding study.      - Follow up visit: return to clinic in 4-6 weeks      The above plan and management  options were discussed at length with patient. Patient is in agreement with the above and verbalized understanding.    - I discussed the goals of interventional chronic pain management with the patient on today's visit. We discussed a multimodal and systematic approach to pain.  This includes diagnostic and therapeutic injections, adjuvant pharmacologic treatment, physical therapy, and at times psychiatry.  I emphasized the importance of regular exercise, core strengthening and stretching, diet and weight loss as a cornerstone of long-term pain management.    - This condition does not require this patient to take time off of work, and the primary goal of our Pain Management services is to improve the patient's functional capacity.  - Patient Questions: Answered all of the patient's questions regarding diagnoses, therapy, treatment and next steps        Ilene Ortiz MD  Interventional Pain Management  [facility]    Disclaimer:  This note was prepared using voice recognition system and is likely to have sound alike errors that may have been overlooked even after proof reading.  Please call me with any questions

## 2024-02-15 NOTE — H&P (VIEW-ONLY)
New Patient Chronic Pain Note (Initial Visit)    Referring Physician: Stephani Valdivia MD    PCP: Stephani Valdivia MD    Chief Complaint:   Chief Complaint   Patient presents with    Low-back Pain        SUBJECTIVE:    Silvia Cotton is a 68 y.o. female with past medical history significant for hyperlipidemia, stage 3 chronic kidney disease, GERD who presents to the clinic for the evaluation of left-sided sacroiliac joint pain, previous Dr. Galvez patient.  Today she reports pain has been present for over 5 years without inciting accident injury or trauma.  Pain is constant and today is rated a 5/10.  Pain is described as sharp and stabbing in nature.  Patient reports pain overlying the left sacroiliac joint.  Pain is exacerbated when moving from sitting to standing and with standing and ambulation.  She reports she is only able to ambulate approximately 5-10 minutes when pain is severe.  Pain is improved with topical application of heat and ice as well as prior left-sided sacroiliac joint injection.  She reports she received greater than 80% relief lasting several years in duration.  Patient has completed conventional land-based physical therapy in the past at [facility]  in [place].  She has continued physician directed physical therapy exercises at home over the last 8 weeks from 12/15/2023 through 02/15/2024 with marginal improvement in her symptoms.  Today she denies any right-sided pain, lower extremity radiculopathy, lower extremity weakness or bowel or bladder incontinence.    Patient denies night fever/night sweats, urinary incontinence, bowel incontinence, significant weight loss, significant motor weakness, and loss of sensations.      Pain Disability Index Review:         2/15/2024     1:20 PM   Last 3 PDI Scores   Pain Disability Index (PDI) 30       Non-Pharmacologic Treatments:  Physical Therapy/Home Exercise: yes  Ice/Heat:yes  TENS: no  Acupuncture: no  Massage: no  Chiropractic: no    Other: no      Pain  "Medications:  - Adjuvant Medications: Elavil (Amitriptyline), Neurontin (Gabapentin), Trazodone (Desyrel), Venlafaxine (Effexor), and Zanaflex ( Tizanidine)    Pain Procedures:   Dr. Galvez:  -01/15/2019: Left-sided sacroiliac joint injection    Past Medical History:   Diagnosis Date    Anemia     Hyperlipidemia     Hypertension     PONV (postoperative nausea and vomiting)     Supraventricular tachycardia      Past Surgical History:   Procedure Laterality Date    APPENDECTOMY      CARPAL TUNNEL RELEASE Right 11/30/2023    Procedure: RELEASE, CARPAL TUNNEL;  Surgeon: Kiran Finley MD;  Location: [facility] OR;  Service: Orthopedics;  Laterality: Right;  right carpal tunnel release    CARPAL TUNNEL RELEASE Left 12/28/2023    Procedure: RELEASE, CARPAL TUNNEL;  Surgeon: Kiran Finley MD;  Location: [facility] OR;  Service: Orthopedics;  Laterality: Left;    CHOLECYSTECTOMY      COLONOSCOPY N/A 01/13/2017    Procedure: COLONOSCOPY;  Surgeon: Taj Nicholas MD;  Location: [facility] ENDO;  Service: Endoscopy;  Laterality: N/A;    COLONOSCOPY N/A 03/10/2022    Procedure: COLONOSCOPY;  Surgeon: Bee Brock MD;  Location: [facility] ENDO;  Service: Endoscopy;  Laterality: N/A;    KNEE SURGERY Left 2017    SHOULDER ARTHROSCOPY      Tonsilectomy      TONSILLECTOMY      Uterine Ablation       Review of patient's allergies indicates:  No Known Allergies    Current Outpatient Medications   Medication Sig    atenoloL (TENORMIN) 25 MG tablet TAKE 1 TABLET TWICE DAILY    cyanocobalamin 1,000 mcg/mL injection Inject 1 mL (1,000 mcg total) into the muscle once a week.    ferrous gluconate (FERGON) 324 MG tablet Take 324 mg by mouth 2 (two) times a day.    gabapentin (NEURONTIN) 100 MG capsule Take 1 capsule (100 mg total) by mouth 3 (three) times daily.    needle, disp, 18 G 18 x 1 " Ndle Use to draw b12 1000 mcg from vail every 7 days for 7 weeks    needle, reusable, 25 G 25 x 1 " Ndle Use to inject B12 1000 mcg every 7 days " "for 7 weeks    omega-3 fatty acids/fish oil (FISH OIL-OMEGA-3 FATTY ACIDS) 300-1,000 mg capsule Take by mouth once daily.    omeprazole (PRILOSEC) 40 MG capsule TAKE 1 CAPSULE ONE TIME DAILY    ondansetron (ZOFRAN-ODT) 4 MG TbDL Take 1 tablet (4 mg total) by mouth every 6 (six) hours as needed (nausea).    rosuvastatin (CRESTOR) 20 MG tablet TAKE 1 TABLET ONE TIME DAILY    tirzepatide (MOUNJARO) 2.5 mg/0.5 mL PnIj Inject 2.5 mg into the skin every 7 days.    tiZANidine (ZANAFLEX) 4 MG tablet TAKE 1 TABLET TWICE DAILY    traZODone (DESYREL) 100 MG tablet TAKE 1 TABLET (100 MG TOTAL) BY MOUTH EVERY EVENING.    venlafaxine (EFFEXOR-XR) 75 MG 24 hr capsule TAKE 1 CAPSULE ONE TIME DAILY     No current facility-administered medications for this visit.     Facility-Administered Medications Ordered in Other Visits   Medication    chlorhexidine 0.12 % solution 10 mL       Review of Systems     GENERAL:  No weight loss, malaise or fevers.  HEENT:   No recent changes in vision or hearing  NECK:  Negative for lumps, no difficulty with swallowing.  RESPIRATORY:  Negative for cough, wheezing or shortness of breath, patient denies any recent URI.  CARDIOVASCULAR:  Negative for chest pain or palpitations.  GI:  Negative for abdominal discomfort, blood in stools or black stools or change in bowel habits.  MUSCULOSKELETAL:  See HPI.  SKIN:  Negative for lesions, rash, and itching.  PSYCH:  No mood disorder or recent psychosocial stressors.   HEMATOLOGY/LYMPHOLOGY:  Negative for prolonged bleeding, bruising easily or swollen nodes.    NEURO:   No history of syncope, paralysis, seizures or tremors.  All other reviewed and negative other than HPI.    OBJECTIVE:    /82   Pulse (P) 95   Resp 17   Ht 5' 9" (1.753 m)   Wt 116 kg (255 lb 11.7 oz)   BMI 37.77 kg/m²       Physical Exam    GENERAL: Well appearing, in no acute distress, alert and oriented x3.  PSYCH:  Mood and affect appropriate.  SKIN: Skin color, texture, turgor " normal, no rashes or lesions.  HEAD/FACE:  Normocephalic, atraumatic. Cranial nerves grossly intact.    CV: RRR with palpation of the radial artery.  PULM: No evidence of respiratory difficulty, symmetric chest rise.  GI:  Soft and non-tender.    BACK: Straight leg raising in the sitting and supine positions is negative to radicular pain. No pain to palpation over the facet joints of the lumbar spine or spinous processes. Normal range of motion without pain reproduction.  EXTREMITIES: Peripheral joint ROM is full and pain free without obvious instability or laxity in all four extremities. No deformities, edema, or skin discoloration. Good capillary refill.  MUSCULOSKELETAL: Able to stand on heels & toes.   hip, and knee provocative maneuvers are negative.    SIJ testing: LEFT  - TTP over SI joint: Present  - Sepideh's/ Uriel's: Positive    - Sacroiliac Distraction Test (anterior pressure): Positive  - Sacroiliac Compression Test (lateral pressure): Positive   - SacralThrust Test (posterior pressure): Positive    Facet loading test is negative bilaterally.   Bilateral upper and lower extremity strength is normal and symmetric.  No atrophy or tone abnormalities are noted.    RIGHT Lower extremity: Hip flexion 5/5, Hip Abduction 5/5, Hip Adduction 5/5, Knee extension 5/5, Knee flexion 5/5, Ankle dorsiflexion5/5, Extensor hallucis longus 5/5, Ankle plantarflexion 5/5  LEFT Lower extremity:  Hip flexion 5/5, Hip Abduction 5/5,Hip Adduction 5/5, Knee extension 5/5, Knee flexion 5/5, Ankle dorsiflexion 5/5, Extensor hallucis longus 5/5, Ankle plantarflexion 5/5  -Normal testing knee (patellar) jerk and ankle (achilles) jerk    NEURO: Bilateral upper and lower extremity coordination and muscle stretch reflexes are physiologic and symmetric. No loss of sensation is noted.  GAIT: normal.    Imagin/10/19    X-Ray Lumbar Spine AP And Lateral  FINDINGS:  No scoliosis.  No acute fracture.  No listhesis.  Mild degenerative  changes with spurring of the endplates and facet arthropathy is noted.  Degenerative changes of the sacroiliac joints are also noted.            ASSESSMENT: 68 y.o. year old female with     1. Sacroiliitis  Case Request-RAD/Other Procedure Area: Left SIJ Injection with local      2. Chronic left SI joint pain  Ambulatory referral/consult to Pain Clinic    Case Request-RAD/Other Procedure Area: Left SIJ Injection with local      3. Lumbar facet arthropathy              PLAN:   - Interventions:  Schedule for left-sided sacroiliac joint injection for sacroiliitis of note patient received greater than 80% relief lasting several years in duration with her prior procedure.. Explained the risks and benefits of the procedure in detail with the patient today in clinic along with alternative treatment options, and the patient elected to pursue the intervention at this time.      - Anticoagulation use: No no anticoagulation     report:  Reviewed and consistent with medication use as prescribed.    - Medications:  -Continue Gabapentin 100 mg TID per Dr. Valdivia.  We have discussed considering increase in dosage pending initial response.    - Therapy:   We discussed initiating physical therapy to help manage the patient/s painful condition. The patient was counseled that muscle strengthening will improve the long term prognosis in regards to pain and may also help increase range of motion and mobility. They were told that one of the goals of physical therapy is that they learn how to do the exercises so that they can do them independently at home daily upon completion. The patient's questions were answered and they were agreeable to this course. A referral for EXT physical therapy: [facility] in [place] was provided to the patient.    - Imaging: Reviewed available imaging with patient and answered any questions they had regarding study.      - Follow up visit: return to clinic in 4-6 weeks      The above plan and management  options were discussed at length with patient. Patient is in agreement with the above and verbalized understanding.    - I discussed the goals of interventional chronic pain management with the patient on today's visit. We discussed a multimodal and systematic approach to pain.  This includes diagnostic and therapeutic injections, adjuvant pharmacologic treatment, physical therapy, and at times psychiatry.  I emphasized the importance of regular exercise, core strengthening and stretching, diet and weight loss as a cornerstone of long-term pain management.    - This condition does not require this patient to take time off of work, and the primary goal of our Pain Management services is to improve the patient's functional capacity.  - Patient Questions: Answered all of the patient's questions regarding diagnoses, therapy, treatment and next steps        Ilene Ortiz MD  Interventional Pain Management  [facility]    Disclaimer:  This note was prepared using voice recognition system and is likely to have sound alike errors that may have been overlooked even after proof reading.  Please call me with any questions

## 2024-02-16 NOTE — PRE-PROCEDURE INSTRUCTIONS
Spoke with patient regarding procedure scheduled on 2.27     Arrival time 0645     Has patient been sick with fever or on antibiotics within the last 7 days? No     Does the patient have any open wounds, sores or rashes? No     Does the patient have any recent fractures? no     Has patient received a vaccination within the last 7 days? No     Received the COVID vaccination? yes     Has the patient stopped all medications as directed? na     Does patient have a pacemaker, defibrillator, or implantable stimulator? No     Does the patient have a ride to and from procedure and someone reliable to remain with patient?       Is the patient diabetic? no     Does the patient have sleep apnea? Or use O2 at home? No and no     Is the patient receiving sedation? yes     Is the patient instructed to remain NPO beginning at midnight the night before their procedure? yes     Procedure location confirmed with patient? Yes     Covid- Denies signs/symptoms. Instructed to notify PAT/MD if any changes.

## 2024-02-20 ENCOUNTER — PATIENT MESSAGE (OUTPATIENT)
Dept: ADMINISTRATIVE | Facility: CLINIC | Age: 69
End: 2024-02-20
Payer: MEDICARE

## 2024-02-27 ENCOUNTER — HOSPITAL ENCOUNTER (OUTPATIENT)
Facility: HOSPITAL | Age: 69
Discharge: HOME | End: 2024-02-27
Attending: ANESTHESIOLOGY | Admitting: ANESTHESIOLOGY
Payer: MEDICARE

## 2024-02-27 VITALS
TEMPERATURE: 97 F | OXYGEN SATURATION: 95 % | SYSTOLIC BLOOD PRESSURE: 144 MMHG | RESPIRATION RATE: 16 BRPM | WEIGHT: 257.63 LBS | HEART RATE: 70 BPM | DIASTOLIC BLOOD PRESSURE: 74 MMHG | BODY MASS INDEX: 38.04 KG/M2

## 2024-02-27 PROBLEM — M46.1 SACROILIITIS: Status: ACTIVE | Noted: 2024-02-27

## 2024-02-27 PROCEDURE — 27096 INJECT SACROILIAC JOINT: CPT | Mod: LT | Performed by: ANESTHESIOLOGY

## 2024-02-27 PROCEDURE — 63600175 PHARM REV CODE 636 W HCPCS: Performed by: ANESTHESIOLOGY

## 2024-02-27 PROCEDURE — 27096 INJECT SACROILIAC JOINT: CPT | Mod: LT,,, | Performed by: ANESTHESIOLOGY

## 2024-02-27 PROCEDURE — 25000003 PHARM REV CODE 250: Performed by: ANESTHESIOLOGY

## 2024-02-27 PROCEDURE — 25500020 PHARM REV CODE 255: Performed by: ANESTHESIOLOGY

## 2024-02-27 RX ORDER — MIDAZOLAM HYDROCHLORIDE 1 MG/ML
INJECTION, SOLUTION INTRAMUSCULAR; INTRAVENOUS
Status: DISCONTINUED | OUTPATIENT
Start: 2024-02-27 | End: 2024-02-27 | Stop reason: HOSPADM

## 2024-02-27 RX ORDER — INDOMETHACIN 25 MG/1
CAPSULE ORAL
Status: DISCONTINUED | OUTPATIENT
Start: 2024-02-27 | End: 2024-02-27 | Stop reason: HOSPADM

## 2024-02-27 RX ORDER — TRIAMCINOLONE ACETONIDE 40 MG/ML
INJECTION, SUSPENSION INTRA-ARTICULAR; INTRAMUSCULAR
Status: DISCONTINUED | OUTPATIENT
Start: 2024-02-27 | End: 2024-02-27 | Stop reason: HOSPADM

## 2024-02-27 RX ORDER — FENTANYL CITRATE 50 UG/ML
INJECTION, SOLUTION INTRAMUSCULAR; INTRAVENOUS
Status: DISCONTINUED | OUTPATIENT
Start: 2024-02-27 | End: 2024-02-27 | Stop reason: HOSPADM

## 2024-02-27 RX ORDER — BUPIVACAINE HYDROCHLORIDE 2.5 MG/ML
INJECTION, SOLUTION EPIDURAL; INFILTRATION; INTRACAUDAL
Status: DISCONTINUED | OUTPATIENT
Start: 2024-02-27 | End: 2024-02-27 | Stop reason: HOSPADM

## 2024-02-27 NOTE — OP NOTE
Silvia Cotton  68 y.o. female      Vitals:    02/27/24 0739   BP: (!) 150/70   Pulse: 76   Resp: 16   Temp:        Procedure Date:02/27/2024        INFORMED CONSENT: The procedure, risks, benefits and options were discussed with patient. There are no contraindications to the procedure. The patient expressed understanding and agreed to proceed. The personnel performing the procedure was discussed. I verify that I personally obtained consent prior to the start of the procedure and the signed consent can be found on the patient's chart.       Anesthesia:   Conscious sedation provided by M.D    The patient was monitored with continuous pulse oximetry, EKG, and intermittent blood pressure monitors.  The patient was hemodynamically stable throughout the entire process was responsive to voice, and breathing spontaneously.  Supplemental O2 was provided at 2L/min via nasal cannula.  Patient was comfortable for the duration of the procedure. (See nurse documentation and case log for sedation time)    There was a total of 1mg IV Midazolam and 50mcg Fentanyl titrated for the procedure    Pre Procedure diagnosis: Chronic left SI joint pain [M53.3, G89.29]  Sacroiliitis [M46.1]  Post-Procedure diagnosis: SAME      PROCEDURE:   Left sacroiliac joint injection                            REASON FOR PROCEDURE:   Sacroiliitis [M46.1]      MEDICATIONS INJECTED: 1mL 40mg/ml Kenalog and 4mL Bupivacaine 0.25% into each site    LOCAL ANESTHETIC USED: Xylocaine 1% 6ml     ESTIMATED BLOOD LOSS: None.   COMPLICATIONS: None.     TECHNIQUE:       Sacroiliac joint injection:   Laying in the prone position, the patient was prepped and draped in the usual sterile fashion using ChloraPrep and fenestrated drape.  The area was determined under fluoroscopy.  Local Xylocaine was injected by raising a wheel and going down to the periosteum using a 27-gauge hypodermic needle.  The 3.5 inch 22-gauge spinal needle was introduce into the Left sacroiliac  joint.  Negative pressure applied to confirm no intravascular placement.  Omnipaque was injected to confirm placement and to confirm that there was no vascular runoff.  The medication was then injected slowly.  The patient tolerated the procedure well.                       The patient was monitored for approximately 30 minutes after the procedure. Patient was given post procedure and discharge instructions to follow at home. We will see the patient back in two weeks or the patient may call to inform of status. The patient was discharged in a stable condition

## 2024-02-27 NOTE — PLAN OF CARE
Pt discharged home, awake, alert, oriented x's 4,  denies any pain, no apparent distress noted. All questions and concerns addressed and answered, pt verbalizes understanding of discharge process, pt meets discharge criteria and is being discharged to car via wheelchair.

## 2024-02-27 NOTE — DISCHARGE INSTRUCTIONS

## 2024-02-27 NOTE — DISCHARGE SUMMARY
"Discharge Note  Short Stay      SUMMARY     Admit Date: 2/27/2024    Attending Physician: Ilene Ortiz MD        Discharge Physician: Ilene Ortiz MD        Discharge Date: 2/27/2024 7:42 AM    Procedure(s) (LRB):  Left SIJ Injection with local (Left)    Final Diagnosis: Chronic left SI joint pain [M53.3, G89.29]  Sacroiliitis [M46.1]    Disposition: Home or self care    Patient Instructions:   Current Discharge Medication List        CONTINUE these medications which have NOT CHANGED    Details   atenoloL (TENORMIN) 25 MG tablet TAKE 1 TABLET TWICE DAILY  Qty: 180 tablet, Refills: 3      tiZANidine (ZANAFLEX) 4 MG tablet TAKE 1 TABLET TWICE DAILY  Qty: 180 tablet, Refills: 3      traZODone (DESYREL) 100 MG tablet TAKE 1 TABLET (100 MG TOTAL) BY MOUTH EVERY EVENING.  Qty: 90 tablet, Refills: 3      venlafaxine (EFFEXOR-XR) 75 MG 24 hr capsule TAKE 1 CAPSULE ONE TIME DAILY  Qty: 90 capsule, Refills: 3      cyanocobalamin 1,000 mcg/mL injection Inject 1 mL (1,000 mcg total) into the muscle once a week.  Qty: 30 mL, Refills: 12      ferrous gluconate (FERGON) 324 MG tablet Take 324 mg by mouth 2 (two) times a day.      gabapentin (NEURONTIN) 100 MG capsule Take 1 capsule (100 mg total) by mouth 3 (three) times daily.  Qty: 90 capsule, Refills: 11      needle, disp, 18 G 18 x 1 " Ndle Use to draw b12 1000 mcg from vail every 7 days for 7 weeks  Qty: 100 each, Refills: 0      needle, reusable, 25 G 25 x 1 " Ndle Use to inject B12 1000 mcg every 7 days for 7 weeks  Qty: 12 each, Refills: 2      omega-3 fatty acids/fish oil (FISH OIL-OMEGA-3 FATTY ACIDS) 300-1,000 mg capsule Take by mouth once daily.      omeprazole (PRILOSEC) 40 MG capsule TAKE 1 CAPSULE ONE TIME DAILY  Qty: 90 capsule, Refills: 1      ondansetron (ZOFRAN-ODT) 4 MG TbDL Take 1 tablet (4 mg total) by mouth every 6 (six) hours as needed (nausea).  Qty: 30 tablet, Refills: 0    Associated Diagnoses: Nausea      rosuvastatin (CRESTOR) 20 MG tablet TAKE 1 " TABLET ONE TIME DAILY  Qty: 90 tablet, Refills: 3      tirzepatide (MOUNJARO) 2.5 mg/0.5 mL PnIj Inject 2.5 mg into the skin every 7 days.  Qty: 4 pen , Refills: 0                 Discharge Diagnosis: Chronic left SI joint pain [M53.3, G89.29]  Sacroiliitis [M46.1]  Condition on Discharge: Stable with no complications to procedure   Diet on Discharge: Same as before.  Activity: as per instruction sheet.  Discharge to: Home with a responsible adult.  Follow up: 2-4 weeks       Please call the office at [phone] if you experience any weakness or loss of sensation, fever > 101.5, pain uncontrolled with oral medications, persistent nausea/vomiting/or diarrhea, redness or drainage from the incisions, or any other worrisome concerns. If physician on call was not reached or could not communicate with our office for any reason please go to the nearest emergency department

## 2024-02-28 ENCOUNTER — OFFICE VISIT (OUTPATIENT)
Dept: FAMILY MEDICINE | Facility: CLINIC | Age: 69
End: 2024-02-28
Payer: MEDICARE

## 2024-02-28 VITALS
HEART RATE: 88 BPM | DIASTOLIC BLOOD PRESSURE: 80 MMHG | BODY MASS INDEX: 38.04 KG/M2 | WEIGHT: 257.63 LBS | OXYGEN SATURATION: 95 % | SYSTOLIC BLOOD PRESSURE: 136 MMHG

## 2024-02-28 DIAGNOSIS — R07.89 CHEST WALL PAIN: Primary | ICD-10-CM

## 2024-02-28 DIAGNOSIS — J01.90 ACUTE BACTERIAL SINUSITIS: ICD-10-CM

## 2024-02-28 DIAGNOSIS — B96.89 ACUTE BACTERIAL SINUSITIS: ICD-10-CM

## 2024-02-28 PROCEDURE — 3079F DIAST BP 80-89 MM HG: CPT | Mod: CPTII,S$GLB,, | Performed by: FAMILY MEDICINE

## 2024-02-28 PROCEDURE — 99999 PR PBB SHADOW E&M-EST. PATIENT-LVL III: CPT | Mod: PBBFAC,,, | Performed by: FAMILY MEDICINE

## 2024-02-28 PROCEDURE — 3075F SYST BP GE 130 - 139MM HG: CPT | Mod: CPTII,S$GLB,, | Performed by: FAMILY MEDICINE

## 2024-02-28 PROCEDURE — 3044F HG A1C LEVEL LT 7.0%: CPT | Mod: CPTII,S$GLB,, | Performed by: FAMILY MEDICINE

## 2024-02-28 PROCEDURE — 3008F BODY MASS INDEX DOCD: CPT | Mod: CPTII,S$GLB,, | Performed by: FAMILY MEDICINE

## 2024-02-28 PROCEDURE — 99214 OFFICE O/P EST MOD 30 MIN: CPT | Mod: S$GLB,,, | Performed by: FAMILY MEDICINE

## 2024-02-28 PROCEDURE — 1159F MED LIST DOCD IN RCRD: CPT | Mod: CPTII,S$GLB,, | Performed by: FAMILY MEDICINE

## 2024-02-28 RX ORDER — AMOXICILLIN AND CLAVULANATE POTASSIUM 875; 125 MG/1; MG/1
1 TABLET, FILM COATED ORAL EVERY 12 HOURS
Qty: 14 TABLET | Refills: 0 | Status: SHIPPED | OUTPATIENT
Start: 2024-02-28 | End: 2024-03-06

## 2024-02-28 NOTE — PROGRESS NOTES
Chief Complaint:    No chief complaint on file.      History of Present Illness:  Patient with palpitations presents today for rib pain,    Right sided rib pain for 4 days. Said she had recent SAMSON injection yesterday and the pain had improved.     Mentions she has been sneezing a lot and has pressure around her R eye.         ROS:  Review of Systems   Constitutional:  Negative for activity change, chills, fatigue, fever and unexpected weight change.   HENT:  Positive for sinus pressure and sneezing. Negative for congestion, ear discharge, ear pain, hearing loss, postnasal drip and rhinorrhea.    Eyes:  Negative for pain and visual disturbance.   Respiratory:  Negative for cough, chest tightness and shortness of breath.    Cardiovascular:  Negative for chest pain and palpitations.   Gastrointestinal:  Negative for abdominal pain, diarrhea and vomiting.   Endocrine: Negative for heat intolerance.   Genitourinary:  Negative for dysuria, flank pain, frequency and hematuria.   Musculoskeletal:  Positive for myalgias. Negative for back pain, gait problem and neck pain.        (+) R side pain   Skin:  Negative for color change and rash.   Neurological:  Negative for dizziness, tremors, seizures, numbness and headaches.   Psychiatric/Behavioral:  Negative for agitation, hallucinations, self-injury, sleep disturbance and suicidal ideas. The patient is not nervous/anxious.    All other systems reviewed and are negative.      Past Medical History:   Diagnosis Date    Anemia     Hyperlipidemia     Hypertension     PONV (postoperative nausea and vomiting)     Supraventricular tachycardia        Social History:  Social History     Socioeconomic History    Marital status:    Tobacco Use    Smoking status: Never     Passive exposure: Never    Smokeless tobacco: Never   Substance and Sexual Activity    Alcohol use: Not Currently    Drug use: Not Currently    Sexual activity: Not Currently     Partners: Male     Birth  control/protection: Post-menopausal     Social Determinants of Health     Financial Resource Strain: Medium Risk (2/7/2024)    Overall Financial Resource Strain (CARDIA)     Difficulty of Paying Living Expenses: Somewhat hard   Food Insecurity: No Food Insecurity (2/7/2024)    Hunger Vital Sign     Worried About Running Out of Food in the Last Year: Never true     Ran Out of Food in the Last Year: Never true   Transportation Needs: No Transportation Needs (2/7/2024)    PRAPARE - Transportation     Lack of Transportation (Medical): No     Lack of Transportation (Non-Medical): No   Physical Activity: Insufficiently Active (2/7/2024)    Exercise Vital Sign     Days of Exercise per Week: 1 day     Minutes of Exercise per Session: 20 min   Stress: Stress Concern Present (2/7/2024)    [employer] [place] [employer] - Occupational Stress Questionnaire     Feeling of Stress : To some extent   Social Connections: Unknown (2/7/2024)    Social Connection and Isolation Panel [NHANES]     Frequency of Communication with Friends and Family: More than three times a week     Frequency of Social Gatherings with Friends and Family: Once a week     Active Member of Clubs or Organizations: No     Attends Club or Organization Meetings: Never     Marital Status:    Recent Concern: Social Connections - Moderately Isolated (2/7/2024)    Social Connection and Isolation Panel [NHANES]     Frequency of Communication with Friends and Family: More than three times a week     Frequency of Social Gatherings with Friends and Family: Once a week     Attends Yazidi Services: Never     Active Member of Clubs or Organizations: No     Attends Club or Organization Meetings: Never     Marital Status:    Housing Stability: Low Risk  (2/7/2024)    Housing Stability Vital Sign     Unable to Pay for Housing in the Last Year: No     Number of Places Lived in the Last Year: 1     Unstable Housing in the Last Year: No       Family  History:   family history includes Cataracts in her father and mother; Heart block in her mother; Hypertension in her brother, sister, and sister.    Health Maintenance   Topic Date Due    Mammogram  02/13/2024    TETANUS VACCINE  12/01/2024    Lipid Panel  02/06/2025    DEXA Scan  08/23/2026    Colorectal Cancer Screening  03/10/2027    Hepatitis C Screening  Completed    Shingles Vaccine  Completed       Physical Exam:    Vital Signs  Pulse: 88  SpO2: 95 %  BP: 136/80  BP Location: Left arm  Patient Position: Sitting  Height and Weight  Weight: 116.8 kg (257 lb 9.7 oz)]    Body mass index is 38.04 kg/m².    Physical Exam  Vitals and nursing note reviewed.   Constitutional:       Appearance: Normal appearance. She is not toxic-appearing.   HENT:      Head: Normocephalic and atraumatic.        Right Ear: Tympanic membrane normal.      Left Ear: Tympanic membrane normal.   Eyes:      Extraocular Movements: Extraocular movements intact.      Pupils: Pupils are equal, round, and reactive to light.   Cardiovascular:      Rate and Rhythm: Normal rate and regular rhythm.      Heart sounds: Normal heart sounds.   Pulmonary:      Effort: Pulmonary effort is normal.      Breath sounds: Normal breath sounds. No wheezing, rhonchi or rales.   Chest:      Chest wall: Tenderness present.       Abdominal:      General: Bowel sounds are normal. There is no distension.      Palpations: Abdomen is soft.      Tenderness: There is no abdominal tenderness.   Musculoskeletal:         General: Normal range of motion.      Cervical back: Normal range of motion.        Back:       Comments: Rib cage tenderness   Skin:     General: Skin is warm and dry.      Capillary Refill: Capillary refill takes less than 2 seconds.   Neurological:      General: No focal deficit present.      Mental Status: She is alert and oriented to person, place, and time.   Psychiatric:         Mood and Affect: Mood normal.         Behavior: Behavior normal.          "Judgment: Judgment normal.           Assessment:      ICD-10-CM ICD-9-CM   1. Chest wall pain  R07.89 786.52   2. Acute bacterial sinusitis  J01.90 461.9    B96.89               Plan:    Take tylenol as needed for right rib cage pain.  Also can use some icy hot    Start Augmentin for acute bacterial sinusitis.    No orders of the defined types were placed in this encounter.      Current Outpatient Medications   Medication Sig Dispense Refill    atenoloL (TENORMIN) 25 MG tablet TAKE 1 TABLET TWICE DAILY 180 tablet 3    cyanocobalamin 1,000 mcg/mL injection Inject 1 mL (1,000 mcg total) into the muscle once a week. 30 mL 12    ferrous gluconate (FERGON) 324 MG tablet Take 324 mg by mouth 2 (two) times a day.      gabapentin (NEURONTIN) 100 MG capsule Take 1 capsule (100 mg total) by mouth 3 (three) times daily. 90 capsule 11    needle, disp, 18 G 18 x 1 " Ndle Use to draw b12 1000 mcg from vail every 7 days for 7 weeks 100 each 0    needle, reusable, 25 G 25 x 1 " Ndle Use to inject B12 1000 mcg every 7 days for 7 weeks 12 each 2    omega-3 fatty acids/fish oil (FISH OIL-OMEGA-3 FATTY ACIDS) 300-1,000 mg capsule Take by mouth once daily.      omeprazole (PRILOSEC) 40 MG capsule TAKE 1 CAPSULE ONE TIME DAILY 90 capsule 1    ondansetron (ZOFRAN-ODT) 4 MG TbDL Take 1 tablet (4 mg total) by mouth every 6 (six) hours as needed (nausea). 30 tablet 0    rosuvastatin (CRESTOR) 20 MG tablet TAKE 1 TABLET ONE TIME DAILY 90 tablet 3    tiZANidine (ZANAFLEX) 4 MG tablet TAKE 1 TABLET TWICE DAILY 180 tablet 3    traZODone (DESYREL) 100 MG tablet TAKE 1 TABLET (100 MG TOTAL) BY MOUTH EVERY EVENING. 90 tablet 3    venlafaxine (EFFEXOR-XR) 75 MG 24 hr capsule TAKE 1 CAPSULE ONE TIME DAILY 90 capsule 3    amoxicillin-clavulanate 875-125mg (AUGMENTIN) 875-125 mg per tablet Take 1 tablet by mouth every 12 (twelve) hours. for 7 days 14 tablet 0     No current facility-administered medications for this visit.     Facility-Administered " Medications Ordered in Other Visits   Medication Dose Route Frequency Provider Last Rate Last Admin    chlorhexidine 0.12 % solution 10 mL  10 mL Mouth/Throat On Call Procedure Christina Mario, LUCINDA           Medications Discontinued During This Encounter   Medication Reason    tirzepatide (MOUNJARO) 2.5 mg/0.5 mL PnIj            No follow-ups on file.      Stephani Valdivia MD  Scribe Attestation:   I, Kaleb High, am scribing for, and in the presence of, Dr.Arif Valdivia I performed the above scribed service and the documentation accurately describes the services I performed. I attest to the accuracy of the note.    I, Dr. Stephani Valdivia, reviewed documentation as scribed above. I performed the services described in this documentation.  I agree that the record reflects my personal performance and is accurate and complete. Stephani Valdivia MD.  02/28/2024

## 2024-03-18 ENCOUNTER — PATIENT MESSAGE (OUTPATIENT)
Dept: OPHTHALMOLOGY | Facility: CLINIC | Age: 69
End: 2024-03-18
Payer: MEDICARE

## 2024-03-18 ENCOUNTER — OFFICE VISIT (OUTPATIENT)
Dept: OPHTHALMOLOGY | Facility: CLINIC | Age: 69
End: 2024-03-18
Payer: MEDICARE

## 2024-03-18 ENCOUNTER — HOSPITAL ENCOUNTER (OUTPATIENT)
Dept: RADIOLOGY | Facility: HOSPITAL | Age: 69
Discharge: HOME | End: 2024-03-18
Attending: FAMILY MEDICINE
Payer: MEDICARE

## 2024-03-18 DIAGNOSIS — H52.03 HYPEROPIA WITH ASTIGMATISM AND PRESBYOPIA, BILATERAL: ICD-10-CM

## 2024-03-18 DIAGNOSIS — H52.4 HYPEROPIA WITH ASTIGMATISM AND PRESBYOPIA, BILATERAL: ICD-10-CM

## 2024-03-18 DIAGNOSIS — H25.13 NUCLEAR SCLEROSIS OF BOTH EYES: Primary | ICD-10-CM

## 2024-03-18 DIAGNOSIS — Z12.31 BREAST CANCER SCREENING BY MAMMOGRAM: ICD-10-CM

## 2024-03-18 DIAGNOSIS — H52.203 HYPEROPIA WITH ASTIGMATISM AND PRESBYOPIA, BILATERAL: ICD-10-CM

## 2024-03-18 PROCEDURE — 77063 BREAST TOMOSYNTHESIS BI: CPT | Mod: 26,HCNC,, | Performed by: RADIOLOGY

## 2024-03-18 PROCEDURE — 77067 SCR MAMMO BI INCL CAD: CPT | Mod: TC,HCNC

## 2024-03-18 PROCEDURE — 3044F HG A1C LEVEL LT 7.0%: CPT | Mod: HCNC,CPTII,S$GLB, | Performed by: OPTOMETRIST

## 2024-03-18 PROCEDURE — 77067 SCR MAMMO BI INCL CAD: CPT | Mod: 26,HCNC,, | Performed by: RADIOLOGY

## 2024-03-18 PROCEDURE — 99999 PR PBB SHADOW E&M-EST. PATIENT-LVL III: CPT | Mod: PBBFAC,HCNC,, | Performed by: OPTOMETRIST

## 2024-03-18 PROCEDURE — 92014 COMPRE OPH EXAM EST PT 1/>: CPT | Mod: HCNC,S$GLB,, | Performed by: OPTOMETRIST

## 2024-03-18 PROCEDURE — 92015 DETERMINE REFRACTIVE STATE: CPT | Mod: HCNC,S$GLB,, | Performed by: OPTOMETRIST

## 2024-03-18 PROCEDURE — 1159F MED LIST DOCD IN RCRD: CPT | Mod: HCNC,CPTII,S$GLB, | Performed by: OPTOMETRIST

## 2024-03-18 NOTE — PROGRESS NOTES
SUBJECTIVE  Silvia Cotton is 68 y.o. female  Corrected distance visual acuity was 20/20 in the right eye and 20/20 in the left eye. Corrected near visual acuity was J1 in the right eye and J1 in the left eye.   Chief Complaint   Patient presents with    Annual Exam     Pt here or annual eye exam. Pt states her vision seems stable. Pt states her eyes are still dry.Pt is using refresh Gel to help with discomfort. Pt denies flashes of lights has floaters.           HPI     Annual Exam     Additional comments: Pt here or annual eye exam. Pt states her vision   seems stable. Pt states her eyes are still dry.Pt is using refresh Gel to   help with discomfort. Pt denies flashes of lights has floaters.            Comments    Pvd with heme OD  Dry eye  Ns ou            Last edited by Juan Diego Cortez, OD on 3/18/2024  9:43 AM.         Assessment /Plan :  1. Nuclear sclerosis of both eyes   Cataracts are not visually significant and not affecting activities of daily living. Annual observation is recommended at this time. Patient to call or return to clinic with any significant change in vision prior to next visit.     2. Hyperopia with astigmatism and presbyopia, bilateral   Dispense Final Rx for glasses.  RTC 1 year  Discussed above and answered questions.

## 2024-04-07 RX ORDER — OMEPRAZOLE 40 MG/1
40 CAPSULE, DELAYED RELEASE ORAL
Qty: 90 CAPSULE | Refills: 3 | Status: SHIPPED | OUTPATIENT
Start: 2024-04-07

## 2024-04-07 NOTE — TELEPHONE ENCOUNTER
No care due was identified.  Health [facility] Embedded Care Due Messages. Reference number: [id number].   4/07/2024 2:12:20 AM CDT

## 2024-04-07 NOTE — TELEPHONE ENCOUNTER
Refill Decision Note   Silvia Yury  is requesting a refill authorization.  Brief Assessment and Rationale for Refill:  Approve     Medication Therapy Plan:        Comments:     Note composed:2:45 PM 04/07/2024

## 2024-04-09 NOTE — PROGRESS NOTES
Established Patient Chronic Pain Note (Initial Visit)    The patient location is: home  The chief complaint leading to consultation is: L SIJ pain  Visit type: Virtual visit with synchronous audio and video  Total time spent with patient: 11-15m  Each patient to whom he or she provides medical services by telemedicine is: (1) informed of the relationship between the physician and patient and the respective role of any other health care provider with respect to management of the patient; and (2) notified that he or she may decline to receive medical services by telemedicine and may withdraw from such care at any time.    Referring Physician: No ref. provider found    PCP: Stephani Valdivia MD    Chief Complaint:   Left-sided sacroiliac joint pain       SUBJECTIVE:  Interval history 04/10/2024  Patient presents status post left-sided sacroiliac joint injection 02/27/2024.  Patient reports approximately 100% relief lasting 1 week in duration which has now subsided to approximately 60% sustained relief.  She does report pain can arise with increased activity.  When pain is present it is described as sharp and stabbing in nature overlying the left sacroiliac joint.  Pain can be exacerbated with positional change, moving from sitting to standing and with standing and ambulation.  She is continued physician directed physical therapy exercises over the last 8 weeks from 02/10/2024 through 04/10/2024 with marginal improvement in her pain.  Today she denies right-sided pain.  She denies bilateral lower extremity radiculopathy, lower extremity weakness, bowel or bladder incontinence or saddle anesthesia.    HPI 02/15/2024  Silvia Cotton is a 68 y.o. female with past medical history significant for hyperlipidemia, stage 3 chronic kidney disease, GERD who presents to the clinic for the evaluation of left-sided sacroiliac joint pain, previous Dr. Galvez patient.  Today she reports pain has been present for over 5 years without  inciting accident injury or trauma.  Pain is constant and today is rated a 5/10.  Pain is described as sharp and stabbing in nature.  Patient reports pain overlying the left sacroiliac joint.  Pain is exacerbated when moving from sitting to standing and with standing and ambulation.  She reports she is only able to ambulate approximately 5-10 minutes when pain is severe.  Pain is improved with topical application of heat and ice as well as prior left-sided sacroiliac joint injection.  She reports she received greater than 80% relief lasting several years in duration.  Patient has completed conventional land-based physical therapy in the past at [facility].  She has continued physician directed physical therapy exercises at home over the last 8 weeks from 12/15/2023 through 02/15/2024 with marginal improvement in her symptoms.  Today she denies any right-sided pain, lower extremity radiculopathy, lower extremity weakness or bowel or bladder incontinence.    Patient denies night fever/night sweats, urinary incontinence, bowel incontinence, significant weight loss, significant motor weakness, and loss of sensations.      Pain Disability Index Review:         2/15/2024     1:20 PM   Last 3 PDI Scores   Pain Disability Index (PDI) 30       Non-Pharmacologic Treatments:  Physical Therapy/Home Exercise: yes  Ice/Heat:yes  TENS: no  Acupuncture: no  Massage: no  Chiropractic: no    Other: no      Pain Medications:  - Adjuvant Medications: Elavil (Amitriptyline), Neurontin (Gabapentin), Trazodone (Desyrel), Venlafaxine (Effexor), and Zanaflex ( Tizanidine)    Pain Procedures:   Dr. Ortiz:  -02/27/2024: Left-sided sacroiliac joint injection    Dr. Galvez:  -01/15/2019: Left-sided sacroiliac joint injection    Past Medical History:   Diagnosis Date    Anemia     Hyperlipidemia     Hypertension     PONV (postoperative nausea and vomiting)     Supraventricular tachycardia      Past Surgical History:   Procedure Laterality  "Date    APPENDECTOMY      CARPAL TUNNEL RELEASE Right 11/30/2023    Procedure: RELEASE, CARPAL TUNNEL;  Surgeon: Kiran Finley MD;  Location: [facility] OR;  Service: Orthopedics;  Laterality: Right;  right carpal tunnel release    CARPAL TUNNEL RELEASE Left 12/28/2023    Procedure: RELEASE, CARPAL TUNNEL;  Surgeon: Kiran Finley MD;  Location: [facility] OR;  Service: Orthopedics;  Laterality: Left;    CHOLECYSTECTOMY      COLONOSCOPY N/A 01/13/2017    Procedure: COLONOSCOPY;  Surgeon: Taj Nicholas MD;  Location: [facility] ENDO;  Service: Endoscopy;  Laterality: N/A;    COLONOSCOPY N/A 03/10/2022    Procedure: COLONOSCOPY;  Surgeon: Bee Brock MD;  Location: [facility] ENDO;  Service: Endoscopy;  Laterality: N/A;    INJECTION OF ANESTHETIC AGENT INTO SACROILIAC JOINT Left 2/27/2024    Procedure: Left SIJ Injection with local;  Surgeon: Ilene Ortiz MD;  Location: [facility] PAIN MGT;  Service: Pain Management;  Laterality: Left;    KNEE SURGERY Left 2017    SHOULDER ARTHROSCOPY      Tonsilectomy      TONSILLECTOMY      Uterine Ablation       Review of patient's allergies indicates:  No Known Allergies    Current Outpatient Medications   Medication Sig    atenoloL (TENORMIN) 25 MG tablet TAKE 1 TABLET TWICE DAILY    cyanocobalamin 1,000 mcg/mL injection Inject 1 mL (1,000 mcg total) into the muscle once a week.    ferrous gluconate (FERGON) 324 MG tablet Take 324 mg by mouth 2 (two) times a day.    gabapentin (NEURONTIN) 100 MG capsule Take 1 capsule (100 mg total) by mouth 3 (three) times daily.    needle, disp, 18 G 18 x 1 " Ndle Use to draw b12 1000 mcg from vail every 7 days for 7 weeks    needle, reusable, 25 G 25 x 1 " Ndle Use to inject B12 1000 mcg every 7 days for 7 weeks    omega-3 fatty acids/fish oil (FISH OIL-OMEGA-3 FATTY ACIDS) 300-1,000 mg capsule Take by mouth once daily.    omeprazole (PRILOSEC) 40 MG capsule TAKE 1 CAPSULE EVERY DAY    ondansetron (ZOFRAN-ODT) 4 MG TbDL Take 1 tablet (4 mg " total) by mouth every 6 (six) hours as needed (nausea).    rosuvastatin (CRESTOR) 20 MG tablet TAKE 1 TABLET ONE TIME DAILY    tiZANidine (ZANAFLEX) 4 MG tablet TAKE 1 TABLET TWICE DAILY    traZODone (DESYREL) 100 MG tablet TAKE 1 TABLET (100 MG TOTAL) BY MOUTH EVERY EVENING.    venlafaxine (EFFEXOR-XR) 75 MG 24 hr capsule TAKE 1 CAPSULE ONE TIME DAILY     No current facility-administered medications for this visit.     Facility-Administered Medications Ordered in Other Visits   Medication    chlorhexidine 0.12 % solution 10 mL       Review of Systems     GENERAL:  No weight loss, malaise or fevers.  HEENT:   No recent changes in vision or hearing  NECK:  Negative for lumps, no difficulty with swallowing.  RESPIRATORY:  Negative for cough, wheezing or shortness of breath, patient denies any recent URI.  CARDIOVASCULAR:  Negative for chest pain or palpitations.  GI:  Negative for abdominal discomfort, blood in stools or black stools or change in bowel habits.  MUSCULOSKELETAL:  See HPI.  SKIN:  Negative for lesions, rash, and itching.  PSYCH:  No mood disorder or recent psychosocial stressors.   HEMATOLOGY/LYMPHOLOGY:  Negative for prolonged bleeding, bruising easily or swollen nodes.    NEURO:   No history of syncope, paralysis, seizures or tremors.  All other reviewed and negative other than HPI.    OBJECTIVE:    There were no vitals taken for this visit.      Physical Exam    GENERAL: Well appearing, in no acute distress, alert and oriented x3.  PSYCH:  Mood and affect appropriate.  SKIN: Skin color, texture, turgor normal, no rashes or lesions.  HEAD/FACE:  Normocephalic, atraumatic. Cranial nerves grossly intact.    CV: RRR with palpation of the radial artery.  PULM: No evidence of respiratory difficulty, symmetric chest rise.  GI:  Soft and non-tender.    BACK: Straight leg raising in the sitting and supine positions is negative to radicular pain. No pain to palpation over the facet joints of the lumbar spine  or spinous processes. Normal range of motion without pain reproduction.  EXTREMITIES: Peripheral joint ROM is full and pain free without obvious instability or laxity in all four extremities. No deformities, edema, or skin discoloration. Good capillary refill.  MUSCULOSKELETAL: Able to stand on heels & toes.   hip, and knee provocative maneuvers are negative.    SIJ testing: LEFT  - TTP over SI joint: Present  - Sepideh's/ Uriel's: Positive    - Sacroiliac Distraction Test (anterior pressure): Positive  - Sacroiliac Compression Test (lateral pressure): Positive   - SacralThrust Test (posterior pressure): Positive    Facet loading test is negative bilaterally.   Bilateral upper and lower extremity strength is normal and symmetric.  No atrophy or tone abnormalities are noted.    RIGHT Lower extremity: Hip flexion 5/5, Hip Abduction 5/5, Hip Adduction 5/5, Knee extension 5/5, Knee flexion 5/5, Ankle dorsiflexion5/5, Extensor hallucis longus 5/5, Ankle plantarflexion 5/5  LEFT Lower extremity:  Hip flexion 5/5, Hip Abduction 5/5,Hip Adduction 5/5, Knee extension 5/5, Knee flexion 5/5, Ankle dorsiflexion 5/5, Extensor hallucis longus 5/5, Ankle plantarflexion 5/5  -Normal testing knee (patellar) jerk and ankle (achilles) jerk    NEURO: Bilateral upper and lower extremity coordination and muscle stretch reflexes are physiologic and symmetric. No loss of sensation is noted.  GAIT: normal.    Imagin/10/19    X-Ray Lumbar Spine AP And Lateral  FINDINGS:  No scoliosis.  No acute fracture.  No listhesis.  Mild degenerative changes with spurring of the endplates and facet arthropathy is noted.  Degenerative changes of the sacroiliac joints are also noted.            ASSESSMENT: 68 y.o. year old female with     1. Chronic left SI joint pain  Case Request-RAD/Other Procedure Area: Left SIJ Injection (diagnostic)-Injection #2      2. DDD (degenerative disc disease), lumbar        3. Lumbar facet arthropathy               PLAN:   - Interventions:  Schedule for left-sided sacroiliac joint injection (diagnostic) for sacroiliitis. Of note patient received 100% relief with her prior procedure..Explained the risks and benefits of the procedure in detail with the patient today in clinic along with alternative treatment options, and the patient elected to pursue the intervention at this time.  We have discussed with significant (greater than 80%) relief, she may be a candidate for left-sided L5 medial branch, lateral branch S1, S2, S3 cooled radiofrequency ablation for more sustained relief.  We have also discussed future consideration of sacroiliac joint fusion for lifelong relief of sacroiliac joint dysfunction.    - Anticoagulation use: No no anticoagulation     report:  Reviewed and consistent with medication use as prescribed.    - Medications:  -Continue Gabapentin 100 mg TID per Dr. Valdivia.      - Therapy:   We discussed continuing physical therapy to help manage the patient/s painful condition. The patient was counseled that muscle strengthening will improve the long term prognosis in regards to pain and may also help increase range of motion and mobility. They were told that one of the goals of physical therapy is that they learn how to do the exercises so that they can do them independently at home daily upon completion. The patient's questions were answered and they were agreeable to this course. A referral for EXT physical therapy: [facility] in [place] was provided to the patient.    - Imaging: Reviewed available imaging with patient and answered any questions they had regarding study.      - Follow up visit: return to clinic in 4-6 weeks post 2nd injection      The above plan and management options were discussed at length with patient. Patient is in agreement with the above and verbalized understanding.    - I discussed the goals of interventional chronic pain management with the patient on today's visit. We  discussed a multimodal and systematic approach to pain.  This includes diagnostic and therapeutic injections, adjuvant pharmacologic treatment, physical therapy, and at times psychiatry.  I emphasized the importance of regular exercise, core strengthening and stretching, diet and weight loss as a cornerstone of long-term pain management.    - This condition does not require this patient to take time off of work, and the primary goal of our Pain Management services is to improve the patient's functional capacity.  - Patient Questions: Answered all of the patient's questions regarding diagnoses, therapy, treatment and next steps        Ilene Ortiz MD  Interventional Pain Management  [facility]    Disclaimer:  This note was prepared using voice recognition system and is likely to have sound alike errors that may have been overlooked even after proof reading.  Please call me with any questions

## 2024-04-10 ENCOUNTER — TELEPHONE (OUTPATIENT)
Dept: PAIN MEDICINE | Facility: CLINIC | Age: 69
End: 2024-04-10
Payer: MEDICARE

## 2024-04-10 ENCOUNTER — OFFICE VISIT (OUTPATIENT)
Dept: PAIN MEDICINE | Facility: CLINIC | Age: 69
End: 2024-04-10
Payer: MEDICARE

## 2024-04-10 DIAGNOSIS — M51.36 DDD (DEGENERATIVE DISC DISEASE), LUMBAR: ICD-10-CM

## 2024-04-10 DIAGNOSIS — G89.29 CHRONIC LEFT SI JOINT PAIN: Primary | ICD-10-CM

## 2024-04-10 DIAGNOSIS — M47.816 LUMBAR FACET ARTHROPATHY: ICD-10-CM

## 2024-04-10 DIAGNOSIS — M53.3 CHRONIC LEFT SI JOINT PAIN: Primary | ICD-10-CM

## 2024-04-10 PROCEDURE — 1160F RVW MEDS BY RX/DR IN RCRD: CPT | Mod: CPTII,95,, | Performed by: ANESTHESIOLOGY

## 2024-04-10 PROCEDURE — 3044F HG A1C LEVEL LT 7.0%: CPT | Mod: CPTII,95,, | Performed by: ANESTHESIOLOGY

## 2024-04-10 PROCEDURE — 1159F MED LIST DOCD IN RCRD: CPT | Mod: CPTII,95,, | Performed by: ANESTHESIOLOGY

## 2024-04-10 PROCEDURE — 99214 OFFICE O/P EST MOD 30 MIN: CPT | Mod: 95,,, | Performed by: ANESTHESIOLOGY

## 2024-04-10 NOTE — TELEPHONE ENCOUNTER
----- Message from Ilene Ortiz MD sent at 4/10/2024  7:16 AM CDT -----  Pain Provider: Angel  Patient Name: Silvia Cotton  MRN: [id number]  Case: SACROILIAC JOINT INJECTION  Laterality: left  Anticoagulation: None    Please set 24 hour reminder to call patient to ask for degree of pain relief from this diagnostic procedure.TY

## 2024-04-26 NOTE — PRE-PROCEDURE INSTRUCTIONS
Spoke with patient regarding procedure scheduled on 5.10     Arrival time 0600     Has patient been sick with fever or on antibiotics within the last 7 days? No     Does the patient have any open wounds, sores or rashes? No     Does the patient have any recent fractures? no     Has patient received a vaccination within the last 7 days? No     Received the COVID vaccination? yes     Has the patient stopped all medications as directed? na     Does patient have a pacemaker, defibrillator, or implantable stimulator? No     Does the patient have a ride to and from procedure and someone reliable to remain with patient?        Is the patient diabetic? no     Does the patient have sleep apnea? Or use O2 at home? no     Is the patient receiving sedation? yes     Is the patient instructed to remain NPO beginning at midnight the night before their procedure? yes     Procedure location confirmed with patient? Yes     Covid- Denies signs/symptoms. Instructed to notify PAT/MD if any changes.

## 2024-05-10 ENCOUNTER — HOSPITAL ENCOUNTER (OUTPATIENT)
Facility: HOSPITAL | Age: 69
Discharge: HOME | End: 2024-05-10
Attending: ANESTHESIOLOGY | Admitting: ANESTHESIOLOGY
Payer: MEDICARE

## 2024-05-10 VITALS
HEIGHT: 69 IN | BODY MASS INDEX: 38.91 KG/M2 | DIASTOLIC BLOOD PRESSURE: 58 MMHG | OXYGEN SATURATION: 95 % | RESPIRATION RATE: 18 BRPM | WEIGHT: 262.69 LBS | SYSTOLIC BLOOD PRESSURE: 110 MMHG | HEART RATE: 72 BPM | TEMPERATURE: 98 F

## 2024-05-10 DIAGNOSIS — M53.3 CHRONIC LEFT SACROILIAC JOINT PAIN: ICD-10-CM

## 2024-05-10 DIAGNOSIS — G89.29 CHRONIC LEFT SACROILIAC JOINT PAIN: ICD-10-CM

## 2024-05-10 PROCEDURE — 25000003 PHARM REV CODE 250: Mod: HCNC | Performed by: ANESTHESIOLOGY

## 2024-05-10 PROCEDURE — 63600175 PHARM REV CODE 636 W HCPCS: Mod: HCNC | Performed by: ANESTHESIOLOGY

## 2024-05-10 PROCEDURE — 27096 INJECT SACROILIAC JOINT: CPT | Mod: HCNC,LT | Performed by: ANESTHESIOLOGY

## 2024-05-10 PROCEDURE — 25500020 PHARM REV CODE 255: Mod: HCNC | Performed by: ANESTHESIOLOGY

## 2024-05-10 PROCEDURE — 27096 INJECT SACROILIAC JOINT: CPT | Mod: HCNC,LT,, | Performed by: ANESTHESIOLOGY

## 2024-05-10 RX ORDER — SODIUM BICARBONATE 1 MEQ/ML
SYRINGE (ML) INTRAVENOUS
Status: DISCONTINUED | OUTPATIENT
Start: 2024-05-10 | End: 2024-05-10 | Stop reason: HOSPADM

## 2024-05-10 RX ORDER — BUPIVACAINE HYDROCHLORIDE 2.5 MG/ML
INJECTION, SOLUTION EPIDURAL; INFILTRATION; INTRACAUDAL
Status: DISCONTINUED | OUTPATIENT
Start: 2024-05-10 | End: 2024-05-10 | Stop reason: HOSPADM

## 2024-05-10 RX ORDER — MIDAZOLAM HYDROCHLORIDE 1 MG/ML
INJECTION, SOLUTION INTRAMUSCULAR; INTRAVENOUS
Status: DISCONTINUED | OUTPATIENT
Start: 2024-05-10 | End: 2024-05-10 | Stop reason: HOSPADM

## 2024-05-10 RX ORDER — FENTANYL CITRATE 50 UG/ML
INJECTION, SOLUTION INTRAMUSCULAR; INTRAVENOUS
Status: DISCONTINUED | OUTPATIENT
Start: 2024-05-10 | End: 2024-05-10 | Stop reason: HOSPADM

## 2024-05-10 NOTE — OP NOTE
Silvia Cotton  68 y.o. female      Vitals:    05/10/24 0606   BP: (!) 144/75   Pulse: 74   Resp: 16   Temp: 97.8 °F (36.6 °C)       Procedure Date 05/10/2024        INFORMED CONSENT: The procedure, risks, benefits and options were discussed with patient. There are no contraindications to the procedure. The patient expressed understanding and agreed to proceed. The personnel performing the procedure was discussed. I verify that I personally obtained consent prior to the start of the procedure and the signed consent can be found on the patient's chart.       Anesthesia:   Conscious sedation provided by M.D    The patient was monitored with continuous pulse oximetry, EKG, and intermittent blood pressure monitors.  The patient was hemodynamically stable throughout the entire process was responsive to voice, and breathing spontaneously.  Supplemental O2 was provided at 2L/min via nasal cannula.  Patient was comfortable for the duration of the procedure. (See nurse documentation and case log for sedation time)    There was a total of 1mg IV Midazolam and 100mcg Fentanyl titrated for the procedure    Pre Procedure diagnosis: Chronic left SI joint pain [M53.3, G89.29]  Post-Procedure diagnosis: SAME      PROCEDURE:  Left sacroiliac joint injection                            REASON FOR PROCEDURE:   Sacroiliitis [M46.1]        MEDICATIONS INJECTED: 1mL 40mg/ml Kenalog ONLY    LOCAL ANESTHETIC USED: Xylocaine 1% 6ml     ESTIMATED BLOOD LOSS: None.   COMPLICATIONS: None.     TECHNIQUE:       Sacroiliac joint injection:   Laying in the prone position, the patient was prepped and draped in the usual sterile fashion using ChloraPrep and fenestrated drape.  The area was determined under fluoroscopy.  Local Xylocaine was injected by raising a wheel and going down to the periosteum using a 27-gauge hypodermic needle.  The 3.5 inch 22-gauge spinal needle was introduce into the Left sacroiliac joint.  Negative pressure applied to  confirm no intravascular placement.  Omnipaque was injected to confirm placement and to confirm that there was no vascular runoff.  The medication was then injected slowly.  The patient tolerated the procedure well.                       The patient was monitored for approximately 30 minutes after the procedure. Patient was given post procedure and discharge instructions to follow at home. We will see the patient back in two weeks or the patient may call to inform of status. The patient was discharged in a stable condition

## 2024-05-10 NOTE — DISCHARGE SUMMARY
"Discharge Note  Short Stay      SUMMARY     Admit Date: 5/10/2024    Attending Physician: Ilene Ortiz MD        Discharge Physician: Ilene Ortiz MD        Discharge Date: 5/10/2024 7:12 AM    Procedure(s) (LRB):  Left SIJ Injection (diagnostic)-Injection #2 (Left)    Final Diagnosis: Chronic left SI joint pain [M53.3, G89.29]    Disposition: Home or self care    Patient Instructions:   Current Discharge Medication List        CONTINUE these medications which have NOT CHANGED    Details   atenoloL (TENORMIN) 25 MG tablet TAKE 1 TABLET TWICE DAILY  Qty: 180 tablet, Refills: 3      omeprazole (PRILOSEC) 40 MG capsule TAKE 1 CAPSULE EVERY DAY  Qty: 90 capsule, Refills: 3      rosuvastatin (CRESTOR) 20 MG tablet TAKE 1 TABLET ONE TIME DAILY  Qty: 90 tablet, Refills: 3      venlafaxine (EFFEXOR-XR) 75 MG 24 hr capsule TAKE 1 CAPSULE ONE TIME DAILY  Qty: 90 capsule, Refills: 3      cyanocobalamin 1,000 mcg/mL injection Inject 1 mL (1,000 mcg total) into the muscle once a week.  Qty: 30 mL, Refills: 12      ferrous gluconate (FERGON) 324 MG tablet Take 324 mg by mouth 2 (two) times a day.      gabapentin (NEURONTIN) 100 MG capsule Take 1 capsule (100 mg total) by mouth 3 (three) times daily.  Qty: 90 capsule, Refills: 11      needle, disp, 18 G 18 x 1 " Ndle Use to draw b12 1000 mcg from vail every 7 days for 7 weeks  Qty: 100 each, Refills: 0      needle, reusable, 25 G 25 x 1 " Ndle Use to inject B12 1000 mcg every 7 days for 7 weeks  Qty: 12 each, Refills: 2      omega-3 fatty acids/fish oil (FISH OIL-OMEGA-3 FATTY ACIDS) 300-1,000 mg capsule Take by mouth once daily.      ondansetron (ZOFRAN-ODT) 4 MG TbDL Take 1 tablet (4 mg total) by mouth every 6 (six) hours as needed (nausea).  Qty: 30 tablet, Refills: 0    Associated Diagnoses: Nausea      tiZANidine (ZANAFLEX) 4 MG tablet TAKE 1 TABLET TWICE DAILY  Qty: 180 tablet, Refills: 3      traZODone (DESYREL) 100 MG tablet TAKE 1 TABLET (100 MG TOTAL) BY MOUTH EVERY " EVENING.  Qty: 90 tablet, Refills: 3                 Discharge Diagnosis: Chronic left SI joint pain [M53.3, G89.29]  Condition on Discharge: Stable with no complications to procedure   Diet on Discharge: Same as before.  Activity: as per instruction sheet.  Discharge to: Home with a responsible adult.  Follow up: 2-4 weeks       Please call the office at [phone] if you experience any weakness or loss of sensation, fever > 101.5, pain uncontrolled with oral medications, persistent nausea/vomiting/or diarrhea, redness or drainage from the incisions, or any other worrisome concerns. If physician on call was not reached or could not communicate with our office for any reason please go to the nearest emergency department

## 2024-05-10 NOTE — DISCHARGE INSTRUCTIONS

## 2024-05-13 ENCOUNTER — TELEPHONE (OUTPATIENT)
Dept: PAIN MEDICINE | Facility: CLINIC | Age: 69
End: 2024-05-13
Payer: MEDICARE

## 2024-05-13 NOTE — TELEPHONE ENCOUNTER
Reach out to the patient to received their % relief from the MBB procedure. Pt reported 80% for half . Inform patient that I will inform the provider and   we will see them at their follow up appointment. Pt understood.

## 2024-05-27 RX ORDER — TRAZODONE HYDROCHLORIDE 100 MG/1
100 TABLET ORAL NIGHTLY
Qty: 90 TABLET | Refills: 3 | Status: SHIPPED | OUTPATIENT
Start: 2024-05-27 | End: 2025-05-22

## 2024-05-27 RX ORDER — ATENOLOL 25 MG/1
TABLET ORAL
Qty: 180 TABLET | Refills: 3 | Status: SHIPPED | OUTPATIENT
Start: 2024-05-27

## 2024-05-27 RX ORDER — ROSUVASTATIN CALCIUM 20 MG/1
20 TABLET, COATED ORAL
Qty: 90 TABLET | Refills: 2 | Status: SHIPPED | OUTPATIENT
Start: 2024-05-27

## 2024-05-27 RX ORDER — VENLAFAXINE HYDROCHLORIDE 75 MG/1
75 CAPSULE, EXTENDED RELEASE ORAL
Qty: 90 CAPSULE | Refills: 2 | Status: SHIPPED | OUTPATIENT
Start: 2024-05-27

## 2024-05-27 NOTE — TELEPHONE ENCOUNTER
Refill Decision Note   Silvia Yury  is requesting a refill authorization.  Brief Assessment and Rationale for Refill:  Approve     Medication Therapy Plan:         Pharmacist review requested: Yes   Comments:     Note composed:11:39 AM 05/27/2024

## 2024-05-27 NOTE — TELEPHONE ENCOUNTER
Refill Routing Note   Medication(s) are not appropriate for processing by [facility] for the following reason(s):        Drug-disease interaction    [facility] action(s):  Defer  Approve        Medication Therapy Plan: venlafaxine and Chronic kidney disease, stage 3a    Pharmacist review requested: Yes     Appointments  past 12m or future 3m with PCP    Date Provider   Last Visit   2/28/2024 Stephani Valdivia MD   Next Visit   8/19/2024 Stephani Valdivia MD   ED visits in past 90 days: 0        Note composed:11:18 AM 05/27/2024

## 2024-05-27 NOTE — TELEPHONE ENCOUNTER
No care due was identified.  [facility] Embedded Care Due Messages. Reference number: [id number].   5/27/2024 1:11:22 AM CDT

## 2024-06-03 ENCOUNTER — TELEPHONE (OUTPATIENT)
Dept: PAIN MEDICINE | Facility: CLINIC | Age: 69
End: 2024-06-03
Payer: MEDICARE

## 2024-06-03 NOTE — TELEPHONE ENCOUNTER
Reached out to patient to schedule appointment from messages. Apt has been made.   Pt understand. All questions answered.     Ajit Jules  Medical Assistant

## 2024-06-03 NOTE — TELEPHONE ENCOUNTER
----- Message from Wilma Campos sent at 6/3/2024 10:05 AM CDT -----  Contact: Silvia Vega needs a call back at  [phone], Regards to getting her appointment reschedule due to a death in the family when I try to reschedule my self the next open was on September and she wants to be seen sooner than that.    Thanks  Td

## 2024-06-04 NOTE — PROGRESS NOTES
Established Patient Chronic Pain Note (Initial Visit)        Referring Physician: No ref. provider found    PCP: Stephani Valdivia MD    Chief Complaint:   Left-sided sacroiliac joint pain       SUBJECTIVE:  Interval History (6/14/2024):  Patient Silvia Cotton presents today for follow-up visit.  Patient was last seen on 5/10/2024 for left SIJ injection #2 with 80% relief sustained. She will still have some intermittent left sided buttock pain but overall she is doing much better. No radiculopathy into the lower extremities.   She rates her pain today a 1/10. When the pain is severe, prior to procedure, pain would go up to a 10/10.   Patient denies night fever/night sweats, urinary incontinence, bowel incontinence, significant weight loss and significant motor weakness.   Patient denies any other complaints or concerns at this time.      Interval history 04/10/2024  Patient presents status post left-sided sacroiliac joint injection 02/27/2024.  Patient reports approximately 100% relief lasting 1 week in duration which has now subsided to approximately 60% sustained relief.  She does report pain can arise with increased activity.  When pain is present it is described as sharp and stabbing in nature overlying the left sacroiliac joint.  Pain can be exacerbated with positional change, moving from sitting to standing and with standing and ambulation.  She is continued physician directed physical therapy exercises over the last 8 weeks from 02/10/2024 through 04/10/2024 with marginal improvement in her pain.  Today she denies right-sided pain.  She denies bilateral lower extremity radiculopathy, lower extremity weakness, bowel or bladder incontinence or saddle anesthesia.    HPI 02/15/2024  Silvia Cotton is a 68 y.o. female with past medical history significant for hyperlipidemia, stage 3 chronic kidney disease, GERD who presents to the clinic for the evaluation of left-sided sacroiliac joint pain, previous Dr. Galvez  patient.  Today she reports pain has been present for over 5 years without inciting accident injury or trauma.  Pain is constant and today is rated a 5/10.  Pain is described as sharp and stabbing in nature.  Patient reports pain overlying the left sacroiliac joint.  Pain is exacerbated when moving from sitting to standing and with standing and ambulation.  She reports she is only able to ambulate approximately 5-10 minutes when pain is severe.  Pain is improved with topical application of heat and ice as well as prior left-sided sacroiliac joint injection.  She reports she received greater than 80% relief lasting several years in duration.  Patient has completed conventional land-based physical therapy in the past at [facility] in [place].  She has continued physician directed physical therapy exercises at home over the last 8 weeks from 12/15/2023 through 02/15/2024 with marginal improvement in her symptoms.  Today she denies any right-sided pain, lower extremity radiculopathy, lower extremity weakness or bowel or bladder incontinence.    Patient denies night fever/night sweats, urinary incontinence, bowel incontinence, significant weight loss, significant motor weakness, and loss of sensations.      Pain Disability Index Review:         6/14/2024    12:14 PM 2/15/2024     1:20 PM   Last 3 PDI Scores   Pain Disability Index (PDI) 15 30       Non-Pharmacologic Treatments:  Physical Therapy/Home Exercise: yes  Ice/Heat:yes  TENS: no  Acupuncture: no  Massage: no  Chiropractic: no    Other: no      Pain Medications:  - Adjuvant Medications: Elavil (Amitriptyline), Neurontin (Gabapentin), Trazodone (Desyrel), Venlafaxine (Effexor), and Zanaflex ( Tizanidine)    Pain Procedures:   Dr. Ortiz:  -02/27/2024: Left-sided sacroiliac joint injection with 100% relief x 1 week then 60% relief  5/10/2024 left SIJ injection #2 with 80% relief sustained    Dr. Galvez:  -01/15/2019: Left-sided sacroiliac joint injection    Past Medical  History:   Diagnosis Date    Anemia     Hyperlipidemia     Hypertension     PONV (postoperative nausea and vomiting)     Supraventricular tachycardia      Past Surgical History:   Procedure Laterality Date    APPENDECTOMY      CARPAL TUNNEL RELEASE Right 11/30/2023    Procedure: RELEASE, CARPAL TUNNEL;  Surgeon: Kiran Finley MD;  Location: [facility] OR;  Service: Orthopedics;  Laterality: Right;  right carpal tunnel release    CARPAL TUNNEL RELEASE Left 12/28/2023    Procedure: RELEASE, CARPAL TUNNEL;  Surgeon: Kiran Finley MD;  Location: [facility] OR;  Service: Orthopedics;  Laterality: Left;    CHOLECYSTECTOMY      COLONOSCOPY N/A 01/13/2017    Procedure: COLONOSCOPY;  Surgeon: Taj Nicholas MD;  Location: [facility] ENDO;  Service: Endoscopy;  Laterality: N/A;    COLONOSCOPY N/A 03/10/2022    Procedure: COLONOSCOPY;  Surgeon: Bee Brock MD;  Location: [facility] ENDO;  Service: Endoscopy;  Laterality: N/A;    INJECTION OF ANESTHETIC AGENT INTO SACROILIAC JOINT Left 2/27/2024    Procedure: Left SIJ Injection with local;  Surgeon: Ilene Ortiz MD;  Location: [facility] PAIN MGT;  Service: Pain Management;  Laterality: Left;    INJECTION OF ANESTHETIC AGENT INTO SACROILIAC JOINT Left 5/10/2024    Procedure: Left SIJ Injection (diagnostic)-Injection #2;  Surgeon: Ilene Ortiz MD;  Location: [facility] PAIN MGT;  Service: Pain Management;  Laterality: Left;    KNEE SURGERY Left 2017    SHOULDER ARTHROSCOPY      Tonsilectomy      TONSILLECTOMY      Uterine Ablation       Review of patient's allergies indicates:  No Known Allergies    Current Outpatient Medications   Medication Sig    amitriptyline (ELAVIL) 50 MG tablet Take 50 mg by mouth every evening.    atenoloL (TENORMIN) 25 MG tablet TAKE 1 TABLET TWICE DAILY    cyanocobalamin 1,000 mcg/mL injection Inject 1 mL (1,000 mcg total) into the muscle once a week.    ferrous gluconate (FERGON) 324 MG tablet Take 324 mg by mouth 2 (two) times a day.    gabapentin  "(NEURONTIN) 100 MG capsule Take 1 capsule (100 mg total) by mouth 3 (three) times daily.    needle, disp, 18 G 18 x 1 " Ndle Use to draw b12 1000 mcg from vail every 7 days for 7 weeks    needle, reusable, 25 G 25 x 1 " Ndle Use to inject B12 1000 mcg every 7 days for 7 weeks    omega-3 fatty acids/fish oil (FISH OIL-OMEGA-3 FATTY ACIDS) 300-1,000 mg capsule Take by mouth once daily.    omeprazole (PRILOSEC) 40 MG capsule TAKE 1 CAPSULE EVERY DAY    ondansetron (ZOFRAN-ODT) 4 MG TbDL Take 1 tablet (4 mg total) by mouth every 6 (six) hours as needed (nausea).    rosuvastatin (CRESTOR) 20 MG tablet TAKE 1 TABLET EVERY DAY    tiZANidine (ZANAFLEX) 4 MG tablet TAKE 1 TABLET TWICE DAILY    traZODone (DESYREL) 100 MG tablet TAKE 1 TABLET (100 MG TOTAL) BY MOUTH EVERY EVENING.    venlafaxine (EFFEXOR-XR) 75 MG 24 hr capsule TAKE 1 CAPSULE EVERY DAY     No current facility-administered medications for this visit.     Facility-Administered Medications Ordered in Other Visits   Medication    chlorhexidine 0.12 % solution 10 mL       Review of Systems     GENERAL:  No weight loss, malaise or fevers.  HEENT:   No recent changes in vision or hearing  NECK:  Negative for lumps, no difficulty with swallowing.  RESPIRATORY:  Negative for cough, wheezing or shortness of breath, patient denies any recent URI.  CARDIOVASCULAR:  Negative for chest pain or palpitations.  GI:  Negative for abdominal discomfort, blood in stools or black stools or change in bowel habits.  MUSCULOSKELETAL:  See HPI.  SKIN:  Negative for lesions, rash, and itching.  PSYCH:  No mood disorder or recent psychosocial stressors.   HEMATOLOGY/LYMPHOLOGY:  Negative for prolonged bleeding, bruising easily or swollen nodes.    NEURO:   No history of syncope, paralysis, seizures or tremors.  All other reviewed and negative other than HPI.    OBJECTIVE:    BP (!) 132/56   Pulse 89   Ht 5' 9" (1.753 m)   Wt 119.3 kg (262 lb 14.4 oz)   BMI 38.82 kg/m²       Physical " Exam    GENERAL: Well appearing, in no acute distress, alert and oriented x3.  PSYCH:  Mood and affect appropriate.  SKIN: Skin color, texture, turgor normal, no rashes or lesions.  HEAD/FACE:  Normocephalic, atraumatic. Cranial nerves grossly intact.    CV: RRR with palpation of the radial artery.  PULM: No evidence of respiratory difficulty, symmetric chest rise.  GI:  Soft and non-tender.    BACK: Straight leg raising in the sitting and supine positions is negative to radicular pain. No pain to palpation over the facet joints of the lumbar spine or spinous processes. Normal range of motion without pain reproduction.  EXTREMITIES: Peripheral joint ROM is full and pain free without obvious instability or laxity in all four extremities. No deformities, edema, or skin discoloration. Good capillary refill.  MUSCULOSKELETAL: Able to stand on heels & toes.   hip, and knee provocative maneuvers are negative.    SIJ testing: LEFT  - TTP over SI joint: Present  - Sepideh's/ Uriel's: Positive    - Sacroiliac Distraction Test (anterior pressure): Positive  - Sacroiliac Compression Test (lateral pressure): Positive   - SacralThrust Test (posterior pressure): Positive    Facet loading test is negative bilaterally.   Bilateral upper and lower extremity strength is normal and symmetric.  No atrophy or tone abnormalities are noted.    RIGHT Lower extremity: Hip flexion 5/5, Hip Abduction 5/5, Hip Adduction 5/5, Knee extension 5/5, Knee flexion 5/5, Ankle dorsiflexion5/5, Extensor hallucis longus 5/5, Ankle plantarflexion 5/5  LEFT Lower extremity:  Hip flexion 5/5, Hip Abduction 5/5,Hip Adduction 5/5, Knee extension 5/5, Knee flexion 5/5, Ankle dorsiflexion 5/5, Extensor hallucis longus 5/5, Ankle plantarflexion 5/5  -Normal testing knee (patellar) jerk and ankle (achilles) jerk    NEURO: Bilateral upper and lower extremity coordination and muscle stretch reflexes are physiologic and symmetric. No loss of sensation is  noted.  GAIT: normal.    Imagin/10/19    X-Ray Lumbar Spine AP And Lateral  FINDINGS:  No scoliosis.  No acute fracture.  No listhesis.  Mild degenerative changes with spurring of the endplates and facet arthropathy is noted.  Degenerative changes of the sacroiliac joints are also noted.            ASSESSMENT: 68 y.o. year old female with     1. Chronic left sacroiliac joint pain  Case Request-RAD/Other Procedure Area: Left sacral branch RFA      2. Sacroiliitis  Case Request-RAD/Other Procedure Area: Left sacral branch RFA              PLAN:   - Interventions:  Schedule for left-sided sacral branch RFA. (left-sided L5 medial branch, lateral branch S1, S2, S3 cooled radiofrequency ablation)  Explained the risks and benefits of the procedure in detail with the patient today in clinic along with alternative treatment options, and the patient elected to pursue the intervention.      2024: Left-sided sacroiliac joint injection #1 with 100% relief x 1 week then 60% relief  5/10/2024 left SIJ injection #2 with 80% relief sustained  We have also discussed future consideration of sacroiliac joint fusion for lifelong relief of sacroiliac joint dysfunction.    - Anticoagulation use: No no anticoagulation     report:  Reviewed and consistent with medication use as prescribed.    - Medications:  -Continue Gabapentin 100 mg TID per Dr. Valdivia.      Continue tizanidine    - Therapy:   We discussed continuing physical therapy to help manage the patient/s painful condition. The patient was counseled that muscle strengthening will improve the long term prognosis in regards to pain and may also help increase range of motion and mobility. They were told that one of the goals of physical therapy is that they learn how to do the exercises so that they can do them independently at home daily upon completion. The patient's questions were answered and they were agreeable to this course. A referral for EXT physical therapy:  [facility] in [place] was provided to the patient.    - Imaging: Reviewed available imaging with patient and answered any questions they had regarding study.      - Follow up visit: return to clinic in 4-5 weeks after procedure      The above plan and management options were discussed at length with patient. Patient is in agreement with the above and verbalized understanding.    - I discussed the goals of interventional chronic pain management with the patient on today's visit. We discussed a multimodal and systematic approach to pain.  This includes diagnostic and therapeutic injections, adjuvant pharmacologic treatment, physical therapy, and at times psychiatry.  I emphasized the importance of regular exercise, core strengthening and stretching, diet and weight loss as a cornerstone of long-term pain management.    - This condition does not require this patient to take time off of work, and the primary goal of our Pain Management services is to improve the patient's functional capacity.  - Patient Questions: Answered all of the patient's questions regarding diagnoses, therapy, treatment and next steps    Visit today included increased complexity associated with the care of the episodic problem of chronic pain which was addressed and continue to manage the longitudinal care of the patient due to the serious and/or complex managed problem(s) listed above.      Lisa Linares NP  Interventional Pain Management  [facility]    Disclaimer:  This note was prepared using voice recognition system and is likely to have sound alike errors that may have been overlooked even after proof reading.  Please call me with any questions

## 2024-06-14 ENCOUNTER — OFFICE VISIT (OUTPATIENT)
Dept: PAIN MEDICINE | Facility: CLINIC | Age: 69
End: 2024-06-14
Payer: MEDICARE

## 2024-06-14 VITALS
HEART RATE: 89 BPM | WEIGHT: 262.88 LBS | HEIGHT: 69 IN | SYSTOLIC BLOOD PRESSURE: 132 MMHG | DIASTOLIC BLOOD PRESSURE: 56 MMHG | BODY MASS INDEX: 38.94 KG/M2

## 2024-06-14 DIAGNOSIS — M46.1 SACROILIITIS: ICD-10-CM

## 2024-06-14 DIAGNOSIS — M53.3 CHRONIC LEFT SACROILIAC JOINT PAIN: Primary | ICD-10-CM

## 2024-06-14 DIAGNOSIS — G89.29 CHRONIC LEFT SACROILIAC JOINT PAIN: Primary | ICD-10-CM

## 2024-06-14 PROCEDURE — 3008F BODY MASS INDEX DOCD: CPT | Mod: HCNC,CPTII,S$GLB, | Performed by: NURSE PRACTITIONER

## 2024-06-14 PROCEDURE — G2211 COMPLEX E/M VISIT ADD ON: HCPCS | Mod: HCNC,S$GLB,, | Performed by: NURSE PRACTITIONER

## 2024-06-14 PROCEDURE — 1101F PT FALLS ASSESS-DOCD LE1/YR: CPT | Mod: HCNC,CPTII,S$GLB, | Performed by: NURSE PRACTITIONER

## 2024-06-14 PROCEDURE — 1159F MED LIST DOCD IN RCRD: CPT | Mod: HCNC,CPTII,S$GLB, | Performed by: NURSE PRACTITIONER

## 2024-06-14 PROCEDURE — 99213 OFFICE O/P EST LOW 20 MIN: CPT | Mod: HCNC,S$GLB,, | Performed by: NURSE PRACTITIONER

## 2024-06-14 PROCEDURE — 3044F HG A1C LEVEL LT 7.0%: CPT | Mod: HCNC,CPTII,S$GLB, | Performed by: NURSE PRACTITIONER

## 2024-06-14 PROCEDURE — 3078F DIAST BP <80 MM HG: CPT | Mod: HCNC,CPTII,S$GLB, | Performed by: NURSE PRACTITIONER

## 2024-06-14 PROCEDURE — 1125F AMNT PAIN NOTED PAIN PRSNT: CPT | Mod: HCNC,CPTII,S$GLB, | Performed by: NURSE PRACTITIONER

## 2024-06-14 PROCEDURE — 3288F FALL RISK ASSESSMENT DOCD: CPT | Mod: HCNC,CPTII,S$GLB, | Performed by: NURSE PRACTITIONER

## 2024-06-14 PROCEDURE — 3075F SYST BP GE 130 - 139MM HG: CPT | Mod: HCNC,CPTII,S$GLB, | Performed by: NURSE PRACTITIONER

## 2024-06-14 PROCEDURE — 99999 PR PBB SHADOW E&M-EST. PATIENT-LVL III: CPT | Mod: PBBFAC,HCNC,, | Performed by: NURSE PRACTITIONER

## 2024-06-14 RX ORDER — AMITRIPTYLINE HYDROCHLORIDE 50 MG/1
50 TABLET, FILM COATED ORAL NIGHTLY
COMMUNITY
Start: 2024-05-28

## 2024-06-17 ENCOUNTER — TELEPHONE (OUTPATIENT)
Dept: PAIN MEDICINE | Facility: CLINIC | Age: 69
End: 2024-06-17
Payer: MEDICARE

## 2024-06-17 NOTE — TELEPHONE ENCOUNTER
----- Message from Lisa Linares NP sent at 6/14/2024 12:56 PM CDT -----  Pain Provider:Angel  Patient Name: Silvia Cotton   MRN: [id number]  Case: Left Sacral branch RFA  Level: sacral branch  Laterality: left  Anticoagulation: none     4-5 weeks after procedure- may be virtual

## 2024-06-17 NOTE — TELEPHONE ENCOUNTER
Call to schedule pt injection with Dr Ortiz, pt was scheduled for July 12.    .Simon Vallejo CCM

## 2024-06-19 ENCOUNTER — OFFICE VISIT (OUTPATIENT)
Dept: OBSTETRICS AND GYNECOLOGY | Facility: CLINIC | Age: 69
End: 2024-06-19
Payer: MEDICARE

## 2024-06-19 VITALS
HEIGHT: 69 IN | SYSTOLIC BLOOD PRESSURE: 120 MMHG | BODY MASS INDEX: 39.21 KG/M2 | WEIGHT: 264.75 LBS | DIASTOLIC BLOOD PRESSURE: 78 MMHG

## 2024-06-19 DIAGNOSIS — Z01.419 ROUTINE GYNECOLOGICAL EXAMINATION: Primary | ICD-10-CM

## 2024-06-19 PROCEDURE — G0101 CA SCREEN;PELVIC/BREAST EXAM: HCPCS | Mod: HCNC,GZ,S$GLB, | Performed by: NURSE PRACTITIONER

## 2024-06-19 PROCEDURE — 1126F AMNT PAIN NOTED NONE PRSNT: CPT | Mod: HCNC,CPTII,S$GLB, | Performed by: NURSE PRACTITIONER

## 2024-06-19 PROCEDURE — 1159F MED LIST DOCD IN RCRD: CPT | Mod: HCNC,CPTII,S$GLB, | Performed by: NURSE PRACTITIONER

## 2024-06-19 PROCEDURE — 3078F DIAST BP <80 MM HG: CPT | Mod: HCNC,CPTII,S$GLB, | Performed by: NURSE PRACTITIONER

## 2024-06-19 PROCEDURE — 1160F RVW MEDS BY RX/DR IN RCRD: CPT | Mod: HCNC,CPTII,S$GLB, | Performed by: NURSE PRACTITIONER

## 2024-06-19 PROCEDURE — 3044F HG A1C LEVEL LT 7.0%: CPT | Mod: HCNC,CPTII,S$GLB, | Performed by: NURSE PRACTITIONER

## 2024-06-19 PROCEDURE — 99999 PR PBB SHADOW E&M-EST. PATIENT-LVL III: CPT | Mod: PBBFAC,HCNC,, | Performed by: NURSE PRACTITIONER

## 2024-06-19 PROCEDURE — 1101F PT FALLS ASSESS-DOCD LE1/YR: CPT | Mod: HCNC,CPTII,S$GLB, | Performed by: NURSE PRACTITIONER

## 2024-06-19 PROCEDURE — 3074F SYST BP LT 130 MM HG: CPT | Mod: HCNC,CPTII,S$GLB, | Performed by: NURSE PRACTITIONER

## 2024-06-19 PROCEDURE — 3288F FALL RISK ASSESSMENT DOCD: CPT | Mod: HCNC,CPTII,S$GLB, | Performed by: NURSE PRACTITIONER

## 2024-06-19 NOTE — PROGRESS NOTES
"  Subjective:       Patient ID: Silvia Cotton is a 68 y.o. female.    Chief Complaint:  Annual Exam      History of Present Illness  HPI  Complains of night sweats she feel like she is burning up at night    Health Maintenance   Topic Date Due    TETANUS VACCINE  2024    Lipid Panel  2025    Mammogram  2025    DEXA Scan  2026    Colorectal Cancer Screening  03/10/2027    Hepatitis C Screening  Completed    Shingles Vaccine  Completed     GYN & OB History  No LMP recorded. Patient is postmenopausal.   Date of Last Pap: 2022 Negative     OB History    Para Term  AB Living   1 1 1     1   SAB IAB Ectopic Multiple Live Births           1      # Outcome Date GA Lbr Max/2nd Weight Sex Type Anes PTL Lv   1 Term     M Vag-Spont   NA       Review of Systems  Review of Systems        Objective:   /78   Ht 5' 9" (1.753 m)   Wt 120.1 kg (264 lb 12.4 oz)   BMI 39.10 kg/m²    Physical Exam     Assessment:        1. Routine gynecological examination                Plan:            Silvia was seen today for annual exam.    Diagnoses and all orders for this visit:    Routine gynecological examination    Pap smear are no longer indicated   Offered HRT for night sweats, declines states that night sweats are not that bad at this time   Will let me know if they become worse and will consider HRT at that time     "

## 2024-06-24 ENCOUNTER — OFFICE VISIT (OUTPATIENT)
Dept: PAIN MEDICINE | Facility: CLINIC | Age: 69
End: 2024-06-24
Payer: MEDICARE

## 2024-06-24 VITALS
SYSTOLIC BLOOD PRESSURE: 134 MMHG | WEIGHT: 265.63 LBS | DIASTOLIC BLOOD PRESSURE: 82 MMHG | HEIGHT: 69 IN | HEART RATE: 83 BPM | RESPIRATION RATE: 18 BRPM | BODY MASS INDEX: 39.34 KG/M2

## 2024-06-24 DIAGNOSIS — M53.3 CHRONIC LEFT SACROILIAC JOINT PAIN: Primary | ICD-10-CM

## 2024-06-24 DIAGNOSIS — G89.29 CHRONIC LEFT SACROILIAC JOINT PAIN: Primary | ICD-10-CM

## 2024-06-24 DIAGNOSIS — M51.36 DDD (DEGENERATIVE DISC DISEASE), LUMBAR: ICD-10-CM

## 2024-06-24 DIAGNOSIS — G57.01 PIRIFORMIS SYNDROME, RIGHT: ICD-10-CM

## 2024-06-24 DIAGNOSIS — M46.1 SACROILIITIS: ICD-10-CM

## 2024-06-24 PROCEDURE — 3075F SYST BP GE 130 - 139MM HG: CPT | Mod: HCNC,CPTII,S$GLB, | Performed by: NURSE PRACTITIONER

## 2024-06-24 PROCEDURE — 99999 PR PBB SHADOW E&M-EST. PATIENT-LVL III: CPT | Mod: PBBFAC,HCNC,, | Performed by: NURSE PRACTITIONER

## 2024-06-24 PROCEDURE — 3008F BODY MASS INDEX DOCD: CPT | Mod: HCNC,CPTII,S$GLB, | Performed by: NURSE PRACTITIONER

## 2024-06-24 PROCEDURE — 3079F DIAST BP 80-89 MM HG: CPT | Mod: HCNC,CPTII,S$GLB, | Performed by: NURSE PRACTITIONER

## 2024-06-24 PROCEDURE — 1159F MED LIST DOCD IN RCRD: CPT | Mod: HCNC,CPTII,S$GLB, | Performed by: NURSE PRACTITIONER

## 2024-06-24 PROCEDURE — 1125F AMNT PAIN NOTED PAIN PRSNT: CPT | Mod: HCNC,CPTII,S$GLB, | Performed by: NURSE PRACTITIONER

## 2024-06-24 PROCEDURE — 3044F HG A1C LEVEL LT 7.0%: CPT | Mod: HCNC,CPTII,S$GLB, | Performed by: NURSE PRACTITIONER

## 2024-06-24 PROCEDURE — 1101F PT FALLS ASSESS-DOCD LE1/YR: CPT | Mod: HCNC,CPTII,S$GLB, | Performed by: NURSE PRACTITIONER

## 2024-06-24 PROCEDURE — 99214 OFFICE O/P EST MOD 30 MIN: CPT | Mod: HCNC,S$GLB,, | Performed by: NURSE PRACTITIONER

## 2024-06-24 PROCEDURE — 3288F FALL RISK ASSESSMENT DOCD: CPT | Mod: HCNC,CPTII,S$GLB, | Performed by: NURSE PRACTITIONER

## 2024-06-24 RX ORDER — METHYLPREDNISOLONE 4 MG/1
TABLET ORAL
Qty: 21 EACH | Refills: 0 | Status: SHIPPED | OUTPATIENT
Start: 2024-06-24 | End: 2024-07-15

## 2024-06-24 RX ORDER — METHYLPREDNISOLONE 4 MG/1
TABLET ORAL
Qty: 21 EACH | Refills: 0 | Status: SHIPPED | OUTPATIENT
Start: 2024-06-24 | End: 2024-06-24 | Stop reason: SDUPTHER

## 2024-06-24 RX ORDER — METHYLPREDNISOLONE 4 MG/1
TABLET ORAL
Qty: 21 EACH | Refills: 0 | Status: SHIPPED | OUTPATIENT
Start: 2024-06-24 | End: 2024-06-24

## 2024-06-24 NOTE — PROGRESS NOTES
Established Patient Chronic Pain Note (Initial Visit)        Referring Physician: No ref. provider found    PCP: Stephani Valdivia MD    Chief Complaint:   Left-sided sacroiliac joint pain       SUBJECTIVE:  Interval History (6/24/2024):  Patient Silvia Cotton presents today for follow-up visit.  Patient was last seen on 6/14/2024. She is scheduled for left sided sacral RFA in July. For the past 3 weeks she has had right sided pain over the lower back/buttock for the past 3 weeks. No new injury. Pain is a 8/10. Pain is worse with crossing her legs or getting in and out of bed/car. Pain does not radiate to the legs.   Patient denies night fever/night sweats, urinary incontinence, bowel incontinence, significant weight loss and significant motor weakness.   Patient denies any other complaints or concerns at this time.      Interval History (6/14/2024):  Patient Silvia Cotton presents today for follow-up visit.  Patient was last seen on 5/10/2024 for left SIJ injection #2 with 80% relief sustained. She will still have some intermittent left sided buttock pain but overall she is doing much better. No radiculopathy into the lower extremities.   She rates her pain today a 1/10. When the pain is severe, prior to procedure, pain would go up to a 10/10.   Patient denies night fever/night sweats, urinary incontinence, bowel incontinence, significant weight loss and significant motor weakness.   Patient denies any other complaints or concerns at this time.      Interval history 04/10/2024  Patient presents status post left-sided sacroiliac joint injection 02/27/2024.  Patient reports approximately 100% relief lasting 1 week in duration which has now subsided to approximately 60% sustained relief.  She does report pain can arise with increased activity.  When pain is present it is described as sharp and stabbing in nature overlying the left sacroiliac joint.  Pain can be exacerbated with positional change, moving from  sitting to standing and with standing and ambulation.  She is continued physician directed physical therapy exercises over the last 8 weeks from 02/10/2024 through 04/10/2024 with marginal improvement in her pain.  Today she denies right-sided pain.  She denies bilateral lower extremity radiculopathy, lower extremity weakness, bowel or bladder incontinence or saddle anesthesia.    HPI 02/15/2024  Silvia Cotton is a 68 y.o. female with past medical history significant for hyperlipidemia, stage 3 chronic kidney disease, GERD who presents to the clinic for the evaluation of left-sided sacroiliac joint pain, previous Dr. Galvez patient.  Today she reports pain has been present for over 5 years without inciting accident injury or trauma.  Pain is constant and today is rated a 5/10.  Pain is described as sharp and stabbing in nature.  Patient reports pain overlying the left sacroiliac joint.  Pain is exacerbated when moving from sitting to standing and with standing and ambulation.  She reports she is only able to ambulate approximately 5-10 minutes when pain is severe.  Pain is improved with topical application of heat and ice as well as prior left-sided sacroiliac joint injection.  She reports she received greater than 80% relief lasting several years in duration.  Patient has completed conventional land-based physical therapy in the past at [facility]  in [place].  She has continued physician directed physical therapy exercises at home over the last 8 weeks from 12/15/2023 through 02/15/2024 with marginal improvement in her symptoms.  Today she denies any right-sided pain, lower extremity radiculopathy, lower extremity weakness or bowel or bladder incontinence.    Patient denies night fever/night sweats, urinary incontinence, bowel incontinence, significant weight loss, significant motor weakness, and loss of sensations.      Pain Disability Index Review:         6/14/2024    12:14 PM 2/15/2024     1:20 PM   Last 3 PDI  Scores   Pain Disability Index (PDI) 15 30       Non-Pharmacologic Treatments:  Physical Therapy/Home Exercise: yes  Ice/Heat:yes  TENS: no  Acupuncture: no  Massage: no  Chiropractic: no    Other: no      Pain Medications:  - Adjuvant Medications: Elavil (Amitriptyline), Neurontin (Gabapentin), Trazodone (Desyrel), Venlafaxine (Effexor), and Zanaflex ( Tizanidine)    Pain Procedures:   Dr. Ortiz:  -02/27/2024: Left-sided sacroiliac joint injection with 100% relief x 1 week then 60% relief  5/10/2024 left SIJ injection #2 with 80% relief sustained    Dr. Galvez:  -01/15/2019: Left-sided sacroiliac joint injection    Past Medical History:   Diagnosis Date    Anemia     Hyperlipidemia     Hypertension     PONV (postoperative nausea and vomiting)     Supraventricular tachycardia      Past Surgical History:   Procedure Laterality Date    APPENDECTOMY      CARPAL TUNNEL RELEASE Right 11/30/2023    Procedure: RELEASE, CARPAL TUNNEL;  Surgeon: Kiran Finley MD;  Location: [facility] OR;  Service: Orthopedics;  Laterality: Right;  right carpal tunnel release    CARPAL TUNNEL RELEASE Left 12/28/2023    Procedure: RELEASE, CARPAL TUNNEL;  Surgeon: Kiran Finley MD;  Location: [facility] OR;  Service: Orthopedics;  Laterality: Left;    CHOLECYSTECTOMY      COLONOSCOPY N/A 01/13/2017    Procedure: COLONOSCOPY;  Surgeon: Taj Nicholas MD;  Location: [facility];  Service: Endoscopy;  Laterality: N/A;    COLONOSCOPY N/A 03/10/2022    Procedure: COLONOSCOPY;  Surgeon: Bee Brock MD;  Location: [facility] ENDO;  Service: Endoscopy;  Laterality: N/A;    INJECTION OF ANESTHETIC AGENT INTO SACROILIAC JOINT Left 2/27/2024    Procedure: Left SIJ Injection with local;  Surgeon: Ilene Ortiz MD;  Location: [facility] PAIN T;  Service: Pain Management;  Laterality: Left;    INJECTION OF ANESTHETIC AGENT INTO SACROILIAC JOINT Left 5/10/2024    Procedure: Left SIJ Injection (diagnostic)-Injection #2;  Surgeon: Ilene Ortiz MD;   "Location: [facility] PAIN MGT;  Service: Pain Management;  Laterality: Left;    KNEE SURGERY Left 2017    SHOULDER ARTHROSCOPY      Tonsilectomy      TONSILLECTOMY      Uterine Ablation       Review of patient's allergies indicates:  No Known Allergies    Current Outpatient Medications   Medication Sig    amitriptyline (ELAVIL) 50 MG tablet Take 50 mg by mouth every evening.    atenoloL (TENORMIN) 25 MG tablet TAKE 1 TABLET TWICE DAILY    cyanocobalamin 1,000 mcg/mL injection Inject 1 mL (1,000 mcg total) into the muscle once a week.    ferrous gluconate (FERGON) 324 MG tablet Take 324 mg by mouth 2 (two) times a day.    gabapentin (NEURONTIN) 100 MG capsule Take 1 capsule (100 mg total) by mouth 3 (three) times daily.    needle, disp, 18 G 18 x 1 " Ndle Use to draw b12 1000 mcg from vail every 7 days for 7 weeks    needle, reusable, 25 G 25 x 1 " Ndle Use to inject B12 1000 mcg every 7 days for 7 weeks    omega-3 fatty acids/fish oil (FISH OIL-OMEGA-3 FATTY ACIDS) 300-1,000 mg capsule Take by mouth once daily.    omeprazole (PRILOSEC) 40 MG capsule TAKE 1 CAPSULE EVERY DAY    ondansetron (ZOFRAN-ODT) 4 MG TbDL Take 1 tablet (4 mg total) by mouth every 6 (six) hours as needed (nausea).    rosuvastatin (CRESTOR) 20 MG tablet TAKE 1 TABLET EVERY DAY    tiZANidine (ZANAFLEX) 4 MG tablet TAKE 1 TABLET TWICE DAILY    traZODone (DESYREL) 100 MG tablet TAKE 1 TABLET (100 MG TOTAL) BY MOUTH EVERY EVENING.    venlafaxine (EFFEXOR-XR) 75 MG 24 hr capsule TAKE 1 CAPSULE EVERY DAY    methylPREDNISolone (MEDROL DOSEPACK) 4 mg tablet use as directed     No current facility-administered medications for this visit.     Facility-Administered Medications Ordered in Other Visits   Medication    chlorhexidine 0.12 % solution 10 mL       Review of Systems     GENERAL:  No weight loss, malaise or fevers.  HEENT:   No recent changes in vision or hearing  NECK:  Negative for lumps, no difficulty with swallowing.  RESPIRATORY:  Negative for " "cough, wheezing or shortness of breath, patient denies any recent URI.  CARDIOVASCULAR:  Negative for chest pain or palpitations.  GI:  Negative for abdominal discomfort, blood in stools or black stools or change in bowel habits.  MUSCULOSKELETAL:  See HPI.  SKIN:  Negative for lesions, rash, and itching.  PSYCH:  No mood disorder or recent psychosocial stressors.   HEMATOLOGY/LYMPHOLOGY:  Negative for prolonged bleeding, bruising easily or swollen nodes.    NEURO:   No history of syncope, paralysis, seizures or tremors.  All other reviewed and negative other than HPI.    OBJECTIVE:    /82   Pulse 83   Resp 18   Ht 5' 9" (1.753 m)   Wt 120.5 kg (265 lb 10.5 oz)   BMI 39.23 kg/m²       Physical Exam    GENERAL: Well appearing, in no acute distress, alert and oriented x3.  PSYCH:  Mood and affect appropriate.  SKIN: Skin color, texture, turgor normal, no rashes or lesions.  HEAD/FACE:  Normocephalic, atraumatic. Cranial nerves grossly intact.    CV: RRR with palpation of the radial artery.  PULM: No evidence of respiratory difficulty, symmetric chest rise.  GI:  Soft and non-tender.    BACK: Straight leg raising in the sitting and supine positions is negative to radicular pain. No pain to palpation over the facet joints of the lumbar spine or spinous processes. Normal range of motion without pain reproduction.  EXTREMITIES: Peripheral joint ROM is full and pain free without obvious instability or laxity in all four extremities. No deformities, edema, or skin discoloration. Good capillary refill.  MUSCULOSKELETAL: Able to stand on heels & toes.   hip, and knee provocative maneuvers are negative.    SIJ testing: LEFT  - TTP over SI joint: Present bilateral  - Sepideh's/ Uriel's: Positive  bilateral  Positive fadir's on the right  - Sacroiliac Distraction Test (anterior pressure): Positive  - Sacroiliac Compression Test (lateral pressure): Positive   - SacralThrust Test (posterior pressure): Positive    Facet " loading test is negative bilaterally.   Bilateral upper and lower extremity strength is normal and symmetric.  No atrophy or tone abnormalities are noted.    RIGHT Lower extremity: Hip flexion 5/5, Hip Abduction 5/5, Hip Adduction 5/5, Knee extension 5/5, Knee flexion 5/5, Ankle dorsiflexion5/5, Extensor hallucis longus 5/5, Ankle plantarflexion 5/5  LEFT Lower extremity:  Hip flexion 5/5, Hip Abduction 5/5,Hip Adduction 5/5, Knee extension 5/5, Knee flexion 5/5, Ankle dorsiflexion 5/5, Extensor hallucis longus 5/5, Ankle plantarflexion 5/5  -Normal testing knee (patellar) jerk and ankle (achilles) jerk    NEURO: Bilateral upper and lower extremity coordination and muscle stretch reflexes are physiologic and symmetric. No loss of sensation is noted.  GAIT: normal.    Imagin/10/19    X-Ray Lumbar Spine AP And Lateral  FINDINGS:  No scoliosis.  No acute fracture.  No listhesis.  Mild degenerative changes with spurring of the endplates and facet arthropathy is noted.  Degenerative changes of the sacroiliac joints are also noted.            ASSESSMENT: 68 y.o. year old female with     1. Chronic left sacroiliac joint pain        2. Sacroiliitis        3. DDD (degenerative disc disease), lumbar        4. Piriformis syndrome, right                  PLAN:   - Interventions:  Will treat currently sacroiliitis on the Right with medrol dose pack. Consider right SIJ and piriformis in the future if pain continues.     Pt is scheduled for left-sided sacral branch RFA. (left-sided L5 medial branch, lateral branch S1, S2, S3 cooled radiofrequency ablation)  Explained the risks and benefits of the procedure in detail with the patient today in clinic along with alternative treatment options, and the patient elected to pursue the intervention.      2024: Left-sided sacroiliac joint injection #1 with 100% relief x 1 week then 60% relief  5/10/2024 left SIJ injection #2 with 80% relief sustained  We have also discussed  future consideration of sacroiliac joint fusion for lifelong relief of sacroiliac joint dysfunction.    - Anticoagulation use: No no anticoagulation     report:  Reviewed and consistent with medication use as prescribed.    - Medications:  -Continue Gabapentin 100 mg TID per Dr. Valdivia.      Continue tizanidine    Medrol Dose pack with instructions:  Day 1: 2 tablets before breakfast, 1 tablet after lunch, 1 tablet after dinner, 2 tablets at bedtime   Day 2: 1 tablet before breakfast, 1 tablet after lunch, 1 tablet after dinner, 2 tablets at bedtime   Day 3: 1 tablet before breakfast, 1 tablet after lunch, 1 tablet after dinner, 1 tablet at bedtime   Day 4: 1 tablet before breakfast, 1 tablet after lunch, 1 tablet at bedtime   Day 5: 1 tablet before breakfast, 1 tablet at bedtime   Day 6: 1 tablet before breakfast.      - Therapy:   We discussed continuing physical therapy to help manage the patient/s painful condition. The patient was counseled that muscle strengthening will improve the long term prognosis in regards to pain and may also help increase range of motion and mobility. They were told that one of the goals of physical therapy is that they learn how to do the exercises so that they can do them independently at home daily upon completion. The patient's questions were answered and they were agreeable to this course. A referral for EXT physical therapy: [facility] in [place] was provided to the patient.    - Imaging: Reviewed available imaging with patient and answered any questions they had regarding study.      - Follow up visit: return to clinic in 4-5 weeks after procedure      The above plan and management options were discussed at length with patient. Patient is in agreement with the above and verbalized understanding.    - I discussed the goals of interventional chronic pain management with the patient on today's visit. We discussed a multimodal and systematic approach to pain.  This includes  diagnostic and therapeutic injections, adjuvant pharmacologic treatment, physical therapy, and at times psychiatry.  I emphasized the importance of regular exercise, core strengthening and stretching, diet and weight loss as a cornerstone of long-term pain management.    - This condition does not require this patient to take time off of work, and the primary goal of our Pain Management services is to improve the patient's functional capacity.  - Patient Questions: Answered all of the patient's questions regarding diagnoses, therapy, treatment and next steps    Visit today included increased complexity associated with the care of the episodic problem of chronic pain which was addressed and continue to manage the longitudinal care of the patient due to the serious and/or complex managed problem(s) listed above.      Lisa Linares NP  Interventional Pain Management  [facility]    Disclaimer:  This note was prepared using voice recognition system and is likely to have sound alike errors that may have been overlooked even after proof reading.  Please call me with any questions

## 2024-07-05 ENCOUNTER — TELEPHONE (OUTPATIENT)
Dept: PAIN MEDICINE | Facility: CLINIC | Age: 69
End: 2024-07-05
Payer: MEDICARE

## 2024-07-05 NOTE — TELEPHONE ENCOUNTER
Spoke with the pt to explain to her that her procedure with Dr. Ortiz is not approved, and she will discuss other treatment options with her at her virtual appt.

## 2024-07-08 NOTE — H&P (VIEW-ONLY)
Established Patient Interventional Pain Note     The patient location is: home  The chief complaint leading to consultation is: L SIJ pain  Visit type: Virtual visit with synchronous audio and video  Total time spent with patient: 11-15m  Each patient to whom he or she provides medical services by telemedicine is: (1) informed of the relationship between the physician and patient and the respective role of any other health care provider with respect to management of the patient; and (2) notified that he or she may decline to receive medical services by telemedicine and may withdraw from such care at any time.        Referring Physician: No ref. provider found    PCP: Stephani Valdivia MD    Chief Complaint:   Left-sided sacroiliac joint pain       SUBJECTIVE:  Interval history 07/09/2024  Patient presents for follow-up for left-sided sacroiliac joint pain.  Of note patient has insurance, [employer], denied sacroiliac fusion.  Today she again reports pain overlying the left-sided SI joint.  Pain today is rated an 8/10.  Pain is exacerbated with positional changes, moving from sitting to standing, standing and walking.  Pain interferes with activities of daily living.  She is continued physician directed physical therapy exercises for sacroiliac joint pain over the last 8 weeks from 05/09/2024 through 07/09/2024 with marginal improvement in her symptoms.  Of note patient received 80% relief with prior therapeutic and diagnostic sacroiliac joint injection.      Interval History (06/24/2024):  Patient Silvia Cotton presents today for follow-up visit.  Patient was last seen on 6/14/2024. She is scheduled for left sided sacral RFA in July. For the past 3 weeks she has had right sided pain over the lower back/buttock for the past 3 weeks. No new injury. Pain is a 8/10. Pain is worse with crossing her legs or getting in and out of bed/car. Pain does not radiate to the legs.   Patient denies night fever/night sweats, urinary  incontinence, bowel incontinence, significant weight loss and significant motor weakness.   Patient denies any other complaints or concerns at this time.      Interval History (6/14/2024):  Patient Silvia Cotton presents today for follow-up visit.  Patient was last seen on 5/10/2024 for left SIJ injection #2 with 80% relief sustained. She will still have some intermittent left sided buttock pain but overall she is doing much better. No radiculopathy into the lower extremities.   She rates her pain today a 1/10. When the pain is severe, prior to procedure, pain would go up to a 10/10.   Patient denies night fever/night sweats, urinary incontinence, bowel incontinence, significant weight loss and significant motor weakness.   Patient denies any other complaints or concerns at this time.      Interval history 04/10/2024  Patient presents status post left-sided sacroiliac joint injection 02/27/2024.  Patient reports approximately 100% relief lasting 1 week in duration which has now subsided to approximately 60% sustained relief.  She does report pain can arise with increased activity.  When pain is present it is described as sharp and stabbing in nature overlying the left sacroiliac joint.  Pain can be exacerbated with positional change, moving from sitting to standing and with standing and ambulation.  She is continued physician directed physical therapy exercises over the last 8 weeks from 02/10/2024 through 04/10/2024 with marginal improvement in her pain.  Today she denies right-sided pain.  She denies bilateral lower extremity radiculopathy, lower extremity weakness, bowel or bladder incontinence or saddle anesthesia.    HPI 02/15/2024  Silvia Cotton is a 68 y.o. female with past medical history significant for hyperlipidemia, stage 3 chronic kidney disease, GERD who presents to the clinic for the evaluation of left-sided sacroiliac joint pain, previous Dr. Galvez patient.  Today she reports pain has been  present for over 5 years without inciting accident injury or trauma.  Pain is constant and today is rated a 5/10.  Pain is described as sharp and stabbing in nature.  Patient reports pain overlying the left sacroiliac joint.  Pain is exacerbated when moving from sitting to standing and with standing and ambulation.  She reports she is only able to ambulate approximately 5-10 minutes when pain is severe.  Pain is improved with topical application of heat and ice as well as prior left-sided sacroiliac joint injection.  She reports she received greater than 80% relief lasting several years in duration.  Patient has completed conventional land-based physical therapy in the past at [facility] in [place].  She has continued physician directed physical therapy exercises at home over the last 8 weeks from 12/15/2023 through 02/15/2024 with marginal improvement in her symptoms.  Today she denies any right-sided pain, lower extremity radiculopathy, lower extremity weakness or bowel or bladder incontinence.    Patient denies night fever/night sweats, urinary incontinence, bowel incontinence, significant weight loss, significant motor weakness, and loss of sensations.      Pain Disability Index Review:         6/14/2024    12:14 PM 2/15/2024     1:20 PM   Last 3 PDI Scores   Pain Disability Index (PDI) 15 30       Non-Pharmacologic Treatments:  Physical Therapy/Home Exercise: yes  Ice/Heat:yes  TENS: no  Acupuncture: no  Massage: no  Chiropractic: no    Other: no      Pain Medications:  - Adjuvant Medications: Elavil (Amitriptyline), Neurontin (Gabapentin), Trazodone (Desyrel), Venlafaxine (Effexor), and Zanaflex ( Tizanidine)    Pain Procedures:   Dr. Ortiz:  -02/27/2024: Left-sided sacroiliac joint injection with 100% relief x 1 week then 60% relief  -05/10/2024: left SIJ injection #2 with 80% relief sustained (DIAGNOSTIC)    Dr. Galvez:  -01/15/2019: Left-sided sacroiliac joint injection    Past Medical History:   Diagnosis Date     Anemia     Hyperlipidemia     Hypertension     PONV (postoperative nausea and vomiting)     Supraventricular tachycardia      Past Surgical History:   Procedure Laterality Date    APPENDECTOMY      CARPAL TUNNEL RELEASE Right 11/30/2023    Procedure: RELEASE, CARPAL TUNNEL;  Surgeon: Kiran Finley MD;  Location: [facility] OR;  Service: Orthopedics;  Laterality: Right;  right carpal tunnel release    CARPAL TUNNEL RELEASE Left 12/28/2023    Procedure: RELEASE, CARPAL TUNNEL;  Surgeon: Kiran Finley MD;  Location: [facility] OR;  Service: Orthopedics;  Laterality: Left;    CHOLECYSTECTOMY      COLONOSCOPY N/A 01/13/2017    Procedure: COLONOSCOPY;  Surgeon: Taj Nicholas MD;  Location: [facility] ENDO;  Service: Endoscopy;  Laterality: N/A;    COLONOSCOPY N/A 03/10/2022    Procedure: COLONOSCOPY;  Surgeon: Bee Brock MD;  Location: [facility] ENDO;  Service: Endoscopy;  Laterality: N/A;    INJECTION OF ANESTHETIC AGENT INTO SACROILIAC JOINT Left 2/27/2024    Procedure: Left SIJ Injection with local;  Surgeon: Ilene Ortiz MD;  Location: [facility] PAIN MGT;  Service: Pain Management;  Laterality: Left;    INJECTION OF ANESTHETIC AGENT INTO SACROILIAC JOINT Left 5/10/2024    Procedure: Left SIJ Injection (diagnostic)-Injection #2;  Surgeon: Ilene Ortiz MD;  Location: [facility] PAIN MGT;  Service: Pain Management;  Laterality: Left;    KNEE SURGERY Left 2017    SHOULDER ARTHROSCOPY      Tonsilectomy      TONSILLECTOMY      Uterine Ablation       Review of patient's allergies indicates:  No Known Allergies    Current Outpatient Medications   Medication Sig    amitriptyline (ELAVIL) 50 MG tablet Take 50 mg by mouth every evening.    atenoloL (TENORMIN) 25 MG tablet TAKE 1 TABLET TWICE DAILY    cyanocobalamin 1,000 mcg/mL injection Inject 1 mL (1,000 mcg total) into the muscle once a week.    ferrous gluconate (FERGON) 324 MG tablet Take 324 mg by mouth 2 (two) times a day.    gabapentin (NEURONTIN) 100 MG capsule  "Take 1 capsule (100 mg total) by mouth 3 (three) times daily.    methylPREDNISolone (MEDROL DOSEPACK) 4 mg tablet use as directed    needle, disp, 18 G 18 x 1 " Ndle Use to draw b12 1000 mcg from vail every 7 days for 7 weeks    needle, reusable, 25 G 25 x 1 " Ndle Use to inject B12 1000 mcg every 7 days for 7 weeks    omega-3 fatty acids/fish oil (FISH OIL-OMEGA-3 FATTY ACIDS) 300-1,000 mg capsule Take by mouth once daily.    omeprazole (PRILOSEC) 40 MG capsule TAKE 1 CAPSULE EVERY DAY    ondansetron (ZOFRAN-ODT) 4 MG TbDL Take 1 tablet (4 mg total) by mouth every 6 (six) hours as needed (nausea).    rosuvastatin (CRESTOR) 20 MG tablet TAKE 1 TABLET EVERY DAY    tiZANidine (ZANAFLEX) 4 MG tablet TAKE 1 TABLET TWICE DAILY    traZODone (DESYREL) 100 MG tablet TAKE 1 TABLET (100 MG TOTAL) BY MOUTH EVERY EVENING.    venlafaxine (EFFEXOR-XR) 75 MG 24 hr capsule TAKE 1 CAPSULE EVERY DAY     No current facility-administered medications for this visit.     Facility-Administered Medications Ordered in Other Visits   Medication    chlorhexidine 0.12 % solution 10 mL       Review of Systems     GENERAL:  No weight loss, malaise or fevers.  HEENT:   No recent changes in vision or hearing  NECK:  Negative for lumps, no difficulty with swallowing.  RESPIRATORY:  Negative for cough, wheezing or shortness of breath, patient denies any recent URI.  CARDIOVASCULAR:  Negative for chest pain or palpitations.  GI:  Negative for abdominal discomfort, blood in stools or black stools or change in bowel habits.  MUSCULOSKELETAL:  See HPI.  SKIN:  Negative for lesions, rash, and itching.  PSYCH:  No mood disorder or recent psychosocial stressors.   HEMATOLOGY/LYMPHOLOGY:  Negative for prolonged bleeding, bruising easily or swollen nodes.    NEURO:   No history of syncope, paralysis, seizures or tremors.  All other reviewed and negative other than HPI.    OBJECTIVE:    There were no vitals taken for this visit.      Physical Exam    GENERAL: " Well appearing, in no acute distress, alert and oriented x3.  PSYCH:  Mood and affect appropriate.  SKIN: Skin color, texture, turgor normal, no rashes or lesions.  HEAD/FACE:  Normocephalic, atraumatic. Cranial nerves grossly intact.    CV: RRR with palpation of the radial artery.  PULM: No evidence of respiratory difficulty, symmetric chest rise.  GI:  Soft and non-tender.    BACK: Straight leg raising in the sitting and supine positions is negative to radicular pain. No pain to palpation over the facet joints of the lumbar spine or spinous processes. Normal range of motion without pain reproduction.  EXTREMITIES: Peripheral joint ROM is full and pain free without obvious instability or laxity in all four extremities. No deformities, edema, or skin discoloration. Good capillary refill.  MUSCULOSKELETAL: Able to stand on heels & toes.   hip, and knee provocative maneuvers are negative.    SIJ testing: LEFT  - TTP over SI joint: Present bilateral  - Sepideh's/ Uriel's: Positive  bilateral  Positive fadir's on the right  - Sacroiliac Distraction Test (anterior pressure): Positive  - Sacroiliac Compression Test (lateral pressure): Positive   - SacralThrust Test (posterior pressure): Positive    Facet loading test is negative bilaterally.   Bilateral upper and lower extremity strength is normal and symmetric.  No atrophy or tone abnormalities are noted.    RIGHT Lower extremity: Hip flexion 5/5, Hip Abduction 5/5, Hip Adduction 5/5, Knee extension 5/5, Knee flexion 5/5, Ankle dorsiflexion5/5, Extensor hallucis longus 5/5, Ankle plantarflexion 5/5  LEFT Lower extremity:  Hip flexion 5/5, Hip Abduction 5/5,Hip Adduction 5/5, Knee extension 5/5, Knee flexion 5/5, Ankle dorsiflexion 5/5, Extensor hallucis longus 5/5, Ankle plantarflexion 5/5  -Normal testing knee (patellar) jerk and ankle (achilles) jerk    NEURO: Bilateral upper and lower extremity coordination and muscle stretch reflexes are physiologic and symmetric. No  loss of sensation is noted.  GAIT: normal.    Imagin/10/19    X-Ray Lumbar Spine AP And Lateral  FINDINGS:  No scoliosis.  No acute fracture.  No listhesis.  Mild degenerative changes with spurring of the endplates and facet arthropathy is noted.  Degenerative changes of the sacroiliac joints are also noted.            ASSESSMENT: 68 y.o. year old female with     1. Sacroiliitis  Case Request-RAD/Other Procedure Area: Left SIJ Injection with local (DIAGNOSTIC< NO STEROID)    MRI Sacroiliac Joints Without Contrast      2. Sacroiliac joint pain            PLAN:   - Interventions:  Schedule for left-sided diagnostic sacroiliac joint injection.  Explained the risks and benefits of the procedure in detail with the patient today in clinic along with alternative treatment options, and the patient elected to pursue the intervention.    Patient is a candidate for left-sided sacroiliac joint Rosario fusion.  Of note patient has had relief with the following procedures:  -2024: Left-sided sacroiliac joint injection with 100% relief x 1 week then 60% relief  -05/10/2024: left SIJ injection #2 with 80% relief sustained (DIAGNOSTIC)    - Anticoagulation use: No no anticoagulation     report:  Reviewed and consistent with medication use as prescribed.    - Medications:  -Continue Gabapentin 100 mg TID per Dr. Valdivia.      -Continue tizanidine. We have discussed potential deleterious side effects associated with this medication including  dizziness, drowsiness, dry mouth or tingling sensation in the upper or lower extremities.     - Therapy:   We discussed continuing physical therapy to help manage the patient/s painful condition. The patient was counseled that muscle strengthening will improve the long term prognosis in regards to pain and may also help increase range of motion and mobility. A referral for EXT physical therapy: [facility] in [place] was provided to the patient.    - Imaging: Reviewed available imaging  (x-ray lumbar spine) with patient and answered any questions they had regarding study.  MRI sacroiliac joints to better evaluate pain      - Follow up visit: return to clinic in 4-6 weeks after procedure.  Virtual visit      The above plan and management options were discussed at length with patient. Patient is in agreement with the above and verbalized understanding.    - I discussed the goals of interventional chronic pain management with the patient on today's visit. We discussed a multimodal and systematic approach to pain.  This includes diagnostic and therapeutic injections, adjuvant pharmacologic treatment, physical therapy, and at times psychiatry.  I emphasized the importance of regular exercise, core strengthening and stretching, diet and weight loss as a cornerstone of long-term pain management.    - This condition does not require this patient to take time off of work, and the primary goal of our Pain Management services is to improve the patient's functional capacity.  - Patient Questions: Answered all of the patient's questions regarding diagnoses, therapy, treatment and next steps          Ilene Ortiz MD  Interventional Pain Management  [facility]    Disclaimer:  This note was prepared using voice recognition system and is likely to have sound alike errors that may have been overlooked even after proof reading.  Please call me with any questions

## 2024-07-08 NOTE — PROGRESS NOTES
Established Patient Interventional Pain Note     The patient location is: home  The chief complaint leading to consultation is: L SIJ pain  Visit type: Virtual visit with synchronous audio and video  Total time spent with patient: 11-15m  Each patient to whom he or she provides medical services by telemedicine is: (1) informed of the relationship between the physician and patient and the respective role of any other health care provider with respect to management of the patient; and (2) notified that he or she may decline to receive medical services by telemedicine and may withdraw from such care at any time.        Referring Physician: No ref. provider found    PCP: Stephani Valdivia MD    Chief Complaint:   Left-sided sacroiliac joint pain       SUBJECTIVE:  Interval history 07/09/2024  Patient presents for follow-up for left-sided sacroiliac joint pain.  Of note patient has insurance, [employer], denied sacroiliac fusion.  Today she again reports pain overlying the left-sided SI joint.  Pain today is rated an 8/10.  Pain is exacerbated with positional changes, moving from sitting to standing, standing and walking.  Pain interferes with activities of daily living.  She is continued physician directed physical therapy exercises for sacroiliac joint pain over the last 8 weeks from 05/09/2024 through 07/09/2024 with marginal improvement in her symptoms.  Of note patient received 80% relief with prior therapeutic and diagnostic sacroiliac joint injection.      Interval History (06/24/2024):  Patient Silvia Cotton presents today for follow-up visit.  Patient was last seen on 6/14/2024. She is scheduled for left sided sacral RFA in July. For the past 3 weeks she has had right sided pain over the lower back/buttock for the past 3 weeks. No new injury. Pain is a 8/10. Pain is worse with crossing her legs or getting in and out of bed/car. Pain does not radiate to the legs.   Patient denies night fever/night sweats, urinary  incontinence, bowel incontinence, significant weight loss and significant motor weakness.   Patient denies any other complaints or concerns at this time.      Interval History (6/14/2024):  Patient Silvia Cotton presents today for follow-up visit.  Patient was last seen on 5/10/2024 for left SIJ injection #2 with 80% relief sustained. She will still have some intermittent left sided buttock pain but overall she is doing much better. No radiculopathy into the lower extremities.   She rates her pain today a 1/10. When the pain is severe, prior to procedure, pain would go up to a 10/10.   Patient denies night fever/night sweats, urinary incontinence, bowel incontinence, significant weight loss and significant motor weakness.   Patient denies any other complaints or concerns at this time.      Interval history 04/10/2024  Patient presents status post left-sided sacroiliac joint injection 02/27/2024.  Patient reports approximately 100% relief lasting 1 week in duration which has now subsided to approximately 60% sustained relief.  She does report pain can arise with increased activity.  When pain is present it is described as sharp and stabbing in nature overlying the left sacroiliac joint.  Pain can be exacerbated with positional change, moving from sitting to standing and with standing and ambulation.  She is continued physician directed physical therapy exercises over the last 8 weeks from 02/10/2024 through 04/10/2024 with marginal improvement in her pain.  Today she denies right-sided pain.  She denies bilateral lower extremity radiculopathy, lower extremity weakness, bowel or bladder incontinence or saddle anesthesia.    HPI 02/15/2024  Silvia Cotton is a 68 y.o. female with past medical history significant for hyperlipidemia, stage 3 chronic kidney disease, GERD who presents to the clinic for the evaluation of left-sided sacroiliac joint pain, previous Dr. Galvez patient.  Today she reports pain has been  present for over 5 years without inciting accident injury or trauma.  Pain is constant and today is rated a 5/10.  Pain is described as sharp and stabbing in nature.  Patient reports pain overlying the left sacroiliac joint.  Pain is exacerbated when moving from sitting to standing and with standing and ambulation.  She reports she is only able to ambulate approximately 5-10 minutes when pain is severe.  Pain is improved with topical application of heat and ice as well as prior left-sided sacroiliac joint injection.  She reports she received greater than 80% relief lasting several years in duration.  Patient has completed conventional land-based physical therapy in the past at [facility] in [place].  She has continued physician directed physical therapy exercises at home over the last 8 weeks from 12/15/2023 through 02/15/2024 with marginal improvement in her symptoms.  Today she denies any right-sided pain, lower extremity radiculopathy, lower extremity weakness or bowel or bladder incontinence.    Patient denies night fever/night sweats, urinary incontinence, bowel incontinence, significant weight loss, significant motor weakness, and loss of sensations.      Pain Disability Index Review:         6/14/2024    12:14 PM 2/15/2024     1:20 PM   Last 3 PDI Scores   Pain Disability Index (PDI) 15 30       Non-Pharmacologic Treatments:  Physical Therapy/Home Exercise: yes  Ice/Heat:yes  TENS: no  Acupuncture: no  Massage: no  Chiropractic: no    Other: no      Pain Medications:  - Adjuvant Medications: Elavil (Amitriptyline), Neurontin (Gabapentin), Trazodone (Desyrel), Venlafaxine (Effexor), and Zanaflex ( Tizanidine)    Pain Procedures:   Dr. Ortiz:  -02/27/2024: Left-sided sacroiliac joint injection with 100% relief x 1 week then 60% relief  -05/10/2024: left SIJ injection #2 with 80% relief sustained (DIAGNOSTIC)    Dr. Galvez:  -01/15/2019: Left-sided sacroiliac joint injection    Past Medical History:   Diagnosis Date     Anemia     Hyperlipidemia     Hypertension     PONV (postoperative nausea and vomiting)     Supraventricular tachycardia      Past Surgical History:   Procedure Laterality Date    APPENDECTOMY      CARPAL TUNNEL RELEASE Right 11/30/2023    Procedure: RELEASE, CARPAL TUNNEL;  Surgeon: Kiran Finley MD;  Location: [facility] OR;  Service: Orthopedics;  Laterality: Right;  right carpal tunnel release    CARPAL TUNNEL RELEASE Left 12/28/2023    Procedure: RELEASE, CARPAL TUNNEL;  Surgeon: Kiran Finley MD;  Location: [facility] OR;  Service: Orthopedics;  Laterality: Left;    CHOLECYSTECTOMY      COLONOSCOPY N/A 01/13/2017    Procedure: COLONOSCOPY;  Surgeon: Taj Nicholas MD;  Location: [facility] ENDO;  Service: Endoscopy;  Laterality: N/A;    COLONOSCOPY N/A 03/10/2022    Procedure: COLONOSCOPY;  Surgeon: Bee Brock MD;  Location: [facility] ENDO;  Service: Endoscopy;  Laterality: N/A;    INJECTION OF ANESTHETIC AGENT INTO SACROILIAC JOINT Left 2/27/2024    Procedure: Left SIJ Injection with local;  Surgeon: Ilene Ortiz MD;  Location: [facility] PAIN MGT;  Service: Pain Management;  Laterality: Left;    INJECTION OF ANESTHETIC AGENT INTO SACROILIAC JOINT Left 5/10/2024    Procedure: Left SIJ Injection (diagnostic)-Injection #2;  Surgeon: Ilene Ortiz MD;  Location: [facility] PAIN MGT;  Service: Pain Management;  Laterality: Left;    KNEE SURGERY Left 2017    SHOULDER ARTHROSCOPY      Tonsilectomy      TONSILLECTOMY      Uterine Ablation       Review of patient's allergies indicates:  No Known Allergies    Current Outpatient Medications   Medication Sig    amitriptyline (ELAVIL) 50 MG tablet Take 50 mg by mouth every evening.    atenoloL (TENORMIN) 25 MG tablet TAKE 1 TABLET TWICE DAILY    cyanocobalamin 1,000 mcg/mL injection Inject 1 mL (1,000 mcg total) into the muscle once a week.    ferrous gluconate (FERGON) 324 MG tablet Take 324 mg by mouth 2 (two) times a day.    gabapentin (NEURONTIN) 100 MG capsule  "Take 1 capsule (100 mg total) by mouth 3 (three) times daily.    methylPREDNISolone (MEDROL DOSEPACK) 4 mg tablet use as directed    needle, disp, 18 G 18 x 1 " Ndle Use to draw b12 1000 mcg from vail every 7 days for 7 weeks    needle, reusable, 25 G 25 x 1 " Ndle Use to inject B12 1000 mcg every 7 days for 7 weeks    omega-3 fatty acids/fish oil (FISH OIL-OMEGA-3 FATTY ACIDS) 300-1,000 mg capsule Take by mouth once daily.    omeprazole (PRILOSEC) 40 MG capsule TAKE 1 CAPSULE EVERY DAY    ondansetron (ZOFRAN-ODT) 4 MG TbDL Take 1 tablet (4 mg total) by mouth every 6 (six) hours as needed (nausea).    rosuvastatin (CRESTOR) 20 MG tablet TAKE 1 TABLET EVERY DAY    tiZANidine (ZANAFLEX) 4 MG tablet TAKE 1 TABLET TWICE DAILY    traZODone (DESYREL) 100 MG tablet TAKE 1 TABLET (100 MG TOTAL) BY MOUTH EVERY EVENING.    venlafaxine (EFFEXOR-XR) 75 MG 24 hr capsule TAKE 1 CAPSULE EVERY DAY     No current facility-administered medications for this visit.     Facility-Administered Medications Ordered in Other Visits   Medication    chlorhexidine 0.12 % solution 10 mL       Review of Systems     GENERAL:  No weight loss, malaise or fevers.  HEENT:   No recent changes in vision or hearing  NECK:  Negative for lumps, no difficulty with swallowing.  RESPIRATORY:  Negative for cough, wheezing or shortness of breath, patient denies any recent URI.  CARDIOVASCULAR:  Negative for chest pain or palpitations.  GI:  Negative for abdominal discomfort, blood in stools or black stools or change in bowel habits.  MUSCULOSKELETAL:  See HPI.  SKIN:  Negative for lesions, rash, and itching.  PSYCH:  No mood disorder or recent psychosocial stressors.   HEMATOLOGY/LYMPHOLOGY:  Negative for prolonged bleeding, bruising easily or swollen nodes.    NEURO:   No history of syncope, paralysis, seizures or tremors.  All other reviewed and negative other than HPI.    OBJECTIVE:    There were no vitals taken for this visit.      Physical Exam    GENERAL: " Well appearing, in no acute distress, alert and oriented x3.  PSYCH:  Mood and affect appropriate.  SKIN: Skin color, texture, turgor normal, no rashes or lesions.  HEAD/FACE:  Normocephalic, atraumatic. Cranial nerves grossly intact.    CV: RRR with palpation of the radial artery.  PULM: No evidence of respiratory difficulty, symmetric chest rise.  GI:  Soft and non-tender.    BACK: Straight leg raising in the sitting and supine positions is negative to radicular pain. No pain to palpation over the facet joints of the lumbar spine or spinous processes. Normal range of motion without pain reproduction.  EXTREMITIES: Peripheral joint ROM is full and pain free without obvious instability or laxity in all four extremities. No deformities, edema, or skin discoloration. Good capillary refill.  MUSCULOSKELETAL: Able to stand on heels & toes.   hip, and knee provocative maneuvers are negative.    SIJ testing: LEFT  - TTP over SI joint: Present bilateral  - Sepideh's/ Uriel's: Positive  bilateral  Positive fadir's on the right  - Sacroiliac Distraction Test (anterior pressure): Positive  - Sacroiliac Compression Test (lateral pressure): Positive   - SacralThrust Test (posterior pressure): Positive    Facet loading test is negative bilaterally.   Bilateral upper and lower extremity strength is normal and symmetric.  No atrophy or tone abnormalities are noted.    RIGHT Lower extremity: Hip flexion 5/5, Hip Abduction 5/5, Hip Adduction 5/5, Knee extension 5/5, Knee flexion 5/5, Ankle dorsiflexion5/5, Extensor hallucis longus 5/5, Ankle plantarflexion 5/5  LEFT Lower extremity:  Hip flexion 5/5, Hip Abduction 5/5,Hip Adduction 5/5, Knee extension 5/5, Knee flexion 5/5, Ankle dorsiflexion 5/5, Extensor hallucis longus 5/5, Ankle plantarflexion 5/5  -Normal testing knee (patellar) jerk and ankle (achilles) jerk    NEURO: Bilateral upper and lower extremity coordination and muscle stretch reflexes are physiologic and symmetric. No  loss of sensation is noted.  GAIT: normal.    Imagin/10/19    X-Ray Lumbar Spine AP And Lateral  FINDINGS:  No scoliosis.  No acute fracture.  No listhesis.  Mild degenerative changes with spurring of the endplates and facet arthropathy is noted.  Degenerative changes of the sacroiliac joints are also noted.            ASSESSMENT: 68 y.o. year old female with     1. Sacroiliitis  Case Request-RAD/Other Procedure Area: Left SIJ Injection with local (DIAGNOSTIC< NO STEROID)    MRI Sacroiliac Joints Without Contrast      2. Sacroiliac joint pain            PLAN:   - Interventions:  Schedule for left-sided diagnostic sacroiliac joint injection.  Explained the risks and benefits of the procedure in detail with the patient today in clinic along with alternative treatment options, and the patient elected to pursue the intervention.    Patient is a candidate for left-sided sacroiliac joint Rosario fusion.  Of note patient has had relief with the following procedures:  -2024: Left-sided sacroiliac joint injection with 100% relief x 1 week then 60% relief  -05/10/2024: left SIJ injection #2 with 80% relief sustained (DIAGNOSTIC)    - Anticoagulation use: No no anticoagulation     report:  Reviewed and consistent with medication use as prescribed.    - Medications:  -Continue Gabapentin 100 mg TID per Dr. Valdivia.      -Continue tizanidine. We have discussed potential deleterious side effects associated with this medication including  dizziness, drowsiness, dry mouth or tingling sensation in the upper or lower extremities.     - Therapy:   We discussed continuing physical therapy to help manage the patient/s painful condition. The patient was counseled that muscle strengthening will improve the long term prognosis in regards to pain and may also help increase range of motion and mobility. A referral for EXT physical therapy: [facility] [[email or web address] PT in [place] was provided to the patient.    - Imaging: Reviewed available imaging  (x-ray lumbar spine) with patient and answered any questions they had regarding study.  MRI sacroiliac joints to better evaluate pain      - Follow up visit: return to clinic in 4-6 weeks after procedure.  Virtual visit      The above plan and management options were discussed at length with patient. Patient is in agreement with the above and verbalized understanding.    - I discussed the goals of interventional chronic pain management with the patient on today's visit. We discussed a multimodal and systematic approach to pain.  This includes diagnostic and therapeutic injections, adjuvant pharmacologic treatment, physical therapy, and at times psychiatry.  I emphasized the importance of regular exercise, core strengthening and stretching, diet and weight loss as a cornerstone of long-term pain management.    - This condition does not require this patient to take time off of work, and the primary goal of our Pain Management services is to improve the patient's functional capacity.  - Patient Questions: Answered all of the patient's questions regarding diagnoses, therapy, treatment and next steps          Ilene Ortiz MD  Interventional Pain Management  [facility]    Disclaimer:  This note was prepared using voice recognition system and is likely to have sound alike errors that may have been overlooked even after proof reading.  Please call me with any questions

## 2024-07-09 ENCOUNTER — OFFICE VISIT (OUTPATIENT)
Dept: PAIN MEDICINE | Facility: CLINIC | Age: 69
End: 2024-07-09
Payer: MEDICARE

## 2024-07-09 DIAGNOSIS — M53.3 SACROILIAC JOINT PAIN: ICD-10-CM

## 2024-07-09 DIAGNOSIS — M46.1 SACROILIITIS: Primary | ICD-10-CM

## 2024-07-09 PROCEDURE — 1159F MED LIST DOCD IN RCRD: CPT | Mod: CPTII,95,, | Performed by: ANESTHESIOLOGY

## 2024-07-09 PROCEDURE — 1160F RVW MEDS BY RX/DR IN RCRD: CPT | Mod: CPTII,95,, | Performed by: ANESTHESIOLOGY

## 2024-07-09 PROCEDURE — 99214 OFFICE O/P EST MOD 30 MIN: CPT | Mod: 95,,, | Performed by: ANESTHESIOLOGY

## 2024-07-09 PROCEDURE — 3044F HG A1C LEVEL LT 7.0%: CPT | Mod: CPTII,95,, | Performed by: ANESTHESIOLOGY

## 2024-07-10 ENCOUNTER — PATIENT MESSAGE (OUTPATIENT)
Dept: PAIN MEDICINE | Facility: CLINIC | Age: 69
End: 2024-07-10
Payer: MEDICARE

## 2024-07-10 RX ORDER — DIAZEPAM 5 MG/1
5 TABLET ORAL ONCE
COMMUNITY
End: 2024-07-10 | Stop reason: SDUPTHER

## 2024-07-10 RX ORDER — DIAZEPAM 5 MG/1
5 TABLET ORAL ONCE
Qty: 1 TABLET | Refills: 0 | Status: SHIPPED | OUTPATIENT
Start: 2024-07-10 | End: 2024-07-10

## 2024-07-11 ENCOUNTER — PATIENT MESSAGE (OUTPATIENT)
Dept: PAIN MEDICINE | Facility: CLINIC | Age: 69
End: 2024-07-11
Payer: MEDICARE

## 2024-07-12 ENCOUNTER — PATIENT MESSAGE (OUTPATIENT)
Dept: PAIN MEDICINE | Facility: CLINIC | Age: 69
End: 2024-07-12
Payer: MEDICARE

## 2024-07-15 NOTE — PRE-PROCEDURE INSTRUCTIONS
Spoke with patient regarding procedure scheduled on 7.26    Arrival time 0700     Has patient been sick with fever or on antibiotics within the last 7 days? No     Does the patient have any open wounds, sores or rashes? No     Does the patient have any recent fractures? no     Has patient received a vaccination within the last 7 days? No     Received the COVID vaccination? yes     Has the patient stopped all medications as directed? na     Does patient have a pacemaker, defibrillator, or implantable stimulator? No     Does the patient have a ride to and from procedure and someone reliable to remain with patient?       Is the patient diabetic? no     Does the patient have sleep apnea? Or use O2 at home? no     Is the patient receiving sedation? Yes      Is the patient instructed to remain NPO beginning at midnight the night before their procedure? yes     Procedure location confirmed with patient? Yes     Covid- Denies signs/symptoms. Instructed to notify PAT/MD if any changes.

## 2024-07-16 ENCOUNTER — PATIENT MESSAGE (OUTPATIENT)
Dept: PAIN MEDICINE | Facility: CLINIC | Age: 69
End: 2024-07-16
Payer: MEDICARE

## 2024-07-19 ENCOUNTER — HOSPITAL ENCOUNTER (OUTPATIENT)
Dept: RADIOLOGY | Facility: HOSPITAL | Age: 69
Discharge: HOME | End: 2024-07-19
Attending: ANESTHESIOLOGY
Payer: MEDICARE

## 2024-07-19 DIAGNOSIS — M46.1 SACROILIITIS: ICD-10-CM

## 2024-07-19 PROCEDURE — 72195 MRI PELVIS W/O DYE: CPT | Mod: 26,HCNC,, | Performed by: RADIOLOGY

## 2024-07-19 PROCEDURE — 72195 MRI PELVIS W/O DYE: CPT | Mod: TC,HCNC

## 2024-07-26 ENCOUNTER — HOSPITAL ENCOUNTER (OUTPATIENT)
Facility: HOSPITAL | Age: 69
Discharge: HOME | End: 2024-07-26
Attending: ANESTHESIOLOGY | Admitting: ANESTHESIOLOGY
Payer: MEDICARE

## 2024-07-26 VITALS
WEIGHT: 266.75 LBS | DIASTOLIC BLOOD PRESSURE: 63 MMHG | HEIGHT: 69 IN | OXYGEN SATURATION: 96 % | HEART RATE: 72 BPM | SYSTOLIC BLOOD PRESSURE: 151 MMHG | RESPIRATION RATE: 16 BRPM | TEMPERATURE: 97 F | BODY MASS INDEX: 39.51 KG/M2

## 2024-07-26 DIAGNOSIS — M46.1 SACROILIITIS: ICD-10-CM

## 2024-07-26 PROCEDURE — 25500020 PHARM REV CODE 255: Mod: HCNC | Performed by: ANESTHESIOLOGY

## 2024-07-26 PROCEDURE — 25000003 PHARM REV CODE 250: Mod: HCNC | Performed by: ANESTHESIOLOGY

## 2024-07-26 PROCEDURE — 27096 INJECT SACROILIAC JOINT: CPT | Mod: HCNC,LT | Performed by: ANESTHESIOLOGY

## 2024-07-26 PROCEDURE — 27096 INJECT SACROILIAC JOINT: CPT | Mod: HCNC,LT,, | Performed by: ANESTHESIOLOGY

## 2024-07-26 PROCEDURE — 63600175 PHARM REV CODE 636 W HCPCS: Mod: HCNC | Performed by: ANESTHESIOLOGY

## 2024-07-26 RX ORDER — SODIUM BICARBONATE 1 MEQ/ML
SYRINGE (ML) INTRAVENOUS
Status: DISCONTINUED | OUTPATIENT
Start: 2024-07-26 | End: 2024-07-26 | Stop reason: HOSPADM

## 2024-07-26 RX ORDER — FENTANYL CITRATE 50 UG/ML
INJECTION, SOLUTION INTRAMUSCULAR; INTRAVENOUS
Status: DISCONTINUED | OUTPATIENT
Start: 2024-07-26 | End: 2024-07-26 | Stop reason: HOSPADM

## 2024-07-26 RX ORDER — BUPIVACAINE HYDROCHLORIDE 2.5 MG/ML
INJECTION, SOLUTION EPIDURAL; INFILTRATION; INTRACAUDAL
Status: DISCONTINUED | OUTPATIENT
Start: 2024-07-26 | End: 2024-07-26 | Stop reason: HOSPADM

## 2024-07-26 RX ORDER — MIDAZOLAM HYDROCHLORIDE 1 MG/ML
INJECTION INTRAMUSCULAR; INTRAVENOUS
Status: DISCONTINUED | OUTPATIENT
Start: 2024-07-26 | End: 2024-07-26 | Stop reason: HOSPADM

## 2024-07-26 NOTE — OP NOTE
Silvia Cotton  68 y.o. female      Vitals:    07/26/24 0750   BP: 136/76   Pulse: 76   Resp: 18   Temp:        Procedure Date: 07/26/2024        INFORMED CONSENT: The procedure, risks, benefits and options were discussed with patient. There are no contraindications to the procedure. The patient expressed understanding and agreed to proceed. The personnel performing the procedure was discussed. I verify that I personally obtained consent prior to the start of the procedure and the signed consent can be found on the patient's chart.       Anesthesia:   Conscious sedation provided by M.D    The patient was monitored with continuous pulse oximetry, EKG, and intermittent blood pressure monitors.  The patient was hemodynamically stable throughout the entire process was responsive to voice, and breathing spontaneously.  Supplemental O2 was provided at 2L/min via nasal cannula.  Patient was comfortable for the duration of the procedure. (See nurse documentation and case log for sedation time)    There was a total of 1mg IV Midazolam and 50mcg Fentanyl titrated for the procedure    Pre Procedure diagnosis: Sacroiliitis [M46.1]  Post-Procedure diagnosis: SAME      PROCEDURE:   Left sacroiliac joint injection                            REASON FOR PROCEDURE:   Sacroiliitis [M46.1]        MEDICATIONS INJECTED: ONLY  Bupivacaine 0.25% into each site diagnostic    LOCAL ANESTHETIC USED: Xylocaine 1% 6ml     ESTIMATED BLOOD LOSS: None.   COMPLICATIONS: None.     TECHNIQUE:       Sacroiliac joint injection:   Laying in the prone position, the patient was prepped and draped in the usual sterile fashion using ChloraPrep and fenestrated drape.  The area was determined under fluoroscopy.  Local Xylocaine was injected by raising a wheel and going down to the periosteum using a 27-gauge hypodermic needle.  The 3.5 inch 22-gauge spinal needle was introduce into the Bilateral sacroiliac joint.  Negative pressure applied to confirm no  intravascular placement.  Omnipaque was injected to confirm placement and to confirm that there was no vascular runoff.  The medication was then injected slowly.  The patient tolerated the procedure well.                       The patient was monitored for approximately 30 minutes after the procedure. Patient was given post procedure and discharge instructions to follow at home. We will see the patient back in two weeks or the patient may call to inform of status. The patient was discharged in a stable condition

## 2024-07-26 NOTE — DISCHARGE SUMMARY
Discharge Note  Short Stay      SUMMARY     Admit Date: 7/26/2024    Attending Physician: Ilene Ortiz MD        Discharge Physician: Ilene Ortiz MD        Discharge Date: 7/26/2024 7:52 AM    Procedure(s) (LRB):  Left SIJ Injection with local (DIAGNOSTIC< NO STEROID) (Left)    Final Diagnosis: Sacroiliitis [M46.1]    Disposition: Home or self care    Patient Instructions:   Current Discharge Medication List        CONTINUE these medications which have NOT CHANGED    Details   amitriptyline (ELAVIL) 50 MG tablet Take 50 mg by mouth every evening.      atenoloL (TENORMIN) 25 MG tablet TAKE 1 TABLET TWICE DAILY  Qty: 180 tablet, Refills: 3    Comments: .      cyanocobalamin 1,000 mcg/mL injection Inject 1 mL (1,000 mcg total) into the muscle once a week.  Qty: 30 mL, Refills: 12      ferrous gluconate (FERGON) 324 MG tablet Take 324 mg by mouth 2 (two) times a day.      gabapentin (NEURONTIN) 100 MG capsule Take 1 capsule (100 mg total) by mouth 3 (three) times daily.  Qty: 90 capsule, Refills: 11      omega-3 fatty acids/fish oil (FISH OIL-OMEGA-3 FATTY ACIDS) 300-1,000 mg capsule Take by mouth once daily.      omeprazole (PRILOSEC) 40 MG capsule TAKE 1 CAPSULE EVERY DAY  Qty: 90 capsule, Refills: 3      ondansetron (ZOFRAN-ODT) 4 MG TbDL Take 1 tablet (4 mg total) by mouth every 6 (six) hours as needed (nausea).  Qty: 30 tablet, Refills: 0    Associated Diagnoses: Nausea      rosuvastatin (CRESTOR) 20 MG tablet TAKE 1 TABLET EVERY DAY  Qty: 90 tablet, Refills: 2      tiZANidine (ZANAFLEX) 4 MG tablet TAKE 1 TABLET TWICE DAILY  Qty: 180 tablet, Refills: 3      traZODone (DESYREL) 100 MG tablet TAKE 1 TABLET (100 MG TOTAL) BY MOUTH EVERY EVENING.  Qty: 90 tablet, Refills: 3      venlafaxine (EFFEXOR-XR) 75 MG 24 hr capsule TAKE 1 CAPSULE EVERY DAY  Qty: 90 capsule, Refills: 2      diazePAM (VALIUM) 5 MG tablet Take 1 tablet (5 mg total) by mouth once. for 1 dose  Qty: 1 tablet, Refills: 0      needle, disp, 18 G 18  "x 1 " Ndle Use to draw b12 1000 mcg from vail every 7 days for 7 weeks  Qty: 100 each, Refills: 0      needle, reusable, 25 G 25 x 1 " Ndle Use to inject B12 1000 mcg every 7 days for 7 weeks  Qty: 12 each, Refills: 2                 Discharge Diagnosis: Sacroiliitis [M46.1]  Condition on Discharge: Stable with no complications to procedure   Diet on Discharge: Same as before.  Activity: as per instruction sheet.  Discharge to: Home with a responsible adult.  Follow up: 2-4 weeks       Please call the office at [phone] if you experience any weakness or loss of sensation, fever > 101.5, pain uncontrolled with oral medications, persistent nausea/vomiting/or diarrhea, redness or drainage from the incisions, or any other worrisome concerns. If physician on call was not reached or could not communicate with our office for any reason please go to the nearest emergency department        "

## 2024-07-26 NOTE — DISCHARGE INSTRUCTIONS

## 2024-07-30 ENCOUNTER — PATIENT MESSAGE (OUTPATIENT)
Dept: PAIN MEDICINE | Facility: CLINIC | Age: 69
End: 2024-07-30
Payer: MEDICARE

## 2024-07-30 NOTE — H&P (VIEW-ONLY)
Established Patient Interventional Pain Note     The patient location is: home  The chief complaint leading to consultation is: L SIJ pain  Visit type: Virtual visit with synchronous audio and video  Total time spent with patient: 11-15m  Each patient to whom he or she provides medical services by telemedicine is: (1) informed of the relationship between the physician and patient and the respective role of any other health care provider with respect to management of the patient; and (2) notified that he or she may decline to receive medical services by telemedicine and may withdraw from such care at any time.        Referring Physician: No ref. provider found    PCP: Stephani Valdivia MD    Chief Complaint:   Left-sided sacroiliac joint pain       SUBJECTIVE:  Interval History 07/31/2024  Patient presents status post left-sided diagnostic SI joint injection 07/26/2024 and left-sided diagnostic SI joint injection 05/10/2024 and for MRI sacroiliac joint review. Patient reports 100% relief following 2 diagnostic left-sided sacroiliac joint injections.   Today she again reports pain overlying the left-sided sacroiliac joint.  Pain today is rated an 8/10 and is constant.  Pain is exacerbated with positional changes, moving from sitting to standing and with standing and ambulation.  Her pain does significantly impact activities of daily living and quality of life.  She has continued physician directed physical therapy exercises for bilateral sacroiliac joint pain over the last 8 weeks from 05/03/2024 through 07/31/2024 with marginal improvement in her pain.  Patient would like to pursue sacroiliac joint fusion.      Interval history 07/09/2024  Patient presents for follow-up for left-sided sacroiliac joint pain.  Of note patient has insurance, [employer], denied sacroiliac fusion.  Today she again reports pain overlying the left-sided SI joint.  Pain today is rated an 8/10.  Pain is exacerbated with positional changes, moving  from sitting to standing, standing and walking.  Pain interferes with activities of daily living.  She is continued physician directed physical therapy exercises for sacroiliac joint pain over the last 8 weeks from 05/09/2024 through 07/09/2024 with marginal improvement in her symptoms.  Of note patient received 80% relief with prior therapeutic and diagnostic sacroiliac joint injection.      Interval History (06/24/2024):  Patient Silvia Cotton presents today for follow-up visit.  Patient was last seen on 6/14/2024. She is scheduled for left sided sacral RFA in July. For the past 3 weeks she has had right sided pain over the lower back/buttock for the past 3 weeks. No new injury. Pain is a 8/10. Pain is worse with crossing her legs or getting in and out of bed/car. Pain does not radiate to the legs.   Patient denies night fever/night sweats, urinary incontinence, bowel incontinence, significant weight loss and significant motor weakness.   Patient denies any other complaints or concerns at this time.      Interval History (6/14/2024):  Patient Silvia Cotton presents today for follow-up visit.  Patient was last seen on 5/10/2024 for left SIJ injection #2 with 80% relief sustained. She will still have some intermittent left sided buttock pain but overall she is doing much better. No radiculopathy into the lower extremities.   She rates her pain today a 1/10. When the pain is severe, prior to procedure, pain would go up to a 10/10.   Patient denies night fever/night sweats, urinary incontinence, bowel incontinence, significant weight loss and significant motor weakness.   Patient denies any other complaints or concerns at this time.      Interval history 04/10/2024  Patient presents status post left-sided sacroiliac joint injection 02/27/2024.  Patient reports approximately 100% relief lasting 1 week in duration which has now subsided to approximately 60% sustained relief.  She does report pain can arise with  increased activity.  When pain is present it is described as sharp and stabbing in nature overlying the left sacroiliac joint.  Pain can be exacerbated with positional change, moving from sitting to standing and with standing and ambulation.  She is continued physician directed physical therapy exercises over the last 8 weeks from 02/10/2024 through 04/10/2024 with marginal improvement in her pain.  Today she denies right-sided pain.  She denies bilateral lower extremity radiculopathy, lower extremity weakness, bowel or bladder incontinence or saddle anesthesia.    HPI 02/15/2024  Silvia Cotton is a 68 y.o. female with past medical history significant for hyperlipidemia, stage 3 chronic kidney disease, GERD who presents to the clinic for the evaluation of left-sided sacroiliac joint pain, previous Dr. Galvez patient.  Today she reports pain has been present for over 5 years without inciting accident injury or trauma.  Pain is constant and today is rated a 5/10.  Pain is described as sharp and stabbing in nature.  Patient reports pain overlying the left sacroiliac joint.  Pain is exacerbated when moving from sitting to standing and with standing and ambulation.  She reports she is only able to ambulate approximately 5-10 minutes when pain is severe.  Pain is improved with topical application of heat and ice as well as prior left-sided sacroiliac joint injection.  She reports she received greater than 80% relief lasting several years in duration.  Patient has completed conventional land-based physical therapy in the past at [facility] in [place].  She has continued physician directed physical therapy exercises at home over the last 8 weeks from 12/15/2023 through 02/15/2024 with marginal improvement in her symptoms.  Today she denies any right-sided pain, lower extremity radiculopathy, lower extremity weakness or bowel or bladder incontinence.    Patient denies night fever/night sweats, urinary incontinence, bowel  incontinence, significant weight loss, significant motor weakness, and loss of sensations.      Pain Disability Index Review:         6/14/2024    12:14 PM 2/15/2024     1:20 PM   Last 3 PDI Scores   Pain Disability Index (PDI) 15 30       Non-Pharmacologic Treatments:  Physical Therapy/Home Exercise: yes  Ice/Heat:yes  TENS: no  Acupuncture: no  Massage: no  Chiropractic: no    Other: no      Pain Medications:  - Adjuvant Medications: Elavil (Amitriptyline), Neurontin (Gabapentin), Trazodone (Desyrel), Venlafaxine (Effexor), and Zanaflex ( Tizanidine)    Pain Procedures:   Dr. Ortiz:  -02/27/2024: Left-sided sacroiliac joint injection with 100% relief x 1 week then 60% relief  -05/10/2024: left SIJ injection #2 with 80% relief sustained (DIAGNOSTIC)  -07/26/2024: Left-sided SI joint injection (DIAGNOSTIC)      Dr. Galvez:  -01/15/2019: Left-sided sacroiliac joint injection    Past Medical History:   Diagnosis Date    Anemia     Hyperlipidemia     Hypertension     PONV (postoperative nausea and vomiting)     Supraventricular tachycardia      Past Surgical History:   Procedure Laterality Date    APPENDECTOMY      CARPAL TUNNEL RELEASE Right 11/30/2023    Procedure: RELEASE, CARPAL TUNNEL;  Surgeon: Kiran Finley MD;  Location: [facility] OR;  Service: Orthopedics;  Laterality: Right;  right carpal tunnel release    CARPAL TUNNEL RELEASE Left 12/28/2023    Procedure: RELEASE, CARPAL TUNNEL;  Surgeon: Kiran Finley MD;  Location: [facility] OR;  Service: Orthopedics;  Laterality: Left;    CHOLECYSTECTOMY      COLONOSCOPY N/A 01/13/2017    Procedure: COLONOSCOPY;  Surgeon: Taj Nicholas MD;  Location: [facility] ENDO;  Service: Endoscopy;  Laterality: N/A;    COLONOSCOPY N/A 03/10/2022    Procedure: COLONOSCOPY;  Surgeon: Bee Brock MD;  Location: [facility] ENDO;  Service: Endoscopy;  Laterality: N/A;    INJECTION OF ANESTHETIC AGENT INTO SACROILIAC JOINT Left 2/27/2024    Procedure: Left SIJ Injection with local;  " Surgeon: Ilene Ortiz MD;  Location: [facility];  Service: Pain Management;  Laterality: Left;    INJECTION OF ANESTHETIC AGENT INTO SACROILIAC JOINT Left 5/10/2024    Procedure: Left SIJ Injection (diagnostic)-Injection #2;  Surgeon: Ilene Ortiz MD;  Location: [facility];  Service: Pain Management;  Laterality: Left;    INJECTION OF ANESTHETIC AGENT INTO SACROILIAC JOINT Left 7/26/2024    Procedure: Left SIJ Injection with local (DIAGNOSTIC< NO STEROID);  Surgeon: Ilene Ortiz MD;  Location: [facility];  Service: Pain Management;  Laterality: Left;    KNEE SURGERY Left 2017    SHOULDER ARTHROSCOPY      Tonsilectomy      TONSILLECTOMY      Uterine Ablation       Review of patient's allergies indicates:  No Known Allergies    Current Outpatient Medications   Medication Sig    amitriptyline (ELAVIL) 50 MG tablet Take 50 mg by mouth every evening.    atenoloL (TENORMIN) 25 MG tablet TAKE 1 TABLET TWICE DAILY    cyanocobalamin 1,000 mcg/mL injection Inject 1 mL (1,000 mcg total) into the muscle once a week.    diazePAM (VALIUM) 5 MG tablet Take 1 tablet (5 mg total) by mouth once. for 1 dose    ferrous gluconate (FERGON) 324 MG tablet Take 324 mg by mouth 2 (two) times a day.    gabapentin (NEURONTIN) 100 MG capsule Take 1 capsule (100 mg total) by mouth 3 (three) times daily.    needle, disp, 18 G 18 x 1 " Ndle Use to draw b12 1000 mcg from vail every 7 days for 7 weeks    needle, reusable, 25 G 25 x 1 " Ndle Use to inject B12 1000 mcg every 7 days for 7 weeks    omega-3 fatty acids/fish oil (FISH OIL-OMEGA-3 FATTY ACIDS) 300-1,000 mg capsule Take by mouth once daily.    omeprazole (PRILOSEC) 40 MG capsule TAKE 1 CAPSULE EVERY DAY    ondansetron (ZOFRAN-ODT) 4 MG TbDL Take 1 tablet (4 mg total) by mouth every 6 (six) hours as needed (nausea).    rosuvastatin (CRESTOR) 20 MG tablet TAKE 1 TABLET EVERY DAY    tiZANidine (ZANAFLEX) 4 MG tablet TAKE 1 TABLET TWICE DAILY    traZODone (DESYREL) 100 MG " tablet TAKE 1 TABLET (100 MG TOTAL) BY MOUTH EVERY EVENING.    venlafaxine (EFFEXOR-XR) 75 MG 24 hr capsule TAKE 1 CAPSULE EVERY DAY     No current facility-administered medications for this visit.     Facility-Administered Medications Ordered in Other Visits   Medication    chlorhexidine 0.12 % solution 10 mL       Review of Systems     GENERAL:  No weight loss, malaise or fevers.  HEENT:   No recent changes in vision or hearing  NECK:  Negative for lumps, no difficulty with swallowing.  RESPIRATORY:  Negative for cough, wheezing or shortness of breath, patient denies any recent URI.  CARDIOVASCULAR:  Negative for chest pain or palpitations.  GI:  Negative for abdominal discomfort, blood in stools or black stools or change in bowel habits.  MUSCULOSKELETAL:  See HPI.  SKIN:  Negative for lesions, rash, and itching.  PSYCH:  No mood disorder or recent psychosocial stressors.   HEMATOLOGY/LYMPHOLOGY:  Negative for prolonged bleeding, bruising easily or swollen nodes.    NEURO:   No history of syncope, paralysis, seizures or tremors.  All other reviewed and negative other than HPI.    OBJECTIVE:    There were no vitals taken for this visit.      Physical Exam    GENERAL: Well appearing, in no acute distress, alert and oriented x3.  PSYCH:  Mood and affect appropriate.  SKIN: Skin color, texture, turgor normal, no rashes or lesions.  HEAD/FACE:  Normocephalic, atraumatic. Cranial nerves grossly intact.    CV: RRR with palpation of the radial artery.  PULM: No evidence of respiratory difficulty, symmetric chest rise.  GI:  Soft and non-tender.    BACK: Straight leg raising in the sitting and supine positions is negative to radicular pain. No pain to palpation over the facet joints of the lumbar spine or spinous processes. Normal range of motion without pain reproduction.  EXTREMITIES: Peripheral joint ROM is full and pain free without obvious instability or laxity in all four extremities. No deformities, edema, or skin  discoloration. Good capillary refill.  MUSCULOSKELETAL: Able to stand on heels & toes.   hip, and knee provocative maneuvers are negative.    SIJ testing: LEFT  - TTP over SI joint: Present bilateral  - Sepideh's/ Uriel's: Positive  bilateral  Positive fadir's on the right  - Sacroiliac Distraction Test (anterior pressure): Positive  - Sacroiliac Compression Test (lateral pressure): Positive   - SacralThrust Test (posterior pressure): Positive    Facet loading test is negative bilaterally.   Bilateral upper and lower extremity strength is normal and symmetric.  No atrophy or tone abnormalities are noted.    RIGHT Lower extremity: Hip flexion 5/5, Hip Abduction 5/5, Hip Adduction 5/5, Knee extension 5/5, Knee flexion 5/5, Ankle dorsiflexion5/5, Extensor hallucis longus 5/5, Ankle plantarflexion 5/5  LEFT Lower extremity:  Hip flexion 5/5, Hip Abduction 5/5,Hip Adduction 5/5, Knee extension 5/5, Knee flexion 5/5, Ankle dorsiflexion 5/5, Extensor hallucis longus 5/5, Ankle plantarflexion 5/5  -Normal testing knee (patellar) jerk and ankle (achilles) jerk    NEURO: Bilateral upper and lower extremity coordination and muscle stretch reflexes are physiologic and symmetric. No loss of sensation is noted.  GAIT: normal.    Imagin/10/19    X-Ray Lumbar Spine AP And Lateral  FINDINGS:  No scoliosis.  No acute fracture.  No listhesis.  Mild degenerative changes with spurring of the endplates and facet arthropathy is noted.  Degenerative changes of the sacroiliac joints are also noted.      MRI sacroiliac joint 2024  FINDINGS:  Bones: No fracture, marrow edema or suspicious osseous lesion identified.  L4-5 and L5-S1 degenerative disc disease and facet arthropathy.     Sacroiliac joints: Alignment normal.  Minimal bilateral sacroiliac osteoarthritis.  Small T2 hyperintense nutrient vessel left ilium along the inferior sacroiliac joint.  No significant abnormal subchondral marrow signal.  No erosions, subchondral  marrow edema, ankylosis or other evidence of sacroiliitis.     Muscles and tendons: Within normal limits.     Miscellaneous: Mild right hydrosalpinx without surrounding inflammation.        Impression:     Minimal sacroiliac osteoarthritis without evidence of sacroiliitis.     L4-5 and L5-S1 degenerative disc disease and facet arthropathy.     Mild right hydrosalpinx without surrounding inflammation.         ASSESSMENT: 68 y.o. year old female with     1. Sacroiliitis  Case Request-RAD/Other Procedure Area: Left-sided TransLoc Sacroiliac Joint Fusion; Schedule for 2 hours      2. Sacroiliac joint pain  Case Request-RAD/Other Procedure Area: Left-sided TransLoc Sacroiliac Joint Fusion; Schedule for 2 hours      3. Arthritis of left sacroiliac joint  Case Request-RAD/Other Procedure Area: Left-sided TransLoc Sacroiliac Joint Fusion; Schedule for 2 hours          PLAN:   - Interventions:  Schedule for left-sided TransLoc fusion - lateral oblique approach for fixation.   Explained the risks and benefits of the procedure in detail with the patient today in clinic along with alternative treatment options, and the patient elected to pursue the intervention.    -02/27/2024: Left-sided sacroiliac joint injection with 100% relief x 1 week then 60% relief (THERAPEUTIC)  -05/10/2024: left SIJ injection #2 with 80% relief sustained (DIAGNOSTIC)  -07/26/2024: left SIJ injection #2 with 100% relief  (DIAGNOSTIC)  -MRI SIJ: 07/19/2024    - Anticoagulation use: No no anticoagulation     report:  Reviewed and consistent with medication use as prescribed.    - Medications:  -Continue Gabapentin 100 mg TID per Dr. Valdivia.      -Continue Tizanidine. We have discussed potential deleterious side effects associated with this medication including  dizziness, drowsiness, dry mouth or tingling sensation in the upper or lower extremities.     - Therapy:   We discussed continuing physical therapy to help manage the patient/s painful  condition. The patient was counseled that muscle strengthening will improve the long term prognosis in regards to pain and may also help increase range of motion and mobility. A referral for EXT physical therapy: [facility] in [place] was provided to the patient.    - Imaging: Reviewed available imaging (x-ray lumbar spine, MRI sacroiliac joints) with patient and answered any questions they had regarding study.       - Follow up visit: return to clinic in 1 week after procedure.      The above plan and management options were discussed at length with patient. Patient is in agreement with the above and verbalized understanding.    - I discussed the goals of interventional chronic pain management with the patient on today's visit. We discussed a multimodal and systematic approach to pain.  This includes diagnostic and therapeutic injections, adjuvant pharmacologic treatment, physical therapy, and at times psychiatry.  I emphasized the importance of regular exercise, core strengthening and stretching, diet and weight loss as a cornerstone of long-term pain management.    - This condition does not require this patient to take time off of work, and the primary goal of our Pain Management services is to improve the patient's functional capacity.  - Patient Questions: Answered all of the patient's questions regarding diagnoses, therapy, treatment and next steps    Visit today included increased complexity associated with the care of the episodic problem of chronic pain which was addressed and continue to manage the longitudinal care of the patient due to the serious and/or complex managed problem(s) listed above.        Ilene Ortiz MD  Interventional Pain Management  [facility]    Disclaimer:  This note was prepared using voice recognition system and is likely to have sound alike errors that may have been overlooked even after proof reading.  Please call me with any questions

## 2024-07-30 NOTE — PROGRESS NOTES
Established Patient Interventional Pain Note     The patient location is: home  The chief complaint leading to consultation is: L SIJ pain  Visit type: Virtual visit with synchronous audio and video  Total time spent with patient: 11-15m  Each patient to whom he or she provides medical services by telemedicine is: (1) informed of the relationship between the physician and patient and the respective role of any other health care provider with respect to management of the patient; and (2) notified that he or she may decline to receive medical services by telemedicine and may withdraw from such care at any time.        Referring Physician: No ref. provider found    PCP: Stephani Valdivia MD    Chief Complaint:   Left-sided sacroiliac joint pain       SUBJECTIVE:  Interval History 07/31/2024  Patient presents status post left-sided diagnostic SI joint injection 07/26/2024 and left-sided diagnostic SI joint injection 05/10/2024 and for MRI sacroiliac joint review. Patient reports 100% relief following 2 diagnostic left-sided sacroiliac joint injections.   Today she again reports pain overlying the left-sided sacroiliac joint.  Pain today is rated an 8/10 and is constant.  Pain is exacerbated with positional changes, moving from sitting to standing and with standing and ambulation.  Her pain does significantly impact activities of daily living and quality of life.  She has continued physician directed physical therapy exercises for bilateral sacroiliac joint pain over the last 8 weeks from 05/03/2024 through 07/31/2024 with marginal improvement in her pain.  Patient would like to pursue sacroiliac joint fusion.      Interval history 07/09/2024  Patient presents for follow-up for left-sided sacroiliac joint pain.  Of note patient has insurance, [employer], denied sacroiliac fusion.  Today she again reports pain overlying the left-sided SI joint.  Pain today is rated an 8/10.  Pain is exacerbated with positional changes, moving  from sitting to standing, standing and walking.  Pain interferes with activities of daily living.  She is continued physician directed physical therapy exercises for sacroiliac joint pain over the last 8 weeks from 05/09/2024 through 07/09/2024 with marginal improvement in her symptoms.  Of note patient received 80% relief with prior therapeutic and diagnostic sacroiliac joint injection.      Interval History (06/24/2024):  Patient Silvia Cotton presents today for follow-up visit.  Patient was last seen on 6/14/2024. She is scheduled for left sided sacral RFA in July. For the past 3 weeks she has had right sided pain over the lower back/buttock for the past 3 weeks. No new injury. Pain is a 8/10. Pain is worse with crossing her legs or getting in and out of bed/car. Pain does not radiate to the legs.   Patient denies night fever/night sweats, urinary incontinence, bowel incontinence, significant weight loss and significant motor weakness.   Patient denies any other complaints or concerns at this time.      Interval History (6/14/2024):  Patient Silvia Cotton presents today for follow-up visit.  Patient was last seen on 5/10/2024 for left SIJ injection #2 with 80% relief sustained. She will still have some intermittent left sided buttock pain but overall she is doing much better. No radiculopathy into the lower extremities.   She rates her pain today a 1/10. When the pain is severe, prior to procedure, pain would go up to a 10/10.   Patient denies night fever/night sweats, urinary incontinence, bowel incontinence, significant weight loss and significant motor weakness.   Patient denies any other complaints or concerns at this time.      Interval history 04/10/2024  Patient presents status post left-sided sacroiliac joint injection 02/27/2024.  Patient reports approximately 100% relief lasting 1 week in duration which has now subsided to approximately 60% sustained relief.  She does report pain can arise with  increased activity.  When pain is present it is described as sharp and stabbing in nature overlying the left sacroiliac joint.  Pain can be exacerbated with positional change, moving from sitting to standing and with standing and ambulation.  She is continued physician directed physical therapy exercises over the last 8 weeks from 02/10/2024 through 04/10/2024 with marginal improvement in her pain.  Today she denies right-sided pain.  She denies bilateral lower extremity radiculopathy, lower extremity weakness, bowel or bladder incontinence or saddle anesthesia.    HPI 02/15/2024  Silvia Cotton is a 68 y.o. female with past medical history significant for hyperlipidemia, stage 3 chronic kidney disease, GERD who presents to the clinic for the evaluation of left-sided sacroiliac joint pain, previous Dr. Galvez patient.  Today she reports pain has been present for over 5 years without inciting accident injury or trauma.  Pain is constant and today is rated a 5/10.  Pain is described as sharp and stabbing in nature.  Patient reports pain overlying the left sacroiliac joint.  Pain is exacerbated when moving from sitting to standing and with standing and ambulation.  She reports she is only able to ambulate approximately 5-10 minutes when pain is severe.  Pain is improved with topical application of heat and ice as well as prior left-sided sacroiliac joint injection.  She reports she received greater than 80% relief lasting several years in duration.  Patient has completed conventional land-based physical therapy in the past at [facility] in [place].  She has continued physician directed physical therapy exercises at home over the last 8 weeks from 12/15/2023 through 02/15/2024 with marginal improvement in her symptoms.  Today she denies any right-sided pain, lower extremity radiculopathy, lower extremity weakness or bowel or bladder incontinence.    Patient denies night fever/night sweats, urinary incontinence, bowel  incontinence, significant weight loss, significant motor weakness, and loss of sensations.      Pain Disability Index Review:         6/14/2024    12:14 PM 2/15/2024     1:20 PM   Last 3 PDI Scores   Pain Disability Index (PDI) 15 30       Non-Pharmacologic Treatments:  Physical Therapy/Home Exercise: yes  Ice/Heat:yes  TENS: no  Acupuncture: no  Massage: no  Chiropractic: no    Other: no      Pain Medications:  - Adjuvant Medications: Elavil (Amitriptyline), Neurontin (Gabapentin), Trazodone (Desyrel), Venlafaxine (Effexor), and Zanaflex ( Tizanidine)    Pain Procedures:   Dr. Ortiz:  -02/27/2024: Left-sided sacroiliac joint injection with 100% relief x 1 week then 60% relief  -05/10/2024: left SIJ injection #2 with 80% relief sustained (DIAGNOSTIC)  -07/26/2024: Left-sided SI joint injection (DIAGNOSTIC)      Dr. Galvez:  -01/15/2019: Left-sided sacroiliac joint injection    Past Medical History:   Diagnosis Date    Anemia     Hyperlipidemia     Hypertension     PONV (postoperative nausea and vomiting)     Supraventricular tachycardia      Past Surgical History:   Procedure Laterality Date    APPENDECTOMY      CARPAL TUNNEL RELEASE Right 11/30/2023    Procedure: RELEASE, CARPAL TUNNEL;  Surgeon: Kiran Finley MD;  Location: [facility] OR;  Service: Orthopedics;  Laterality: Right;  right carpal tunnel release    CARPAL TUNNEL RELEASE Left 12/28/2023    Procedure: RELEASE, CARPAL TUNNEL;  Surgeon: Kiran Finley MD;  Location: [facility] OR;  Service: Orthopedics;  Laterality: Left;    CHOLECYSTECTOMY      COLONOSCOPY N/A 01/13/2017    Procedure: COLONOSCOPY;  Surgeon: Taj Nicholas MD;  Location: [facility] ENDO;  Service: Endoscopy;  Laterality: N/A;    COLONOSCOPY N/A 03/10/2022    Procedure: COLONOSCOPY;  Surgeon: Bee Brock MD;  Location: [facility] ENDO;  Service: Endoscopy;  Laterality: N/A;    INJECTION OF ANESTHETIC AGENT INTO SACROILIAC JOINT Left 2/27/2024    Procedure: Left SIJ Injection with local;  " Surgeon: Ilene Ortiz MD;  Location: [facility];  Service: Pain Management;  Laterality: Left;    INJECTION OF ANESTHETIC AGENT INTO SACROILIAC JOINT Left 5/10/2024    Procedure: Left SIJ Injection (diagnostic)-Injection #2;  Surgeon: Ilene Ortiz MD;  Location: [facility];  Service: Pain Management;  Laterality: Left;    INJECTION OF ANESTHETIC AGENT INTO SACROILIAC JOINT Left 7/26/2024    Procedure: Left SIJ Injection with local (DIAGNOSTIC< NO STEROID);  Surgeon: Ilene Ortiz MD;  Location: [facility];  Service: Pain Management;  Laterality: Left;    KNEE SURGERY Left 2017    SHOULDER ARTHROSCOPY      Tonsilectomy      TONSILLECTOMY      Uterine Ablation       Review of patient's allergies indicates:  No Known Allergies    Current Outpatient Medications   Medication Sig    amitriptyline (ELAVIL) 50 MG tablet Take 50 mg by mouth every evening.    atenoloL (TENORMIN) 25 MG tablet TAKE 1 TABLET TWICE DAILY    cyanocobalamin 1,000 mcg/mL injection Inject 1 mL (1,000 mcg total) into the muscle once a week.    diazePAM (VALIUM) 5 MG tablet Take 1 tablet (5 mg total) by mouth once. for 1 dose    ferrous gluconate (FERGON) 324 MG tablet Take 324 mg by mouth 2 (two) times a day.    gabapentin (NEURONTIN) 100 MG capsule Take 1 capsule (100 mg total) by mouth 3 (three) times daily.    needle, disp, 18 G 18 x 1 " Ndle Use to draw b12 1000 mcg from vail every 7 days for 7 weeks    needle, reusable, 25 G 25 x 1 " Ndle Use to inject B12 1000 mcg every 7 days for 7 weeks    omega-3 fatty acids/fish oil (FISH OIL-OMEGA-3 FATTY ACIDS) 300-1,000 mg capsule Take by mouth once daily.    omeprazole (PRILOSEC) 40 MG capsule TAKE 1 CAPSULE EVERY DAY    ondansetron (ZOFRAN-ODT) 4 MG TbDL Take 1 tablet (4 mg total) by mouth every 6 (six) hours as needed (nausea).    rosuvastatin (CRESTOR) 20 MG tablet TAKE 1 TABLET EVERY DAY    tiZANidine (ZANAFLEX) 4 MG tablet TAKE 1 TABLET TWICE DAILY    traZODone (DESYREL) 100 MG " tablet TAKE 1 TABLET (100 MG TOTAL) BY MOUTH EVERY EVENING.    venlafaxine (EFFEXOR-XR) 75 MG 24 hr capsule TAKE 1 CAPSULE EVERY DAY     No current facility-administered medications for this visit.     Facility-Administered Medications Ordered in Other Visits   Medication    chlorhexidine 0.12 % solution 10 mL       Review of Systems     GENERAL:  No weight loss, malaise or fevers.  HEENT:   No recent changes in vision or hearing  NECK:  Negative for lumps, no difficulty with swallowing.  RESPIRATORY:  Negative for cough, wheezing or shortness of breath, patient denies any recent URI.  CARDIOVASCULAR:  Negative for chest pain or palpitations.  GI:  Negative for abdominal discomfort, blood in stools or black stools or change in bowel habits.  MUSCULOSKELETAL:  See HPI.  SKIN:  Negative for lesions, rash, and itching.  PSYCH:  No mood disorder or recent psychosocial stressors.   HEMATOLOGY/LYMPHOLOGY:  Negative for prolonged bleeding, bruising easily or swollen nodes.    NEURO:   No history of syncope, paralysis, seizures or tremors.  All other reviewed and negative other than HPI.    OBJECTIVE:    There were no vitals taken for this visit.      Physical Exam    GENERAL: Well appearing, in no acute distress, alert and oriented x3.  PSYCH:  Mood and affect appropriate.  SKIN: Skin color, texture, turgor normal, no rashes or lesions.  HEAD/FACE:  Normocephalic, atraumatic. Cranial nerves grossly intact.    CV: RRR with palpation of the radial artery.  PULM: No evidence of respiratory difficulty, symmetric chest rise.  GI:  Soft and non-tender.    BACK: Straight leg raising in the sitting and supine positions is negative to radicular pain. No pain to palpation over the facet joints of the lumbar spine or spinous processes. Normal range of motion without pain reproduction.  EXTREMITIES: Peripheral joint ROM is full and pain free without obvious instability or laxity in all four extremities. No deformities, edema, or skin  discoloration. Good capillary refill.  MUSCULOSKELETAL: Able to stand on heels & toes.   hip, and knee provocative maneuvers are negative.    SIJ testing: LEFT  - TTP over SI joint: Present bilateral  - Sepideh's/ Uriel's: Positive  bilateral  Positive fadir's on the right  - Sacroiliac Distraction Test (anterior pressure): Positive  - Sacroiliac Compression Test (lateral pressure): Positive   - SacralThrust Test (posterior pressure): Positive    Facet loading test is negative bilaterally.   Bilateral upper and lower extremity strength is normal and symmetric.  No atrophy or tone abnormalities are noted.    RIGHT Lower extremity: Hip flexion 5/5, Hip Abduction 5/5, Hip Adduction 5/5, Knee extension 5/5, Knee flexion 5/5, Ankle dorsiflexion5/5, Extensor hallucis longus 5/5, Ankle plantarflexion 5/5  LEFT Lower extremity:  Hip flexion 5/5, Hip Abduction 5/5,Hip Adduction 5/5, Knee extension 5/5, Knee flexion 5/5, Ankle dorsiflexion 5/5, Extensor hallucis longus 5/5, Ankle plantarflexion 5/5  -Normal testing knee (patellar) jerk and ankle (achilles) jerk    NEURO: Bilateral upper and lower extremity coordination and muscle stretch reflexes are physiologic and symmetric. No loss of sensation is noted.  GAIT: normal.    Imagin/10/19    X-Ray Lumbar Spine AP And Lateral  FINDINGS:  No scoliosis.  No acute fracture.  No listhesis.  Mild degenerative changes with spurring of the endplates and facet arthropathy is noted.  Degenerative changes of the sacroiliac joints are also noted.      MRI sacroiliac joint 2024  FINDINGS:  Bones: No fracture, marrow edema or suspicious osseous lesion identified.  L4-5 and L5-S1 degenerative disc disease and facet arthropathy.     Sacroiliac joints: Alignment normal.  Minimal bilateral sacroiliac osteoarthritis.  Small T2 hyperintense nutrient vessel left ilium along the inferior sacroiliac joint.  No significant abnormal subchondral marrow signal.  No erosions, subchondral  marrow edema, ankylosis or other evidence of sacroiliitis.     Muscles and tendons: Within normal limits.     Miscellaneous: Mild right hydrosalpinx without surrounding inflammation.        Impression:     Minimal sacroiliac osteoarthritis without evidence of sacroiliitis.     L4-5 and L5-S1 degenerative disc disease and facet arthropathy.     Mild right hydrosalpinx without surrounding inflammation.         ASSESSMENT: 68 y.o. year old female with     1. Sacroiliitis  Case Request-RAD/Other Procedure Area: Left-sided TransLoc Sacroiliac Joint Fusion; Schedule for 2 hours      2. Sacroiliac joint pain  Case Request-RAD/Other Procedure Area: Left-sided TransLoc Sacroiliac Joint Fusion; Schedule for 2 hours      3. Arthritis of left sacroiliac joint  Case Request-RAD/Other Procedure Area: Left-sided TransLoc Sacroiliac Joint Fusion; Schedule for 2 hours          PLAN:   - Interventions:  Schedule for left-sided TransLoc fusion - lateral oblique approach for fixation.   Explained the risks and benefits of the procedure in detail with the patient today in clinic along with alternative treatment options, and the patient elected to pursue the intervention.    -02/27/2024: Left-sided sacroiliac joint injection with 100% relief x 1 week then 60% relief (THERAPEUTIC)  -05/10/2024: left SIJ injection #2 with 80% relief sustained (DIAGNOSTIC)  -07/26/2024: left SIJ injection #2 with 100% relief  (DIAGNOSTIC)  -MRI SIJ: 07/19/2024    - Anticoagulation use: No no anticoagulation     report:  Reviewed and consistent with medication use as prescribed.    - Medications:  -Continue Gabapentin 100 mg TID per Dr. Valdivia.      -Continue Tizanidine. We have discussed potential deleterious side effects associated with this medication including  dizziness, drowsiness, dry mouth or tingling sensation in the upper or lower extremities.     - Therapy:   We discussed continuing physical therapy to help manage the patient/s painful  condition. The patient was counseled that muscle strengthening will improve the long term prognosis in regards to pain and may also help increase range of motion and mobility. A referral for EXT physical therapy: [facility] in [place] was provided to the patient.    - Imaging: Reviewed available imaging (x-ray lumbar spine, MRI sacroiliac joints) with patient and answered any questions they had regarding study.       - Follow up visit: return to clinic in 1 week after procedure.      The above plan and management options were discussed at length with patient. Patient is in agreement with the above and verbalized understanding.    - I discussed the goals of interventional chronic pain management with the patient on today's visit. We discussed a multimodal and systematic approach to pain.  This includes diagnostic and therapeutic injections, adjuvant pharmacologic treatment, physical therapy, and at times psychiatry.  I emphasized the importance of regular exercise, core strengthening and stretching, diet and weight loss as a cornerstone of long-term pain management.    - This condition does not require this patient to take time off of work, and the primary goal of our Pain Management services is to improve the patient's functional capacity.  - Patient Questions: Answered all of the patient's questions regarding diagnoses, therapy, treatment and next steps    Visit today included increased complexity associated with the care of the episodic problem of chronic pain which was addressed and continue to manage the longitudinal care of the patient due to the serious and/or complex managed problem(s) listed above.        Ilene Ortiz MD  Interventional Pain Management  [facility]    Disclaimer:  This note was prepared using voice recognition system and is likely to have sound alike errors that may have been overlooked even after proof reading.  Please call me with any questions

## 2024-07-31 ENCOUNTER — OFFICE VISIT (OUTPATIENT)
Dept: PAIN MEDICINE | Facility: CLINIC | Age: 69
End: 2024-07-31
Payer: MEDICARE

## 2024-07-31 ENCOUNTER — PATIENT MESSAGE (OUTPATIENT)
Dept: PAIN MEDICINE | Facility: CLINIC | Age: 69
End: 2024-07-31

## 2024-07-31 DIAGNOSIS — M53.3 SACROILIAC JOINT PAIN: ICD-10-CM

## 2024-07-31 DIAGNOSIS — M47.818 ARTHRITIS OF LEFT SACROILIAC JOINT: ICD-10-CM

## 2024-07-31 DIAGNOSIS — M46.1 SACROILIITIS: Primary | ICD-10-CM

## 2024-07-31 PROCEDURE — 1160F RVW MEDS BY RX/DR IN RCRD: CPT | Mod: HCNC,CPTII,95, | Performed by: ANESTHESIOLOGY

## 2024-07-31 PROCEDURE — G2211 COMPLEX E/M VISIT ADD ON: HCPCS | Mod: 95,,, | Performed by: ANESTHESIOLOGY

## 2024-07-31 PROCEDURE — 99214 OFFICE O/P EST MOD 30 MIN: CPT | Mod: 95,,, | Performed by: ANESTHESIOLOGY

## 2024-07-31 PROCEDURE — 3044F HG A1C LEVEL LT 7.0%: CPT | Mod: HCNC,CPTII,95, | Performed by: ANESTHESIOLOGY

## 2024-07-31 PROCEDURE — 1159F MED LIST DOCD IN RCRD: CPT | Mod: HCNC,CPTII,95, | Performed by: ANESTHESIOLOGY

## 2024-08-01 ENCOUNTER — PATIENT MESSAGE (OUTPATIENT)
Dept: FAMILY MEDICINE | Facility: CLINIC | Age: 69
End: 2024-08-01
Payer: MEDICARE

## 2024-08-02 ENCOUNTER — PATIENT MESSAGE (OUTPATIENT)
Dept: FAMILY MEDICINE | Facility: CLINIC | Age: 69
End: 2024-08-02
Payer: MEDICARE

## 2024-08-08 ENCOUNTER — OFFICE VISIT (OUTPATIENT)
Dept: INTERNAL MEDICINE | Facility: CLINIC | Age: 69
End: 2024-08-08
Payer: MEDICARE

## 2024-08-08 ENCOUNTER — PATIENT MESSAGE (OUTPATIENT)
Dept: PREADMISSION TESTING | Facility: HOSPITAL | Age: 69
End: 2024-08-08
Payer: MEDICARE

## 2024-08-08 VITALS
DIASTOLIC BLOOD PRESSURE: 69 MMHG | RESPIRATION RATE: 18 BRPM | SYSTOLIC BLOOD PRESSURE: 138 MMHG | HEART RATE: 85 BPM | TEMPERATURE: 98 F | OXYGEN SATURATION: 95 %

## 2024-08-08 DIAGNOSIS — F39 MOOD DISORDER: ICD-10-CM

## 2024-08-08 DIAGNOSIS — I47.10 SUPRAVENTRICULAR TACHYCARDIA: Primary | ICD-10-CM

## 2024-08-08 DIAGNOSIS — Z01.818 PRE-OP EXAM: ICD-10-CM

## 2024-08-08 DIAGNOSIS — D50.0 IRON DEFICIENCY ANEMIA DUE TO CHRONIC BLOOD LOSS: ICD-10-CM

## 2024-08-08 DIAGNOSIS — N18.31 CHRONIC KIDNEY DISEASE, STAGE 3A: ICD-10-CM

## 2024-08-08 DIAGNOSIS — M53.3 SACROILIAC JOINT PAIN: Primary | ICD-10-CM

## 2024-08-08 DIAGNOSIS — E66.01 SEVERE OBESITY (BMI 35.0-39.9) WITH COMORBIDITY: ICD-10-CM

## 2024-08-08 DIAGNOSIS — E53.8 B12 DEFICIENCY: ICD-10-CM

## 2024-08-08 DIAGNOSIS — I47.10 SUPRAVENTRICULAR TACHYCARDIA: ICD-10-CM

## 2024-08-08 LAB
OHS QRS DURATION: 86 MS
OHS QTC CALCULATION: 431 MS

## 2024-08-08 PROCEDURE — 93005 ELECTROCARDIOGRAM TRACING: CPT | Mod: HCNC

## 2024-08-08 PROCEDURE — 99999 PR PBB SHADOW E&M-EST. PATIENT-LVL IV: CPT | Mod: PBBFAC,HCNC,,

## 2024-08-08 PROCEDURE — 93010 ELECTROCARDIOGRAM REPORT: CPT | Mod: HCNC,,, | Performed by: INTERNAL MEDICINE

## 2024-08-08 NOTE — ASSESSMENT & PLAN NOTE
-history of right carpal tunnel release with symptom improvement on 11/30/2023  -history of left carpal tunnel release with symptom improvement on 12/28/2023

## 2024-08-08 NOTE — PROGRESS NOTES
Preoperative History and Physical                                                                     Chief Complaint: Preoperative evaluation     History of Present Illness:      Silvia Cotton is a 68 y.o. female who presents to the office today for a preoperative consultation at the request of Dr. Ortiz who plans on performing a Left-sided TransLoc Sacroiliac Joint Fusion on August 14.     Functional Status:      The patient is able to climb a flight of stairs slowly. The patient is able to ambulate without difficulty. The patient's functional status is affected by the surgical problem due to pain. The patient's functional status is not affected by shortness of breath, chest pain, dyspnea on exertion and fatigue.    MET score greater than 4    Past Medical History:      Past Medical History:   Diagnosis Date    Anemia     GERD (gastroesophageal reflux disease)     Hyperlipidemia     Hypertension     PONV (postoperative nausea and vomiting)     Supraventricular tachycardia     Supraventricular tachycardia 8/8/2024        Past Surgical History:      Past Surgical History:   Procedure Laterality Date    APPENDECTOMY      CARPAL TUNNEL RELEASE Right 11/30/2023    Procedure: RELEASE, CARPAL TUNNEL;  Surgeon: Kiran Finley MD;  Location: [facility] OR;  Service: Orthopedics;  Laterality: Right;  right carpal tunnel release    CARPAL TUNNEL RELEASE Left 12/28/2023    Procedure: RELEASE, CARPAL TUNNEL;  Surgeon: Kiran Finley MD;  Location: [facility] OR;  Service: Orthopedics;  Laterality: Left;    CHOLECYSTECTOMY      COLONOSCOPY N/A 01/13/2017    Procedure: COLONOSCOPY;  Surgeon: Taj Nicholas MD;  Location: [facility];  Service: Endoscopy;  Laterality: N/A;    COLONOSCOPY N/A 03/10/2022    Procedure: COLONOSCOPY;  Surgeon: Bee Brock MD;  Location: [facility];  Service: Endoscopy;  Laterality: N/A;    INJECTION OF ANESTHETIC AGENT INTO SACROILIAC JOINT  Left 2/27/2024    Procedure: Left SIJ Injection with local;  Surgeon: Ilene Ortiz MD;  Location: [facility];  Service: Pain Management;  Laterality: Left;    INJECTION OF ANESTHETIC AGENT INTO SACROILIAC JOINT Left 5/10/2024    Procedure: Left SIJ Injection (diagnostic)-Injection #2;  Surgeon: Ilene Ortiz MD;  Location: [facility];  Service: Pain Management;  Laterality: Left;    INJECTION OF ANESTHETIC AGENT INTO SACROILIAC JOINT Left 7/26/2024    Procedure: Left SIJ Injection with local (DIAGNOSTIC< NO STEROID);  Surgeon: Ilene Ortiz MD;  Location: [facility];  Service: Pain Management;  Laterality: Left;    KNEE SURGERY Left 2017    SHOULDER ARTHROSCOPY      Tonsilectomy      TONSILLECTOMY      Uterine Ablation          Social History:      Social History     Socioeconomic History    Marital status:    Tobacco Use    Smoking status: Never     Passive exposure: Never    Smokeless tobacco: Never   Substance and Sexual Activity    Alcohol use: Not Currently    Drug use: Not Currently    Sexual activity: Not Currently     Partners: Male     Birth control/protection: Post-menopausal     Social Determinants of Health     Financial Resource Strain: Medium Risk (2/7/2024)    Overall Financial Resource Strain (CARDIA)     Difficulty of Paying Living Expenses: Somewhat hard   Food Insecurity: No Food Insecurity (2/7/2024)    Hunger Vital Sign     Worried About Running Out of Food in the Last Year: Never true     Ran Out of Food in the Last Year: Never true   Transportation Needs: No Transportation Needs (2/7/2024)    PRAPARE - Transportation     Lack of Transportation (Medical): No     Lack of Transportation (Non-Medical): No   Physical Activity: Insufficiently Active (2/7/2024)    Exercise Vital Sign     Days of Exercise per Week: 1 day     Minutes of Exercise per Session: 20 min   Stress: Stress Concern Present (2/7/2024)    [employer] - Occupational Stress  "Questionnaire     Feeling of Stress : To some extent   Housing Stability: Low Risk  (2/7/2024)    Housing Stability Vital Sign     Unable to Pay for Housing in the Last Year: No     Number of Places Lived in the Last Year: 1     Unstable Housing in the Last Year: No        Family History:      Family History   Problem Relation Name Age of Onset    Cataracts Mother      Heart block Mother      Cataracts Father      Hypertension Sister      Hypertension Sister      Hypertension Brother      Glaucoma Neg Hx      Diabetes Neg Hx      Breast cancer Neg Hx      Colon cancer Neg Hx      Ovarian cancer Neg Hx         Allergies:      Review of patient's allergies indicates:  No Known Allergies    Medications:      Current Outpatient Medications   Medication Sig    amitriptyline (ELAVIL) 50 MG tablet TAKE 1 TABLET EVERY EVENING    atenoloL (TENORMIN) 25 MG tablet TAKE 1 TABLET TWICE DAILY    cyanocobalamin 1,000 mcg/mL injection Inject 1 mL (1,000 mcg total) into the muscle once a week.    diazePAM (VALIUM) 5 MG tablet Take 1 tablet (5 mg total) by mouth once. for 1 dose    ferrous gluconate (FERGON) 324 MG tablet Take 324 mg by mouth 2 (two) times a day.    gabapentin (NEURONTIN) 100 MG capsule Take 1 capsule (100 mg total) by mouth 3 (three) times daily.    needle, disp, 18 G 18 x 1 " Ndle Use to draw b12 1000 mcg from [place] every 7 days for 7 weeks    needle, reusable, 25 G 25 x 1 " Ndle Use to inject B12 1000 mcg every 7 days for 7 weeks    omega-3 fatty acids/fish oil (FISH OIL-OMEGA-3 FATTY ACIDS) 300-1,000 mg capsule Take by mouth once daily.    omeprazole (PRILOSEC) 40 MG capsule TAKE 1 CAPSULE EVERY DAY    ondansetron (ZOFRAN-ODT) 4 MG TbDL Take 1 tablet (4 mg total) by mouth every 6 (six) hours as needed (nausea).    rosuvastatin (CRESTOR) 20 MG tablet TAKE 1 TABLET EVERY DAY    tiZANidine (ZANAFLEX) 4 MG tablet TAKE 1 TABLET TWICE DAILY    traZODone (DESYREL) 100 MG tablet TAKE 1 TABLET (100 MG TOTAL) BY MOUTH EVERY " EVENING.    venlafaxine (EFFEXOR-XR) 75 MG 24 hr capsule TAKE 1 CAPSULE EVERY DAY     No current facility-administered medications for this visit.     Facility-Administered Medications Ordered in Other Visits   Medication    chlorhexidine 0.12 % solution 10 mL       Vitals:      Vitals:    08/08/24 1111   BP: 138/69   Pulse:    Resp:    Temp:        Review of Systems:        Constitutional: Negative for fever, chills, weight loss, malaise/fatigue and diaphoresis.   HENT: Negative for hearing loss, ear pain, nosebleeds, congestion, sore throat, neck pain, tinnitus and ear discharge.    Eyes: Negative for blurred vision, double vision, photophobia, pain, discharge and redness.   Respiratory: Negative for cough, hemoptysis, sputum production, shortness of breath, wheezing and stridor.    Cardiovascular: Negative for chest pain, palpitations, orthopnea, claudication, leg swelling and PND.   Gastrointestinal: Negative for heartburn, nausea, vomiting, abdominal pain, diarrhea, constipation, blood in stool and melena.   Genitourinary: Negative for dysuria, urgency, frequency, hematuria and flank pain.   Musculoskeletal: Negative for myalgias, joint pain and falls. + back pain    Skin: Negative for itching and rash.   Neurological: Negative for dizziness, tingling, tremors, sensory change, speech change, focal weakness, seizures, loss of consciousness, weakness and headaches.   Endo/Heme/Allergies: Negative for environmental allergies and polydipsia. Does not bruise/bleed easily.   Psychiatric/Behavioral: Negative for depression, suicidal ideas, hallucinations, memory loss and substance abuse. The patient is not nervous/anxious and does not have insomnia.    All 14 systems reviewed and negative except as noted above.    Physical Exam:      Constitutional: Appears well-developed, well-nourished and in no acute distress.  Patient is oriented to person, place, and time.   Head: Normocephalic and atraumatic. Mucous membranes  "moist.  Neck: Neck supple no mass.   Cardiovascular: Normal rate and regular rhythm.  S1 S2 appreciated by ascultation.  Pulmonary/Chest: Effort normal and clear to auscultation bilaterally. No respiratory distress.   Abdomen: Soft. Non-tender and non-distended. Bowel sounds are normal.   Neurological: Patient is alert and oriented to person, place and time. Moves all extremities.  Musculoskeletal:  Left sacroiliac area tender to palpation  Skin: Warm and dry. No lesions.  Extremities: No clubbing, cyanosis or edema.     Laboratory data:      Reviewed and noted in plan where applicable. Please see chart for full laboratory data.    No results found for: "INR", "PROTIME"    Lab Results   Component Value Date    WBC 6.86 08/09/2024    HGB 13.0 08/09/2024    HCT 41.2 08/09/2024     (H) 08/09/2024     08/09/2024           Sodium   Date Value Ref Range Status   08/09/2024 141 136 - 145 mmol/L Final     Chloride   Date Value Ref Range Status   08/09/2024 108 95 - 110 mmol/L Final     CO2   Date Value Ref Range Status   08/09/2024 23 23 - 29 mmol/L Final     Glucose   Date Value Ref Range Status   08/09/2024 108 70 - 110 mg/dL Final     BUN   Date Value Ref Range Status   08/09/2024 12 8 - 23 mg/dL Final     Creatinine   Date Value Ref Range Status   08/09/2024 1.2 0.5 - 1.4 mg/dL Final     Calcium   Date Value Ref Range Status   08/09/2024 9.9 8.7 - 10.5 mg/dL Final     Total Protein   Date Value Ref Range Status   08/09/2024 7.1 6.0 - 8.4 g/dL Final     Albumin   Date Value Ref Range Status   08/09/2024 3.9 3.5 - 5.2 g/dL Final     Total Bilirubin   Date Value Ref Range Status   08/09/2024 0.5 0.1 - 1.0 mg/dL Final     Comment:     For infants and newborns, interpretation of results should be based  on gestational age, weight and in agreement with clinical  observations.    Premature Infant recommended reference ranges:  Up to 24 hours.............<8.0 mg/dL  Up to 48 hours............<12.0 mg/dL  3-5 " days..................<15.0 mg/dL  6-29 days.................<15.0 mg/dL       Alkaline Phosphatase   Date Value Ref Range Status   08/09/2024 60 55 - 135 U/L Final     AST   Date Value Ref Range Status   08/09/2024 28 10 - 40 U/L Final     ALT   Date Value Ref Range Status   08/09/2024 27 10 - 44 U/L Final     Anion Gap   Date Value Ref Range Status   08/09/2024 10 8 - 16 mmol/L Final     eGFR   Date Value Ref Range Status   08/09/2024 49.9 (A) >60 mL/min/1.73 m^2 Final           Predictors of intubation difficulty:       Morbid obesity? No- BMI 39.39   Anatomically abnormal facies? no   Prominent incisors? no   Receding mandible? no   Short, thick neck? no   Neck range of motion: normal   Dentition:  Dentures: Top and bottom plate  Based on the Modified Mallampati, patient is a mallampati score: III (soft and hard palate and base of uvula visible)    History of malignant hypothermia: No  History of pseudocholinesterase deficiency: No  History of PONV: Yes  History of difficult intubation: No  History of delayed emergence: No    Cardiographics:      ECG:  Normal sinus rhythm    Low voltage QRS - Borderline Abnormal ECG   When compared with ECG of 20-NOV-2023 10:48,   No significant change was found   Confirmed by NICK BURKETT MD ([id number]) on 8/8/2024 2:40:05 PM   Echocardiogram: not indicated    Imaging:      Chest x-ray:  Impression of clear lungs per imaging on 11/20/2023    Assessment and Plan:      Sacroiliac joint pain  Left-sided TransLoc Sacroiliac Joint Fusion planned per Dr. Ortiz on 08/14/2024    Known risk factors for perioperative complications: Anemia    Difficulty with intubation is not anticipated.  Postoperative nausea and vomiting (PONV) reported by patient with previous anesthesia.     Cardiac Risk Estimation: Based on the Revised Cardiac Risk index, patient is a Class I risk with a 3.9 % risk of a major cardiac event in a low risk procedure.    1.) Preoperative workup as follows: ECG, hemoglobin,  hematocrit, electrolytes, creatinine, glucose.  2.) Change in medication regimen before surgery: discontinue ASA 6 days before surgery, discontinue NSAIDs 5 days before surgery, hold Metformin 24 hours prior to surgery. Hold all vitamins/supplements for 7 days prior to surgery, with the exception of Potassium and Iron supplementation. Hold semaglutide/tirzepatide for 7 days prior to surgery.   3.) Prophylaxis for cardiac events with perioperative beta-blockers:  Atenolol continued.  4.) Invasive hemodynamic monitoring perioperatively: at the discretion of anesthesiologist.  5.) Deep vein thrombosis prophylaxis postoperatively: regimen to be chosen by surgical team.  6.) Surveillance for postoperative MI with ECG immediately postoperatively and on postoperati ve days 1 and 2 AND troponin levels 24 hours postoperatively and on day 4 or hospital discharge (whichever comes first): at the discretion of anesthesiologist.  7.) Current medications which may produce withdrawal symptoms if withheld perioperatively:  None  8.) Other measures: Postoperative hypertension management with IV hydralazine until able to take oral medications.  IV antiemetics as needed to control postop nausea and vomiting.    --Morning of Procedure medications:  Atenolol, omeprazole, venlafaxine, and Neurontin  --Hold all other medications morning of surgery   --Resume all medications post-operatively  --Hold all vitamins/supplements 7 days prior to procedure      B12 deficiency  -vitamins and supplements held x7 days prior to procedure    GERD (gastroesophageal reflux disease)  -omeprazole continued-may take morning of surgery    Hyperlipidemia  -rosuvastatin continued in the evening  -fish oil supplement held x7 days prior to procedure    Insomnia  -trazodone continued nightly    Iron deficiency anemia due to chronic blood loss  -H/H stable per CBC on 8/9/2024  -iron supplementation continued.  -followed outpatient by hematology    Chronic kidney  disease, stage 3a  -Cr 1.2 and eGFR 49.4 per CMP on 8/9/2024  -baseline creatinine 1-1.2  -may adjust medications to renal dosing as appropriate    Carpal tunnel syndrome  -history of right carpal tunnel release with symptom improvement on 11/30/2023  -history of left carpal tunnel release with symptom improvement on 12/28/2023  -followed outpatient by Dr. Fniley (Hand Surgery)    Severe obesity (BMI 35.0-39.9) with comorbidity  -BMI 39.39  -patient prescribed Ozempic and Mounjaro in the setting of upward trend in HbA1c however not initiated due to cost    Supraventricular tachycardia  -history noted  -atenolol continued-may take morning of surgery  -EKG obtained with results showing normal sinus rhythm    Mood disorder  -stable  -venlafaxine continued-may take morning of surgery    PONV  -IV antiemetics as needed for N/V control     Electronically signed by Livia Valencia  MSN, FNP-C on 8/12/2024 at 4:23 PM.

## 2024-08-08 NOTE — ASSESSMENT & PLAN NOTE
-repeat CBC to be obtained with results pending  -iron supplementation continued.  -followed outpatient by hematology

## 2024-08-08 NOTE — ASSESSMENT & PLAN NOTE
-history noted  -atenolol continued-may take morning of surgery  -EKG obtained with results showing normal sinus rhythm

## 2024-08-08 NOTE — DISCHARGE INSTRUCTIONS
To confirm, your doctor has instructed you: Surgery is scheduled for 08/14/24.   Pre admit office will call the afternoon prior to surgery between 1PM and 3PM with arrival time.    Surgery will be at [facility],  The address is [address]. [place] [address].      IMPORTANT INSTRUCTIONS!    Do not eat or drink after 12 midnight, including water. Do not smoke or use chewing tobacco after 12 midnight!  OK to brush teeth, but no gum, candy, or mints!      *Take only these medicines with a small swallow of water-morning of surgery*     Atenolol, prilosec, venlafaxine, neurontin         ____ Stop taking all vitamins, herbal supplements, Aspirin, & NSAIDS (Ibuprofen, Advil, Aleve) 7 days prior to surgery, as these can thin your blood.    ____ Weight loss medication, such as Adipex and Phentermine, must be stopped 14 days prior to surgery, no exceptions!    *Diabetic Patients: If you take diabetic or weight loss medication, do NOT take morning of surgery unless instructed by Doctor. Metformin to be stopped 24 hrs prior to surgery. DO NOT take short-acting insulin the day of surgery. Only take HALF of your regular dose of long-acting insulin the night before surgery, unless instructed otherwise. Blood sugars will be checked in pre-op.   ~Ozempic/Mounjaro/Wegovy/Trulicity/Semaglutide injections must be stopped 7 days prior to surgery.     Please notify MD office if you develop an active infection, are prescribed antibiotics by someone other than the surgeon doing your surgery, or visit urgent care/ER.      Bathing Instructions:   The night before surgery and the morning prior to coming to the hospital:    - Shower & rinse your body as usual with anti-bacterial Soap (Dial or Lever 2000)   -Hibiclens (if indicated) use AFTER anti-bacterial soap; 1 packet PM/1 packet in AM on surgical site only   -Do not use hibiclens on your head, face, or genitals.    -Do not wash with anti-bacterial soap after you use  the hibiclens.    -Do not shave surgical site 5-7 days prior to surgery.    -Pubic hair 7 days prior to surgery (OBGYN/Urology only).       Additional Instructions:   __ No makeup, powder, lotions, creams, or body spray to skin   __ No deodorant if you are having: breast procedure, PORT, or upper shoulder surgery!   __ No nail polish or artificial nails       **SURGERY WILL BE CANCELLED IF ARTIFICIAL/NAIL POLISH IS PRESENT!!!**  __ Please remove all piercings and leave all jewelry at home.    **SURGERY WILL BE CANCELLED IF PIERCINGS ARE PRESENT!!!**    __ Dentures, Hearing Aids and Contact Lens need to be removed prior to the start of surgery.    __ Avoid Alcoholic beverages 3 days prior to surgery, as it can thin the blood, unless told otherwise by pre-admit department.  __ Females: may need to give a urine sample the morning of surgery;   **Please ask  for a specimen cup if you need to use the restroom prior to being called into pre-op.**  __ Males: Stop ED medications (Viagra, Cialis) 24 hrs prior to surgery.  __ Wear clean, loose-fitting clothing. Allow for dressings/bandages/surgical equipment   __ You must have transportation, and they MUST stay the entire time.   __  Bring photo ID and insurance information to hospital [facility] Visitor/Ride Policy:   Only 1 adult allowed (over the age of 18) to accompany you and MUST STAY through the entire length of admission.     -Must have a ride home from a responsible adult that you know and trust.    -Medical Transport, Uber or Lyft can only be used if patient has a responsible adult to accompany them during ride home.    ~Your ride MUST STAY the entire time until you are discharged~    *If you are running late or have questions the morning of surgery, please call the Pre-OP Department @ [phone].       *Please Call [facility] Pre-Admit Department for surgery instruction questions:   [phone]       Financial Questions:                                                       Additional Payment Question Numbers:   qhel: [phone]                                                               [phone] or [phone]

## 2024-08-08 NOTE — ASSESSMENT & PLAN NOTE
Left-sided TransLoc Sacroiliac Joint Fusion planned per Dr. Ortiz on 08/14/2024    Known risk factors for perioperative complications: Anemia    Difficulty with intubation is not anticipated.    Cardiac Risk Estimation: Based on the Revised Cardiac Risk index, patient is a Class I risk with a 3.9 % risk of a major cardiac event in a low risk procedure.    1.) Preoperative workup as follows: ECG, hemoglobin, hematocrit, electrolytes, creatinine, glucose.  2.) Change in medication regimen before surgery: discontinue ASA 6 days before surgery, discontinue NSAIDs 5 days before surgery, hold Metformin 24 hours prior to surgery. Hold all vitamins/supplements for 7 days prior to surgery, with the exception of Potassium and Iron supplementation. Hold semaglutide/tirzepatide for 7 days prior to surgery.   3.) Prophylaxis for cardiac events with perioperative beta-blockers:  Atenolol continued.  4.) Invasive hemodynamic monitoring perioperatively: at the discretion of anesthesiologist.  5.) Deep vein thrombosis prophylaxis postoperatively: regimen to be chosen by surgical team.  6.) Surveillance for postoperative MI with ECG immediately postoperatively and on postoperati ve days 1 and 2 AND troponin levels 24 hours postoperatively and on day 4 or hospital discharge (whichever comes first): at the discretion of anesthesiologist.  7.) Current medications which may produce withdrawal symptoms if withheld perioperatively:  None  8.) Other measures: Postoperative hypertension management with IV hydralazine until able to take oral medications.     --Morning of Procedure medications:  Atenolol, omeprazole, venlafaxine, and Neurontin  --Hold all other medications morning of surgery   --Resume all medications post-operatively  --Hold all vitamins/supplements 7 days prior to procedure

## 2024-08-09 ENCOUNTER — LAB VISIT (OUTPATIENT)
Dept: LAB | Facility: HOSPITAL | Age: 69
End: 2024-08-09
Attending: FAMILY MEDICINE
Payer: MEDICARE

## 2024-08-09 DIAGNOSIS — E78.2 MIXED HYPERLIPIDEMIA: ICD-10-CM

## 2024-08-09 LAB
ALBUMIN SERPL BCP-MCNC: 3.9 G/DL (ref 3.5–5.2)
ALP SERPL-CCNC: 60 U/L (ref 55–135)
ALT SERPL W/O P-5'-P-CCNC: 27 U/L (ref 10–44)
ANION GAP SERPL CALC-SCNC: 10 MMOL/L (ref 8–16)
AST SERPL-CCNC: 28 U/L (ref 10–40)
BASOPHILS # BLD AUTO: 0.04 K/UL (ref 0–0.2)
BASOPHILS NFR BLD: 0.6 % (ref 0–1.9)
BILIRUB SERPL-MCNC: 0.5 MG/DL (ref 0.1–1)
BUN SERPL-MCNC: 12 MG/DL (ref 8–23)
CALCIUM SERPL-MCNC: 9.9 MG/DL (ref 8.7–10.5)
CHLORIDE SERPL-SCNC: 108 MMOL/L (ref 95–110)
CHOLEST SERPL-MCNC: 171 MG/DL (ref 120–199)
CHOLEST/HDLC SERPL: 4 {RATIO} (ref 2–5)
CO2 SERPL-SCNC: 23 MMOL/L (ref 23–29)
CREAT SERPL-MCNC: 1.2 MG/DL (ref 0.5–1.4)
DIFFERENTIAL METHOD BLD: ABNORMAL
EOSINOPHIL # BLD AUTO: 0.3 K/UL (ref 0–0.5)
EOSINOPHIL NFR BLD: 3.8 % (ref 0–8)
ERYTHROCYTE [DISTWIDTH] IN BLOOD BY AUTOMATED COUNT: 15.1 % (ref 11.5–14.5)
EST. GFR  (NO RACE VARIABLE): 49.9 ML/MIN/1.73 M^2
GLUCOSE SERPL-MCNC: 108 MG/DL (ref 70–110)
HCT VFR BLD AUTO: 41.2 % (ref 37–48.5)
HDLC SERPL-MCNC: 43 MG/DL (ref 40–75)
HDLC SERPL: 25.1 % (ref 20–50)
HGB BLD-MCNC: 13 G/DL (ref 12–16)
IMM GRANULOCYTES # BLD AUTO: 0.02 K/UL (ref 0–0.04)
IMM GRANULOCYTES NFR BLD AUTO: 0.3 % (ref 0–0.5)
LDLC SERPL CALC-MCNC: 87.6 MG/DL (ref 63–159)
LYMPHOCYTES # BLD AUTO: 2.7 K/UL (ref 1–4.8)
LYMPHOCYTES NFR BLD: 39.4 % (ref 18–48)
MCH RBC QN AUTO: 31.5 PG (ref 27–31)
MCHC RBC AUTO-ENTMCNC: 31.6 G/DL (ref 32–36)
MCV RBC AUTO: 100 FL (ref 82–98)
MONOCYTES # BLD AUTO: 0.6 K/UL (ref 0.3–1)
MONOCYTES NFR BLD: 8.9 % (ref 4–15)
NEUTROPHILS # BLD AUTO: 3.2 K/UL (ref 1.8–7.7)
NEUTROPHILS NFR BLD: 47 % (ref 38–73)
NONHDLC SERPL-MCNC: 128 MG/DL
NRBC BLD-RTO: 0 /100 WBC
PLATELET # BLD AUTO: 242 K/UL (ref 150–450)
PMV BLD AUTO: 11.1 FL (ref 9.2–12.9)
POTASSIUM SERPL-SCNC: 4 MMOL/L (ref 3.5–5.1)
PROT SERPL-MCNC: 7.1 G/DL (ref 6–8.4)
RBC # BLD AUTO: 4.13 M/UL (ref 4–5.4)
SODIUM SERPL-SCNC: 141 MMOL/L (ref 136–145)
TRIGL SERPL-MCNC: 202 MG/DL (ref 30–150)
WBC # BLD AUTO: 6.86 K/UL (ref 3.9–12.7)

## 2024-08-09 PROCEDURE — 85025 COMPLETE CBC W/AUTO DIFF WBC: CPT | Mod: HCNC | Performed by: FAMILY MEDICINE

## 2024-08-09 PROCEDURE — 80053 COMPREHEN METABOLIC PANEL: CPT | Mod: HCNC | Performed by: FAMILY MEDICINE

## 2024-08-09 PROCEDURE — 36415 COLL VENOUS BLD VENIPUNCTURE: CPT | Mod: HCNC,PN | Performed by: FAMILY MEDICINE

## 2024-08-09 PROCEDURE — 80061 LIPID PANEL: CPT | Mod: HCNC | Performed by: FAMILY MEDICINE

## 2024-08-09 RX ORDER — AMITRIPTYLINE HYDROCHLORIDE 50 MG/1
50 TABLET, FILM COATED ORAL NIGHTLY
Qty: 90 TABLET | Refills: 3 | Status: SHIPPED | OUTPATIENT
Start: 2024-08-09

## 2024-08-12 ENCOUNTER — PATIENT MESSAGE (OUTPATIENT)
Dept: PAIN MEDICINE | Facility: CLINIC | Age: 69
End: 2024-08-12
Payer: MEDICARE

## 2024-08-13 ENCOUNTER — OFFICE VISIT (OUTPATIENT)
Dept: FAMILY MEDICINE | Facility: CLINIC | Age: 69
End: 2024-08-13
Payer: MEDICARE

## 2024-08-13 ENCOUNTER — ANESTHESIA EVENT (OUTPATIENT)
Dept: SURGERY | Facility: HOSPITAL | Age: 69
End: 2024-08-13
Payer: MEDICARE

## 2024-08-13 ENCOUNTER — PATIENT MESSAGE (OUTPATIENT)
Dept: RESPIRATORY THERAPY | Facility: HOSPITAL | Age: 69
End: 2024-08-13
Payer: MEDICARE

## 2024-08-13 VITALS
SYSTOLIC BLOOD PRESSURE: 130 MMHG | DIASTOLIC BLOOD PRESSURE: 78 MMHG | OXYGEN SATURATION: 97 % | HEART RATE: 103 BPM | WEIGHT: 267.31 LBS | BODY MASS INDEX: 39.47 KG/M2

## 2024-08-13 DIAGNOSIS — F39 MOOD DISORDER: ICD-10-CM

## 2024-08-13 DIAGNOSIS — D50.0 IRON DEFICIENCY ANEMIA DUE TO CHRONIC BLOOD LOSS: ICD-10-CM

## 2024-08-13 DIAGNOSIS — E78.2 MIXED HYPERLIPIDEMIA: ICD-10-CM

## 2024-08-13 DIAGNOSIS — Z00.00 WELL ADULT EXAM: Primary | ICD-10-CM

## 2024-08-13 DIAGNOSIS — E53.8 B12 DEFICIENCY: ICD-10-CM

## 2024-08-13 PROCEDURE — 3044F HG A1C LEVEL LT 7.0%: CPT | Mod: HCNC,CPTII,S$GLB, | Performed by: FAMILY MEDICINE

## 2024-08-13 PROCEDURE — 99999 PR PBB SHADOW E&M-EST. PATIENT-LVL III: CPT | Mod: PBBFAC,HCNC,, | Performed by: FAMILY MEDICINE

## 2024-08-13 PROCEDURE — 3008F BODY MASS INDEX DOCD: CPT | Mod: HCNC,CPTII,S$GLB, | Performed by: FAMILY MEDICINE

## 2024-08-13 PROCEDURE — 3075F SYST BP GE 130 - 139MM HG: CPT | Mod: HCNC,CPTII,S$GLB, | Performed by: FAMILY MEDICINE

## 2024-08-13 PROCEDURE — 99397 PER PM REEVAL EST PAT 65+ YR: CPT | Mod: HCNC,S$GLB,, | Performed by: FAMILY MEDICINE

## 2024-08-13 PROCEDURE — 1159F MED LIST DOCD IN RCRD: CPT | Mod: HCNC,CPTII,S$GLB, | Performed by: FAMILY MEDICINE

## 2024-08-13 PROCEDURE — 3078F DIAST BP <80 MM HG: CPT | Mod: HCNC,CPTII,S$GLB, | Performed by: FAMILY MEDICINE

## 2024-08-13 RX ORDER — CYANOCOBALAMIN 1000 UG/ML
1000 INJECTION, SOLUTION INTRAMUSCULAR; SUBCUTANEOUS WEEKLY
Qty: 30 ML | Refills: 12 | Status: SHIPPED | OUTPATIENT
Start: 2024-08-13

## 2024-08-13 RX ORDER — SODIUM CHLORIDE, SODIUM LACTATE, POTASSIUM CHLORIDE, CALCIUM CHLORIDE 600; 310; 30; 20 MG/100ML; MG/100ML; MG/100ML; MG/100ML
INJECTION, SOLUTION INTRAVENOUS CONTINUOUS
OUTPATIENT
Start: 2024-08-13

## 2024-08-13 NOTE — PROGRESS NOTES
Chief Complaint:  No chief complaint on file.      History of Present Illness:    History of Present Illness    Patient presents today for follow up. She is scheduled for bilateral joint fusion surgery tomorrow to be performed by Dr. BARNEY DELUCA. The decision for surgery was made after diagnostic injections and an MRI revealed the need for bilateral intervention. She reports significant discomfort, stating she cannot walk, sit, stand, or lay down comfortably due to her condition. She has stopped taking aspirin and all vitamins as instructed, adhering to the seven-day pre-surgery hold. She was previously on a weekly B12 supplementation regimen but needs to restart and obtain a refill. She expresses a desire to resume B12 supplementation based on current lab results. Regarding iron supplementation, she is currently holding these pills due to uncertainty about continuing them before surgery. Her MCV (mean corpuscular volume) is slightly elevated, indicating an increase in the size of her RBCs. She previously considered Mounjaro for weight management but did not initiate treatment due to unfavorable circumstances. She denies current use of Mounjaro. She requests a refill for B12 injections to be sent to [facility].           Past Medical History:   Diagnosis Date    Anemia     GERD (gastroesophageal reflux disease)     Hyperlipidemia     Hypertension     PONV (postoperative nausea and vomiting)     Supraventricular tachycardia     Supraventricular tachycardia 8/8/2024       Social History:  Social History     Socioeconomic History    Marital status:    Tobacco Use    Smoking status: Never     Passive exposure: Never    Smokeless tobacco: Never   Substance and Sexual Activity    Alcohol use: Not Currently    Drug use: Not Currently    Sexual activity: Not Currently     Partners: Male     Birth control/protection: Post-menopausal     Social Determinants of Health     Financial Resource Strain: Medium Risk  (2/7/2024)    Overall Financial Resource Strain (CARDIA)     Difficulty of Paying Living Expenses: Somewhat hard   Food Insecurity: No Food Insecurity (2/7/2024)    Hunger Vital Sign     Worried About Running Out of Food in the Last Year: Never true     Ran Out of Food in the Last Year: Never true   Transportation Needs: No Transportation Needs (2/7/2024)    PRAPARE - Transportation     Lack of Transportation (Medical): No     Lack of Transportation (Non-Medical): No   Physical Activity: Insufficiently Active (2/7/2024)    Exercise Vital Sign     Days of Exercise per Week: 1 day     Minutes of Exercise per Session: 20 min   Stress: Stress Concern Present (2/7/2024)    [employer] [place] [employer] - Occupational Stress Questionnaire     Feeling of Stress : To some extent   Housing Stability: Low Risk  (2/7/2024)    Housing Stability Vital Sign     Unable to Pay for Housing in the Last Year: No     Number of Places Lived in the Last Year: 1     Unstable Housing in the Last Year: No       Family History:   family history includes Cataracts in her father and mother; Heart block in her mother; Hypertension in her brother, sister, and sister.    Health Maintenance   Topic Date Due    TETANUS VACCINE  12/01/2024    Mammogram  03/18/2025    Lipid Panel  08/09/2025    DEXA Scan  08/23/2026    Colorectal Cancer Screening  03/10/2027    Hepatitis C Screening  Completed    Shingles Vaccine  Completed       Exam:Physical     Vital Signs  Pulse: 103  SpO2: 97 %  BP: 130/78  BP Location: Left arm  Patient Position: Sitting  Height and Weight  Weight: 121.2 kg (267 lb 4.9 oz)]    Body mass index is 39.47 kg/m².    Physical Exam    General: Well-developed. Well-nourished. No acute distress.  Eyes: EOMI. Sclerae anicteric.  HENT: Normocephalic. Atraumatic. Nares patent. Moist oral mucosa.  Cardiovascular: Regular rate. Regular rhythm. No murmurs. No rubs. No gallops. Normal S1, S2.  Respiratory: Normal respiratory effort.  Clear to auscultation bilaterally. No rales. No rhonchi. No wheezing.  Musculoskeletal: No  obvious deformity.  Extremities: No lower extremity edema.  Neurological: Alert & oriented x3. No slurred speech. Normal gait.  Psychiatric: Normal mood. Normal affect. Good insight. Good judgment.  Skin: Warm. Dry. No rash.           Assessment:        ICD-10-CM ICD-9-CM   1. Well adult exam  Z00.00 V70.0   2. B12 deficiency  E53.8 266.2   3. Mixed hyperlipidemia  E78.2 272.2   4. Iron deficiency anemia due to chronic blood loss  D50.0 280.0   5. Mood disorder  F39 296.90         Plan:    Assessment & Plan    MEDICAL DECISION MAKING:  - Reviewed patient's lab results, noting slightly elevated MCV (mean corpuscular volume)  - Considered B12 supplementation as potential cause for elevated MCV  - Assessed patient's current medication regimen, including iron supplementation and B12 injections  - Evaluated patient's upcoming joint fusion surgery with Dr. Carvajal  PATIENT EDUCATION:  - Explained that elevated MCV indicates larger than normal RBCs  - Discussed sublingual generic version of Ozempic available from [facility] pharmacy in [place] as potential weight loss medication option  MEDICATIONS:  - Continued B12 injections  - Refilled B12 injections, to be sent to [facility]         Diagnoses and all orders for this visit:    Well adult exam    B12 deficiency  -     Vitamin B12; Future    Mixed hyperlipidemia  -     Lipid Panel; Future  -     Hemoglobin A1C; Future  -     Comprehensive Metabolic Panel; Future  -     CBC Auto Differential; Future  -     Vitamin B12; Future    Iron deficiency anemia due to chronic blood loss  -     Lipid Panel; Future  -     Hemoglobin A1C; Future  -     Comprehensive Metabolic Panel; Future  -     CBC Auto Differential; Future  -     Vitamin B12; Future    Mood disorder  -     Lipid Panel; Future  -     Hemoglobin A1C; Future  -     Comprehensive Metabolic Panel; Future  -     CBC  Auto Differential; Future  -     Vitamin B12; Future    Other orders  -     cyanocobalamin 1,000 mcg/mL injection; Inject 1 mL (1,000 mcg total) into the muscle once a week.        Follow up in about 6 months (around 2/13/2025).      Stephani Valdivia MD

## 2024-08-13 NOTE — ANESTHESIA PREPROCEDURE EVALUATION
08/13/2024  Silvia Cotton is a 68 y.o., female.  Past Medical History:   Diagnosis Date    Anemia     GERD (gastroesophageal reflux disease)     Hyperlipidemia     Hypertension     PONV (postoperative nausea and vomiting)     Supraventricular tachycardia     Supraventricular tachycardia 8/8/2024     Past Surgical History:   Procedure Laterality Date    APPENDECTOMY      CARPAL TUNNEL RELEASE Right 11/30/2023    Procedure: RELEASE, CARPAL TUNNEL;  Surgeon: Kiran Finley MD;  Location: [facility] OR;  Service: Orthopedics;  Laterality: Right;  right carpal tunnel release    CARPAL TUNNEL RELEASE Left 12/28/2023    Procedure: RELEASE, CARPAL TUNNEL;  Surgeon: Kiran Finley MD;  Location: [facility] OR;  Service: Orthopedics;  Laterality: Left;    CHOLECYSTECTOMY      COLONOSCOPY N/A 01/13/2017    Procedure: COLONOSCOPY;  Surgeon: Taj Nicholas MD;  Location: [facility];  Service: Endoscopy;  Laterality: N/A;    COLONOSCOPY N/A 03/10/2022    Procedure: COLONOSCOPY;  Surgeon: Bee Brock MD;  Location: [facility];  Service: Endoscopy;  Laterality: N/A;    INJECTION OF ANESTHETIC AGENT INTO SACROILIAC JOINT Left 2/27/2024    Procedure: Left SIJ Injection with local;  Surgeon: Ilene Ortiz MD;  Location: [facility] PAIN [facility]GT;  Service: Pain Management;  Laterality: Left;    INJECTION OF ANESTHETIC AGENT INTO SACROILIAC JOINT Left 5/10/2024    Procedure: Left SIJ Injection (diagnostic)-Injection #2;  Surgeon: Ilene Ortiz MD;  Location: [facility];  Service: Pain Management;  Laterality: Left;    INJECTION OF ANESTHETIC AGENT INTO SACROILIAC JOINT Left 7/26/2024    Procedure: Left SIJ Injection with local (DIAGNOSTIC< NO STEROID);  Surgeon: Ilene Ortiz MD;  Location: [facility];  Service: Pain Management;  Laterality: Left;    KNEE SURGERY Left 2017    SHOULDER ARTHROSCOPY       Tonsilectomy      TONSILLECTOMY      Uterine Ablation           Pre-op Assessment    I have reviewed the Patient Summary Reports.     I have reviewed the Nursing Notes. I have reviewed the NPO Status.   I have reviewed the Medications.     Review of Systems  Anesthesia Hx:    PONV. History of prior surgery of interest to airway management or planning:           Personal Hx of Anesthesia complications, Post-Operative Nausea/Vomiting                    Social:  Non-Smoker       Hematology/Oncology:       -- Anemia:                                  Cardiovascular:     Hypertension           hyperlipidemia                       Hypertension         Pulmonary:  Pulmonary Normal                       Renal/:  Chronic Renal Disease        Kidney Function/Disease             Hepatic/GI:     GERD      Gerd          Neurological:        Peripheral Neuropathy                        Neuromuscular Disease   Endocrine:        Obesity / BMI > 30  Psych:  Psychiatric History                  Physical Exam  General: Well nourished    Airway:  Mallampati: III / II  Mouth Opening: Normal  TM Distance: 4 - 6 cm  Tongue: Normal  Neck ROM: Normal ROM    Dental:  Dentures, Edentulous    Chest/Lungs:  Clear to auscultation, Normal Respiratory Rate    Heart:  Rate: Normal  Rhythm: Regular Rhythm        Anesthesia Plan  Type of Anesthesia, risks & benefits discussed:    Anesthesia Type: Gen ETT  Intra-op Monitoring Plan: Standard ASA Monitors  Post Op Pain Control Plan: multimodal analgesia and IV/PO Opioids PRN  Induction:  IV  Informed Consent: Informed consent signed with the Patient and all parties understand the risks and agree with anesthesia plan.  All questions answered. Patient consented to blood products? No  ASA Score: 3  Day of Surgery Review of History & Physical: H&P Update referred to the surgeon/provider.    Ready For Surgery From Anesthesia Perspective.     .

## 2024-08-14 ENCOUNTER — HOSPITAL ENCOUNTER (OUTPATIENT)
Dept: RADIOLOGY | Facility: HOSPITAL | Age: 69
Discharge: HOME | End: 2024-08-14
Attending: ANESTHESIOLOGY | Admitting: ANESTHESIOLOGY
Payer: MEDICARE

## 2024-08-14 ENCOUNTER — ANESTHESIA (OUTPATIENT)
Dept: SURGERY | Facility: HOSPITAL | Age: 69
End: 2024-08-14
Payer: MEDICARE

## 2024-08-14 ENCOUNTER — HOSPITAL ENCOUNTER (OUTPATIENT)
Facility: HOSPITAL | Age: 69
Discharge: HOME | End: 2024-08-14
Attending: ANESTHESIOLOGY | Admitting: ANESTHESIOLOGY
Payer: MEDICARE

## 2024-08-14 ENCOUNTER — NURSE TRIAGE (OUTPATIENT)
Dept: ADMINISTRATIVE | Facility: CLINIC | Age: 69
End: 2024-08-14
Payer: MEDICARE

## 2024-08-14 ENCOUNTER — PATIENT MESSAGE (OUTPATIENT)
Dept: SURGERY | Facility: HOSPITAL | Age: 69
End: 2024-08-14
Payer: MEDICARE

## 2024-08-14 VITALS
WEIGHT: 264.56 LBS | DIASTOLIC BLOOD PRESSURE: 67 MMHG | BODY MASS INDEX: 39.07 KG/M2 | TEMPERATURE: 98 F | OXYGEN SATURATION: 95 % | SYSTOLIC BLOOD PRESSURE: 115 MMHG | RESPIRATION RATE: 25 BRPM | HEART RATE: 82 BPM

## 2024-08-14 DIAGNOSIS — Z01.818 PRE-OP EXAM: Primary | ICD-10-CM

## 2024-08-14 PROCEDURE — 36000711: Mod: HCNC | Performed by: ANESTHESIOLOGY

## 2024-08-14 PROCEDURE — 72020 X-RAY EXAM OF SPINE 1 VIEW: CPT | Mod: TC,HCNC

## 2024-08-14 PROCEDURE — 63600175 PHARM REV CODE 636 W HCPCS: Mod: HCNC | Performed by: ANESTHESIOLOGY

## 2024-08-14 PROCEDURE — 25000003 PHARM REV CODE 250: Mod: HCNC | Performed by: NURSE ANESTHETIST, CERTIFIED REGISTERED

## 2024-08-14 PROCEDURE — 63600175 PHARM REV CODE 636 W HCPCS: Mod: HCNC | Performed by: NURSE ANESTHETIST, CERTIFIED REGISTERED

## 2024-08-14 PROCEDURE — 27279 ARTHRD SI JT PLMT TARTCLR DV: CPT | Mod: ,,, | Performed by: ANESTHESIOLOGY

## 2024-08-14 PROCEDURE — 37000009 HC ANESTHESIA EA ADD 15 MINS: Mod: HCNC | Performed by: ANESTHESIOLOGY

## 2024-08-14 PROCEDURE — 71000033 HC RECOVERY, INTIAL HOUR: Mod: HCNC | Performed by: ANESTHESIOLOGY

## 2024-08-14 PROCEDURE — 71000015 HC POSTOP RECOV 1ST HR: Mod: HCNC | Performed by: ANESTHESIOLOGY

## 2024-08-14 PROCEDURE — C1713 ANCHOR/SCREW BN/BN,TIS/BN: HCPCS | Mod: HCNC | Performed by: ANESTHESIOLOGY

## 2024-08-14 PROCEDURE — 25000003 PHARM REV CODE 250: Mod: HCNC | Performed by: ANESTHESIOLOGY

## 2024-08-14 PROCEDURE — 37000008 HC ANESTHESIA 1ST 15 MINUTES: Mod: HCNC | Performed by: ANESTHESIOLOGY

## 2024-08-14 PROCEDURE — 36000710: Mod: HCNC | Performed by: ANESTHESIOLOGY

## 2024-08-14 RX ORDER — PROPOFOL 10 MG/ML
VIAL (ML) INTRAVENOUS
Status: DISCONTINUED | OUTPATIENT
Start: 2024-08-14 | End: 2024-08-14

## 2024-08-14 RX ORDER — ONDANSETRON HYDROCHLORIDE 2 MG/ML
INJECTION, SOLUTION INTRAVENOUS
Status: DISCONTINUED | OUTPATIENT
Start: 2024-08-14 | End: 2024-08-14

## 2024-08-14 RX ORDER — FENTANYL CITRATE 50 UG/ML
INJECTION, SOLUTION INTRAMUSCULAR; INTRAVENOUS
Status: DISCONTINUED | OUTPATIENT
Start: 2024-08-14 | End: 2024-08-14

## 2024-08-14 RX ORDER — SUCCINYLCHOLINE CHLORIDE 20 MG/ML
INJECTION INTRAMUSCULAR; INTRAVENOUS
Status: DISCONTINUED | OUTPATIENT
Start: 2024-08-14 | End: 2024-08-14

## 2024-08-14 RX ORDER — LIDOCAINE HYDROCHLORIDE 10 MG/ML
INJECTION, SOLUTION EPIDURAL; INFILTRATION; INTRACAUDAL; PERINEURAL
Status: DISCONTINUED | OUTPATIENT
Start: 2024-08-14 | End: 2024-08-14 | Stop reason: HOSPADM

## 2024-08-14 RX ORDER — DEXAMETHASONE SODIUM PHOSPHATE 4 MG/ML
INJECTION, SOLUTION INTRA-ARTICULAR; INTRALESIONAL; INTRAMUSCULAR; INTRAVENOUS; SOFT TISSUE
Status: DISCONTINUED | OUTPATIENT
Start: 2024-08-14 | End: 2024-08-14

## 2024-08-14 RX ORDER — CEFAZOLIN SODIUM 1 G/3ML
INJECTION, POWDER, FOR SOLUTION INTRAMUSCULAR; INTRAVENOUS
Status: DISCONTINUED | OUTPATIENT
Start: 2024-08-14 | End: 2024-08-14 | Stop reason: HOSPADM

## 2024-08-14 RX ORDER — VANCOMYCIN HYDROCHLORIDE 1 G/20ML
INJECTION, POWDER, LYOPHILIZED, FOR SOLUTION INTRAVENOUS
Status: DISCONTINUED
Start: 2024-08-14 | End: 2024-08-14 | Stop reason: HOSPADM

## 2024-08-14 RX ORDER — DEXMEDETOMIDINE HYDROCHLORIDE 100 UG/ML
INJECTION, SOLUTION INTRAVENOUS
Status: DISCONTINUED | OUTPATIENT
Start: 2024-08-14 | End: 2024-08-14

## 2024-08-14 RX ORDER — CEFAZOLIN SODIUM 1 G/3ML
INJECTION, POWDER, FOR SOLUTION INTRAMUSCULAR; INTRAVENOUS
Status: DISCONTINUED | OUTPATIENT
Start: 2024-08-14 | End: 2024-08-14

## 2024-08-14 RX ORDER — DIPHENHYDRAMINE HYDROCHLORIDE 50 MG/ML
INJECTION INTRAMUSCULAR; INTRAVENOUS
Status: DISCONTINUED | OUTPATIENT
Start: 2024-08-14 | End: 2024-08-14

## 2024-08-14 RX ORDER — DIPHENHYDRAMINE HYDROCHLORIDE 50 MG/ML
25 INJECTION INTRAMUSCULAR; INTRAVENOUS EVERY 6 HOURS PRN
Status: DISCONTINUED | OUTPATIENT
Start: 2024-08-14 | End: 2024-08-14 | Stop reason: HOSPADM

## 2024-08-14 RX ORDER — ACETAMINOPHEN 10 MG/ML
INJECTION, SOLUTION INTRAVENOUS
Status: DISCONTINUED | OUTPATIENT
Start: 2024-08-14 | End: 2024-08-14

## 2024-08-14 RX ORDER — CEPHALEXIN 500 MG/1
500 CAPSULE ORAL EVERY 8 HOURS
Qty: 9 CAPSULE | Refills: 0 | Status: SHIPPED | OUTPATIENT
Start: 2024-08-14 | End: 2024-08-17

## 2024-08-14 RX ORDER — ALBUTEROL SULFATE 0.83 MG/ML
2.5 SOLUTION RESPIRATORY (INHALATION) EVERY 4 HOURS PRN
Status: DISCONTINUED | OUTPATIENT
Start: 2024-08-14 | End: 2024-08-14 | Stop reason: HOSPADM

## 2024-08-14 RX ORDER — LIDOCAINE HYDROCHLORIDE 20 MG/ML
INJECTION, SOLUTION EPIDURAL; INFILTRATION; INTRACAUDAL; PERINEURAL
Status: DISCONTINUED | OUTPATIENT
Start: 2024-08-14 | End: 2024-08-14

## 2024-08-14 RX ORDER — GLUCAGON 1 MG
1 KIT INJECTION
Status: DISCONTINUED | OUTPATIENT
Start: 2024-08-14 | End: 2024-08-14 | Stop reason: HOSPADM

## 2024-08-14 RX ORDER — LIDOCAINE HYDROCHLORIDE 10 MG/ML
INJECTION, SOLUTION EPIDURAL; INFILTRATION; INTRACAUDAL; PERINEURAL
Status: DISCONTINUED
Start: 2024-08-14 | End: 2024-08-14 | Stop reason: HOSPADM

## 2024-08-14 RX ORDER — FENTANYL CITRATE 50 UG/ML
25 INJECTION, SOLUTION INTRAMUSCULAR; INTRAVENOUS EVERY 5 MIN PRN
Status: DISCONTINUED | OUTPATIENT
Start: 2024-08-14 | End: 2024-08-14 | Stop reason: HOSPADM

## 2024-08-14 RX ORDER — CEFAZOLIN SODIUM 1 G/3ML
INJECTION, POWDER, FOR SOLUTION INTRAMUSCULAR; INTRAVENOUS
Status: DISCONTINUED
Start: 2024-08-14 | End: 2024-08-14 | Stop reason: HOSPADM

## 2024-08-14 RX ORDER — ONDANSETRON HYDROCHLORIDE 2 MG/ML
4 INJECTION, SOLUTION INTRAVENOUS ONCE AS NEEDED
Status: DISCONTINUED | OUTPATIENT
Start: 2024-08-14 | End: 2024-08-14 | Stop reason: HOSPADM

## 2024-08-14 RX ORDER — MEPERIDINE HYDROCHLORIDE 25 MG/ML
12.5 INJECTION INTRAMUSCULAR; INTRAVENOUS; SUBCUTANEOUS ONCE
Status: DISCONTINUED | OUTPATIENT
Start: 2024-08-14 | End: 2024-08-14 | Stop reason: HOSPADM

## 2024-08-14 RX ORDER — MIDAZOLAM HYDROCHLORIDE 1 MG/ML
INJECTION INTRAMUSCULAR; INTRAVENOUS
Status: DISCONTINUED | OUTPATIENT
Start: 2024-08-14 | End: 2024-08-14

## 2024-08-14 RX ORDER — DOXYCYCLINE 100 MG/1
100 CAPSULE ORAL EVERY 12 HOURS
Qty: 6 CAPSULE | Refills: 0 | Status: SHIPPED | OUTPATIENT
Start: 2024-08-14 | End: 2024-08-17

## 2024-08-14 RX ORDER — BUPIVACAINE HYDROCHLORIDE 5 MG/ML
INJECTION, SOLUTION EPIDURAL; INTRACAUDAL
Status: DISCONTINUED
Start: 2024-08-14 | End: 2024-08-14 | Stop reason: HOSPADM

## 2024-08-14 RX ORDER — BUPIVACAINE HYDROCHLORIDE 5 MG/ML
INJECTION, SOLUTION EPIDURAL; INTRACAUDAL
Status: DISCONTINUED | OUTPATIENT
Start: 2024-08-14 | End: 2024-08-14 | Stop reason: HOSPADM

## 2024-08-14 RX ORDER — VANCOMYCIN HYDROCHLORIDE 1 G/20ML
INJECTION, POWDER, LYOPHILIZED, FOR SOLUTION INTRAVENOUS
Status: DISCONTINUED | OUTPATIENT
Start: 2024-08-14 | End: 2024-08-14 | Stop reason: HOSPADM

## 2024-08-14 RX ORDER — HYDROCODONE BITARTRATE AND ACETAMINOPHEN 5; 325 MG/1; MG/1
1 TABLET ORAL
Status: DISCONTINUED | OUTPATIENT
Start: 2024-08-14 | End: 2024-08-14 | Stop reason: HOSPADM

## 2024-08-14 RX ORDER — ROCURONIUM BROMIDE 10 MG/ML
INJECTION, SOLUTION INTRAVENOUS
Status: DISCONTINUED | OUTPATIENT
Start: 2024-08-14 | End: 2024-08-14

## 2024-08-14 RX ORDER — OXYCODONE AND ACETAMINOPHEN 5; 325 MG/1; MG/1
1 TABLET ORAL EVERY 6 HOURS PRN
Qty: 21 TABLET | Refills: 0 | Status: SHIPPED | OUTPATIENT
Start: 2024-08-14 | End: 2024-08-21

## 2024-08-14 RX ADMIN — MIDAZOLAM HYDROCHLORIDE 2 MG: 1 INJECTION, SOLUTION INTRAMUSCULAR; INTRAVENOUS at 11:08

## 2024-08-14 RX ADMIN — DEXMEDETOMIDINE HYDROCHLORIDE 4 MCG: 100 INJECTION, SOLUTION INTRAVENOUS at 12:08

## 2024-08-14 RX ADMIN — FENTANYL CITRATE 100 MCG: 50 INJECTION, SOLUTION INTRAMUSCULAR; INTRAVENOUS at 11:08

## 2024-08-14 RX ADMIN — SUCCINYLCHOLINE CHLORIDE 120 MG: 20 INJECTION, SOLUTION INTRAMUSCULAR; INTRAVENOUS; PARENTERAL at 11:08

## 2024-08-14 RX ADMIN — PROPOFOL 160 MG: 10 INJECTION, EMULSION INTRAVENOUS at 11:08

## 2024-08-14 RX ADMIN — FENTANYL CITRATE 25 MCG: 50 INJECTION, SOLUTION INTRAMUSCULAR; INTRAVENOUS at 12:08

## 2024-08-14 RX ADMIN — FENTANYL CITRATE 50 MCG: 50 INJECTION, SOLUTION INTRAMUSCULAR; INTRAVENOUS at 12:08

## 2024-08-14 RX ADMIN — ROCURONIUM BROMIDE 5 MG: 10 INJECTION, SOLUTION INTRAVENOUS at 11:08

## 2024-08-14 RX ADMIN — FENTANYL CITRATE 25 MCG: 50 INJECTION, SOLUTION INTRAMUSCULAR; INTRAVENOUS at 01:08

## 2024-08-14 RX ADMIN — ROCURONIUM BROMIDE 20 MG: 10 INJECTION, SOLUTION INTRAVENOUS at 12:08

## 2024-08-14 RX ADMIN — LIDOCAINE HYDROCHLORIDE 70 MG: 20 INJECTION, SOLUTION EPIDURAL; INFILTRATION; INTRACAUDAL; PERINEURAL at 11:08

## 2024-08-14 RX ADMIN — CEFAZOLIN 2 G: 330 INJECTION, POWDER, FOR SOLUTION INTRAMUSCULAR; INTRAVENOUS at 11:08

## 2024-08-14 RX ADMIN — SODIUM CHLORIDE, POTASSIUM CHLORIDE, SODIUM LACTATE AND CALCIUM CHLORIDE: 600; 310; 30; 20 INJECTION, SOLUTION INTRAVENOUS at 11:08

## 2024-08-14 RX ADMIN — ROCURONIUM BROMIDE 25 MG: 10 INJECTION, SOLUTION INTRAVENOUS at 11:08

## 2024-08-14 RX ADMIN — ONDANSETRON 4 MG: 2 INJECTION INTRAMUSCULAR; INTRAVENOUS at 12:08

## 2024-08-14 RX ADMIN — DIPHENHYDRAMINE HYDROCHLORIDE 12.5 MG: 50 INJECTION INTRAMUSCULAR; INTRAVENOUS at 11:08

## 2024-08-14 RX ADMIN — ACETAMINOPHEN 1000 MG: 10 INJECTION, SOLUTION INTRAVENOUS at 12:08

## 2024-08-14 RX ADMIN — DEXAMETHASONE SODIUM PHOSPHATE 8 MG: 4 INJECTION, SOLUTION INTRA-ARTICULAR; INTRALESIONAL; INTRAMUSCULAR; INTRAVENOUS; SOFT TISSUE at 11:08

## 2024-08-14 NOTE — ANESTHESIA POSTPROCEDURE EVALUATION
Anesthesia Post Evaluation    Patient: Silvia Cotton    Procedure(s) Performed: Procedure(s) (LRB):  Left-sided TransLoc Sacroiliac Joint Fusion (Left)    Final Anesthesia Type: general      Patient location during evaluation: PACU  Patient participation: Yes- Able to Participate  Level of consciousness: awake and alert and oriented  Post-procedure vital signs: reviewed and stable  Pain management: adequate  Airway patency: patent    PONV status at discharge: No PONV  Anesthetic complications: no      Cardiovascular status: blood pressure returned to baseline, stable and hemodynamically stable  Respiratory status: unassisted  Hydration status: euvolemic  Follow-up not needed.              Vitals Value Taken Time   /58 08/14/24 1341   Temp 36.8 °C (98.2 °F) 08/14/24 1323   Pulse 89 08/14/24 1341   Resp 20 08/14/24 1341   SpO2 97 % 08/14/24 1341   Vitals shown include unfiled device data.      No case tracking events are documented in the log.      Pain/Lashawn Score: Pain Rating Prior to Med Admin: 5 (8/14/2024  1:38 PM)

## 2024-08-14 NOTE — OP NOTE
TransLoc Posterior Percutaneous Sacroiliac Joint Arthrodesis    Patient Name: Silvia Cotton  MRN: [id number]       INFORMED CONSENT: The procedure, risks, benefits and options were discussed with patient. There are no contraindications to the procedure. The patient expressed understanding and agreed to proceed. The personnel performing the procedure was discussed.     Date of procedure: 08/14/2024       Time-out taken to identify patient and procedure side prior to starting the procedure.                    Posterior Percutaneous Sacroiliac Joint Arthrodesis Under Fluroscopy     Surgeon: Ilene Ortiz MD     Assistant Physician: Sergey Haddad MD     Local Anesthetic:5ml of 0.5% bupivacaine with epinephrine and 5ml 1% lidocaine     EBL: 5 cc     Anesthesia: General     Procedure in Detail:  After risks and benefits were explained to the patient, an informed consent was obtained. A peripheral IV was started. The patient was brought to the operative suite with general endotracheal intubation/anesthesia performed. The patient was then placed in the prone position on the procedure table, taking care to ensure normal patient lordosis. Endotracheal tube was confirmed to be in satisfactory position with patients eyes free from pressure with a prone pillow. The patients arms were padded and tucked bilaterally with padding on elbows and wrists. Axillary rolls were placed bilaterally. The patient's lumbar and sacral region was prepped and draped in a surgical sterile fashion using alcohol, Hibiclens and betadine, followed by Ioban and a full sterile drape.    Time out was done.     An AP and the contralateral outlet view of the sacroiliac joint was found by finding the tilt that best allowed visualization of the sacral foramen, posterior superior iliac spine, anterior joint line, and posterior joint line. We then found an optimal contralateral outlet oblique view by lining up the Anterior and Posterior joint lines to look  directly up the joint line (superimposed view). In AP view, a line was drawn on the skin horizontally at the level of the S1 foramen with continuation of line directly lateral over the PSIS on the operative side.  Moving to an outlet contralateral oblique angle to the operative SI joint, the lateral edge of the PSIS was marked with a skin marker.  A spinal needle was then placed under fluoroscopic guidance at a 45 degree angle to reach the lateral border of the PSIS.  A 1.5 cm longitudinal incision  through the skin and subcutaneous tissue was centered on the line marking the S1 foramen, 1-1/2 cm lateral to the lateral edge of the PSIS with a 10 blade. Bovie cautery was used to ensure coagulation.    A T-handle introducer was then placed along the lateral aspect of the ilium at the level of S1 foramen, 1-1/2 cm anterolateral to the posterior aspect of the PSIS.  The introducer was then tapped 1 cm into place angling the trajectory of the introducer 10° cephalad to the top of the S1 foramen.  Before advancing the introducer, the C-arm was positioned in an inlet and lateral position to confirm the trajectory towards the sacral promontory.    C-arm was rotated back to a contralateral oblique view with the introducer advanced through the ilium across the SI joint to the sacrum.  Once across the joint, a safe trajectory was confirmed using a pelvic inlet view.  Then returned to a contralateral oblique view, and the introducer advanced until the graduated stop on the introducer touched the lateral aspect of the ilium.  The central trocar from the introducer was removed with a 12 in guidewire placed down the introducer, taking care to ensure the distal end of the guidewire stayed at least 1 cm from the anterior sacral cortex and at least 1 cm superior lateral to the S1 foramen.  This was confirmed in the AP, contralateral outlet, pelvic inlet views.    A Reamer was used over the guidewire to enlarge the screw hole and to  determine the correct screw length.  The reamer was advanced until the depth stop contacted of the lateral edge of the ileum.  Care was taken to manage the depth of the guidewires so it did not advanced during reaming.  Screw lengths of 45 mm were chosen for optimal fixation to not extend beyond the anterior sacral cortex or into the S1 foramen.     The 1st 45 mm x 8.5 mm screw(LOT [id number]) was firmly placed on the end of the T 30  shaft.  The screws carefully guided over the guidewire.  The screw was then advanced past the SI joint by rotating the T 30  shaft clockwise.  The guidewire was then removed.  The screw was then advanced/ratcheted until the screw head was flushed with the lateral edge of the ileum.  Optimal position was confirmed in the contralateral outlet view, pelvic inlet view and lateral views.  The  shaft was then wiggled loose from the screw.    A 2nd screw was then added superior in the same manner as was done for the 1st screw through the same incision.  The incision was slightly elongated laterally.    The deeper tissue was closed with two interrupted 2-0 Vicryl CT1 pop offs and then a 4-0 monocryl suture was then used a a running subcuticular fashion to close the incision.  Dermabond with steristrips were placed over the incision and a pressure dressing using gauze and tegaderm was placed over the incision.      The patient was monitored for approximately 30 minutes after the procedure.  Patient was given post procedure and discharge instructions to follow at home.  The patient was discharged in a stable condition. The patient will follow up in 1 week in clinic.

## 2024-08-14 NOTE — DISCHARGE INSTRUCTIONS
Wound care:     Leave dressing in place, if sutures are in place leave steri-strips alone until follow up. If steri strips fall off prior to clinic it is okay.     No soaking in a bath or swimming until after follow up. Sponge bath to front is ok.        Care Tips:  Take your pain medication as needed.     Take over the counter stool softener while taking pain medication to prevent constipation.      If no bowel movement in 2 days take miralax twice a day.     Ok for light activity (light housework, walking, stair climbing) no strenuous exercise until or physical therapy until after follow up.     No lifting greater than 10 pounds until after follow up.     No heat no ice to incision site.     When to call the Doctor:  Please call my office if experienced any weakness or loss of sensation, fever > 101.5, pain uncontrolled with oral medications, persistent nausea/vomiting/or diarrhea, redness or drainage from the incisions, or any other worrisome concerns. If physician on call was not reached or could not communicate with our office for any reason please go to the nearest emergency department.

## 2024-08-14 NOTE — ANESTHESIA PROCEDURE NOTES
Intubation    Date/Time: 8/14/2024 11:19 AM    Performed by: Tyrone Doty CRNA  Authorized by: Dawna Suarez MD    Intubation:     Induction:  Intravenous    Intubated:  Postinduction    Mask Ventilation:  Easy mask    Attempts:  1    Attempted By:  CRNA    Method of Intubation:  Video laryngoscopy    Blade:  Henriquez 3    Laryngeal View Grade: Grade I - full view of cords      Difficult Airway Encountered?: No      Complications:  None    Airway Device:  Oral endotracheal tube    Airway Device Size:  7.5    Style/Cuff Inflation:  Cuffed (inflated to minimal occlusive pressure)    Inflation Amount (mL):  7    Tube secured:  20    Secured at:  The lips    Placement Verified By:  Capnometry    Complicating Factors:  None    Findings Post-Intubation:  BS equal bilateral and atraumatic/condition of teeth unchanged

## 2024-08-14 NOTE — DISCHARGE SUMMARY
"Discharge Note  Short Stay      SUMMARY     Admit Date: 8/14/2024    Attending Physician: Ilene Ortzi MD        Discharge Physician: Ilene Ortiz MD        Discharge Date: 8/14/2024 3:00 PM    Procedure(s) (LRB):  Left-sided TransLoc Sacroiliac Joint Fusion (Left)    Final Diagnosis: Sacroiliitis [M46.1]  Sacroiliac joint pain [M53.3]  Arthritis of left sacroiliac joint [M47.818]    Disposition: Home or self care    Patient Instructions:   Discharge Medication List as of 8/14/2024  1:51 PM        START taking these medications    Details   cephALEXin (KEFLEX) 500 MG capsule Take 1 capsule (500 mg total) by mouth every 8 (eight) hours. for 3 days, Starting Wed 8/14/2024, Until Sat 8/17/2024, Normal      doxycycline (VIBRAMYCIN) 100 MG Cap Take 1 capsule (100 mg total) by mouth every 12 (twelve) hours. for 3 days, Starting Wed 8/14/2024, Until Sat 8/17/2024, Normal      oxyCODONE-acetaminophen (PERCOCET) 5-325 mg per tablet Take 1 tablet by mouth every 6 (six) hours as needed for Pain., Starting Wed 8/14/2024, Until Wed 8/21/2024 at 2359, Normal           CONTINUE these medications which have NOT CHANGED    Details   atenoloL (TENORMIN) 25 MG tablet TAKE 1 TABLET TWICE DAILY, Normal      amitriptyline (ELAVIL) 50 MG tablet TAKE 1 TABLET EVERY EVENING, Starting Fri 8/9/2024, Normal      cyanocobalamin 1,000 mcg/mL injection Inject 1 mL (1,000 mcg total) into the muscle once a week., Starting Tue 8/13/2024, Normal      diazePAM (VALIUM) 5 MG tablet Take 1 tablet (5 mg total) by mouth once. for 1 dose, Starting Wed 7/10/2024, Normal      ferrous gluconate (FERGON) 324 MG tablet Take 324 mg by mouth 2 (two) times a day., Historical Med      gabapentin (NEURONTIN) 100 MG capsule Take 1 capsule (100 mg total) by mouth 3 (three) times daily., Starting Mon 2/12/2024, Until Tue 2/11/2025, Normal      needle, disp, 18 G 18 x 1 " Ndle Use to draw b12 1000 mcg from [place] every 7 days for 7 weeks, Normal      needle, reusable, " "25 G 25 x 1 " Ndle Use to inject B12 1000 mcg every 7 days for 7 weeks, Normal      omega-3 fatty acids/fish oil (FISH OIL-OMEGA-3 FATTY ACIDS) 300-1,000 mg capsule Take by mouth once daily., Historical Med      omeprazole (PRILOSEC) 40 MG capsule TAKE 1 CAPSULE EVERY DAY, Starting Sun 4/7/2024, Normal      ondansetron (ZOFRAN-ODT) 4 MG TbDL Take 1 tablet (4 mg total) by mouth every 6 (six) hours as needed (nausea)., Starting Tue 2/13/2024, Normal      rosuvastatin (CRESTOR) 20 MG tablet TAKE 1 TABLET EVERY DAY, Starting Mon 5/27/2024, Normal      tiZANidine (ZANAFLEX) 4 MG tablet TAKE 1 TABLET TWICE DAILY, Normal      traZODone (DESYREL) 100 MG tablet TAKE 1 TABLET (100 MG TOTAL) BY MOUTH EVERY EVENING., Starting Mon 5/27/2024, Until Thu 5/22/2025, Normal      venlafaxine (EFFEXOR-XR) 75 MG 24 hr capsule TAKE 1 CAPSULE EVERY DAY, Starting Mon 5/27/2024, Normal                 Discharge Diagnosis: Sacroiliitis [M46.1]  Sacroiliac joint pain [M53.3]  Arthritis of left sacroiliac joint [M47.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  Activity: as per instruction sheet.  Discharge to: Home with a responsible adult.  Follow up: 2-4 weeks       Please call the office at [phone] if you experience any weakness or loss of sensation, fever > 101.5, pain uncontrolled with oral medications, persistent nausea/vomiting/or diarrhea, redness or drainage from the incisions, or any other worrisome concerns. If physician on call was not reached or could not communicate with our office for any reason please go to the nearest emergency department        "

## 2024-08-14 NOTE — TRANSFER OF CARE
Anesthesia Transfer of Care Note    Patient: Silvia Cotton    Procedure(s) Performed: Procedure(s) (LRB):  Left-sided TransLoc Sacroiliac Joint Fusion (Left)    Patient location: PACU    Anesthesia Type: general    Transport from OR: Transported from OR on room air with adequate spontaneous ventilation    Post pain: adequate analgesia    Post assessment: no apparent anesthetic complications    Post vital signs: stable    Level of consciousness: sedated and responds to stimulation    Nausea/Vomiting: no nausea/vomiting    Complications: none    Transfer of care protocol was followed      Last vitals: Visit Vitals  BP (!) 154/73 (BP Location: Right arm, Patient Position: Sitting)   Pulse 81   Temp 36.4 °C (97.6 °F)   Resp 17   Wt 120 kg (264 lb 8.8 oz)   SpO2 98%   Breastfeeding No   BMI 39.07 kg/m²

## 2024-08-15 ENCOUNTER — DOCUMENTATION ONLY (OUTPATIENT)
Dept: PAIN MEDICINE | Facility: HOSPITAL | Age: 69
End: 2024-08-15
Payer: MEDICARE

## 2024-08-15 NOTE — PROGRESS NOTES
"Spoke with patient to discuss dressing changes and blood soaked postoperative dressing. Patient replaced L sided SI dressing this morning and currently reports only a " nickel size" amount of blood. Reports procedural soreness but overall doing well. Denies severe pain, F/C.     Ilene Ortiz MD        "

## 2024-08-15 NOTE — TELEPHONE ENCOUNTER
Pt is post-op left-sided SI joint fusion today. Pt has a gauze dressing at the incision site. There are also two stitches. Pt states that gauze is now saturated with blood. She denies any other changes at this time. Pt sends a picture via [facility] portal. OCP is called for further guidance.    Dr. Haddad advises that pt change her dressing using gauze and tape and wipe with alcohol. If it continues to bleed throughout the night into tomorrow, he advises to contact the clinic in the am. Pt verbalizes understanding and is instructed to call back with any new/worsening sxs, questions, or concerns.   Reason for Disposition   Dressing soaked with blood or body fluid (e.g., drainage)    Additional Information   Negative: [1] Major abdominal surgical incision AND [2] wound gaping open AND [3] visible internal organs   Negative: Sounds like a life-threatening emergency to the triager   Negative: [1] Bleeding from incision AND [2] won't stop after 10 minutes of direct pressure   Negative: [1] Bleeding (more than a few drops) from incision AND [2] tracheostomy or blood vessel surgery (e.g., carotid endarterectomy, femoral bypass graft, kidney dialysis fistula)   Negative: [1] Widespread rash AND [2] bright red, sunburn-like   Negative: Severe pain in the incision   Negative: [1] Incision gaping open AND [2] < 48 hours since wound re-opened   Negative: [1] Incision gaping open AND [2] length of opening > 2 inches (5 cm)   Negative: Patient sounds very sick or weak to the triager   Negative: Sounds like a serious complication to the triager   Negative: Fever > 100.4 F (38.0 C)   Negative: [1] Incision looks infected (e.g., spreading redness, pus) AND [2] fever > 99.5 F (37.5 C)   Negative: [1] Incision looks infected (e.g., spreading redness, pus) AND [2] large red area (> 2 in. or 5 cm)   Negative: [1] Incision looks infected (e.g., spreading redness, pus) AND [2] face wound   Negative: [1] Red streak runs from the  incision AND [2] longer than 1 inch (2.5 cm)   Negative: [1] Pus or bad-smelling fluid draining from incision AND [2] no fever   Negative: [1] Post-op pain AND [2] not controlled with pain medications    Protocols used: Post-Op Incision Symptoms and [id number]-N-CG

## 2024-08-20 NOTE — PROGRESS NOTES
Established Patient Interventional Pain Note     The patient location is: home  The chief complaint leading to consultation is: L SIJ pain  Visit type: Virtual visit with synchronous audio and video  Total time spent with patient: 11-15m  Each patient to whom he or she provides medical services by telemedicine is: (1) informed of the relationship between the physician and patient and the respective role of any other health care provider with respect to management of the patient; and (2) notified that he or she may decline to receive medical services by telemedicine and may withdraw from such care at any time.        Referring Physician: No ref. provider found    PCP: Stephani Valdivia MD    Chief Complaint:   Left-sided sacroiliac joint pain       SUBJECTIVE:  Interval History 08/21/2024  Patient presents status post left-sided sacroiliac joint fusion 08/14/2024.       Patient reports mi procedural soreness overlying left buttock.  Pain today is rated a 5/10.  She denies any drainage, purulence at the incision site.  With dressing change this morning, postoperatively placed Steri-Strips were removed.  She denies any fevers, chills.  Patient is interested in pursuing right-sided sacroiliac joint fusion.  Today she reports pain overlying the right sacroiliac joint.  Pain today is rated a 6/10.  Pain can be exacerbated with standing and with ambulation and positional changes.  She denies radicular symptoms in the lower extremities, lower extremity weakness.  She has continued physician directed physical therapy exercises for sacroiliac joint pain over the last 8 weeks from 06/21/2024 through 08/21/2024.    Interval History 07/31/2024  Patient presents status post left-sided diagnostic SI joint injection 07/26/2024 and left-sided diagnostic SI joint injection 05/10/2024 and for MRI sacroiliac joint review. Patient reports 100% relief following 2 diagnostic left-sided sacroiliac joint injections.   Today she again reports  pain overlying the left-sided sacroiliac joint.  Pain today is rated an 8/10 and is constant.  Pain is exacerbated with positional changes, moving from sitting to standing and with standing and ambulation.  Her pain does significantly impact activities of daily living and quality of life.  She has continued physician directed physical therapy exercises for bilateral sacroiliac joint pain over the last 8 weeks from 05/03/2024 through 07/31/2024 with marginal improvement in her pain.  Patient would like to pursue sacroiliac joint fusion.    Interval history 07/09/2024  Patient presents for follow-up for left-sided sacroiliac joint pain.  Of note patient has insurance, [employer], denied sacroiliac fusion.  Today she again reports pain overlying the left-sided SI joint.  Pain today is rated an 8/10.  Pain is exacerbated with positional changes, moving from sitting to standing, standing and walking.  Pain interferes with activities of daily living.  She is continued physician directed physical therapy exercises for sacroiliac joint pain over the last 8 weeks from 05/09/2024 through 07/09/2024 with marginal improvement in her symptoms.  Of note patient received 80% relief with prior therapeutic and diagnostic sacroiliac joint injection.    Interval History (06/24/2024):  Patient Silvia Cotton presents today for follow-up visit.  Patient was last seen on 6/14/2024. She is scheduled for left sided sacral RFA in July. For the past 3 weeks she has had right sided pain over the lower back/buttock for the past 3 weeks. No new injury. Pain is a 8/10. Pain is worse with crossing her legs or getting in and out of bed/car. Pain does not radiate to the legs.   Patient denies night fever/night sweats, urinary incontinence, bowel incontinence, significant weight loss and significant motor weakness.   Patient denies any other complaints or concerns at this time.    Interval History (6/14/2024):  Patient Silvia Cotton presents today  for follow-up visit.  Patient was last seen on 5/10/2024 for left SIJ injection #2 with 80% relief sustained. She will still have some intermittent left sided buttock pain but overall she is doing much better. No radiculopathy into the lower extremities.   She rates her pain today a 1/10. When the pain is severe, prior to procedure, pain would go up to a 10/10.   Patient denies night fever/night sweats, urinary incontinence, bowel incontinence, significant weight loss and significant motor weakness.   Patient denies any other complaints or concerns at this time.    Interval history 04/10/2024  Patient presents status post left-sided sacroiliac joint injection 02/27/2024.  Patient reports approximately 100% relief lasting 1 week in duration which has now subsided to approximately 60% sustained relief.  She does report pain can arise with increased activity.  When pain is present it is described as sharp and stabbing in nature overlying the left sacroiliac joint.  Pain can be exacerbated with positional change, moving from sitting to standing and with standing and ambulation.  She is continued physician directed physical therapy exercises over the last 8 weeks from 02/10/2024 through 04/10/2024 with marginal improvement in her pain.  Today she denies right-sided pain.  She denies bilateral lower extremity radiculopathy, lower extremity weakness, bowel or bladder incontinence or saddle anesthesia.    HPI 02/15/2024  Silvia Cotton is a 68 y.o. female with past medical history significant for hyperlipidemia, stage 3 chronic kidney disease, GERD who presents to the clinic for the evaluation of left-sided sacroiliac joint pain, previous Dr. Galvez patient.  Today she reports pain has been present for over 5 years without inciting accident injury or trauma.  Pain is constant and today is rated a 5/10.  Pain is described as sharp and stabbing in nature.  Patient reports pain overlying the left sacroiliac joint.  Pain is  exacerbated when moving from sitting to standing and with standing and ambulation.  She reports she is only able to ambulate approximately 5-10 minutes when pain is severe.  Pain is improved with topical application of heat and ice as well as prior left-sided sacroiliac joint injection.  She reports she received greater than 80% relief lasting several years in duration.  Patient has completed conventional land-based physical therapy in the past at [facility] in [place].  She has continued physician directed physical therapy exercises at home over the last 8 weeks from 12/15/2023 through 02/15/2024 with marginal improvement in her symptoms.  Today she denies any right-sided pain, lower extremity radiculopathy, lower extremity weakness or bowel or bladder incontinence.    Patient denies night fever/night sweats, urinary incontinence, bowel incontinence, significant weight loss, significant motor weakness, and loss of sensations.      Pain Disability Index Review:         8/21/2024     7:28 AM 6/14/2024    12:14 PM 2/15/2024     1:20 PM   Last 3 PDI Scores   Pain Disability Index (PDI) 35 15 30       Non-Pharmacologic Treatments:  Physical Therapy/Home Exercise: yes  Ice/Heat:yes  TENS: no  Acupuncture: no  Massage: no  Chiropractic: no    Other: no      Pain Medications:  - Adjuvant Medications: Elavil (Amitriptyline), Neurontin (Gabapentin), Trazodone (Desyrel), Venlafaxine (Effexor), and Zanaflex ( Tizanidine)    Pain Procedures:   Dr. Ortiz:  -02/27/2024: Left-sided sacroiliac joint injection with 100% relief x 1 week then 60% relief  -05/10/2024: Left SIJ injection #2 with 80% relief sustained (DIAGNOSTIC)  -07/26/2024: Left-sided SI joint injection (DIAGNOSTIC)  -08/14/2024: Left-sided sacroiliac joint fusion      Dr. Galvez:  -01/15/2019: Left-sided sacroiliac joint injection    Past Medical History:   Diagnosis Date    Anemia     GERD (gastroesophageal reflux disease)     Hyperlipidemia     Hypertension     PONV  (postoperative nausea and vomiting)     Supraventricular tachycardia     Supraventricular tachycardia 8/8/2024     Past Surgical History:   Procedure Laterality Date    APPENDECTOMY      CARPAL TUNNEL RELEASE Right 11/30/2023    Procedure: RELEASE, CARPAL TUNNEL;  Surgeon: Kiran Finley MD;  Location: [facility] OR;  Service: Orthopedics;  Laterality: Right;  right carpal tunnel release    CARPAL TUNNEL RELEASE Left 12/28/2023    Procedure: RELEASE, CARPAL TUNNEL;  Surgeon: Kiran Finley MD;  Location: [facility] OR;  Service: Orthopedics;  Laterality: Left;    CHOLECYSTECTOMY      COLONOSCOPY N/A 01/13/2017    Procedure: COLONOSCOPY;  Surgeon: Taj Nicholas MD;  Location: [facility] ENDO;  Service: Endoscopy;  Laterality: N/A;    COLONOSCOPY N/A 03/10/2022    Procedure: COLONOSCOPY;  Surgeon: Bee Brock MD;  Location: [facility] ENDO;  Service: Endoscopy;  Laterality: N/A;    FUSION OF SACROILIAC JOINT Left 8/14/2024    Procedure: FUSION, SACROILIAC JOINT;  Surgeon: Ilene Ortiz MD;  Location: [facility] OR;  Service: Pain Management;  Laterality: Left;  Posterior Percutaneous Sacroiliac Joint Arthrodesis Under Fluroscopy    INJECTION OF ANESTHETIC AGENT INTO SACROILIAC JOINT Left 2/27/2024    Procedure: Left SIJ Injection with local;  Surgeon: Ilene Ortiz MD;  Location: [facility] PAIN MGT;  Service: Pain Management;  Laterality: Left;    INJECTION OF ANESTHETIC AGENT INTO SACROILIAC JOINT Left 5/10/2024    Procedure: Left SIJ Injection (diagnostic)-Injection #2;  Surgeon: Ilene Ortiz MD;  Location: [facility] PAIN MGT;  Service: Pain Management;  Laterality: Left;    INJECTION OF ANESTHETIC AGENT INTO SACROILIAC JOINT Left 7/26/2024    Procedure: Left SIJ Injection with local (DIAGNOSTIC< NO STEROID);  Surgeon: Ilene Ortiz MD;  Location: [facility] PAIN MGT;  Service: Pain Management;  Laterality: Left;    KNEE SURGERY Left 2017    SHOULDER ARTHROSCOPY      Tonsilectomy      TONSILLECTOMY      Uterine Ablation    "    Review of patient's allergies indicates:  No Known Allergies    Current Outpatient Medications   Medication Sig    amitriptyline (ELAVIL) 50 MG tablet TAKE 1 TABLET EVERY EVENING    atenoloL (TENORMIN) 25 MG tablet TAKE 1 TABLET TWICE DAILY    cyanocobalamin 1,000 mcg/mL injection Inject 1 mL (1,000 mcg total) into the muscle once a week.    ferrous gluconate (FERGON) 324 MG tablet Take 324 mg by mouth 2 (two) times a day.    gabapentin (NEURONTIN) 100 MG capsule Take 1 capsule (100 mg total) by mouth 3 (three) times daily.    needle, disp, 18 G 18 x 1 " Ndle Use to draw b12 1000 mcg from vail every 7 days for 7 weeks    needle, reusable, 25 G 25 x 1 " Ndle Use to inject B12 1000 mcg every 7 days for 7 weeks    omega-3 fatty acids/fish oil (FISH OIL-OMEGA-3 FATTY ACIDS) 300-1,000 mg capsule Take by mouth once daily.    omeprazole (PRILOSEC) 40 MG capsule TAKE 1 CAPSULE EVERY DAY    ondansetron (ZOFRAN-ODT) 4 MG TbDL Take 1 tablet (4 mg total) by mouth every 6 (six) hours as needed (nausea).    oxyCODONE-acetaminophen (PERCOCET) 5-325 mg per tablet Take 1 tablet by mouth every 6 (six) hours as needed for Pain.    rosuvastatin (CRESTOR) 20 MG tablet TAKE 1 TABLET EVERY DAY    tiZANidine (ZANAFLEX) 4 MG tablet TAKE 1 TABLET TWICE DAILY    traZODone (DESYREL) 100 MG tablet TAKE 1 TABLET (100 MG TOTAL) BY MOUTH EVERY EVENING.    venlafaxine (EFFEXOR-XR) 75 MG 24 hr capsule TAKE 1 CAPSULE EVERY DAY    diazePAM (VALIUM) 5 MG tablet Take 1 tablet (5 mg total) by mouth once. for 1 dose     No current facility-administered medications for this visit.     Facility-Administered Medications Ordered in Other Visits   Medication    chlorhexidine 0.12 % solution 10 mL       Review of Systems     GENERAL:  No weight loss, malaise or fevers.  HEENT:   No recent changes in vision or hearing  NECK:  Negative for lumps, no difficulty with swallowing.  RESPIRATORY:  Negative for cough, wheezing or shortness of breath, patient denies " "any recent URI.  CARDIOVASCULAR:  Negative for chest pain or palpitations.  GI:  Negative for abdominal discomfort, blood in stools or black stools or change in bowel habits.  MUSCULOSKELETAL:  See HPI.  SKIN:  Negative for lesions, rash, and itching.  PSYCH:  No mood disorder or recent psychosocial stressors.   HEMATOLOGY/LYMPHOLOGY:  Negative for prolonged bleeding, bruising easily or swollen nodes.    NEURO:   No history of syncope, paralysis, seizures or tremors.  All other reviewed and negative other than HPI.    OBJECTIVE:    /81   Pulse (P) 74   Resp 17   Ht 5' 9" (1.753 m)   Wt 121 kg (266 lb 12.1 oz)   BMI 39.39 kg/m²       Physical Exam    GENERAL: Well appearing, in no acute distress, alert and oriented x3.  PSYCH:  Mood and affect appropriate.  SKIN: Skin color, texture, turgor normal, no rashes or lesions.  HEAD/FACE:  Normocephalic, atraumatic. Cranial nerves grossly intact.    CV: RRR with palpation of the radial artery.  PULM: No evidence of respiratory difficulty, symmetric chest rise.  GI:  Soft and non-tender.    BACK: Straight leg raising in the sitting and supine positions is negative to radicular pain. No pain to palpation over the facet joints of the lumbar spine or spinous processes. Normal range of motion without pain reproduction.  EXTREMITIES: Peripheral joint ROM is full and pain free without obvious instability or laxity in all four extremities. No deformities, edema, or skin discoloration. Good capillary refill.  MUSCULOSKELETAL: Able to stand on heels & toes.   hip, and knee provocative maneuvers are negative.    SIJ testing: RIGHT  - TTP over SI joint: Present   - Sepideh's/ Uriel's: Positive    - Sacroiliac Distraction Test (anterior pressure): Positive  - Sacroiliac Compression Test (lateral pressure): Positive   - SacralThrust Test (posterior pressure): Positive    Facet loading test is negative bilaterally.   Bilateral upper and lower extremity strength is normal and " symmetric.  No atrophy or tone abnormalities are noted.    RIGHT Lower extremity: Hip flexion 5/5, Hip Abduction 5/5, Hip Adduction 5/5, Knee extension 5/5, Knee flexion 5/5, Ankle dorsiflexion5/5, Extensor hallucis longus 5/5, Ankle plantarflexion 5/5  LEFT Lower extremity:  Hip flexion 5/5, Hip Abduction 5/5,Hip Adduction 5/5, Knee extension 5/5, Knee flexion 5/5, Ankle dorsiflexion 5/5, Extensor hallucis longus 5/5, Ankle plantarflexion 5/5  -Normal testing knee (patellar) jerk and ankle (achilles) jerk    NEURO: Bilateral upper and lower extremity coordination and muscle stretch reflexes are physiologic and symmetric. No loss of sensation is noted.  GAIT: normal.    Imagin/10/19    X-Ray Lumbar Spine AP And Lateral  FINDINGS:  No scoliosis.  No acute fracture.  No listhesis.  Mild degenerative changes with spurring of the endplates and facet arthropathy is noted.  Degenerative changes of the sacroiliac joints are also noted.      MRI sacroiliac joint 2024  FINDINGS:  Bones: No fracture, marrow edema or suspicious osseous lesion identified.  L4-5 and L5-S1 degenerative disc disease and facet arthropathy.     Sacroiliac joints: Alignment normal.  Minimal bilateral sacroiliac osteoarthritis.  Small T2 hyperintense nutrient vessel left ilium along the inferior sacroiliac joint.  No significant abnormal subchondral marrow signal.  No erosions, subchondral marrow edema, ankylosis or other evidence of sacroiliitis.     Muscles and tendons: Within normal limits.     Miscellaneous: Mild right hydrosalpinx without surrounding inflammation.        Impression:     Minimal sacroiliac osteoarthritis without evidence of sacroiliitis.     L4-5 and L5-S1 degenerative disc disease and facet arthropathy.     Mild right hydrosalpinx without surrounding inflammation.         ASSESSMENT: 68 y.o. year old female with     1. Sacroiliitis  Case Request-RAD/Other Procedure Area: Right SIJ Injection #1 diagnostic      2.  Sacroiliac joint pain  Case Request-RAD/Other Procedure Area: Right SIJ Injection #1 diagnostic      3. SI joint arthritis            PLAN:   - Interventions:  Work up for right-sided TransLoc fusion - lateral oblique approach for fixation. -Schedule for right-sided diagnostic sacroiliac joint injection to see if this helps with sacroiliac joint pain and dysfunction. Explained the risks and benefits of the procedure in detail with the patient today in clinic along with alternative treatment options, and the patient elected to pursue the intervention.    -MRI SIJ: 07/19/2024    - Anticoagulation use: No no anticoagulation    Procedure:  Band-Aid removed overlying left-sided sacroiliac joint.  Incision clean/dry/intact with wound edges approximated.  Wound cleaned with alcohol wipe and for overlying Steri-Strips placed.  Discussion regarding wound care.  Patient instructed to avoid submerging in pool, Jacuzzi, [place] bodies of water for another 4 weeks.  Postoperative SI joint fusion instructions reviewed.     report:  Reviewed and consistent with medication use as prescribed.    - Medications:  -Continue Gabapentin 100 mg TID per Dr. Valdivia.      -Continue Tizanidine. We have discussed potential deleterious side effects associated with this medication including  dizziness, drowsiness, dry mouth or tingling sensation in the upper or lower extremities.     - Therapy:   We discussed continuing physical therapy to help manage the patient/s painful condition. The patient was counseled that muscle strengthening will improve the long term prognosis in regards to pain and may also help increase range of motion and mobility. A referral for EXT physical therapy: [facility] in [place] was provided to the patient.    - Imaging: Reviewed available imaging (x-ray lumbar spine, MRI sacroiliac joints) with patient and answered any questions they had regarding study.     - Follow up visit:  Set 24 hour reminder call following 1st  diagnostic right-sided SI joint injection      The above plan and management options were discussed at length with patient. Patient is in agreement with the above and verbalized understanding.    - I discussed the goals of interventional chronic pain management with the patient on today's visit. We discussed a multimodal and systematic approach to pain.  This includes diagnostic and therapeutic injections, adjuvant pharmacologic treatment, physical therapy, and at times psychiatry.  I emphasized the importance of regular exercise, core strengthening and stretching, diet and weight loss as a cornerstone of long-term pain management.    - This condition does not require this patient to take time off of work, and the primary goal of our Pain Management services is to improve the patient's functional capacity.  - Patient Questions: Answered all of the patient's questions regarding diagnoses, therapy, treatment and next steps    Visit today included increased complexity associated with the care of the episodic problem of chronic pain which was addressed and continue to manage the longitudinal care of the patient due to the serious and/or complex managed problem(s) listed above.        Ilene Ortiz MD  Interventional Pain Management  [facility]    Disclaimer:  This note was prepared using voice recognition system and is likely to have sound alike errors that may have been overlooked even after proof reading.  Please call me with any questions

## 2024-08-21 ENCOUNTER — OFFICE VISIT (OUTPATIENT)
Dept: PAIN MEDICINE | Facility: CLINIC | Age: 69
End: 2024-08-21
Payer: MEDICARE

## 2024-08-21 VITALS
DIASTOLIC BLOOD PRESSURE: 81 MMHG | SYSTOLIC BLOOD PRESSURE: 123 MMHG | HEIGHT: 69 IN | WEIGHT: 266.75 LBS | BODY MASS INDEX: 39.51 KG/M2 | RESPIRATION RATE: 17 BRPM

## 2024-08-21 DIAGNOSIS — M46.1 SACROILIITIS: Primary | ICD-10-CM

## 2024-08-21 DIAGNOSIS — M47.818 SI JOINT ARTHRITIS: ICD-10-CM

## 2024-08-21 DIAGNOSIS — M53.3 SACROILIAC JOINT PAIN: ICD-10-CM

## 2024-08-21 PROCEDURE — 99214 OFFICE O/P EST MOD 30 MIN: CPT | Mod: HCNC,S$GLB,, | Performed by: ANESTHESIOLOGY

## 2024-08-21 PROCEDURE — 1160F RVW MEDS BY RX/DR IN RCRD: CPT | Mod: HCNC,CPTII,S$GLB, | Performed by: ANESTHESIOLOGY

## 2024-08-21 PROCEDURE — 3044F HG A1C LEVEL LT 7.0%: CPT | Mod: HCNC,CPTII,S$GLB, | Performed by: ANESTHESIOLOGY

## 2024-08-21 PROCEDURE — 1159F MED LIST DOCD IN RCRD: CPT | Mod: HCNC,CPTII,S$GLB, | Performed by: ANESTHESIOLOGY

## 2024-08-21 PROCEDURE — 3074F SYST BP LT 130 MM HG: CPT | Mod: HCNC,CPTII,S$GLB, | Performed by: ANESTHESIOLOGY

## 2024-08-21 PROCEDURE — 3079F DIAST BP 80-89 MM HG: CPT | Mod: HCNC,CPTII,S$GLB, | Performed by: ANESTHESIOLOGY

## 2024-08-21 PROCEDURE — 3008F BODY MASS INDEX DOCD: CPT | Mod: HCNC,CPTII,S$GLB, | Performed by: ANESTHESIOLOGY

## 2024-08-21 PROCEDURE — 99999 PR PBB SHADOW E&M-EST. PATIENT-LVL IV: CPT | Mod: PBBFAC,HCNC,, | Performed by: ANESTHESIOLOGY

## 2024-08-21 PROCEDURE — 1125F AMNT PAIN NOTED PAIN PRSNT: CPT | Mod: HCNC,CPTII,S$GLB, | Performed by: ANESTHESIOLOGY

## 2024-08-27 NOTE — PRE-PROCEDURE INSTRUCTIONS
Spoke with patient regarding procedure scheduled on 9.11    Arrival time 1030     Has patient been sick with fever or on antibiotics within the last 7 days? No     Does the patient have any open wounds, sores or rashes? No     Does the patient have any recent fractures? no     Has patient received a vaccination within the last 7 days? No     Received the COVID vaccination? yes     Has the patient stopped all medications as directed? na     Does patient have a pacemaker, defibrillator, or implantable stimulator? No     Does the patient have a ride to and from procedure and someone reliable to remain with patient?        Is the patient diabetic? no     Does the patient have sleep apnea? Or use O2 at home? no     Is the patient receiving sedation? Yes      Is the patient instructed to remain NPO beginning at midnight the night before their procedure? yes     Procedure location confirmed with patient? Yes     Covid- Denies signs/symptoms. Instructed to notify PAT/MD if any changes.

## 2024-09-13 ENCOUNTER — PATIENT MESSAGE (OUTPATIENT)
Dept: PAIN MEDICINE | Facility: CLINIC | Age: 69
End: 2024-09-13
Payer: MEDICARE

## 2024-09-19 ENCOUNTER — PATIENT MESSAGE (OUTPATIENT)
Dept: ADMINISTRATIVE | Facility: CLINIC | Age: 69
End: 2024-09-19
Payer: MEDICARE

## 2024-09-23 ENCOUNTER — TELEPHONE (OUTPATIENT)
Dept: ADMINISTRATIVE | Facility: CLINIC | Age: 69
End: 2024-09-23
Payer: MEDICARE

## 2024-09-25 ENCOUNTER — OFFICE VISIT (OUTPATIENT)
Dept: PAIN MEDICINE | Facility: CLINIC | Age: 69
End: 2024-09-25
Payer: MEDICARE

## 2024-09-25 ENCOUNTER — OFFICE VISIT (OUTPATIENT)
Dept: FAMILY MEDICINE | Facility: CLINIC | Age: 69
End: 2024-09-25
Payer: MEDICARE

## 2024-09-25 VITALS — BODY MASS INDEX: 39.4 KG/M2 | WEIGHT: 266 LBS | HEIGHT: 69 IN

## 2024-09-25 DIAGNOSIS — M46.1 SACROILIITIS: ICD-10-CM

## 2024-09-25 DIAGNOSIS — G56.00 CARPAL TUNNEL SYNDROME, UNSPECIFIED LATERALITY: ICD-10-CM

## 2024-09-25 DIAGNOSIS — E66.01 SEVERE OBESITY (BMI 35.0-39.9) WITH COMORBIDITY: ICD-10-CM

## 2024-09-25 DIAGNOSIS — F39 MOOD DISORDER: ICD-10-CM

## 2024-09-25 DIAGNOSIS — F51.04 PSYCHOPHYSIOLOGICAL INSOMNIA: ICD-10-CM

## 2024-09-25 DIAGNOSIS — K21.9 GASTROESOPHAGEAL REFLUX DISEASE WITHOUT ESOPHAGITIS: ICD-10-CM

## 2024-09-25 DIAGNOSIS — Z00.00 ENCOUNTER FOR PREVENTIVE HEALTH EXAMINATION: Primary | ICD-10-CM

## 2024-09-25 DIAGNOSIS — E53.8 B12 DEFICIENCY: ICD-10-CM

## 2024-09-25 DIAGNOSIS — D50.0 IRON DEFICIENCY ANEMIA DUE TO CHRONIC BLOOD LOSS: ICD-10-CM

## 2024-09-25 DIAGNOSIS — Z00.00 ENCOUNTER FOR MEDICARE ANNUAL WELLNESS EXAM: ICD-10-CM

## 2024-09-25 DIAGNOSIS — M47.818 SI JOINT ARTHRITIS: ICD-10-CM

## 2024-09-25 DIAGNOSIS — I47.10 SUPRAVENTRICULAR TACHYCARDIA: ICD-10-CM

## 2024-09-25 DIAGNOSIS — N18.31 CHRONIC KIDNEY DISEASE, STAGE 3A: ICD-10-CM

## 2024-09-25 DIAGNOSIS — M53.3 SACROILIAC JOINT PAIN: ICD-10-CM

## 2024-09-25 DIAGNOSIS — R00.2 PALPITATIONS: Chronic | ICD-10-CM

## 2024-09-25 DIAGNOSIS — M46.1 SACROILIITIS: Primary | ICD-10-CM

## 2024-09-25 DIAGNOSIS — E78.2 MIXED HYPERLIPIDEMIA: ICD-10-CM

## 2024-09-25 NOTE — H&P (VIEW-ONLY)
Established Patient Interventional Pain Note     The patient location is: home  The chief complaint leading to consultation is: L SIJ pain  Visit type: Virtual visit with synchronous audio and video  Total time spent with patient: 11-15m  Each patient to whom he or she provides medical services by telemedicine is: (1) informed of the relationship between the physician and patient and the respective role of any other health care provider with respect to management of the patient; and (2) notified that he or she may decline to receive medical services by telemedicine and may withdraw from such care at any time.        Referring Physician: No ref. provider found    PCP: Stephani Valdivia MD    Chief Complaint:   Left-sided sacroiliac joint pain       SUBJECTIVE:  Interval history 09/25/2024  Patient presents status post left-sided sacroiliac TransLoc fusion 08/14/2024.  Patient reports 100% relief in left-sided sacroiliac joint pain following her procedure.  She reports mild residual soreness on the left side with over exertion.  Patient would like to pursue this procedure on the right side.  Today she reports pain overlying the right sacroiliac joint.  Pain is constant and today is rated a 6/10.  Pain can be exacerbated with moving from sitting to a standing position prolonged sitting or standing.  She denies more distal radiculopathy into the lower extremities or feet.  She has continued physician directed physical therapy exercises for bilateral SI joint pain over the last 8 weeks from 07/25/2024 through 09/25/2024 with marginal improvement in her symptoms.  She was continued gabapentin per PCP, Dr. Valdivia with noticeable improvement in her pain.      Interval History 08/21/2024  Patient presents status post left-sided sacroiliac joint fusion 08/14/2024.       Patient reports mi procedural soreness overlying left buttock.  Pain today is rated a 5/10.  She denies any drainage, purulence at the incision site.  With  dressing change this morning, postoperatively placed Steri-Strips were removed.  She denies any fevers, chills.  Patient is interested in pursuing right-sided sacroiliac joint fusion.  Today she reports pain overlying the right sacroiliac joint.  Pain today is rated a 6/10.  Pain can be exacerbated with standing and with ambulation and positional changes.  She denies radicular symptoms in the lower extremities, lower extremity weakness.  She has continued physician directed physical therapy exercises for sacroiliac joint pain over the last 8 weeks from 06/21/2024 through 08/21/2024.    Interval History 07/31/2024  Patient presents status post left-sided diagnostic SI joint injection 07/26/2024 and left-sided diagnostic SI joint injection 05/10/2024 and for MRI sacroiliac joint review. Patient reports 100% relief following 2 diagnostic left-sided sacroiliac joint injections.   Today she again reports pain overlying the left-sided sacroiliac joint.  Pain today is rated an 8/10 and is constant.  Pain is exacerbated with positional changes, moving from sitting to standing and with standing and ambulation.  Her pain does significantly impact activities of daily living and quality of life.  She has continued physician directed physical therapy exercises for bilateral sacroiliac joint pain over the last 8 weeks from 05/03/2024 through 07/31/2024 with marginal improvement in her pain.  Patient would like to pursue sacroiliac joint fusion.    Interval history 07/09/2024  Patient presents for follow-up for left-sided sacroiliac joint pain.  Of note patient has insurance, [employer], denied sacroiliac fusion.  Today she again reports pain overlying the left-sided SI joint.  Pain today is rated an 8/10.  Pain is exacerbated with positional changes, moving from sitting to standing, standing and walking.  Pain interferes with activities of daily living.  She is continued physician directed physical therapy exercises for sacroiliac  joint pain over the last 8 weeks from 05/09/2024 through 07/09/2024 with marginal improvement in her symptoms.  Of note patient received 80% relief with prior therapeutic and diagnostic sacroiliac joint injection.    Interval History (06/24/2024):  Patient Silvia Cotton presents today for follow-up visit.  Patient was last seen on 6/14/2024. She is scheduled for left sided sacral RFA in July. For the past 3 weeks she has had right sided pain over the lower back/buttock for the past 3 weeks. No new injury. Pain is a 8/10. Pain is worse with crossing her legs or getting in and out of bed/car. Pain does not radiate to the legs.   Patient denies night fever/night sweats, urinary incontinence, bowel incontinence, significant weight loss and significant motor weakness.   Patient denies any other complaints or concerns at this time.    Interval History (6/14/2024):  Patient Silvia Cotton presents today for follow-up visit.  Patient was last seen on 5/10/2024 for left SIJ injection #2 with 80% relief sustained. She will still have some intermittent left sided buttock pain but overall she is doing much better. No radiculopathy into the lower extremities.   She rates her pain today a 1/10. When the pain is severe, prior to procedure, pain would go up to a 10/10.   Patient denies night fever/night sweats, urinary incontinence, bowel incontinence, significant weight loss and significant motor weakness.   Patient denies any other complaints or concerns at this time.    Interval history 04/10/2024  Patient presents status post left-sided sacroiliac joint injection 02/27/2024.  Patient reports approximately 100% relief lasting 1 week in duration which has now subsided to approximately 60% sustained relief.  She does report pain can arise with increased activity.  When pain is present it is described as sharp and stabbing in nature overlying the left sacroiliac joint.  Pain can be exacerbated with positional change, moving  from sitting to standing and with standing and ambulation.  She is continued physician directed physical therapy exercises over the last 8 weeks from 02/10/2024 through 04/10/2024 with marginal improvement in her pain.  Today she denies right-sided pain.  She denies bilateral lower extremity radiculopathy, lower extremity weakness, bowel or bladder incontinence or saddle anesthesia.    HPI 02/15/2024  Silvia Cotton is a 68 y.o. female with past medical history significant for hyperlipidemia, stage 3 chronic kidney disease, GERD who presents to the clinic for the evaluation of left-sided sacroiliac joint pain, previous Dr. Galvez patient.  Today she reports pain has been present for over 5 years without inciting accident injury or trauma.  Pain is constant and today is rated a 5/10.  Pain is described as sharp and stabbing in nature.  Patient reports pain overlying the left sacroiliac joint.  Pain is exacerbated when moving from sitting to standing and with standing and ambulation.  She reports she is only able to ambulate approximately 5-10 minutes when pain is severe.  Pain is improved with topical application of heat and ice as well as prior left-sided sacroiliac joint injection.  She reports she received greater than 80% relief lasting several years in duration.  Patient has completed conventional land-based physical therapy in the past at [facility] in [place].  She has continued physician directed physical therapy exercises at home over the last 8 weeks from 12/15/2023 through 02/15/2024 with marginal improvement in her symptoms.  Today she denies any right-sided pain, lower extremity radiculopathy, lower extremity weakness or bowel or bladder incontinence.    Patient denies night fever/night sweats, urinary incontinence, bowel incontinence, significant weight loss, significant motor weakness, and loss of sensations.      Pain Disability Index Review:         8/21/2024     7:28 AM 6/14/2024    12:14 PM 2/15/2024      1:20 PM   Last 3 PDI Scores   Pain Disability Index (PDI) 35 15 30       Non-Pharmacologic Treatments:  Physical Therapy/Home Exercise: yes  Ice/Heat:yes  TENS: no  Acupuncture: no  Massage: no  Chiropractic: no    Other: no      Pain Medications:  - Adjuvant Medications: Elavil (Amitriptyline), Neurontin (Gabapentin), Trazodone (Desyrel), Venlafaxine (Effexor), and Zanaflex ( Tizanidine)    Pain Procedures:   Dr. Ortiz:  -02/27/2024: Left-sided sacroiliac joint injection with 100% relief x 1 week then 60% relief  -05/10/2024: Left SIJ injection #2 with 80% relief sustained (DIAGNOSTIC)  -07/26/2024: Left-sided SI joint injection (DIAGNOSTIC)  -08/14/2024: Left-sided sacroiliac joint fusion      Dr. Galvez:  -01/15/2019: Left-sided sacroiliac joint injection    Past Medical History:   Diagnosis Date    Anemia     GERD (gastroesophageal reflux disease)     Hyperlipidemia     Hypertension     PONV (postoperative nausea and vomiting)     Supraventricular tachycardia     Supraventricular tachycardia 8/8/2024     Past Surgical History:   Procedure Laterality Date    APPENDECTOMY      CARPAL TUNNEL RELEASE Right 11/30/2023    Procedure: RELEASE, CARPAL TUNNEL;  Surgeon: Kiran Finley MD;  Location: [facility] OR;  Service: Orthopedics;  Laterality: Right;  right carpal tunnel release    CARPAL TUNNEL RELEASE Left 12/28/2023    Procedure: RELEASE, CARPAL TUNNEL;  Surgeon: Kiran Finley MD;  Location: [facility] OR;  Service: Orthopedics;  Laterality: Left;    CHOLECYSTECTOMY      COLONOSCOPY N/A 01/13/2017    Procedure: COLONOSCOPY;  Surgeon: Taj Nicholas MD;  Location: [facility];  Service: Endoscopy;  Laterality: N/A;    COLONOSCOPY N/A 03/10/2022    Procedure: COLONOSCOPY;  Surgeon: Bee Brock MD;  Location: [facility] ENDO;  Service: Endoscopy;  Laterality: N/A;    FUSION OF SACROILIAC JOINT Left 8/14/2024    Procedure: FUSION, SACROILIAC JOINT;  Surgeon: Ilene Ortiz MD;  Location: [facility] OR;  Service:  "Pain Management;  Laterality: Left;  Posterior Percutaneous Sacroiliac Joint Arthrodesis Under Fluroscopy    INJECTION OF ANESTHETIC AGENT INTO SACROILIAC JOINT Left 2/27/2024    Procedure: Left SIJ Injection with local;  Surgeon: Ilene Ortiz MD;  Location: [facility];  Service: Pain Management;  Laterality: Left;    INJECTION OF ANESTHETIC AGENT INTO SACROILIAC JOINT Left 5/10/2024    Procedure: Left SIJ Injection (diagnostic)-Injection #2;  Surgeon: Ilene Ortiz MD;  Location: [facility];  Service: Pain Management;  Laterality: Left;    INJECTION OF ANESTHETIC AGENT INTO SACROILIAC JOINT Left 7/26/2024    Procedure: Left SIJ Injection with local (DIAGNOSTIC< NO STEROID);  Surgeon: Ilene Ortiz MD;  Location: [facility];  Service: Pain Management;  Laterality: Left;    KNEE SURGERY Left 2017    SHOULDER ARTHROSCOPY      Tonsilectomy      TONSILLECTOMY      Uterine Ablation       Review of patient's allergies indicates:  No Known Allergies    Current Outpatient Medications   Medication Sig    amitriptyline (ELAVIL) 50 MG tablet TAKE 1 TABLET EVERY EVENING    atenoloL (TENORMIN) 25 MG tablet TAKE 1 TABLET TWICE DAILY    cyanocobalamin 1,000 mcg/mL injection Inject 1 mL (1,000 mcg total) into the muscle once a week.    diazePAM (VALIUM) 5 MG tablet Take 1 tablet (5 mg total) by mouth once. for 1 dose    ferrous gluconate (FERGON) 324 MG tablet Take 324 mg by mouth 2 (two) times a day.    gabapentin (NEURONTIN) 100 MG capsule Take 1 capsule (100 mg total) by mouth 3 (three) times daily.    needle, disp, 18 G 18 x 1 " Ndle Use to draw b12 1000 mcg from vail every 7 days for 7 weeks    needle, reusable, 25 G 25 x 1 " Ndle Use to inject B12 1000 mcg every 7 days for 7 weeks    omega-3 fatty acids/fish oil (FISH OIL-OMEGA-3 FATTY ACIDS) 300-1,000 mg capsule Take by mouth once daily.    omeprazole (PRILOSEC) 40 MG capsule TAKE 1 CAPSULE EVERY DAY    ondansetron (ZOFRAN-ODT) 4 MG TbDL Take 1 tablet (4 mg " total) by mouth every 6 (six) hours as needed (nausea).    rosuvastatin (CRESTOR) 20 MG tablet TAKE 1 TABLET EVERY DAY    tiZANidine (ZANAFLEX) 4 MG tablet TAKE 1 TABLET TWICE DAILY    traZODone (DESYREL) 100 MG tablet TAKE 1 TABLET (100 MG TOTAL) BY MOUTH EVERY EVENING.    venlafaxine (EFFEXOR-XR) 75 MG 24 hr capsule TAKE 1 CAPSULE EVERY DAY     No current facility-administered medications for this visit.     Facility-Administered Medications Ordered in Other Visits   Medication    chlorhexidine 0.12 % solution 10 mL       Review of Systems     GENERAL:  No weight loss, malaise or fevers.  HEENT:   No recent changes in vision or hearing  NECK:  Negative for lumps, no difficulty with swallowing.  RESPIRATORY:  Negative for cough, wheezing or shortness of breath, patient denies any recent URI.  CARDIOVASCULAR:  Negative for chest pain or palpitations.  GI:  Negative for abdominal discomfort, blood in stools or black stools or change in bowel habits.  MUSCULOSKELETAL:  See HPI.  SKIN:  Negative for lesions, rash, and itching.  PSYCH:  No mood disorder or recent psychosocial stressors.   HEMATOLOGY/LYMPHOLOGY:  Negative for prolonged bleeding, bruising easily or swollen nodes.    NEURO:   No history of syncope, paralysis, seizures or tremors.  All other reviewed and negative other than HPI.    OBJECTIVE:    There were no vitals taken for this visit.      Physical Exam    GENERAL: Well appearing, in no acute distress, alert and oriented x3.  PSYCH:  Mood and affect appropriate.  SKIN: Skin color, texture, turgor normal, no rashes or lesions.  HEAD/FACE:  Normocephalic, atraumatic. Cranial nerves grossly intact.    CV: RRR with palpation of the radial artery.  PULM: No evidence of respiratory difficulty, symmetric chest rise.  GI:  Soft and non-tender.    BACK: Straight leg raising in the sitting and supine positions is negative to radicular pain. No pain to palpation over the facet joints of the lumbar spine or spinous  processes. Normal range of motion without pain reproduction.  EXTREMITIES: Peripheral joint ROM is full and pain free without obvious instability or laxity in all four extremities. No deformities, edema, or skin discoloration. Good capillary refill.  MUSCULOSKELETAL: Able to stand on heels & toes.   hip, and knee provocative maneuvers are negative.    SIJ testing: RIGHT  - TTP over SI joint: Present   - Sepideh's/ Uriel's: Positive    - Sacroiliac Distraction Test (anterior pressure): Positive  - Sacroiliac Compression Test (lateral pressure): Positive   - SacralThrust Test (posterior pressure): Positive    Facet loading test is negative bilaterally.   Bilateral upper and lower extremity strength is normal and symmetric.  No atrophy or tone abnormalities are noted.    RIGHT Lower extremity: Hip flexion 5/5, Hip Abduction 5/5, Hip Adduction 5/5, Knee extension 5/5, Knee flexion 5/5, Ankle dorsiflexion5/5, Extensor hallucis longus 5/5, Ankle plantarflexion 5/5  LEFT Lower extremity:  Hip flexion 5/5, Hip Abduction 5/5,Hip Adduction 5/5, Knee extension 5/5, Knee flexion 5/5, Ankle dorsiflexion 5/5, Extensor hallucis longus 5/5, Ankle plantarflexion 5/5  -Normal testing knee (patellar) jerk and ankle (achilles) jerk    NEURO: Bilateral upper and lower extremity coordination and muscle stretch reflexes are physiologic and symmetric. No loss of sensation is noted.  GAIT: normal.    Imagin/10/19    X-Ray Lumbar Spine AP And Lateral  FINDINGS:  No scoliosis.  No acute fracture.  No listhesis.  Mild degenerative changes with spurring of the endplates and facet arthropathy is noted.  Degenerative changes of the sacroiliac joints are also noted.      MRI sacroiliac joint 2024  FINDINGS:  Bones: No fracture, marrow edema or suspicious osseous lesion identified.  L4-5 and L5-S1 degenerative disc disease and facet arthropathy.     Sacroiliac joints: Alignment normal.  Minimal bilateral sacroiliac osteoarthritis.   Small T2 hyperintense nutrient vessel left ilium along the inferior sacroiliac joint.  No significant abnormal subchondral marrow signal.  No erosions, subchondral marrow edema, ankylosis or other evidence of sacroiliitis.     Muscles and tendons: Within normal limits.     Miscellaneous: Mild right hydrosalpinx without surrounding inflammation.        Impression:     Minimal sacroiliac osteoarthritis without evidence of sacroiliitis.     L4-5 and L5-S1 degenerative disc disease and facet arthropathy.     Mild right hydrosalpinx without surrounding inflammation.         ASSESSMENT: 68 y.o. year old female with     1. Sacroiliitis        2. SI joint arthritis              PLAN:   - Interventions:  Work up for right-sided TransLoc fusion - lateral oblique approach for fixation. -Scheduled for right-sided diagnostic sacroiliac joint injection 10/09/2024 to see if this helps with sacroiliac joint pain and dysfunction. Explained the risks and benefits of the procedure in detail with the patient today in clinic along with alternative treatment options, and the patient elected to pursue the intervention.    -MRI SIJ: 07/19/2024    - Anticoagulation use: No no anticoagulation     report:  Reviewed and consistent with medication use as prescribed.    - Medications:  -Continue Gabapentin 100 mg TID per Dr. Valdivia.      -Continue Tizanidine. We have discussed potential deleterious side effects associated with this medication including  dizziness, drowsiness, dry mouth or tingling sensation in the upper or lower extremities.     - Therapy:   We discussed continuing physical therapy to help manage the patient/s painful condition. The patient was counseled that muscle strengthening will improve the long term prognosis in regards to pain and may also help increase range of motion and mobility. A referral for EXT physical therapy: [facility] in [place] was provided to the patient.    - Imaging: Reviewed available imaging (x-ray  lumbar spine, MRI sacroiliac joints) with patient and answered any questions they had regarding study.     - Follow up visit:  Set 24 hour reminder call following 1st diagnostic right-sided SI joint injection      The above plan and management options were discussed at length with patient. Patient is in agreement with the above and verbalized understanding.    - I discussed the goals of interventional chronic pain management with the patient on today's visit. We discussed a multimodal and systematic approach to pain.  This includes diagnostic and therapeutic injections, adjuvant pharmacologic treatment, physical therapy, and at times psychiatry.  I emphasized the importance of regular exercise, core strengthening and stretching, diet and weight loss as a cornerstone of long-term pain management.    - This condition does not require this patient to take time off of work, and the primary goal of our Pain Management services is to improve the patient's functional capacity.  - Patient Questions: Answered all of the patient's questions regarding diagnoses, therapy, treatment and next steps    Visit today included increased complexity associated with the care of the episodic problem of chronic pain which was addressed and continue to manage the longitudinal care of the patient due to the serious and/or complex managed problem(s) listed above.        Ilene Ortiz MD  Interventional Pain Management  [facility]    Disclaimer:  This note was prepared using voice recognition system and is likely to have sound alike errors that may have been overlooked even after proof reading.  Please call me with any questions

## 2024-09-25 NOTE — PATIENT INSTRUCTIONS
Counseling and Referral of Other Preventative  (Italic type indicates deductible and co-insurance are waived)    Patient Name: Silvia Cotton  Today's Date: 9/25/2024    Health Maintenance       Date Due Completion Date    TETANUS VACCINE 12/01/2024 12/1/2014    Mammogram 03/18/2025 3/18/2024    Override on 5/14/2013: Done    Annual UACr 08/09/2025 8/9/2024    Lipid Panel 08/09/2025 8/9/2024    DEXA Scan 08/23/2026 8/23/2023    Hemoglobin A1c (Diabetic Prevention Screening) 02/06/2027 2/6/2024    Colorectal Cancer Screening 03/10/2027 3/10/2022    Override on 11/12/2013: Declined    Override on 5/7/2007: Done (with dr. Ponce, to repeat in 10 yrs per pt.)        No orders of the defined types were placed in this encounter.    The following information is provided to all patients.  This information is to help you find resources for any of the problems found today that may be affecting your health:                  Living healthy guide: www.[facility].[place].gov      Understanding Diabetes: www.diabetes.org      Eating healthy: www.cdc.gov/healthyweight      CDC home safety checklist: www.cdc.gov/steadi/patient.html      Agency on Aging: www.goea.[place].gov      Alcoholics anonymous (AA): www.aa.org      Physical Activity: www.[place]ul[place].nih.gov/la8bwgl      Tobacco use: www.quitwithusla.org

## 2024-09-25 NOTE — PROGRESS NOTES
"      The patient location is: [place]  The chief complaint leading to consultation is: Medicare AWV     Visit type: audiovisual    Face to Face time with patient: 20  60 minutes of total time spent on the encounter, which includes face to face time and non-face to face time preparing to see the patient (eg, review of tests), Obtaining and/or reviewing separately obtained history, Documenting clinical information in the electronic or other health record, Independently interpreting results (not separately reported) and communicating results to the patient/family/caregiver, or Care coordination (not separately reported).         Each patient to whom he or she provides medical services by telemedicine is:  (1) informed of the relationship between the physician and patient and the respective role of any other health care provider with respect to management of the patient; and (2) notified that he or she may decline to receive medical services by telemedicine and may withdraw from such care at any time.    Notes:       Silvia Cotton presented for a  Medicare AWV and comprehensive Health Risk Assessment today. The following components were reviewed and updated:    Medical history  Family History  Social history  Allergies and Current Medications  Health Risk Assessment  Health Maintenance  Care Team         ** See Completed Assessments for Annual Wellness Visit within the encounter summary.**         The following assessments were completed:  Living Situation  CAGE  Depression Screening  Fall Risk Assessment (MACH 10)  Hearing Assessment(HHI)  Cognitive Function Screening  Nutrition Screening  ADL Screening  PAQ Screening    Opioid documentation:      Patient does not have a current opioid prescription.        Vitals:    09/25/24 1304   Weight: 120.7 kg (266 lb)   Height: 5' 9" (1.753 m)     Body mass index is 39.28 kg/m².  Physical Exam  Constitutional:       General: She is not in acute distress.     Appearance: Normal " appearance. She is well-developed and well-groomed.   Skin:     Coloration: Skin is not pale.   Neurological:      Mental Status: She is alert and oriented to person, place, and time.   Psychiatric:         Mood and Affect: Mood normal.         Speech: Speech normal.         Behavior: Behavior normal. Behavior is cooperative.         Thought Content: Thought content normal.               Diagnoses and health risks identified today and associated recommendations/orders:    1. Encounter for Medicare annual wellness exam  - Ambulatory Referral/Consult to Enhanced Annual Wellness Visit (eAWV)    2. Encounter for preventive health examination  Screenings performed, as noted above. Personal preventative testing needs reviewed.     3. Supraventricular tachycardia  Chronic. Stable with atenolol. Followed by PCP.     4. Severe obesity (BMI 35.0-39.9) with comorbidity  Work on diet modification and increase in physical activity as tolerated.   Followed by PCP.     5. Chronic kidney disease, stage 3a  Chronic. Stable. Followed by PCP.     6. Sacroiliitis  7. Sacroiliac joint pain  Chronic. Stable. S/p SI joint fusion. Planning to have another. Followed by Pain Medicine.     8. Carpal tunnel syndrome, unspecified laterality  Chronic. Stable. Followed by PCP.     9. Mood disorder  Chronic. Stable with venlafaxine. Followed by PCP.     10. Palpitations  Chronic. Stable with atenolol. Followed by PCP.     11. Mixed hyperlipidemia  Chronic. Stable with rosuvastatin. Followed by PCP.     12. Iron deficiency anemia due to chronic blood loss  Chronic. Stable with ferrous gluconate. Followed by PCP.     13. B12 deficiency  Chronic. Stable with B12 injections. Followed by PCP.     14. Gastroesophageal reflux disease without esophagitis  Chronic. Stable with omeprazole. Followed by PCP.     15. Psychophysiological insomnia  Chronic. Stable with trazodone. Followed by PCP.       Frances Vega with a 5-10 year written screening schedule  and personal prevention plan. Recommendations were developed using the USPSTF age appropriate recommendations. Education, counseling, and referrals were provided as needed. After Visit Summary printed and given to patient which includes a list of additional screenings\tests needed.    Follow up in about 1 year (around 9/25/2025) for your next annual wellness visit.    Angelina Patton NP      Advance Care Planning     I offered to discuss advanced care planning, including how to pick a person who would make decisions for you if you were unable to make them for yourself, called a health care power of , and what kind of decisions you might make such as use of life sustaining treatments such as ventilators and tube feeding when faced with a life limiting illness recorded on a living will that they will need to know. (How you want to be cared for as you near the end of your natural life)     X Patient is interested in learning more about how to make advanced directives.  I provided them paperwork and offered to discuss this with them.

## 2024-09-25 NOTE — PROGRESS NOTES
Established Patient Interventional Pain Note     The patient location is: home  The chief complaint leading to consultation is: L SIJ pain  Visit type: Virtual visit with synchronous audio and video  Total time spent with patient: 11-15m  Each patient to whom he or she provides medical services by telemedicine is: (1) informed of the relationship between the physician and patient and the respective role of any other health care provider with respect to management of the patient; and (2) notified that he or she may decline to receive medical services by telemedicine and may withdraw from such care at any time.        Referring Physician: No ref. provider found    PCP: Stephani Valdivia MD    Chief Complaint:   Left-sided sacroiliac joint pain       SUBJECTIVE:  Interval history 09/25/2024  Patient presents status post left-sided sacroiliac TransLoc fusion 08/14/2024.  Patient reports 100% relief in left-sided sacroiliac joint pain following her procedure.  She reports mild residual soreness on the left side with over exertion.  Patient would like to pursue this procedure on the right side.  Today she reports pain overlying the right sacroiliac joint.  Pain is constant and today is rated a 6/10.  Pain can be exacerbated with moving from sitting to a standing position prolonged sitting or standing.  She denies more distal radiculopathy into the lower extremities or feet.  She has continued physician directed physical therapy exercises for bilateral SI joint pain over the last 8 weeks from 07/25/2024 through 09/25/2024 with marginal improvement in her symptoms.  She was continued gabapentin per PCP, Dr. Valdivia with noticeable improvement in her pain.      Interval History 08/21/2024  Patient presents status post left-sided sacroiliac joint fusion 08/14/2024.       Patient reports mi procedural soreness overlying left buttock.  Pain today is rated a 5/10.  She denies any drainage, purulence at the incision site.  With  dressing change this morning, postoperatively placed Steri-Strips were removed.  She denies any fevers, chills.  Patient is interested in pursuing right-sided sacroiliac joint fusion.  Today she reports pain overlying the right sacroiliac joint.  Pain today is rated a 6/10.  Pain can be exacerbated with standing and with ambulation and positional changes.  She denies radicular symptoms in the lower extremities, lower extremity weakness.  She has continued physician directed physical therapy exercises for sacroiliac joint pain over the last 8 weeks from 06/21/2024 through 08/21/2024.    Interval History 07/31/2024  Patient presents status post left-sided diagnostic SI joint injection 07/26/2024 and left-sided diagnostic SI joint injection 05/10/2024 and for MRI sacroiliac joint review. Patient reports 100% relief following 2 diagnostic left-sided sacroiliac joint injections.   Today she again reports pain overlying the left-sided sacroiliac joint.  Pain today is rated an 8/10 and is constant.  Pain is exacerbated with positional changes, moving from sitting to standing and with standing and ambulation.  Her pain does significantly impact activities of daily living and quality of life.  She has continued physician directed physical therapy exercises for bilateral sacroiliac joint pain over the last 8 weeks from 05/03/2024 through 07/31/2024 with marginal improvement in her pain.  Patient would like to pursue sacroiliac joint fusion.    Interval history 07/09/2024  Patient presents for follow-up for left-sided sacroiliac joint pain.  Of note patient has insurance, [employer], denied sacroiliac fusion.  Today she again reports pain overlying the left-sided SI joint.  Pain today is rated an 8/10.  Pain is exacerbated with positional changes, moving from sitting to standing, standing and walking.  Pain interferes with activities of daily living.  She is continued physician directed physical therapy exercises for sacroiliac  joint pain over the last 8 weeks from 05/09/2024 through 07/09/2024 with marginal improvement in her symptoms.  Of note patient received 80% relief with prior therapeutic and diagnostic sacroiliac joint injection.    Interval History (06/24/2024):  Patient Silvia Cotton presents today for follow-up visit.  Patient was last seen on 6/14/2024. She is scheduled for left sided sacral RFA in July. For the past 3 weeks she has had right sided pain over the lower back/buttock for the past 3 weeks. No new injury. Pain is a 8/10. Pain is worse with crossing her legs or getting in and out of bed/car. Pain does not radiate to the legs.   Patient denies night fever/night sweats, urinary incontinence, bowel incontinence, significant weight loss and significant motor weakness.   Patient denies any other complaints or concerns at this time.    Interval History (6/14/2024):  Patient Silvia Cotton presents today for follow-up visit.  Patient was last seen on 5/10/2024 for left SIJ injection #2 with 80% relief sustained. She will still have some intermittent left sided buttock pain but overall she is doing much better. No radiculopathy into the lower extremities.   She rates her pain today a 1/10. When the pain is severe, prior to procedure, pain would go up to a 10/10.   Patient denies night fever/night sweats, urinary incontinence, bowel incontinence, significant weight loss and significant motor weakness.   Patient denies any other complaints or concerns at this time.    Interval history 04/10/2024  Patient presents status post left-sided sacroiliac joint injection 02/27/2024.  Patient reports approximately 100% relief lasting 1 week in duration which has now subsided to approximately 60% sustained relief.  She does report pain can arise with increased activity.  When pain is present it is described as sharp and stabbing in nature overlying the left sacroiliac joint.  Pain can be exacerbated with positional change, moving  from sitting to standing and with standing and ambulation.  She is continued physician directed physical therapy exercises over the last 8 weeks from 02/10/2024 through 04/10/2024 with marginal improvement in her pain.  Today she denies right-sided pain.  She denies bilateral lower extremity radiculopathy, lower extremity weakness, bowel or bladder incontinence or saddle anesthesia.    HPI 02/15/2024  Silvia Cotton is a 68 y.o. female with past medical history significant for hyperlipidemia, stage 3 chronic kidney disease, GERD who presents to the clinic for the evaluation of left-sided sacroiliac joint pain, previous Dr. Galvez patient.  Today she reports pain has been present for over 5 years without inciting accident injury or trauma.  Pain is constant and today is rated a 5/10.  Pain is described as sharp and stabbing in nature.  Patient reports pain overlying the left sacroiliac joint.  Pain is exacerbated when moving from sitting to standing and with standing and ambulation.  She reports she is only able to ambulate approximately 5-10 minutes when pain is severe.  Pain is improved with topical application of heat and ice as well as prior left-sided sacroiliac joint injection.  She reports she received greater than 80% relief lasting several years in duration.  Patient has completed conventional land-based physical therapy in the past at [facility] in [place].  She has continued physician directed physical therapy exercises at home over the last 8 weeks from 12/15/2023 through 02/15/2024 with marginal improvement in her symptoms.  Today she denies any right-sided pain, lower extremity radiculopathy, lower extremity weakness or bowel or bladder incontinence.    Patient denies night fever/night sweats, urinary incontinence, bowel incontinence, significant weight loss, significant motor weakness, and loss of sensations.      Pain Disability Index Review:         8/21/2024     7:28 AM 6/14/2024    12:14 PM 2/15/2024      1:20 PM   Last 3 PDI Scores   Pain Disability Index (PDI) 35 15 30       Non-Pharmacologic Treatments:  Physical Therapy/Home Exercise: yes  Ice/Heat:yes  TENS: no  Acupuncture: no  Massage: no  Chiropractic: no    Other: no      Pain Medications:  - Adjuvant Medications: Elavil (Amitriptyline), Neurontin (Gabapentin), Trazodone (Desyrel), Venlafaxine (Effexor), and Zanaflex ( Tizanidine)    Pain Procedures:   Dr. Ortiz:  -02/27/2024: Left-sided sacroiliac joint injection with 100% relief x 1 week then 60% relief  -05/10/2024: Left SIJ injection #2 with 80% relief sustained (DIAGNOSTIC)  -07/26/2024: Left-sided SI joint injection (DIAGNOSTIC)  -08/14/2024: Left-sided sacroiliac joint fusion      Dr. Galvez:  -01/15/2019: Left-sided sacroiliac joint injection    Past Medical History:   Diagnosis Date    Anemia     GERD (gastroesophageal reflux disease)     Hyperlipidemia     Hypertension     PONV (postoperative nausea and vomiting)     Supraventricular tachycardia     Supraventricular tachycardia 8/8/2024     Past Surgical History:   Procedure Laterality Date    APPENDECTOMY      CARPAL TUNNEL RELEASE Right 11/30/2023    Procedure: RELEASE, CARPAL TUNNEL;  Surgeon: Kiran Finley MD;  Location: [facility] OR;  Service: Orthopedics;  Laterality: Right;  right carpal tunnel release    CARPAL TUNNEL RELEASE Left 12/28/2023    Procedure: RELEASE, CARPAL TUNNEL;  Surgeon: Kiran Finley MD;  Location: [facility] OR;  Service: Orthopedics;  Laterality: Left;    CHOLECYSTECTOMY      COLONOSCOPY N/A 01/13/2017    Procedure: COLONOSCOPY;  Surgeon: Taj Nicholas MD;  Location: [facility] ENDO;  Service: Endoscopy;  Laterality: N/A;    COLONOSCOPY N/A 03/10/2022    Procedure: COLONOSCOPY;  Surgeon: Bee Brock MD;  Location: [facility] ENDO;  Service: Endoscopy;  Laterality: N/A;    FUSION OF SACROILIAC JOINT Left 8/14/2024    Procedure: FUSION, SACROILIAC JOINT;  Surgeon: Ilene Ortiz MD;  Location: [facility] OR;  Service:  "Pain Management;  Laterality: Left;  Posterior Percutaneous Sacroiliac Joint Arthrodesis Under Fluroscopy    INJECTION OF ANESTHETIC AGENT INTO SACROILIAC JOINT Left 2/27/2024    Procedure: Left SIJ Injection with local;  Surgeon: Ilene Ortiz MD;  Location: [facility] PAIN MGT;  Service: Pain Management;  Laterality: Left;    INJECTION OF ANESTHETIC AGENT INTO SACROILIAC JOINT Left 5/10/2024    Procedure: Left SIJ Injection (diagnostic)-Injection #2;  Surgeon: Ilene Ortiz MD;  Location: [facility];  Service: Pain Management;  Laterality: Left;    INJECTION OF ANESTHETIC AGENT INTO SACROILIAC JOINT Left 7/26/2024    Procedure: Left SIJ Injection with local (DIAGNOSTIC< NO STEROID);  Surgeon: Ilene Ortiz MD;  Location: [facility];  Service: Pain Management;  Laterality: Left;    KNEE SURGERY Left 2017    SHOULDER ARTHROSCOPY      Tonsilectomy      TONSILLECTOMY      Uterine Ablation       Review of patient's allergies indicates:  No Known Allergies    Current Outpatient Medications   Medication Sig    amitriptyline (ELAVIL) 50 MG tablet TAKE 1 TABLET EVERY EVENING    atenoloL (TENORMIN) 25 MG tablet TAKE 1 TABLET TWICE DAILY    cyanocobalamin 1,000 mcg/mL injection Inject 1 mL (1,000 mcg total) into the muscle once a week.    diazePAM (VALIUM) 5 MG tablet Take 1 tablet (5 mg total) by mouth once. for 1 dose    ferrous gluconate (FERGON) 324 MG tablet Take 324 mg by mouth 2 (two) times a day.    gabapentin (NEURONTIN) 100 MG capsule Take 1 capsule (100 mg total) by mouth 3 (three) times daily.    needle, disp, 18 G 18 x 1 " Ndle Use to draw b12 1000 mcg from vail every 7 days for 7 weeks    needle, reusable, 25 G 25 x 1 " Ndle Use to inject B12 1000 mcg every 7 days for 7 weeks    omega-3 fatty acids/fish oil (FISH OIL-OMEGA-3 FATTY ACIDS) 300-1,000 mg capsule Take by mouth once daily.    omeprazole (PRILOSEC) 40 MG capsule TAKE 1 CAPSULE EVERY DAY    ondansetron (ZOFRAN-ODT) 4 MG TbDL Take 1 tablet (4 mg " total) by mouth every 6 (six) hours as needed (nausea).    rosuvastatin (CRESTOR) 20 MG tablet TAKE 1 TABLET EVERY DAY    tiZANidine (ZANAFLEX) 4 MG tablet TAKE 1 TABLET TWICE DAILY    traZODone (DESYREL) 100 MG tablet TAKE 1 TABLET (100 MG TOTAL) BY MOUTH EVERY EVENING.    venlafaxine (EFFEXOR-XR) 75 MG 24 hr capsule TAKE 1 CAPSULE EVERY DAY     No current facility-administered medications for this visit.     Facility-Administered Medications Ordered in Other Visits   Medication    chlorhexidine 0.12 % solution 10 mL       Review of Systems     GENERAL:  No weight loss, malaise or fevers.  HEENT:   No recent changes in vision or hearing  NECK:  Negative for lumps, no difficulty with swallowing.  RESPIRATORY:  Negative for cough, wheezing or shortness of breath, patient denies any recent URI.  CARDIOVASCULAR:  Negative for chest pain or palpitations.  GI:  Negative for abdominal discomfort, blood in stools or black stools or change in bowel habits.  MUSCULOSKELETAL:  See HPI.  SKIN:  Negative for lesions, rash, and itching.  PSYCH:  No mood disorder or recent psychosocial stressors.   HEMATOLOGY/LYMPHOLOGY:  Negative for prolonged bleeding, bruising easily or swollen nodes.    NEURO:   No history of syncope, paralysis, seizures or tremors.  All other reviewed and negative other than HPI.    OBJECTIVE:    There were no vitals taken for this visit.      Physical Exam    GENERAL: Well appearing, in no acute distress, alert and oriented x3.  PSYCH:  Mood and affect appropriate.  SKIN: Skin color, texture, turgor normal, no rashes or lesions.  HEAD/FACE:  Normocephalic, atraumatic. Cranial nerves grossly intact.    CV: RRR with palpation of the radial artery.  PULM: No evidence of respiratory difficulty, symmetric chest rise.  GI:  Soft and non-tender.    BACK: Straight leg raising in the sitting and supine positions is negative to radicular pain. No pain to palpation over the facet joints of the lumbar spine or spinous  processes. Normal range of motion without pain reproduction.  EXTREMITIES: Peripheral joint ROM is full and pain free without obvious instability or laxity in all four extremities. No deformities, edema, or skin discoloration. Good capillary refill.  MUSCULOSKELETAL: Able to stand on heels & toes.   hip, and knee provocative maneuvers are negative.    SIJ testing: RIGHT  - TTP over SI joint: Present   - Sepideh's/ Uriel's: Positive    - Sacroiliac Distraction Test (anterior pressure): Positive  - Sacroiliac Compression Test (lateral pressure): Positive   - SacralThrust Test (posterior pressure): Positive    Facet loading test is negative bilaterally.   Bilateral upper and lower extremity strength is normal and symmetric.  No atrophy or tone abnormalities are noted.    RIGHT Lower extremity: Hip flexion 5/5, Hip Abduction 5/5, Hip Adduction 5/5, Knee extension 5/5, Knee flexion 5/5, Ankle dorsiflexion5/5, Extensor hallucis longus 5/5, Ankle plantarflexion 5/5  LEFT Lower extremity:  Hip flexion 5/5, Hip Abduction 5/5,Hip Adduction 5/5, Knee extension 5/5, Knee flexion 5/5, Ankle dorsiflexion 5/5, Extensor hallucis longus 5/5, Ankle plantarflexion 5/5  -Normal testing knee (patellar) jerk and ankle (achilles) jerk    NEURO: Bilateral upper and lower extremity coordination and muscle stretch reflexes are physiologic and symmetric. No loss of sensation is noted.  GAIT: normal.    Imagin/10/19    X-Ray Lumbar Spine AP And Lateral  FINDINGS:  No scoliosis.  No acute fracture.  No listhesis.  Mild degenerative changes with spurring of the endplates and facet arthropathy is noted.  Degenerative changes of the sacroiliac joints are also noted.      MRI sacroiliac joint 2024  FINDINGS:  Bones: No fracture, marrow edema or suspicious osseous lesion identified.  L4-5 and L5-S1 degenerative disc disease and facet arthropathy.     Sacroiliac joints: Alignment normal.  Minimal bilateral sacroiliac osteoarthritis.   Small T2 hyperintense nutrient vessel left ilium along the inferior sacroiliac joint.  No significant abnormal subchondral marrow signal.  No erosions, subchondral marrow edema, ankylosis or other evidence of sacroiliitis.     Muscles and tendons: Within normal limits.     Miscellaneous: Mild right hydrosalpinx without surrounding inflammation.        Impression:     Minimal sacroiliac osteoarthritis without evidence of sacroiliitis.     L4-5 and L5-S1 degenerative disc disease and facet arthropathy.     Mild right hydrosalpinx without surrounding inflammation.         ASSESSMENT: 68 y.o. year old female with     1. Sacroiliitis        2. SI joint arthritis              PLAN:   - Interventions:  Work up for right-sided TransLoc fusion - lateral oblique approach for fixation. -Scheduled for right-sided diagnostic sacroiliac joint injection 10/09/2024 to see if this helps with sacroiliac joint pain and dysfunction. Explained the risks and benefits of the procedure in detail with the patient today in clinic along with alternative treatment options, and the patient elected to pursue the intervention.    -MRI SIJ: 07/19/2024    - Anticoagulation use: No no anticoagulation     report:  Reviewed and consistent with medication use as prescribed.    - Medications:  -Continue Gabapentin 100 mg TID per Dr. Valdivia.      -Continue Tizanidine. We have discussed potential deleterious side effects associated with this medication including  dizziness, drowsiness, dry mouth or tingling sensation in the upper or lower extremities.     - Therapy:   We discussed continuing physical therapy to help manage the patient/s painful condition. The patient was counseled that muscle strengthening will improve the long term prognosis in regards to pain and may also help increase range of motion and mobility. A referral for EXT physical therapy: [facility] in [place] was provided to the patient.    - Imaging: Reviewed available imaging (x-ray  lumbar spine, MRI sacroiliac joints) with patient and answered any questions they had regarding study.     - Follow up visit:  Set 24 hour reminder call following 1st diagnostic right-sided SI joint injection      The above plan and management options were discussed at length with patient. Patient is in agreement with the above and verbalized understanding.    - I discussed the goals of interventional chronic pain management with the patient on today's visit. We discussed a multimodal and systematic approach to pain.  This includes diagnostic and therapeutic injections, adjuvant pharmacologic treatment, physical therapy, and at times psychiatry.  I emphasized the importance of regular exercise, core strengthening and stretching, diet and weight loss as a cornerstone of long-term pain management.    - This condition does not require this patient to take time off of work, and the primary goal of our Pain Management services is to improve the patient's functional capacity.  - Patient Questions: Answered all of the patient's questions regarding diagnoses, therapy, treatment and next steps    Visit today included increased complexity associated with the care of the episodic problem of chronic pain which was addressed and continue to manage the longitudinal care of the patient due to the serious and/or complex managed problem(s) listed above.        Ilene Ortiz MD  Interventional Pain Management  [facility]    Disclaimer:  This note was prepared using voice recognition system and is likely to have sound alike errors that may have been overlooked even after proof reading.  Please call me with any questions

## 2024-09-30 NOTE — PRE-PROCEDURE INSTRUCTIONS
Spoke with patient regarding procedure scheduled on 10.9    Arrival time 1015     Has patient been sick with fever or on antibiotics within the last 7 days? No     Does the patient have any open wounds, sores or rashes? No     Does the patient have any recent fractures? no     Has patient received a vaccination within the last 7 days? No     Received the COVID vaccination? yes     Has the patient stopped all medications as directed? na     Does patient have a pacemaker, defibrillator, or implantable stimulator? No     Does the patient have a ride to and from procedure and someone reliable to remain with patient?        Is the patient diabetic? no     Does the patient have sleep apnea? Or use O2 at home? no     Is the patient receiving sedation? Yes      Is the patient instructed to remain NPO beginning at midnight the night before their procedure? yes     Procedure location confirmed with patient? Yes     Covid- Denies signs/symptoms. Instructed to notify PAT/MD if any changes.

## 2024-10-09 ENCOUNTER — HOSPITAL ENCOUNTER (OUTPATIENT)
Facility: HOSPITAL | Age: 69
Discharge: HOME | End: 2024-10-09
Attending: ANESTHESIOLOGY | Admitting: ANESTHESIOLOGY
Payer: MEDICARE

## 2024-10-09 VITALS
HEART RATE: 83 BPM | OXYGEN SATURATION: 95 % | SYSTOLIC BLOOD PRESSURE: 153 MMHG | DIASTOLIC BLOOD PRESSURE: 71 MMHG | HEIGHT: 69 IN | RESPIRATION RATE: 15 BRPM | TEMPERATURE: 98 F | WEIGHT: 267.88 LBS | BODY MASS INDEX: 39.68 KG/M2

## 2024-10-09 DIAGNOSIS — M46.1 SACROILIITIS: ICD-10-CM

## 2024-10-09 PROCEDURE — 25500020 PHARM REV CODE 255: Mod: HCNC | Performed by: ANESTHESIOLOGY

## 2024-10-09 PROCEDURE — 27096 INJECT SACROILIAC JOINT: CPT | Mod: HCNC,RT,, | Performed by: ANESTHESIOLOGY

## 2024-10-09 PROCEDURE — 27096 INJECT SACROILIAC JOINT: CPT | Mod: HCNC | Performed by: ANESTHESIOLOGY

## 2024-10-09 PROCEDURE — 25000003 PHARM REV CODE 250: Mod: HCNC | Performed by: ANESTHESIOLOGY

## 2024-10-09 PROCEDURE — 63600175 PHARM REV CODE 636 W HCPCS: Mod: HCNC | Performed by: ANESTHESIOLOGY

## 2024-10-09 RX ORDER — FENTANYL CITRATE 50 UG/ML
INJECTION, SOLUTION INTRAMUSCULAR; INTRAVENOUS
Status: DISCONTINUED | OUTPATIENT
Start: 2024-10-09 | End: 2024-10-09 | Stop reason: HOSPADM

## 2024-10-09 RX ORDER — INDOMETHACIN 25 MG/1
CAPSULE ORAL
Status: DISCONTINUED | OUTPATIENT
Start: 2024-10-09 | End: 2024-10-09 | Stop reason: HOSPADM

## 2024-10-09 RX ORDER — BUPIVACAINE HYDROCHLORIDE 2.5 MG/ML
INJECTION, SOLUTION EPIDURAL; INFILTRATION; INTRACAUDAL
Status: DISCONTINUED | OUTPATIENT
Start: 2024-10-09 | End: 2024-10-09 | Stop reason: HOSPADM

## 2024-10-09 RX ORDER — MIDAZOLAM HYDROCHLORIDE 1 MG/ML
INJECTION, SOLUTION INTRAMUSCULAR; INTRAVENOUS
Status: DISCONTINUED | OUTPATIENT
Start: 2024-10-09 | End: 2024-10-09 | Stop reason: HOSPADM

## 2024-10-09 NOTE — DISCHARGE SUMMARY
"Discharge Note  Short Stay      SUMMARY     Admit Date: 10/9/2024    Attending Physician: Ilene Ortiz MD        Discharge Physician: Ilene Ortiz MD        Discharge Date: 10/9/2024 12:05 PM    Procedure(s) (LRB):  Right SIJ Injection #1 diagnostic (Right)    Final Diagnosis: Sacroiliitis [M46.1]  Sacroiliac joint pain [M53.3]    Disposition: Home or self care    Patient Instructions:   Discharge Medication List as of 10/9/2024 10:10 AM        CONTINUE these medications which have NOT CHANGED    Details   amitriptyline (ELAVIL) 50 MG tablet TAKE 1 TABLET EVERY EVENING, Starting Fri 8/9/2024, Normal      atenoloL (TENORMIN) 25 MG tablet TAKE 1 TABLET TWICE DAILY, Normal      cyanocobalamin 1,000 mcg/mL injection Inject 1 mL (1,000 mcg total) into the muscle once a week., Starting Tue 8/13/2024, Normal      ferrous gluconate (FERGON) 324 MG tablet Take 324 mg by mouth 2 (two) times a day., Historical Med      gabapentin (NEURONTIN) 100 MG capsule Take 1 capsule (100 mg total) by mouth 3 (three) times daily., Starting Mon 2/12/2024, Until Tue 2/11/2025, Normal      needle, disp, 18 G 18 x 1 " Ndle Use to draw b12 1000 mcg from vail every 7 days for 7 weeks, Normal      needle, reusable, 25 G 25 x 1 " Ndle Use to inject B12 1000 mcg every 7 days for 7 weeks, Normal      omega-3 fatty acids/fish oil (FISH OIL-OMEGA-3 FATTY ACIDS) 300-1,000 mg capsule Take by mouth once daily., Historical Med      omeprazole (PRILOSEC) 40 MG capsule TAKE 1 CAPSULE EVERY DAY, Starting Sun 4/7/2024, Normal      ondansetron (ZOFRAN-ODT) 4 MG TbDL Take 1 tablet (4 mg total) by mouth every 6 (six) hours as needed (nausea)., Starting Tue 2/13/2024, Normal      rosuvastatin (CRESTOR) 20 MG tablet TAKE 1 TABLET EVERY DAY, Starting Mon 5/27/2024, Normal      tiZANidine (ZANAFLEX) 4 MG tablet TAKE 1 TABLET TWICE DAILY, Normal      traZODone (DESYREL) 100 MG tablet TAKE 1 TABLET (100 MG TOTAL) BY MOUTH EVERY EVENING., Starting Mon 5/27/2024, " Until Thu 5/22/2025, Normal      venlafaxine (EFFEXOR-XR) 75 MG 24 hr capsule TAKE 1 CAPSULE EVERY DAY, Starting Mon 5/27/2024, Normal                 Discharge Diagnosis: Sacroiliitis [M46.1]  Sacroiliac joint pain [M53.3]  Condition on Discharge: Stable with no complications to procedure   Diet on Discharge: Same as before.  Activity: as per instruction sheet.  Discharge to: Home with a responsible adult.  Follow up: 2-4 weeks       Please call the office at [phone] if you experience any weakness or loss of sensation, fever > 101.5, pain uncontrolled with oral medications, persistent nausea/vomiting/or diarrhea, redness or drainage from the incisions, or any other worrisome concerns. If physician on call was not reached or could not communicate with our office for any reason please go to the nearest emergency department

## 2024-10-09 NOTE — OP NOTE
Silvia Cotton  68 y.o. female      Vitals:    10/09/24 1147   BP: (!) 153/71   Pulse: 83   Resp: 15   Temp:        Procedure Date: 10/09/2024      INFORMED CONSENT: The procedure, risks, benefits and options were discussed with patient. There are no contraindications to the procedure. The patient expressed understanding and agreed to proceed. The personnel performing the procedure was discussed. I verify that I personally obtained consent prior to the start of the procedure and the signed consent can be found on the patient's chart.       Anesthesia:   Conscious sedation provided by M.D    The patient was monitored with continuous pulse oximetry, EKG, and intermittent blood pressure monitors.  The patient was hemodynamically stable throughout the entire process was responsive to voice, and breathing spontaneously.  Supplemental O2 was provided at 2L/min via nasal cannula.  Patient was comfortable for the duration of the procedure. (See nurse documentation and case log for sedation time)    There was a total of 1mg IV Midazolam and 50mcg Fentanyl titrated for the procedure    Pre Procedure diagnosis: Sacroiliitis [M46.1]  Sacroiliac joint pain [M53.3]  Post-Procedure diagnosis: SAME      PROCEDURE:    Right sacroiliac joint injection                            REASON FOR PROCEDURE:   Sacroiliitis [M46.1]      MEDICATIONS INJECTED: 4mL Bupivacaine 0.25% ONLY    LOCAL ANESTHETIC USED: Xylocaine 1% 6ml     ESTIMATED BLOOD LOSS: None.   COMPLICATIONS: None.     TECHNIQUE:       Sacroiliac joint injection:   Laying in the prone position, the patient was prepped and draped in the usual sterile fashion using ChloraPrep and fenestrated drape.  The area was determined under fluoroscopy.  Local Xylocaine was injected by raising a wheel and going down to the periosteum using a 27-gauge hypodermic needle.  The 3.5 inch 22-gauge spinal needle was introduce into the Right sacroiliac joint.  Negative pressure applied to confirm  no intravascular placement.  Omnipaque was injected to confirm placement and to confirm that there was no vascular runoff.  The medication was then injected slowly.  The patient tolerated the procedure well.                       The patient was monitored for approximately 30 minutes after the procedure. Patient was given post procedure and discharge instructions to follow at home. We will see the patient back in two weeks or the patient may call to inform of status. The patient was discharged in a stable condition

## 2024-10-09 NOTE — DISCHARGE INSTRUCTIONS

## 2024-10-10 ENCOUNTER — TELEPHONE (OUTPATIENT)
Dept: PAIN MEDICINE | Facility: CLINIC | Age: 69
End: 2024-10-10
Payer: MEDICARE

## 2024-10-10 DIAGNOSIS — M46.1 SI JOINT ARTHRITIS: Primary | ICD-10-CM

## 2024-10-10 DIAGNOSIS — M46.1 SACROILIITIS: ICD-10-CM

## 2024-10-10 NOTE — TELEPHONE ENCOUNTER
Need orders for next injection.    1. What percentage of pain relief did you receive following the block, from 0-100%?   100    2. What was pain score the day before your procedure on a scale from 0-10?  6    3. What was pain score immediately after your procedure (up to 6 hours)  on a scale from 0-10?  0    4. When your pain returned, what was your pain score on a scale from 0-10?  2     5. How many hours did pain relief last following the block?    Over 12 hours     6. During this time, please describe in detail the activities you were able to do?  Relaxed.        Pain Disability Index (PDI) Score Review:      8/21/2024     7:28 AM 6/14/2024    12:14 PM 2/15/2024     1:20 PM   Last 3 PDI Scores   Pain Disability Index (PDI) 35 15 30

## 2024-10-11 ENCOUNTER — PATIENT MESSAGE (OUTPATIENT)
Dept: PAIN MEDICINE | Facility: CLINIC | Age: 69
End: 2024-10-11
Payer: MEDICARE

## 2024-10-21 RX ORDER — TIZANIDINE 4 MG/1
TABLET ORAL
Qty: 180 TABLET | Refills: 3 | Status: SHIPPED | OUTPATIENT
Start: 2024-10-21

## 2024-10-21 NOTE — TELEPHONE ENCOUNTER
No care due was identified.  [facility] Embedded Care Due Messages. Reference number: [id number].   10/21/2024 1:20:38 AM CDT

## 2024-10-25 ENCOUNTER — HOSPITAL ENCOUNTER (OUTPATIENT)
Facility: HOSPITAL | Age: 69
Discharge: HOME | End: 2024-10-25
Attending: ANESTHESIOLOGY | Admitting: ANESTHESIOLOGY
Payer: MEDICARE

## 2024-10-25 VITALS
OXYGEN SATURATION: 95 % | WEIGHT: 269.06 LBS | BODY MASS INDEX: 39.85 KG/M2 | DIASTOLIC BLOOD PRESSURE: 66 MMHG | TEMPERATURE: 98 F | HEIGHT: 69 IN | RESPIRATION RATE: 17 BRPM | HEART RATE: 83 BPM | SYSTOLIC BLOOD PRESSURE: 133 MMHG

## 2024-10-25 DIAGNOSIS — M46.1 SACROILIITIS: ICD-10-CM

## 2024-10-25 PROCEDURE — 63600175 PHARM REV CODE 636 W HCPCS: Mod: HCNC | Performed by: ANESTHESIOLOGY

## 2024-10-25 PROCEDURE — 27096 INJECT SACROILIAC JOINT: CPT | Mod: HCNC,RT,, | Performed by: ANESTHESIOLOGY

## 2024-10-25 PROCEDURE — 25000003 PHARM REV CODE 250: Mod: HCNC | Performed by: ANESTHESIOLOGY

## 2024-10-25 PROCEDURE — 27096 INJECT SACROILIAC JOINT: CPT | Mod: HCNC,RT | Performed by: ANESTHESIOLOGY

## 2024-10-25 PROCEDURE — 25500020 PHARM REV CODE 255: Mod: HCNC | Performed by: ANESTHESIOLOGY

## 2024-10-25 RX ORDER — MIDAZOLAM HYDROCHLORIDE 1 MG/ML
INJECTION, SOLUTION INTRAMUSCULAR; INTRAVENOUS
Status: DISCONTINUED | OUTPATIENT
Start: 2024-10-25 | End: 2024-10-25 | Stop reason: HOSPADM

## 2024-10-25 RX ORDER — BUPIVACAINE HYDROCHLORIDE 2.5 MG/ML
INJECTION, SOLUTION EPIDURAL; INFILTRATION; INTRACAUDAL
Status: DISCONTINUED | OUTPATIENT
Start: 2024-10-25 | End: 2024-10-25 | Stop reason: HOSPADM

## 2024-10-25 RX ORDER — INDOMETHACIN 25 MG/1
CAPSULE ORAL
Status: DISCONTINUED | OUTPATIENT
Start: 2024-10-25 | End: 2024-10-25 | Stop reason: HOSPADM

## 2024-10-25 RX ORDER — FENTANYL CITRATE 50 UG/ML
INJECTION, SOLUTION INTRAMUSCULAR; INTRAVENOUS
Status: DISCONTINUED | OUTPATIENT
Start: 2024-10-25 | End: 2024-10-25 | Stop reason: HOSPADM

## 2024-10-28 ENCOUNTER — TELEPHONE (OUTPATIENT)
Dept: PAIN MEDICINE | Facility: CLINIC | Age: 69
End: 2024-10-28
Payer: MEDICARE

## 2024-10-28 ENCOUNTER — PATIENT MESSAGE (OUTPATIENT)
Dept: PAIN MEDICINE | Facility: CLINIC | Age: 69
End: 2024-10-28
Payer: MEDICARE

## 2024-10-29 DIAGNOSIS — M46.1 SACROILIITIS: Primary | ICD-10-CM

## 2024-10-31 NOTE — PRE-PROCEDURE INSTRUCTIONS
Spoke with patient regarding procedure scheduled on 11.13     Arrival time 1045     Has patient been sick with fever or on antibiotics within the last 7 days? No     Does the patient have any open wounds, sores or rashes? No     Does the patient have any recent fractures? no     Has patient received a vaccination within the last 7 days? No     Received the COVID vaccination? yes     Has the patient stopped all medications as directed? na     Does patient have a pacemaker, defibrillator, or implantable stimulator? No     Does the patient have a ride to and from procedure and someone reliable to remain with patient?        Is the patient diabetic? no     Does the patient have sleep apnea? Or use O2 at home? no     Is the patient receiving sedation? yes     Is the patient instructed to remain NPO beginning at midnight the night before their procedure? yes     Procedure location confirmed with patient? Yes     Covid- Denies signs/symptoms. Instructed to notify PAT/MD if any changes.

## 2024-11-13 ENCOUNTER — HOSPITAL ENCOUNTER (OUTPATIENT)
Facility: HOSPITAL | Age: 69
Discharge: HOME | End: 2024-11-13
Attending: ANESTHESIOLOGY | Admitting: ANESTHESIOLOGY
Payer: MEDICARE

## 2024-11-13 VITALS
RESPIRATION RATE: 16 BRPM | HEART RATE: 73 BPM | BODY MASS INDEX: 39.17 KG/M2 | DIASTOLIC BLOOD PRESSURE: 67 MMHG | HEIGHT: 69 IN | SYSTOLIC BLOOD PRESSURE: 128 MMHG | OXYGEN SATURATION: 95 % | TEMPERATURE: 97 F | WEIGHT: 264.44 LBS

## 2024-11-13 DIAGNOSIS — M46.1 SACROILIITIS: ICD-10-CM

## 2024-11-13 PROCEDURE — 25500020 PHARM REV CODE 255: Mod: HCNC | Performed by: ANESTHESIOLOGY

## 2024-11-13 PROCEDURE — 63600175 PHARM REV CODE 636 W HCPCS: Mod: HCNC | Performed by: ANESTHESIOLOGY

## 2024-11-13 PROCEDURE — 27096 INJECT SACROILIAC JOINT: CPT | Mod: HCNC,RT,, | Performed by: ANESTHESIOLOGY

## 2024-11-13 PROCEDURE — 27096 INJECT SACROILIAC JOINT: CPT | Mod: HCNC,RT | Performed by: ANESTHESIOLOGY

## 2024-11-13 PROCEDURE — 25000003 PHARM REV CODE 250: Mod: HCNC | Performed by: ANESTHESIOLOGY

## 2024-11-13 RX ORDER — TRIAMCINOLONE ACETONIDE 40 MG/ML
INJECTION, SUSPENSION INTRA-ARTICULAR; INTRAMUSCULAR
Status: DISCONTINUED | OUTPATIENT
Start: 2024-11-13 | End: 2024-11-13 | Stop reason: HOSPADM

## 2024-11-13 RX ORDER — INDOMETHACIN 25 MG/1
CAPSULE ORAL
Status: DISCONTINUED | OUTPATIENT
Start: 2024-11-13 | End: 2024-11-13 | Stop reason: HOSPADM

## 2024-11-13 RX ORDER — BUPIVACAINE HYDROCHLORIDE 2.5 MG/ML
INJECTION, SOLUTION EPIDURAL; INFILTRATION; INTRACAUDAL
Status: DISCONTINUED | OUTPATIENT
Start: 2024-11-13 | End: 2024-11-13 | Stop reason: HOSPADM

## 2024-11-13 RX ORDER — MIDAZOLAM HYDROCHLORIDE 1 MG/ML
INJECTION, SOLUTION INTRAMUSCULAR; INTRAVENOUS
Status: DISCONTINUED | OUTPATIENT
Start: 2024-11-13 | End: 2024-11-13 | Stop reason: HOSPADM

## 2024-11-13 RX ORDER — FENTANYL CITRATE 50 UG/ML
INJECTION, SOLUTION INTRAMUSCULAR; INTRAVENOUS
Status: DISCONTINUED | OUTPATIENT
Start: 2024-11-13 | End: 2024-11-13 | Stop reason: HOSPADM

## 2024-11-13 NOTE — OP NOTE
Silvia Cotton  69 y.o. female      Vitals:    11/13/24 1044   BP: (!) 171/82   Pulse: 83   Resp: 16   Temp: 97.1 °F (36.2 °C)       Procedure Date:11/13/2024        INFORMED CONSENT: The procedure, risks, benefits and options were discussed with patient. There are no contraindications to the procedure. The patient expressed understanding and agreed to proceed. The personnel performing the procedure was discussed. I verify that I personally obtained consent prior to the start of the procedure and the signed consent can be found on the patient's chart.       Anesthesia:   Conscious sedation provided by M.D    The patient was monitored with continuous pulse oximetry, EKG, and intermittent blood pressure monitors.  The patient was hemodynamically stable throughout the entire process was responsive to voice, and breathing spontaneously.  Supplemental O2 was provided at 2L/min via nasal cannula.  Patient was comfortable for the duration of the procedure. (See nurse documentation and case log for sedation time)    There was a total of 1mg IV Midazolam and 50mcg Fentanyl titrated for the procedure    Pre Procedure diagnosis: Sacroiliitis [M46.1]  Post-Procedure diagnosis: SAME      PROCEDURE:       Right sacroiliac joint injection                            REASON FOR PROCEDURE:   Sacroiliitis [M46.1]        MEDICATIONS INJECTED: 1mL 40mg/ml Kenalog and 4mL Bupivacaine 0.25% into each site    LOCAL ANESTHETIC USED: Xylocaine 1% 6ml     ESTIMATED BLOOD LOSS: None.   COMPLICATIONS: None.     TECHNIQUE:       Sacroiliac joint injection:   Laying in the prone position, the patient was prepped and draped in the usual sterile fashion using ChloraPrep and fenestrated drape.  The area was determined under fluoroscopy.  Local Xylocaine was injected by raising a wheel and going down to the periosteum using a 27-gauge hypodermic needle.  The 3.5 inch 22-gauge spinal needle was introduce into the Right sacroiliac joint.  Negative  pressure applied to confirm no intravascular placement.  Omnipaque was injected to confirm placement and to confirm that there was no vascular runoff.  The medication was then injected slowly.  The patient tolerated the procedure well.                       The patient was monitored for approximately 30 minutes after the procedure. Patient was given post procedure and discharge instructions to follow at home. We will see the patient back in two weeks or the patient may call to inform of status. The patient was discharged in a stable condition

## 2024-11-13 NOTE — H&P
HPI  Patient presenting for Procedure(s) (LRB):  Right Sacroiliac Joint (therapeutic) (Right)     Patient on Anti-coagulation No    No health changes since previous encounter    Past Medical History:   Diagnosis Date    Anemia     GERD (gastroesophageal reflux disease)     Hyperlipidemia     Hypertension     PONV (postoperative nausea and vomiting)     Supraventricular tachycardia     Supraventricular tachycardia 8/8/2024     Past Surgical History:   Procedure Laterality Date    APPENDECTOMY      CARPAL TUNNEL RELEASE Right 11/30/2023    Procedure: RELEASE, CARPAL TUNNEL;  Surgeon: Kiran Finley MD;  Location: [facility] OR;  Service: Orthopedics;  Laterality: Right;  right carpal tunnel release    CARPAL TUNNEL RELEASE Left 12/28/2023    Procedure: RELEASE, CARPAL TUNNEL;  Surgeon: Kiran Finley MD;  Location: [facility] OR;  Service: Orthopedics;  Laterality: Left;    CHOLECYSTECTOMY      COLONOSCOPY N/A 01/13/2017    Procedure: COLONOSCOPY;  Surgeon: Taj Nicholas MD;  Location: [facility];  Service: Endoscopy;  Laterality: N/A;    COLONOSCOPY N/A 03/10/2022    Procedure: COLONOSCOPY;  Surgeon: Bee Brock MD;  Location: [facility];  Service: Endoscopy;  Laterality: N/A;    FUSION OF SACROILIAC JOINT Left 08/14/2024    Procedure: FUSION, SACROILIAC JOINT;  Surgeon: Ilene Ortiz MD;  Location: [facility] OR;  Service: Pain Management;  Laterality: Left;  Posterior Percutaneous Sacroiliac Joint Arthrodesis Under Fluroscopy    INJECTION OF ANESTHETIC AGENT INTO SACROILIAC JOINT Left 02/27/2024    Procedure: Left SIJ Injection with local;  Surgeon: Ilene Ortiz MD;  Location: [facility] PAIN MGT;  Service: Pain Management;  Laterality: Left;    INJECTION OF ANESTHETIC AGENT INTO SACROILIAC JOINT Left 05/10/2024    Procedure: Left SIJ Injection (diagnostic)-Injection #2;  Surgeon: Ilene Ortiz MD;  Location: [facility] PAIN [facility]GT;  Service: Pain Management;  Laterality: Left;    INJECTION OF ANESTHETIC AGENT  "INTO SACROILIAC JOINT Left 07/26/2024    Procedure: Left SIJ Injection with local (DIAGNOSTIC< NO STEROID);  Surgeon: Ilene Ortiz MD;  Location: [facility] PAIN MGT;  Service: Pain Management;  Laterality: Left;    INJECTION OF ANESTHETIC AGENT INTO SACROILIAC JOINT Right 10/9/2024    Procedure: Right SIJ Injection #1 diagnostic;  Surgeon: Ilene Ortiz MD;  Location: [facility] PAIN [facility]GT;  Service: Pain Management;  Laterality: Right;    INJECTION OF ANESTHETIC AGENT INTO SACROILIAC JOINT Right 10/25/2024    Procedure: Right SIJ Injection with local; diagnostic!;  Surgeon: Ilene Ortiz MD;  Location: [facility] PAIN [facility]GT;  Service: Pain Management;  Laterality: Right;    KNEE SURGERY Left 2017    SHOULDER ARTHROSCOPY      Tonsilectomy      TONSILLECTOMY      TUBAL LIGATION  1996    Uterine Ablation       Review of patient's allergies indicates:  No Known Allergies     Current Facility-Administered Medications on File Prior to Encounter   Medication Dose Route Frequency Provider Last Rate Last Admin    chlorhexidine 0.12 % solution 10 mL  10 mL Mouth/Throat On Call Procedure Christina Mario PA-C         Current Outpatient Medications on File Prior to Encounter   Medication Sig Dispense Refill    atenoloL (TENORMIN) 25 MG tablet TAKE 1 TABLET TWICE DAILY 180 tablet 3    omeprazole (PRILOSEC) 40 MG capsule TAKE 1 CAPSULE EVERY DAY 90 capsule 3    amitriptyline (ELAVIL) 50 MG tablet TAKE 1 TABLET EVERY EVENING 90 tablet 3    cyanocobalamin 1,000 mcg/mL injection Inject 1 mL (1,000 mcg total) into the muscle once a week. 30 mL 12    ferrous gluconate (FERGON) 324 MG tablet Take 324 mg by mouth 2 (two) times a day.      gabapentin (NEURONTIN) 100 MG capsule Take 1 capsule (100 mg total) by mouth 3 (three) times daily. 90 capsule 11    needle, disp, 18 G 18 x 1 " Ndle Use to draw b12 1000 mcg from vail every 7 days for 7 weeks 100 each 0    needle, reusable, 25 G 25 x 1 " Ndle Use to inject B12 1000 mcg every 7 days for 7 weeks 12 " "each 2    omega-3 fatty acids/fish oil (FISH OIL-OMEGA-3 FATTY ACIDS) 300-1,000 mg capsule Take by mouth once daily.      ondansetron (ZOFRAN-ODT) 4 MG TbDL Take 1 tablet (4 mg total) by mouth every 6 (six) hours as needed (nausea). 30 tablet 0    rosuvastatin (CRESTOR) 20 MG tablet TAKE 1 TABLET EVERY DAY 90 tablet 2    tiZANidine (ZANAFLEX) 4 MG tablet TAKE 1 TABLET TWICE DAILY 180 tablet 3    traZODone (DESYREL) 100 MG tablet TAKE 1 TABLET (100 MG TOTAL) BY MOUTH EVERY EVENING. 90 tablet 3    venlafaxine (EFFEXOR-XR) 75 MG 24 hr capsule TAKE 1 CAPSULE EVERY DAY 90 capsule 2        PMHx, PSHx, Allergies, Medications reviewed in epic    ROS negative except pain complaints in HPI    OBJECTIVE:    BP (!) 171/82 (BP Location: Right arm, Patient Position: Sitting)   Pulse 83   Temp 97.1 °F (36.2 °C) (Temporal)   Resp 16   Ht 5' 9" (1.753 m)   Wt 120 kg (264 lb 7.1 oz)   SpO2 96%   Breastfeeding No   BMI 39.05 kg/m²     PHYSICAL EXAMINATION:    GENERAL: Well appearing, in no acute distress, alert and oriented x3.  PSYCH:  Mood and affect appropriate.  SKIN: Skin color, texture, turgor normal, no rashes or lesions which will impact the procedure.  CV: RRR with palpation of the radial artery.  PULM: No evidence of respiratory difficulty, symmetric chest rise. Clear to auscultation.  NEURO: Cranial nerves grossly intact.    Plan:    Proceed with procedure as planned Procedure(s) (LRB):  Right Sacroiliac Joint (therapeutic) (Right)    Ilene Ortiz MD  11/13/2024            "

## 2024-11-13 NOTE — DISCHARGE INSTRUCTIONS

## 2024-11-13 NOTE — DISCHARGE SUMMARY
"Discharge Note  Short Stay      SUMMARY     Admit Date: 11/13/2024    Attending Physician: Ilene Ortiz MD        Discharge Physician: Ilene Ortiz MD        Discharge Date: 11/13/2024 12:01 PM    Procedure(s) (LRB):  Right Sacroiliac Joint (therapeutic) (Right)    Final Diagnosis: Sacroiliitis [M46.1]    Disposition: Home or self care    Patient Instructions:   Current Discharge Medication List        CONTINUE these medications which have NOT CHANGED    Details   atenoloL (TENORMIN) 25 MG tablet TAKE 1 TABLET TWICE DAILY  Qty: 180 tablet, Refills: 3    Comments: .      omeprazole (PRILOSEC) 40 MG capsule TAKE 1 CAPSULE EVERY DAY  Qty: 90 capsule, Refills: 3      amitriptyline (ELAVIL) 50 MG tablet TAKE 1 TABLET EVERY EVENING  Qty: 90 tablet, Refills: 3      cyanocobalamin 1,000 mcg/mL injection Inject 1 mL (1,000 mcg total) into the muscle once a week.  Qty: 30 mL, Refills: 12      ferrous gluconate (FERGON) 324 MG tablet Take 324 mg by mouth 2 (two) times a day.      gabapentin (NEURONTIN) 100 MG capsule Take 1 capsule (100 mg total) by mouth 3 (three) times daily.  Qty: 90 capsule, Refills: 11      needle, disp, 18 G 18 x 1 " Ndle Use to draw b12 1000 mcg from vail every 7 days for 7 weeks  Qty: 100 each, Refills: 0      needle, reusable, 25 G 25 x 1 " Ndle Use to inject B12 1000 mcg every 7 days for 7 weeks  Qty: 12 each, Refills: 2      omega-3 fatty acids/fish oil (FISH OIL-OMEGA-3 FATTY ACIDS) 300-1,000 mg capsule Take by mouth once daily.      ondansetron (ZOFRAN-ODT) 4 MG TbDL Take 1 tablet (4 mg total) by mouth every 6 (six) hours as needed (nausea).  Qty: 30 tablet, Refills: 0    Associated Diagnoses: Nausea      rosuvastatin (CRESTOR) 20 MG tablet TAKE 1 TABLET EVERY DAY  Qty: 90 tablet, Refills: 2      tiZANidine (ZANAFLEX) 4 MG tablet TAKE 1 TABLET TWICE DAILY  Qty: 180 tablet, Refills: 3      traZODone (DESYREL) 100 MG tablet TAKE 1 TABLET (100 MG TOTAL) BY MOUTH EVERY EVENING.  Qty: 90 tablet, " Refills: 3      venlafaxine (EFFEXOR-XR) 75 MG 24 hr capsule TAKE 1 CAPSULE EVERY DAY  Qty: 90 capsule, Refills: 2                 Discharge Diagnosis: Sacroiliitis [M46.1]  Condition on Discharge: Stable with no complications to procedure   Diet on Discharge: Same as before.  Activity: as per instruction sheet.  Discharge to: Home with a responsible adult.  Follow up: 2-4 weeks       Please call the office at [phone] if you experience any weakness or loss of sensation, fever > 101.5, pain uncontrolled with oral medications, persistent nausea/vomiting/or diarrhea, redness or drainage from the incisions, or any other worrisome concerns. If physician on call was not reached or could not communicate with our office for any reason please go to the nearest emergency department

## 2024-11-14 ENCOUNTER — PATIENT MESSAGE (OUTPATIENT)
Dept: PAIN MEDICINE | Facility: CLINIC | Age: 69
End: 2024-11-14
Payer: MEDICARE

## 2024-11-19 ENCOUNTER — OFFICE VISIT (OUTPATIENT)
Dept: PAIN MEDICINE | Facility: CLINIC | Age: 69
End: 2024-11-19
Payer: MEDICARE

## 2024-11-19 DIAGNOSIS — M46.1 SACROILIITIS: Primary | ICD-10-CM

## 2024-11-19 DIAGNOSIS — M53.3 SACROILIAC JOINT PAIN: ICD-10-CM

## 2024-11-19 DIAGNOSIS — M46.1 SI JOINT ARTHRITIS: ICD-10-CM

## 2024-11-19 PROCEDURE — 99214 OFFICE O/P EST MOD 30 MIN: CPT | Mod: 95,,, | Performed by: ANESTHESIOLOGY

## 2024-11-19 PROCEDURE — 1160F RVW MEDS BY RX/DR IN RCRD: CPT | Mod: HCNC,CPTII,95, | Performed by: ANESTHESIOLOGY

## 2024-11-19 PROCEDURE — G2211 COMPLEX E/M VISIT ADD ON: HCPCS | Mod: 95,,, | Performed by: ANESTHESIOLOGY

## 2024-11-19 PROCEDURE — 3044F HG A1C LEVEL LT 7.0%: CPT | Mod: HCNC,CPTII,95, | Performed by: ANESTHESIOLOGY

## 2024-11-19 PROCEDURE — 1159F MED LIST DOCD IN RCRD: CPT | Mod: HCNC,CPTII,95, | Performed by: ANESTHESIOLOGY

## 2024-11-19 NOTE — H&P (VIEW-ONLY)
Established Patient Interventional Pain Note     The patient location is: home  The chief complaint leading to consultation is: L SIJ pain  Visit type: Virtual visit with synchronous audio and video  Total time spent with patient: 11-15m  Each patient to whom he or she provides medical services by telemedicine is: (1) informed of the relationship between the physician and patient and the respective role of any other health care provider with respect to management of the patient; and (2) notified that he or she may decline to receive medical services by telemedicine and may withdraw from such care at any time.        Referring Physician: No ref. provider found    PCP: Stephani Valdivia MD    Chief Complaint:   Left-sided sacroiliac joint pain       SUBJECTIVE:  Interval history 11/19/2024  Patient presents status post right-sided SI joint injection diagnostic 10/09/2024, right-sided diagnostic SI joint injection 10/25/2024 and right-sided therapeutic SI joint injection 11/13/2024.  Patient reports 95% relief following both diagnostic injections and 80% sustained relief following therapeutic injection.  Today she reports exacerbation of right-sided sacroiliac joint pain.  Today she reports pain overlying the right sacroiliac joint.  Pain is constant and today is rated a 6/10.  Pain is exacerbated from moving from sitting to standing and with prolonged standing.  She denies more distal radiculopathy into the lower extremities or feet.  She has continued physician directed physical therapy exercises for bilateral SI joint pain over the last 8 weeks from 09/19/2024 through 11/19/2024.  He continues gabapentin per her PCP.    Interval history 09/25/2024  Patient presents status post left-sided sacroiliac TransLoc fusion 08/14/2024.  Patient reports 100% relief in left-sided sacroiliac joint pain following her procedure.  She reports mild residual soreness on the left side with over exertion.  Patient would like to pursue  this procedure on the right side.  Today she reports pain overlying the right sacroiliac joint.  Pain is constant and today is rated a 6/10.  Pain can be exacerbated with moving from sitting to a standing position prolonged sitting or standing.  She denies more distal radiculopathy into the lower extremities or feet.  She has continued physician directed physical therapy exercises for bilateral SI joint pain over the last 8 weeks from 07/25/2024 through 09/25/2024 with marginal improvement in her symptoms.  She was continued gabapentin per PCP, Dr. Valdivia with noticeable improvement in her pain.      Interval History 08/21/2024  Patient presents status post left-sided sacroiliac joint fusion 08/14/2024.       Patient reports mi procedural soreness overlying left buttock.  Pain today is rated a 5/10.  She denies any drainage, purulence at the incision site.  With dressing change this morning, postoperatively placed Steri-Strips were removed.  She denies any fevers, chills.  Patient is interested in pursuing right-sided sacroiliac joint fusion.  Today she reports pain overlying the right sacroiliac joint.  Pain today is rated a 6/10.  Pain can be exacerbated with standing and with ambulation and positional changes.  She denies radicular symptoms in the lower extremities, lower extremity weakness.  She has continued physician directed physical therapy exercises for sacroiliac joint pain over the last 8 weeks from 06/21/2024 through 08/21/2024.    Interval History 07/31/2024  Patient presents status post left-sided diagnostic SI joint injection 07/26/2024 and left-sided diagnostic SI joint injection 05/10/2024 and for MRI sacroiliac joint review. Patient reports 100% relief following 2 diagnostic left-sided sacroiliac joint injections.   Today she again reports pain overlying the left-sided sacroiliac joint.  Pain today is rated an 8/10 and is constant.  Pain is exacerbated with positional changes, moving from sitting  to standing and with standing and ambulation.  Her pain does significantly impact activities of daily living and quality of life.  She has continued physician directed physical therapy exercises for bilateral sacroiliac joint pain over the last 8 weeks from 05/03/2024 through 07/31/2024 with marginal improvement in her pain.  Patient would like to pursue sacroiliac joint fusion.    Interval history 07/09/2024  Patient presents for follow-up for left-sided sacroiliac joint pain.  Of note patient has insurance, [employer], denied sacroiliac fusion.  Today she again reports pain overlying the left-sided SI joint.  Pain today is rated an 8/10.  Pain is exacerbated with positional changes, moving from sitting to standing, standing and walking.  Pain interferes with activities of daily living.  She is continued physician directed physical therapy exercises for sacroiliac joint pain over the last 8 weeks from 05/09/2024 through 07/09/2024 with marginal improvement in her symptoms.  Of note patient received 80% relief with prior therapeutic and diagnostic sacroiliac joint injection.    Interval History (06/24/2024):  Patient Silvia Cotton presents today for follow-up visit.  Patient was last seen on 6/14/2024. She is scheduled for left sided sacral RFA in July. For the past 3 weeks she has had right sided pain over the lower back/buttock for the past 3 weeks. No new injury. Pain is a 8/10. Pain is worse with crossing her legs or getting in and out of bed/car. Pain does not radiate to the legs.   Patient denies night fever/night sweats, urinary incontinence, bowel incontinence, significant weight loss and significant motor weakness.   Patient denies any other complaints or concerns at this time.    Interval History (6/14/2024):  Patient Silvia Cotton presents today for follow-up visit.  Patient was last seen on 5/10/2024 for left SIJ injection #2 with 80% relief sustained. She will still have some intermittent left sided  buttock pain but overall she is doing much better. No radiculopathy into the lower extremities.   She rates her pain today a 1/10. When the pain is severe, prior to procedure, pain would go up to a 10/10.   Patient denies night fever/night sweats, urinary incontinence, bowel incontinence, significant weight loss and significant motor weakness.   Patient denies any other complaints or concerns at this time.    Interval history 04/10/2024  Patient presents status post left-sided sacroiliac joint injection 02/27/2024.  Patient reports approximately 100% relief lasting 1 week in duration which has now subsided to approximately 60% sustained relief.  She does report pain can arise with increased activity.  When pain is present it is described as sharp and stabbing in nature overlying the left sacroiliac joint.  Pain can be exacerbated with positional change, moving from sitting to standing and with standing and ambulation.  She is continued physician directed physical therapy exercises over the last 8 weeks from 02/10/2024 through 04/10/2024 with marginal improvement in her pain.  Today she denies right-sided pain.  She denies bilateral lower extremity radiculopathy, lower extremity weakness, bowel or bladder incontinence or saddle anesthesia.    HPI 02/15/2024  Silvia Cotton is a 69 y.o. female with past medical history significant for hyperlipidemia, stage 3 chronic kidney disease, GERD who presents to the clinic for the evaluation of left-sided sacroiliac joint pain, previous Dr. Galvez patient.  Today she reports pain has been present for over 5 years without inciting accident injury or trauma.  Pain is constant and today is rated a 5/10.  Pain is described as sharp and stabbing in nature.  Patient reports pain overlying the left sacroiliac joint.  Pain is exacerbated when moving from sitting to standing and with standing and ambulation.  She reports she is only able to ambulate approximately 5-10 minutes when pain  is severe.  Pain is improved with topical application of heat and ice as well as prior left-sided sacroiliac joint injection.  She reports she received greater than 80% relief lasting several years in duration.  Patient has completed conventional land-based physical therapy in the past at [facility] in [place].  She has continued physician directed physical therapy exercises at home over the last 8 weeks from 12/15/2023 through 02/15/2024 with marginal improvement in her symptoms.  Today she denies any right-sided pain, lower extremity radiculopathy, lower extremity weakness or bowel or bladder incontinence.    Patient denies night fever/night sweats, urinary incontinence, bowel incontinence, significant weight loss, significant motor weakness, and loss of sensations.      Pain Disability Index Review:         8/21/2024     7:28 AM 6/14/2024    12:14 PM 2/15/2024     1:20 PM   Last 3 PDI Scores   Pain Disability Index (PDI) 35 15 30       Non-Pharmacologic Treatments:  Physical Therapy/Home Exercise: yes  Ice/Heat:yes  TENS: no  Acupuncture: no  Massage: no  Chiropractic: no    Other: no      Pain Medications:  - Adjuvant Medications: Elavil (Amitriptyline), Neurontin (Gabapentin), Trazodone (Desyrel), Venlafaxine (Effexor), and Zanaflex ( Tizanidine)    Pain Procedures:   Dr. Ortiz:  -11/13/2024: Right-sided therapeutic SI joint injection  -10/25/2024: Right-sided diagnostic SI joint injection  -10/09/2024: Right-sided diagnostic SI joint injection  -02/27/2024: Left-sided sacroiliac joint injection with 100% relief x 1 week then 60% relief  -05/10/2024: Left SIJ injection #2 with 80% relief sustained (DIAGNOSTIC)  -07/26/2024: Left-sided SI joint injection (DIAGNOSTIC)  -08/14/2024: Left-sided sacroiliac joint fusion      Dr. Galvez:  -01/15/2019: Left-sided sacroiliac joint injection    Past Medical History:   Diagnosis Date    Anemia     GERD (gastroesophageal reflux disease)     Hyperlipidemia     Hypertension      PONV (postoperative nausea and vomiting)     Supraventricular tachycardia     Supraventricular tachycardia 8/8/2024     Past Surgical History:   Procedure Laterality Date    APPENDECTOMY      CARPAL TUNNEL RELEASE Right 11/30/2023    Procedure: RELEASE, CARPAL TUNNEL;  Surgeon: Kiran Finley MD;  Location: [facility] OR;  Service: Orthopedics;  Laterality: Right;  right carpal tunnel release    CARPAL TUNNEL RELEASE Left 12/28/2023    Procedure: RELEASE, CARPAL TUNNEL;  Surgeon: Kiran Finley MD;  Location: [facility] OR;  Service: Orthopedics;  Laterality: Left;    CHOLECYSTECTOMY      COLONOSCOPY N/A 01/13/2017    Procedure: COLONOSCOPY;  Surgeon: Taj Nicholas MD;  Location: [facility] ENDO;  Service: Endoscopy;  Laterality: N/A;    COLONOSCOPY N/A 03/10/2022    Procedure: COLONOSCOPY;  Surgeon: Bee Brock MD;  Location: [facility] ENDO;  Service: Endoscopy;  Laterality: N/A;    FUSION OF SACROILIAC JOINT Left 08/14/2024    Procedure: FUSION, SACROILIAC JOINT;  Surgeon: Ilene Ortiz MD;  Location: [facility] OR;  Service: Pain Management;  Laterality: Left;  Posterior Percutaneous Sacroiliac Joint Arthrodesis Under Fluroscopy    INJECTION OF ANESTHETIC AGENT INTO SACROILIAC JOINT Left 02/27/2024    Procedure: Left SIJ Injection with local;  Surgeon: Ilene Ortiz MD;  Location: [facility] PAIN MGT;  Service: Pain Management;  Laterality: Left;    INJECTION OF ANESTHETIC AGENT INTO SACROILIAC JOINT Left 05/10/2024    Procedure: Left SIJ Injection (diagnostic)-Injection #2;  Surgeon: Ilene Ortiz MD;  Location: [facility] PAIN MGT;  Service: Pain Management;  Laterality: Left;    INJECTION OF ANESTHETIC AGENT INTO SACROILIAC JOINT Left 07/26/2024    Procedure: Left SIJ Injection with local (DIAGNOSTIC< NO STEROID);  Surgeon: Ilene Ortiz MD;  Location: [facility] PAIN MGT;  Service: Pain Management;  Laterality: Left;    INJECTION OF ANESTHETIC AGENT INTO SACROILIAC JOINT Right 10/9/2024    Procedure: Right SIJ  "Injection #1 diagnostic;  Surgeon: Ilene Ortiz MD;  Location: [facility] PAIN MGT;  Service: Pain Management;  Laterality: Right;    INJECTION OF ANESTHETIC AGENT INTO SACROILIAC JOINT Right 10/25/2024    Procedure: Right SIJ Injection with local; diagnostic!;  Surgeon: Ilene Ortiz MD;  Location: [facility] PAIN MGT;  Service: Pain Management;  Laterality: Right;    INJECTION OF ANESTHETIC AGENT INTO SACROILIAC JOINT Right 11/13/2024    Procedure: Right Sacroiliac Joint (therapeutic);  Surgeon: Ilene Ortiz MD;  Location: [facility] PAIN MGT;  Service: Pain Management;  Laterality: Right;    KNEE SURGERY Left 2017    SHOULDER ARTHROSCOPY      Tonsilectomy      TONSILLECTOMY      TUBAL LIGATION  1996    Uterine Ablation       Review of patient's allergies indicates:  No Known Allergies    Current Outpatient Medications   Medication Sig    amitriptyline (ELAVIL) 50 MG tablet TAKE 1 TABLET EVERY EVENING    atenoloL (TENORMIN) 25 MG tablet TAKE 1 TABLET TWICE DAILY    cyanocobalamin 1,000 mcg/mL injection Inject 1 mL (1,000 mcg total) into the muscle once a week.    ferrous gluconate (FERGON) 324 MG tablet Take 324 mg by mouth 2 (two) times a day.    gabapentin (NEURONTIN) 100 MG capsule Take 1 capsule (100 mg total) by mouth 3 (three) times daily.    needle, disp, 18 G 18 x 1 " Ndle Use to draw b12 1000 mcg from vail every 7 days for 7 weeks    needle, reusable, 25 G 25 x 1 " Ndle Use to inject B12 1000 mcg every 7 days for 7 weeks    omega-3 fatty acids/fish oil (FISH OIL-OMEGA-3 FATTY ACIDS) 300-1,000 mg capsule Take by mouth once daily.    omeprazole (PRILOSEC) 40 MG capsule TAKE 1 CAPSULE EVERY DAY    ondansetron (ZOFRAN-ODT) 4 MG TbDL Take 1 tablet (4 mg total) by mouth every 6 (six) hours as needed (nausea).    rosuvastatin (CRESTOR) 20 MG tablet TAKE 1 TABLET EVERY DAY    tiZANidine (ZANAFLEX) 4 MG tablet TAKE 1 TABLET TWICE DAILY    traZODone (DESYREL) 100 MG tablet TAKE 1 TABLET (100 MG TOTAL) BY MOUTH EVERY EVENING. "    venlafaxine (EFFEXOR-XR) 75 MG 24 hr capsule TAKE 1 CAPSULE EVERY DAY     No current facility-administered medications for this visit.     Facility-Administered Medications Ordered in Other Visits   Medication    chlorhexidine 0.12 % solution 10 mL       Review of Systems     GENERAL:  No weight loss, malaise or fevers.  HEENT:   No recent changes in vision or hearing  NECK:  Negative for lumps, no difficulty with swallowing.  RESPIRATORY:  Negative for cough, wheezing or shortness of breath, patient denies any recent URI.  CARDIOVASCULAR:  Negative for chest pain or palpitations.  GI:  Negative for abdominal discomfort, blood in stools or black stools or change in bowel habits.  MUSCULOSKELETAL:  See HPI.  SKIN:  Negative for lesions, rash, and itching.  PSYCH:  No mood disorder or recent psychosocial stressors.   HEMATOLOGY/LYMPHOLOGY:  Negative for prolonged bleeding, bruising easily or swollen nodes.    NEURO:   No history of syncope, paralysis, seizures or tremors.  All other reviewed and negative other than HPI.    OBJECTIVE:    There were no vitals taken for this visit.      Physical Exam    GENERAL: Well appearing, in no acute distress, alert and oriented x3.  PSYCH:  Mood and affect appropriate.  SKIN: Skin color, texture, turgor normal, no rashes or lesions.  HEAD/FACE:  Normocephalic, atraumatic. Cranial nerves grossly intact.    CV: RRR with palpation of the radial artery.  PULM: No evidence of respiratory difficulty, symmetric chest rise.  GI:  Soft and non-tender.    BACK: Straight leg raising in the sitting and supine positions is negative to radicular pain. No pain to palpation over the facet joints of the lumbar spine or spinous processes. Normal range of motion without pain reproduction.  EXTREMITIES: Peripheral joint ROM is full and pain free without obvious instability or laxity in all four extremities. No deformities, edema, or skin discoloration. Good capillary refill.  MUSCULOSKELETAL: Able  to stand on heels & toes.   hip, and knee provocative maneuvers are negative.    SIJ testing: RIGHT  - TTP over SI joint: Present   - Sepideh's/ Uriel's: Positive    - Sacroiliac Distraction Test (anterior pressure): Positive  - Sacroiliac Compression Test (lateral pressure): Positive   - SacralThrust Test (posterior pressure): Positive    Facet loading test is negative bilaterally.   Bilateral upper and lower extremity strength is normal and symmetric.  No atrophy or tone abnormalities are noted.    RIGHT Lower extremity: Hip flexion 5/5, Hip Abduction 5/5, Hip Adduction 5/5, Knee extension 5/5, Knee flexion 5/5, Ankle dorsiflexion5/5, Extensor hallucis longus 5/5, Ankle plantarflexion 5/5  LEFT Lower extremity:  Hip flexion 5/5, Hip Abduction 5/5,Hip Adduction 5/5, Knee extension 5/5, Knee flexion 5/5, Ankle dorsiflexion 5/5, Extensor hallucis longus 5/5, Ankle plantarflexion 5/5  -Normal testing knee (patellar) jerk and ankle (achilles) jerk    NEURO: Bilateral upper and lower extremity coordination and muscle stretch reflexes are physiologic and symmetric. No loss of sensation is noted.  GAIT: normal.    Imagin/10/19    X-Ray Lumbar Spine AP And Lateral  FINDINGS:  No scoliosis.  No acute fracture.  No listhesis.  Mild degenerative changes with spurring of the endplates and facet arthropathy is noted.  Degenerative changes of the sacroiliac joints are also noted.      MRI sacroiliac joint 2024  FINDINGS:  Bones: No fracture, marrow edema or suspicious osseous lesion identified.  L4-5 and L5-S1 degenerative disc disease and facet arthropathy.     Sacroiliac joints: Alignment normal.  Minimal bilateral sacroiliac osteoarthritis.  Small T2 hyperintense nutrient vessel left ilium along the inferior sacroiliac joint.  No significant abnormal subchondral marrow signal.  No erosions, subchondral marrow edema, ankylosis or other evidence of sacroiliitis.     Muscles and tendons: Within normal limits.      Miscellaneous: Mild right hydrosalpinx without surrounding inflammation.        Impression:     Minimal sacroiliac osteoarthritis without evidence of sacroiliitis.     L4-5 and L5-S1 degenerative disc disease and facet arthropathy.     Mild right hydrosalpinx without surrounding inflammation.         ASSESSMENT: 69 y.o. year old female with     1. Sacroiliitis  Case Request-RAD/Other Procedure Area: Left-sided sacroiliac joint TransLoc fusion      2. SI joint arthritis  Case Request-RAD/Other Procedure Area: Left-sided sacroiliac joint TransLoc fusion      3. Sacroiliac joint pain  Case Request-RAD/Other Procedure Area: Left-sided sacroiliac joint TransLoc fusion          PLAN:   - Interventions:  Schedule for right-sided TransLoc fusion - lateral oblique approach for fixation.  -10/09/2024:right-sided SI joint injection diagnostic: 95% relief   -10/25/2024:right-sided diagnostic SI joint injection:  95% relief  -11/13/2024:right-sided therapeutic SI joint injection: 80% relief  -MRI SIJ: 07/19/2024    Explained the risks and benefits of the procedure in detail with the patient today in clinic along with alternative treatment options, and the patient elected to pursue the intervention.        - Anticoagulation use: No no anticoagulation     report:  Reviewed and consistent with medication use as prescribed.    - Medications:  -Continue Gabapentin 100 mg TID per Dr. Valdivia.      -Continue Tizanidine. We have discussed potential deleterious side effects associated with this medication including  dizziness, drowsiness, dry mouth or tingling sensation in the upper or lower extremities.     - Therapy:   We discussed continuing physical therapy to help manage the patient/s painful condition. The patient was counseled that muscle strengthening will improve the long term prognosis in regards to pain and may also help increase range of motion and mobility. A referral for EXT physical therapy: [facility] in [place] was  provided to the patient.    - Imaging: Reviewed available imaging (x-ray lumbar spine, MRI sacroiliac joints) with patient and answered any questions they had regarding study.     - Follow up visit:  1 week status post SI fusion      The above plan and management options were discussed at length with patient. Patient is in agreement with the above and verbalized understanding.    - I discussed the goals of interventional chronic pain management with the patient on today's visit. We discussed a multimodal and systematic approach to pain.  This includes diagnostic and therapeutic injections, adjuvant pharmacologic treatment, physical therapy, and at times psychiatry.  I emphasized the importance of regular exercise, core strengthening and stretching, diet and weight loss as a cornerstone of long-term pain management.    - This condition does not require this patient to take time off of work, and the primary goal of our Pain Management services is to improve the patient's functional capacity.  - Patient Questions: Answered all of the patient's questions regarding diagnoses, therapy, treatment and next steps    Visit today included increased complexity associated with the care of the episodic problem of chronic pain which was addressed and continue to manage the longitudinal care of the patient due to the serious and/or complex managed problem(s) listed above.        Ilene Ortiz MD  Interventional Pain Management  [facility]    Disclaimer:  This note was prepared using voice recognition system and is likely to have sound alike errors that may have been overlooked even after proof reading.  Please call me with any questions

## 2024-11-19 NOTE — PROGRESS NOTES
Established Patient Interventional Pain Note     The patient location is: home  The chief complaint leading to consultation is: L SIJ pain  Visit type: Virtual visit with synchronous audio and video  Total time spent with patient: 11-15m  Each patient to whom he or she provides medical services by telemedicine is: (1) informed of the relationship between the physician and patient and the respective role of any other health care provider with respect to management of the patient; and (2) notified that he or she may decline to receive medical services by telemedicine and may withdraw from such care at any time.        Referring Physician: No ref. provider found    PCP: Stephani Valdivia MD    Chief Complaint:   Left-sided sacroiliac joint pain       SUBJECTIVE:  Interval history 11/19/2024  Patient presents status post right-sided SI joint injection diagnostic 10/09/2024, right-sided diagnostic SI joint injection 10/25/2024 and right-sided therapeutic SI joint injection 11/13/2024.  Patient reports 95% relief following both diagnostic injections and 80% sustained relief following therapeutic injection.  Today she reports exacerbation of right-sided sacroiliac joint pain.  Today she reports pain overlying the right sacroiliac joint.  Pain is constant and today is rated a 6/10.  Pain is exacerbated from moving from sitting to standing and with prolonged standing.  She denies more distal radiculopathy into the lower extremities or feet.  She has continued physician directed physical therapy exercises for bilateral SI joint pain over the last 8 weeks from 09/19/2024 through 11/19/2024.  He continues gabapentin per her PCP.    Interval history 09/25/2024  Patient presents status post left-sided sacroiliac TransLoc fusion 08/14/2024.  Patient reports 100% relief in left-sided sacroiliac joint pain following her procedure.  She reports mild residual soreness on the left side with over exertion.  Patient would like to pursue  this procedure on the right side.  Today she reports pain overlying the right sacroiliac joint.  Pain is constant and today is rated a 6/10.  Pain can be exacerbated with moving from sitting to a standing position prolonged sitting or standing.  She denies more distal radiculopathy into the lower extremities or feet.  She has continued physician directed physical therapy exercises for bilateral SI joint pain over the last 8 weeks from 07/25/2024 through 09/25/2024 with marginal improvement in her symptoms.  She was continued gabapentin per PCP, Dr. Valdivia with noticeable improvement in her pain.      Interval History 08/21/2024  Patient presents status post left-sided sacroiliac joint fusion 08/14/2024.       Patient reports mi procedural soreness overlying left buttock.  Pain today is rated a 5/10.  She denies any drainage, purulence at the incision site.  With dressing change this morning, postoperatively placed Steri-Strips were removed.  She denies any fevers, chills.  Patient is interested in pursuing right-sided sacroiliac joint fusion.  Today she reports pain overlying the right sacroiliac joint.  Pain today is rated a 6/10.  Pain can be exacerbated with standing and with ambulation and positional changes.  She denies radicular symptoms in the lower extremities, lower extremity weakness.  She has continued physician directed physical therapy exercises for sacroiliac joint pain over the last 8 weeks from 06/21/2024 through 08/21/2024.    Interval History 07/31/2024  Patient presents status post left-sided diagnostic SI joint injection 07/26/2024 and left-sided diagnostic SI joint injection 05/10/2024 and for MRI sacroiliac joint review. Patient reports 100% relief following 2 diagnostic left-sided sacroiliac joint injections.   Today she again reports pain overlying the left-sided sacroiliac joint.  Pain today is rated an 8/10 and is constant.  Pain is exacerbated with positional changes, moving from sitting  to standing and with standing and ambulation.  Her pain does significantly impact activities of daily living and quality of life.  She has continued physician directed physical therapy exercises for bilateral sacroiliac joint pain over the last 8 weeks from 05/03/2024 through 07/31/2024 with marginal improvement in her pain.  Patient would like to pursue sacroiliac joint fusion.    Interval history 07/09/2024  Patient presents for follow-up for left-sided sacroiliac joint pain.  Of note patient has insurance, [employer], denied sacroiliac fusion.  Today she again reports pain overlying the left-sided SI joint.  Pain today is rated an 8/10.  Pain is exacerbated with positional changes, moving from sitting to standing, standing and walking.  Pain interferes with activities of daily living.  She is continued physician directed physical therapy exercises for sacroiliac joint pain over the last 8 weeks from 05/09/2024 through 07/09/2024 with marginal improvement in her symptoms.  Of note patient received 80% relief with prior therapeutic and diagnostic sacroiliac joint injection.    Interval History (06/24/2024):  Patient Silvia Cotton presents today for follow-up visit.  Patient was last seen on 6/14/2024. She is scheduled for left sided sacral RFA in July. For the past 3 weeks she has had right sided pain over the lower back/buttock for the past 3 weeks. No new injury. Pain is a 8/10. Pain is worse with crossing her legs or getting in and out of bed/car. Pain does not radiate to the legs.   Patient denies night fever/night sweats, urinary incontinence, bowel incontinence, significant weight loss and significant motor weakness.   Patient denies any other complaints or concerns at this time.    Interval History (6/14/2024):  Patient Silvia Cotton presents today for follow-up visit.  Patient was last seen on 5/10/2024 for left SIJ injection #2 with 80% relief sustained. She will still have some intermittent left sided  buttock pain but overall she is doing much better. No radiculopathy into the lower extremities.   She rates her pain today a 1/10. When the pain is severe, prior to procedure, pain would go up to a 10/10.   Patient denies night fever/night sweats, urinary incontinence, bowel incontinence, significant weight loss and significant motor weakness.   Patient denies any other complaints or concerns at this time.    Interval history 04/10/2024  Patient presents status post left-sided sacroiliac joint injection 02/27/2024.  Patient reports approximately 100% relief lasting 1 week in duration which has now subsided to approximately 60% sustained relief.  She does report pain can arise with increased activity.  When pain is present it is described as sharp and stabbing in nature overlying the left sacroiliac joint.  Pain can be exacerbated with positional change, moving from sitting to standing and with standing and ambulation.  She is continued physician directed physical therapy exercises over the last 8 weeks from 02/10/2024 through 04/10/2024 with marginal improvement in her pain.  Today she denies right-sided pain.  She denies bilateral lower extremity radiculopathy, lower extremity weakness, bowel or bladder incontinence or saddle anesthesia.    HPI 02/15/2024  Silvia Cotton is a 69 y.o. female with past medical history significant for hyperlipidemia, stage 3 chronic kidney disease, GERD who presents to the clinic for the evaluation of left-sided sacroiliac joint pain, previous Dr. Galvez patient.  Today she reports pain has been present for over 5 years without inciting accident injury or trauma.  Pain is constant and today is rated a 5/10.  Pain is described as sharp and stabbing in nature.  Patient reports pain overlying the left sacroiliac joint.  Pain is exacerbated when moving from sitting to standing and with standing and ambulation.  She reports she is only able to ambulate approximately 5-10 minutes when pain  is severe.  Pain is improved with topical application of heat and ice as well as prior left-sided sacroiliac joint injection.  She reports she received greater than 80% relief lasting several years in duration.  Patient has completed conventional land-based physical therapy in the past at [facility] in [place].  She has continued physician directed physical therapy exercises at home over the last 8 weeks from 12/15/2023 through 02/15/2024 with marginal improvement in her symptoms.  Today she denies any right-sided pain, lower extremity radiculopathy, lower extremity weakness or bowel or bladder incontinence.    Patient denies night fever/night sweats, urinary incontinence, bowel incontinence, significant weight loss, significant motor weakness, and loss of sensations.      Pain Disability Index Review:         8/21/2024     7:28 AM 6/14/2024    12:14 PM 2/15/2024     1:20 PM   Last 3 PDI Scores   Pain Disability Index (PDI) 35 15 30       Non-Pharmacologic Treatments:  Physical Therapy/Home Exercise: yes  Ice/Heat:yes  TENS: no  Acupuncture: no  Massage: no  Chiropractic: no    Other: no      Pain Medications:  - Adjuvant Medications: Elavil (Amitriptyline), Neurontin (Gabapentin), Trazodone (Desyrel), Venlafaxine (Effexor), and Zanaflex ( Tizanidine)    Pain Procedures:   Dr. Ortiz:  -11/13/2024: Right-sided therapeutic SI joint injection  -10/25/2024: Right-sided diagnostic SI joint injection  -10/09/2024: Right-sided diagnostic SI joint injection  -02/27/2024: Left-sided sacroiliac joint injection with 100% relief x 1 week then 60% relief  -05/10/2024: Left SIJ injection #2 with 80% relief sustained (DIAGNOSTIC)  -07/26/2024: Left-sided SI joint injection (DIAGNOSTIC)  -08/14/2024: Left-sided sacroiliac joint fusion      Dr. Galvez:  -01/15/2019: Left-sided sacroiliac joint injection    Past Medical History:   Diagnosis Date    Anemia     GERD (gastroesophageal reflux disease)     Hyperlipidemia     Hypertension      PONV (postoperative nausea and vomiting)     Supraventricular tachycardia     Supraventricular tachycardia 8/8/2024     Past Surgical History:   Procedure Laterality Date    APPENDECTOMY      CARPAL TUNNEL RELEASE Right 11/30/2023    Procedure: RELEASE, CARPAL TUNNEL;  Surgeon: Kiran Finley MD;  Location: [facility] OR;  Service: Orthopedics;  Laterality: Right;  right carpal tunnel release    CARPAL TUNNEL RELEASE Left 12/28/2023    Procedure: RELEASE, CARPAL TUNNEL;  Surgeon: Kiran Finley MD;  Location: [facility] OR;  Service: Orthopedics;  Laterality: Left;    CHOLECYSTECTOMY      COLONOSCOPY N/A 01/13/2017    Procedure: COLONOSCOPY;  Surgeon: Taj Nicholas MD;  Location: [facility] ENDO;  Service: Endoscopy;  Laterality: N/A;    COLONOSCOPY N/A 03/10/2022    Procedure: COLONOSCOPY;  Surgeon: Bee Brock MD;  Location: [facility] ENDO;  Service: Endoscopy;  Laterality: N/A;    FUSION OF SACROILIAC JOINT Left 08/14/2024    Procedure: FUSION, SACROILIAC JOINT;  Surgeon: Ilene Ortiz MD;  Location: [facility] OR;  Service: Pain Management;  Laterality: Left;  Posterior Percutaneous Sacroiliac Joint Arthrodesis Under Fluroscopy    INJECTION OF ANESTHETIC AGENT INTO SACROILIAC JOINT Left 02/27/2024    Procedure: Left SIJ Injection with local;  Surgeon: Ilene Ortiz MD;  Location: [facility] PAIN MGT;  Service: Pain Management;  Laterality: Left;    INJECTION OF ANESTHETIC AGENT INTO SACROILIAC JOINT Left 05/10/2024    Procedure: Left SIJ Injection (diagnostic)-Injection #2;  Surgeon: Ilene Ortiz MD;  Location: [facility] PAIN MGT;  Service: Pain Management;  Laterality: Left;    INJECTION OF ANESTHETIC AGENT INTO SACROILIAC JOINT Left 07/26/2024    Procedure: Left SIJ Injection with local (DIAGNOSTIC< NO STEROID);  Surgeon: Ilene Ortiz MD;  Location: [facility] PAIN MGT;  Service: Pain Management;  Laterality: Left;    INJECTION OF ANESTHETIC AGENT INTO SACROILIAC JOINT Right 10/9/2024    Procedure: Right SIJ  "Injection #1 diagnostic;  Surgeon: Ilene Ortiz MD;  Location: [facility] PAIN MGT;  Service: Pain Management;  Laterality: Right;    INJECTION OF ANESTHETIC AGENT INTO SACROILIAC JOINT Right 10/25/2024    Procedure: Right SIJ Injection with local; diagnostic!;  Surgeon: Ilene Ortiz MD;  Location: [facility] PAIN MGT;  Service: Pain Management;  Laterality: Right;    INJECTION OF ANESTHETIC AGENT INTO SACROILIAC JOINT Right 11/13/2024    Procedure: Right Sacroiliac Joint (therapeutic);  Surgeon: Ilene Ortiz MD;  Location: [facility] PAIN MGT;  Service: Pain Management;  Laterality: Right;    KNEE SURGERY Left 2017    SHOULDER ARTHROSCOPY      Tonsilectomy      TONSILLECTOMY      TUBAL LIGATION  1996    Uterine Ablation       Review of patient's allergies indicates:  No Known Allergies    Current Outpatient Medications   Medication Sig    amitriptyline (ELAVIL) 50 MG tablet TAKE 1 TABLET EVERY EVENING    atenoloL (TENORMIN) 25 MG tablet TAKE 1 TABLET TWICE DAILY    cyanocobalamin 1,000 mcg/mL injection Inject 1 mL (1,000 mcg total) into the muscle once a week.    ferrous gluconate (FERGON) 324 MG tablet Take 324 mg by mouth 2 (two) times a day.    gabapentin (NEURONTIN) 100 MG capsule Take 1 capsule (100 mg total) by mouth 3 (three) times daily.    needle, disp, 18 G 18 x 1 " Ndle Use to draw b12 1000 mcg from vail every 7 days for 7 weeks    needle, reusable, 25 G 25 x 1 " Ndle Use to inject B12 1000 mcg every 7 days for 7 weeks    omega-3 fatty acids/fish oil (FISH OIL-OMEGA-3 FATTY ACIDS) 300-1,000 mg capsule Take by mouth once daily.    omeprazole (PRILOSEC) 40 MG capsule TAKE 1 CAPSULE EVERY DAY    ondansetron (ZOFRAN-ODT) 4 MG TbDL Take 1 tablet (4 mg total) by mouth every 6 (six) hours as needed (nausea).    rosuvastatin (CRESTOR) 20 MG tablet TAKE 1 TABLET EVERY DAY    tiZANidine (ZANAFLEX) 4 MG tablet TAKE 1 TABLET TWICE DAILY    traZODone (DESYREL) 100 MG tablet TAKE 1 TABLET (100 MG TOTAL) BY MOUTH EVERY EVENING. "    venlafaxine (EFFEXOR-XR) 75 MG 24 hr capsule TAKE 1 CAPSULE EVERY DAY     No current facility-administered medications for this visit.     Facility-Administered Medications Ordered in Other Visits   Medication    chlorhexidine 0.12 % solution 10 mL       Review of Systems     GENERAL:  No weight loss, malaise or fevers.  HEENT:   No recent changes in vision or hearing  NECK:  Negative for lumps, no difficulty with swallowing.  RESPIRATORY:  Negative for cough, wheezing or shortness of breath, patient denies any recent URI.  CARDIOVASCULAR:  Negative for chest pain or palpitations.  GI:  Negative for abdominal discomfort, blood in stools or black stools or change in bowel habits.  MUSCULOSKELETAL:  See HPI.  SKIN:  Negative for lesions, rash, and itching.  PSYCH:  No mood disorder or recent psychosocial stressors.   HEMATOLOGY/LYMPHOLOGY:  Negative for prolonged bleeding, bruising easily or swollen nodes.    NEURO:   No history of syncope, paralysis, seizures or tremors.  All other reviewed and negative other than HPI.    OBJECTIVE:    There were no vitals taken for this visit.      Physical Exam    GENERAL: Well appearing, in no acute distress, alert and oriented x3.  PSYCH:  Mood and affect appropriate.  SKIN: Skin color, texture, turgor normal, no rashes or lesions.  HEAD/FACE:  Normocephalic, atraumatic. Cranial nerves grossly intact.    CV: RRR with palpation of the radial artery.  PULM: No evidence of respiratory difficulty, symmetric chest rise.  GI:  Soft and non-tender.    BACK: Straight leg raising in the sitting and supine positions is negative to radicular pain. No pain to palpation over the facet joints of the lumbar spine or spinous processes. Normal range of motion without pain reproduction.  EXTREMITIES: Peripheral joint ROM is full and pain free without obvious instability or laxity in all four extremities. No deformities, edema, or skin discoloration. Good capillary refill.  MUSCULOSKELETAL: Able  to stand on heels & toes.   hip, and knee provocative maneuvers are negative.    SIJ testing: RIGHT  - TTP over SI joint: Present   - Sepideh's/ Uriel's: Positive    - Sacroiliac Distraction Test (anterior pressure): Positive  - Sacroiliac Compression Test (lateral pressure): Positive   - SacralThrust Test (posterior pressure): Positive    Facet loading test is negative bilaterally.   Bilateral upper and lower extremity strength is normal and symmetric.  No atrophy or tone abnormalities are noted.    RIGHT Lower extremity: Hip flexion 5/5, Hip Abduction 5/5, Hip Adduction 5/5, Knee extension 5/5, Knee flexion 5/5, Ankle dorsiflexion5/5, Extensor hallucis longus 5/5, Ankle plantarflexion 5/5  LEFT Lower extremity:  Hip flexion 5/5, Hip Abduction 5/5,Hip Adduction 5/5, Knee extension 5/5, Knee flexion 5/5, Ankle dorsiflexion 5/5, Extensor hallucis longus 5/5, Ankle plantarflexion 5/5  -Normal testing knee (patellar) jerk and ankle (achilles) jerk    NEURO: Bilateral upper and lower extremity coordination and muscle stretch reflexes are physiologic and symmetric. No loss of sensation is noted.  GAIT: normal.    Imagin/10/19    X-Ray Lumbar Spine AP And Lateral  FINDINGS:  No scoliosis.  No acute fracture.  No listhesis.  Mild degenerative changes with spurring of the endplates and facet arthropathy is noted.  Degenerative changes of the sacroiliac joints are also noted.      MRI sacroiliac joint 2024  FINDINGS:  Bones: No fracture, marrow edema or suspicious osseous lesion identified.  L4-5 and L5-S1 degenerative disc disease and facet arthropathy.     Sacroiliac joints: Alignment normal.  Minimal bilateral sacroiliac osteoarthritis.  Small T2 hyperintense nutrient vessel left ilium along the inferior sacroiliac joint.  No significant abnormal subchondral marrow signal.  No erosions, subchondral marrow edema, ankylosis or other evidence of sacroiliitis.     Muscles and tendons: Within normal limits.      Miscellaneous: Mild right hydrosalpinx without surrounding inflammation.        Impression:     Minimal sacroiliac osteoarthritis without evidence of sacroiliitis.     L4-5 and L5-S1 degenerative disc disease and facet arthropathy.     Mild right hydrosalpinx without surrounding inflammation.         ASSESSMENT: 69 y.o. year old female with     1. Sacroiliitis  Case Request-RAD/Other Procedure Area: Left-sided sacroiliac joint TransLoc fusion      2. SI joint arthritis  Case Request-RAD/Other Procedure Area: Left-sided sacroiliac joint TransLoc fusion      3. Sacroiliac joint pain  Case Request-RAD/Other Procedure Area: Left-sided sacroiliac joint TransLoc fusion          PLAN:   - Interventions:  Schedule for right-sided TransLoc fusion - lateral oblique approach for fixation.  -10/09/2024:right-sided SI joint injection diagnostic: 95% relief   -10/25/2024:right-sided diagnostic SI joint injection:  95% relief  -11/13/2024:right-sided therapeutic SI joint injection: 80% relief  -MRI SIJ: 07/19/2024    Explained the risks and benefits of the procedure in detail with the patient today in clinic along with alternative treatment options, and the patient elected to pursue the intervention.        - Anticoagulation use: No no anticoagulation     report:  Reviewed and consistent with medication use as prescribed.    - Medications:  -Continue Gabapentin 100 mg TID per Dr. Valdivia.      -Continue Tizanidine. We have discussed potential deleterious side effects associated with this medication including  dizziness, drowsiness, dry mouth or tingling sensation in the upper or lower extremities.     - Therapy:   We discussed continuing physical therapy to help manage the patient/s painful condition. The patient was counseled that muscle strengthening will improve the long term prognosis in regards to pain and may also help increase range of motion and mobility. A referral for EXT physical therapy: [facility] in [place] was  provided to the patient.    - Imaging: Reviewed available imaging (x-ray lumbar spine, MRI sacroiliac joints) with patient and answered any questions they had regarding study.     - Follow up visit:  1 week status post SI fusion      The above plan and management options were discussed at length with patient. Patient is in agreement with the above and verbalized understanding.    - I discussed the goals of interventional chronic pain management with the patient on today's visit. We discussed a multimodal and systematic approach to pain.  This includes diagnostic and therapeutic injections, adjuvant pharmacologic treatment, physical therapy, and at times psychiatry.  I emphasized the importance of regular exercise, core strengthening and stretching, diet and weight loss as a cornerstone of long-term pain management.    - This condition does not require this patient to take time off of work, and the primary goal of our Pain Management services is to improve the patient's functional capacity.  - Patient Questions: Answered all of the patient's questions regarding diagnoses, therapy, treatment and next steps    Visit today included increased complexity associated with the care of the episodic problem of chronic pain which was addressed and continue to manage the longitudinal care of the patient due to the serious and/or complex managed problem(s) listed above.        Ilene Ortiz MD  Interventional Pain Management  [facility]    Disclaimer:  This note was prepared using voice recognition system and is likely to have sound alike errors that may have been overlooked even after proof reading.  Please call me with any questions

## 2024-11-20 ENCOUNTER — PATIENT MESSAGE (OUTPATIENT)
Dept: PAIN MEDICINE | Facility: CLINIC | Age: 69
End: 2024-11-20
Payer: MEDICARE

## 2024-11-26 DIAGNOSIS — M54.9 DORSALGIA, UNSPECIFIED: Primary | ICD-10-CM

## 2024-11-26 DIAGNOSIS — M46.1 SACROILIITIS: ICD-10-CM

## 2024-11-29 ENCOUNTER — HOSPITAL ENCOUNTER (OUTPATIENT)
Dept: RADIOLOGY | Facility: HOSPITAL | Age: 69
Discharge: HOME | End: 2024-11-29
Attending: ANESTHESIOLOGY
Payer: MEDICARE

## 2024-11-29 ENCOUNTER — PATIENT MESSAGE (OUTPATIENT)
Dept: PAIN MEDICINE | Facility: CLINIC | Age: 69
End: 2024-11-29
Payer: MEDICARE

## 2024-11-29 DIAGNOSIS — M54.9 DORSALGIA, UNSPECIFIED: ICD-10-CM

## 2024-11-29 DIAGNOSIS — M46.1 SACROILIITIS: ICD-10-CM

## 2024-11-29 PROCEDURE — 72148 MRI LUMBAR SPINE W/O DYE: CPT | Mod: 26,,, | Performed by: RADIOLOGY

## 2024-11-29 PROCEDURE — 73521 X-RAY EXAM HIPS BI 2 VIEWS: CPT | Mod: 26,,, | Performed by: RADIOLOGY

## 2024-11-29 PROCEDURE — 73521 X-RAY EXAM HIPS BI 2 VIEWS: CPT | Mod: TC,PN

## 2024-11-29 PROCEDURE — 72148 MRI LUMBAR SPINE W/O DYE: CPT | Mod: TC,PN

## 2024-11-29 NOTE — TELEPHONE ENCOUNTER
I called the patient back letting her know that its still pending on our end, however the patient stated she called the insurance and they just approved it today (11/29/2024). So I told the patient we are just probably just waiting on the fax then, but she does understands.     Latia DIAZ ([facility])

## 2024-11-29 NOTE — TELEPHONE ENCOUNTER
I called the patient and cancelled her follow up and she stated she will call us and let us know once she has the sx. Also I sent a message to the sc  to see if the patient procedure was approved yet because I didn't see it on the snapboard. I told the patient I will give her a call back to let her know.    Latia DIAZ ([facility])

## 2024-12-06 ENCOUNTER — PATIENT MESSAGE (OUTPATIENT)
Dept: PREADMISSION TESTING | Facility: HOSPITAL | Age: 69
End: 2024-12-06
Payer: MEDICARE

## 2024-12-06 DIAGNOSIS — Z01.818 PRE-OP TESTING: Primary | ICD-10-CM

## 2024-12-10 ENCOUNTER — PATIENT MESSAGE (OUTPATIENT)
Dept: PAIN MEDICINE | Facility: CLINIC | Age: 69
End: 2024-12-10
Payer: MEDICARE

## 2024-12-10 ENCOUNTER — LAB VISIT (OUTPATIENT)
Dept: LAB | Facility: HOSPITAL | Age: 69
End: 2024-12-10
Attending: PHYSICIAN ASSISTANT
Payer: MEDICARE

## 2024-12-10 ENCOUNTER — PATIENT MESSAGE (OUTPATIENT)
Dept: PREADMISSION TESTING | Facility: HOSPITAL | Age: 69
End: 2024-12-10
Payer: MEDICARE

## 2024-12-10 DIAGNOSIS — Z01.818 PRE-OP TESTING: ICD-10-CM

## 2024-12-10 LAB
ALBUMIN SERPL BCP-MCNC: 3.5 G/DL (ref 3.5–5.2)
ALP SERPL-CCNC: 68 U/L (ref 40–150)
ALT SERPL W/O P-5'-P-CCNC: 20 U/L (ref 10–44)
ANION GAP SERPL CALC-SCNC: 9 MMOL/L (ref 8–16)
AST SERPL-CCNC: 20 U/L (ref 10–40)
BILIRUB SERPL-MCNC: 0.6 MG/DL (ref 0.1–1)
BUN SERPL-MCNC: 14 MG/DL (ref 8–23)
CALCIUM SERPL-MCNC: 9 MG/DL (ref 8.7–10.5)
CHLORIDE SERPL-SCNC: 107 MMOL/L (ref 95–110)
CO2 SERPL-SCNC: 25 MMOL/L (ref 23–29)
CREAT SERPL-MCNC: 1.4 MG/DL (ref 0.5–1.4)
ERYTHROCYTE [DISTWIDTH] IN BLOOD BY AUTOMATED COUNT: 14.5 % (ref 11.5–14.5)
EST. GFR  (NO RACE VARIABLE): 41.2 ML/MIN/1.73 M^2
GLUCOSE SERPL-MCNC: 85 MG/DL (ref 70–110)
HCT VFR BLD AUTO: 39.6 % (ref 37–48.5)
HGB BLD-MCNC: 12.3 G/DL (ref 12–16)
MCH RBC QN AUTO: 30.9 PG (ref 27–31)
MCHC RBC AUTO-ENTMCNC: 31.1 G/DL (ref 32–36)
MCV RBC AUTO: 100 FL (ref 82–98)
PLATELET # BLD AUTO: 201 K/UL (ref 150–450)
PMV BLD AUTO: 10.8 FL (ref 9.2–12.9)
POTASSIUM SERPL-SCNC: 4.5 MMOL/L (ref 3.5–5.1)
PROT SERPL-MCNC: 6.5 G/DL (ref 6–8.4)
RBC # BLD AUTO: 3.98 M/UL (ref 4–5.4)
SODIUM SERPL-SCNC: 141 MMOL/L (ref 136–145)
WBC # BLD AUTO: 7.39 K/UL (ref 3.9–12.7)

## 2024-12-10 PROCEDURE — 36415 COLL VENOUS BLD VENIPUNCTURE: CPT | Mod: PN | Performed by: PHYSICIAN ASSISTANT

## 2024-12-10 PROCEDURE — 85027 COMPLETE CBC AUTOMATED: CPT | Performed by: PHYSICIAN ASSISTANT

## 2024-12-10 PROCEDURE — 80053 COMPREHEN METABOLIC PANEL: CPT | Performed by: PHYSICIAN ASSISTANT

## 2024-12-12 ENCOUNTER — ANESTHESIA EVENT (OUTPATIENT)
Dept: SURGERY | Facility: HOSPITAL | Age: 69
End: 2024-12-12
Payer: MEDICARE

## 2024-12-12 ENCOUNTER — PATIENT MESSAGE (OUTPATIENT)
Dept: RESPIRATORY THERAPY | Facility: HOSPITAL | Age: 69
End: 2024-12-12
Payer: MEDICARE

## 2024-12-13 ENCOUNTER — HOSPITAL ENCOUNTER (OUTPATIENT)
Facility: HOSPITAL | Age: 69
Discharge: HOME | End: 2024-12-13
Attending: ANESTHESIOLOGY | Admitting: ANESTHESIOLOGY
Payer: MEDICARE

## 2024-12-13 ENCOUNTER — ANESTHESIA (OUTPATIENT)
Dept: SURGERY | Facility: HOSPITAL | Age: 69
End: 2024-12-13
Payer: MEDICARE

## 2024-12-13 ENCOUNTER — HOSPITAL ENCOUNTER (OUTPATIENT)
Dept: RADIOLOGY | Facility: HOSPITAL | Age: 69
Discharge: HOME | End: 2024-12-13
Attending: ANESTHESIOLOGY | Admitting: ANESTHESIOLOGY
Payer: MEDICARE

## 2024-12-13 DIAGNOSIS — M53.3 SACROILIAC JOINT PAIN: ICD-10-CM

## 2024-12-13 DIAGNOSIS — M46.1 SACROILIITIS: Primary | ICD-10-CM

## 2024-12-13 PROCEDURE — 63600175 PHARM REV CODE 636 W HCPCS: Performed by: ANESTHESIOLOGY

## 2024-12-13 PROCEDURE — 25000003 PHARM REV CODE 250: Performed by: ANESTHESIOLOGY

## 2024-12-13 PROCEDURE — 25000003 PHARM REV CODE 250: Performed by: NURSE ANESTHETIST, CERTIFIED REGISTERED

## 2024-12-13 PROCEDURE — 71000033 HC RECOVERY, INTIAL HOUR: Performed by: ANESTHESIOLOGY

## 2024-12-13 PROCEDURE — 37000009 HC ANESTHESIA EA ADD 15 MINS: Performed by: ANESTHESIOLOGY

## 2024-12-13 PROCEDURE — 36000705 HC OR TIME LEV I EA ADD 15 MIN: Performed by: ANESTHESIOLOGY

## 2024-12-13 PROCEDURE — 72020 X-RAY EXAM OF SPINE 1 VIEW: CPT | Mod: TC

## 2024-12-13 PROCEDURE — 63600175 PHARM REV CODE 636 W HCPCS: Performed by: NURSE ANESTHETIST, CERTIFIED REGISTERED

## 2024-12-13 PROCEDURE — 71000015 HC POSTOP RECOV 1ST HR: Performed by: ANESTHESIOLOGY

## 2024-12-13 PROCEDURE — 37000008 HC ANESTHESIA 1ST 15 MINUTES: Performed by: ANESTHESIOLOGY

## 2024-12-13 PROCEDURE — C1713 ANCHOR/SCREW BN/BN,TIS/BN: HCPCS | Performed by: ANESTHESIOLOGY

## 2024-12-13 PROCEDURE — 36000704 HC OR TIME LEV I 1ST 15 MIN: Performed by: ANESTHESIOLOGY

## 2024-12-13 PROCEDURE — 27096 INJECT SACROILIAC JOINT: CPT | Mod: RT,,, | Performed by: ANESTHESIOLOGY

## 2024-12-13 RX ORDER — PROPOFOL 10 MG/ML
VIAL (ML) INTRAVENOUS
Status: DISCONTINUED | OUTPATIENT
Start: 2024-12-13 | End: 2024-12-13

## 2024-12-13 RX ORDER — FENTANYL CITRATE 50 UG/ML
INJECTION, SOLUTION INTRAMUSCULAR; INTRAVENOUS
Status: DISCONTINUED | OUTPATIENT
Start: 2024-12-13 | End: 2024-12-13

## 2024-12-13 RX ORDER — OXYCODONE AND ACETAMINOPHEN 5; 325 MG/1; MG/1
1 TABLET ORAL
Status: DISCONTINUED | OUTPATIENT
Start: 2024-12-13 | End: 2024-12-13 | Stop reason: HOSPADM

## 2024-12-13 RX ORDER — ROCURONIUM BROMIDE 10 MG/ML
INJECTION, SOLUTION INTRAVENOUS
Status: DISCONTINUED | OUTPATIENT
Start: 2024-12-13 | End: 2024-12-13

## 2024-12-13 RX ORDER — CEFAZOLIN SODIUM 1 G/3ML
INJECTION, POWDER, FOR SOLUTION INTRAMUSCULAR; INTRAVENOUS
Status: DISCONTINUED
Start: 2024-12-13 | End: 2024-12-13 | Stop reason: ALTCHOICE

## 2024-12-13 RX ORDER — MEPERIDINE HYDROCHLORIDE 25 MG/ML
12.5 INJECTION INTRAMUSCULAR; INTRAVENOUS; SUBCUTANEOUS ONCE AS NEEDED
Status: DISCONTINUED | OUTPATIENT
Start: 2024-12-13 | End: 2024-12-13 | Stop reason: HOSPADM

## 2024-12-13 RX ORDER — VANCOMYCIN HYDROCHLORIDE 1 G/20ML
INJECTION, POWDER, LYOPHILIZED, FOR SOLUTION INTRAVENOUS
Status: DISCONTINUED
Start: 2024-12-13 | End: 2024-12-13 | Stop reason: HOSPADM

## 2024-12-13 RX ORDER — DOXYCYCLINE 100 MG/1
100 CAPSULE ORAL EVERY 12 HOURS
Qty: 6 CAPSULE | Refills: 0 | Status: SHIPPED | OUTPATIENT
Start: 2024-12-13 | End: 2024-12-16

## 2024-12-13 RX ORDER — FENTANYL CITRATE 50 UG/ML
25 INJECTION, SOLUTION INTRAMUSCULAR; INTRAVENOUS EVERY 5 MIN PRN
Status: DISCONTINUED | OUTPATIENT
Start: 2024-12-13 | End: 2024-12-13 | Stop reason: HOSPADM

## 2024-12-13 RX ORDER — BACITRACIN ZINC 500 UNIT/G
OINTMENT (GRAM) TOPICAL
Status: DISCONTINUED
Start: 2024-12-13 | End: 2024-12-13 | Stop reason: WASHOUT

## 2024-12-13 RX ORDER — LIDOCAINE HYDROCHLORIDE 10 MG/ML
INJECTION, SOLUTION EPIDURAL; INFILTRATION; INTRACAUDAL; PERINEURAL
Status: DISCONTINUED
Start: 2024-12-13 | End: 2024-12-13 | Stop reason: WASHOUT

## 2024-12-13 RX ORDER — DEXAMETHASONE SODIUM PHOSPHATE 4 MG/ML
INJECTION, SOLUTION INTRA-ARTICULAR; INTRALESIONAL; INTRAMUSCULAR; INTRAVENOUS; SOFT TISSUE
Status: DISCONTINUED | OUTPATIENT
Start: 2024-12-13 | End: 2024-12-13

## 2024-12-13 RX ORDER — BUPIVACAINE HYDROCHLORIDE AND EPINEPHRINE 5; 5 MG/ML; UG/ML
INJECTION, SOLUTION EPIDURAL; INTRACAUDAL; PERINEURAL
Status: DISCONTINUED
Start: 2024-12-13 | End: 2024-12-13 | Stop reason: HOSPADM

## 2024-12-13 RX ORDER — MIDAZOLAM HYDROCHLORIDE 1 MG/ML
INJECTION INTRAMUSCULAR; INTRAVENOUS
Status: DISCONTINUED | OUTPATIENT
Start: 2024-12-13 | End: 2024-12-13

## 2024-12-13 RX ORDER — CEFAZOLIN SODIUM 1 G/3ML
INJECTION, POWDER, FOR SOLUTION INTRAMUSCULAR; INTRAVENOUS
Status: DISCONTINUED | OUTPATIENT
Start: 2024-12-13 | End: 2024-12-13 | Stop reason: HOSPADM

## 2024-12-13 RX ORDER — OXYCODONE AND ACETAMINOPHEN 5; 325 MG/1; MG/1
1 TABLET ORAL EVERY 6 HOURS PRN
Qty: 21 TABLET | Refills: 0 | Status: SHIPPED | OUTPATIENT
Start: 2024-12-13 | End: 2024-12-20

## 2024-12-13 RX ORDER — BUPIVACAINE HYDROCHLORIDE AND EPINEPHRINE 5; 5 MG/ML; UG/ML
INJECTION, SOLUTION EPIDURAL; INTRACAUDAL; PERINEURAL
Status: DISCONTINUED | OUTPATIENT
Start: 2024-12-13 | End: 2024-12-13 | Stop reason: HOSPADM

## 2024-12-13 RX ORDER — SUCCINYLCHOLINE CHLORIDE 20 MG/ML
INJECTION INTRAMUSCULAR; INTRAVENOUS
Status: DISCONTINUED | OUTPATIENT
Start: 2024-12-13 | End: 2024-12-13

## 2024-12-13 RX ORDER — EPHEDRINE SULFATE 50 MG/ML
INJECTION, SOLUTION INTRAVENOUS
Status: DISCONTINUED | OUTPATIENT
Start: 2024-12-13 | End: 2024-12-13

## 2024-12-13 RX ORDER — GLUCAGON 1 MG
1 KIT INJECTION
Status: DISCONTINUED | OUTPATIENT
Start: 2024-12-13 | End: 2024-12-13 | Stop reason: HOSPADM

## 2024-12-13 RX ORDER — VANCOMYCIN HYDROCHLORIDE 1 G/20ML
INJECTION, POWDER, LYOPHILIZED, FOR SOLUTION INTRAVENOUS
Status: DISCONTINUED | OUTPATIENT
Start: 2024-12-13 | End: 2024-12-13 | Stop reason: HOSPADM

## 2024-12-13 RX ORDER — SODIUM CHLORIDE, SODIUM LACTATE, POTASSIUM CHLORIDE, CALCIUM CHLORIDE 600; 310; 30; 20 MG/100ML; MG/100ML; MG/100ML; MG/100ML
INJECTION, SOLUTION INTRAVENOUS CONTINUOUS PRN
Status: DISCONTINUED | OUTPATIENT
Start: 2024-12-13 | End: 2024-12-13

## 2024-12-13 RX ORDER — BUPIVACAINE 13.3 MG/ML
INJECTION, SUSPENSION, LIPOSOMAL INFILTRATION
Status: DISCONTINUED
Start: 2024-12-13 | End: 2024-12-13 | Stop reason: WASHOUT

## 2024-12-13 RX ORDER — PHENYLEPHRINE HYDROCHLORIDE 10 MG/ML
INJECTION INTRAVENOUS
Status: DISCONTINUED | OUTPATIENT
Start: 2024-12-13 | End: 2024-12-13

## 2024-12-13 RX ORDER — ONDANSETRON HYDROCHLORIDE 2 MG/ML
4 INJECTION, SOLUTION INTRAVENOUS DAILY PRN
Status: DISCONTINUED | OUTPATIENT
Start: 2024-12-13 | End: 2024-12-13 | Stop reason: HOSPADM

## 2024-12-13 RX ORDER — LIDOCAINE HYDROCHLORIDE 20 MG/ML
INJECTION INTRAVENOUS
Status: DISCONTINUED | OUTPATIENT
Start: 2024-12-13 | End: 2024-12-13

## 2024-12-13 RX ORDER — CEPHALEXIN 500 MG/1
500 CAPSULE ORAL EVERY 8 HOURS
Qty: 9 CAPSULE | Refills: 0 | Status: SHIPPED | OUTPATIENT
Start: 2024-12-13 | End: 2024-12-16

## 2024-12-13 RX ORDER — ONDANSETRON HYDROCHLORIDE 2 MG/ML
INJECTION, SOLUTION INTRAVENOUS
Status: DISCONTINUED | OUTPATIENT
Start: 2024-12-13 | End: 2024-12-13

## 2024-12-13 RX ADMIN — FENTANYL CITRATE 25 MCG: 50 INJECTION, SOLUTION INTRAMUSCULAR; INTRAVENOUS at 02:12

## 2024-12-13 RX ADMIN — FENTANYL CITRATE 100 MCG: 50 INJECTION, SOLUTION INTRAMUSCULAR; INTRAVENOUS at 12:12

## 2024-12-13 RX ADMIN — SUGAMMADEX 200 MG: 100 INJECTION, SOLUTION INTRAVENOUS at 01:12

## 2024-12-13 RX ADMIN — SODIUM CHLORIDE, POTASSIUM CHLORIDE, SODIUM LACTATE AND CALCIUM CHLORIDE: 600; 310; 30; 20 INJECTION, SOLUTION INTRAVENOUS at 12:12

## 2024-12-13 RX ADMIN — OXYCODONE HYDROCHLORIDE AND ACETAMINOPHEN 1 TABLET: 5; 325 TABLET ORAL at 02:12

## 2024-12-13 RX ADMIN — ROCURONIUM BROMIDE 10 MG: 10 SOLUTION INTRAVENOUS at 12:12

## 2024-12-13 RX ADMIN — EPHEDRINE SULFATE 20 MG: 50 INJECTION INTRAVENOUS at 01:12

## 2024-12-13 RX ADMIN — PROPOFOL 200 MG: 10 INJECTION, EMULSION INTRAVENOUS at 12:12

## 2024-12-13 RX ADMIN — ROCURONIUM BROMIDE 40 MG: 10 SOLUTION INTRAVENOUS at 12:12

## 2024-12-13 RX ADMIN — EPHEDRINE SULFATE 10 MG: 50 INJECTION INTRAVENOUS at 01:12

## 2024-12-13 RX ADMIN — MIDAZOLAM 2 MG: 1 INJECTION INTRAMUSCULAR; INTRAVENOUS at 12:12

## 2024-12-13 RX ADMIN — LIDOCAINE HYDROCHLORIDE 50 MG: 20 INJECTION INTRAVENOUS at 12:12

## 2024-12-13 RX ADMIN — ONDANSETRON 4 MG: 2 INJECTION INTRAMUSCULAR; INTRAVENOUS at 01:12

## 2024-12-13 RX ADMIN — PHENYLEPHRINE HYDROCHLORIDE 100 MCG: 10 INJECTION INTRAVENOUS at 01:12

## 2024-12-13 RX ADMIN — SUCCINYLCHOLINE CHLORIDE 140 MG: 20 INJECTION, SOLUTION INTRAMUSCULAR; INTRAVENOUS; PARENTERAL at 12:12

## 2024-12-13 RX ADMIN — ONDANSETRON 4 MG: 2 INJECTION INTRAMUSCULAR; INTRAVENOUS at 02:12

## 2024-12-13 RX ADMIN — EPHEDRINE SULFATE 10 MG: 50 INJECTION INTRAVENOUS at 12:12

## 2024-12-13 RX ADMIN — DEXAMETHASONE SODIUM PHOSPHATE 4 MG: 4 INJECTION, SOLUTION INTRA-ARTICULAR; INTRALESIONAL; INTRAMUSCULAR; INTRAVENOUS; SOFT TISSUE at 01:12

## 2024-12-13 RX ADMIN — DEXTROSE 2 G: 50 INJECTION, SOLUTION INTRAVENOUS at 12:12

## 2024-12-13 NOTE — OP NOTE
TransLoc Right Posterior Percutaneous Sacroiliac Joint Arthrodesis    Patient Name: Silvia Cotton    MRN: [id number]       INFORMED CONSENT: The procedure, risks, benefits and options were discussed with patient. There are no contraindications to the procedure. The patient expressed understanding and agreed to proceed. The personnel performing the procedure was discussed.     Date of procedure: 12/13/2024       Time-out taken to identify patient and procedure side prior to starting the procedure.                    Posterior Percutaneous Sacroiliac Joint Arthrodesis Under Fluroscopy     Surgeon: Ilene Ortiz    Local Anesthetic:5ml of 0.5% bupivacaine with epinephrine and 5ml 1% lidocaine     EBL: 5 cc     Anesthesia: General     Procedure in Detail:  After risks and benefits were explained to the patient, an informed consent was obtained. A peripheral IV was started. The patient was brought to the operative suite with general endotracheal intubation/anesthesia performed. The patient was then placed in the prone position on the procedure table, taking care to ensure normal patient lordosis. Endotracheal tube was confirmed to be in satisfactory position with patients eyes free from pressure with a prone pillow. The patients arms were padded and tucked bilaterally with padding on elbows and wrists. Axillary rolls were placed bilaterally. The patient's lumbar and sacral region was prepped and draped in a surgical sterile fashion using alcohol, Hibiclens and betadine, followed by Ioban and a full sterile drape.    Time out was done.     An AP and the contralateral outlet view of the sacroiliac joint was found by finding the tilt that best allowed visualization of the sacral foramen, posterior superior iliac spine, anterior joint line, and posterior joint line. We then found an optimal contralateral outlet oblique view by lining up the Anterior and Posterior joint lines to look directly up the joint line (superimposed  view). In AP view, a line was drawn on the skin horizontally at the level of the S1 foramen with continuation of line directly lateral over the PSIS on the operative side.  Moving to an outlet contralateral oblique angle to the operative SI joint, the lateral edge of the PSIS was marked with a skin marker.  A spinal needle was then placed under fluoroscopic guidance at a 45 degree angle to reach the lateral border of the PSIS.  A 1.5 cm longitudinal incision  through the skin and subcutaneous tissue was centered on the line marking the S1 foramen, 1-1/2 cm lateral to the lateral edge of the PSIS with a 10 blade. Bovie cautery was used to ensure coagulation.    A T-handle introducer was then placed along the lateral aspect of the ilium at the level of S1 foramen, 1-1/2 cm anterolateral to the posterior aspect of the PSIS.  The introducer was then tapped 1 cm into place angling the trajectory of the introducer 10° cephalad to the top of the S1 foramen.  Before advancing the introducer, the C-arm was positioned in an inlet and lateral position to confirm the trajectory towards the sacral promontory.    C-arm was rotated back to a contralateral oblique view with the introducer advanced through the ilium across the SI joint to the sacrum.  Once across the joint, a safe trajectory was confirmed using a pelvic inlet view.  Then returned to a contralateral oblique view, and the introducer advanced until the graduated stop on the introducer touched the lateral aspect of the ilium.  The central trocar from the introducer was removed with a 12 in guidewire placed down the introducer, taking care to ensure the distal end of the guidewire stayed at least 1 cm from the anterior sacral cortex and at least 1 cm superior lateral to the S1 foramen.  This was confirmed in the AP, contralateral outlet, pelvic inlet views.    A Reamer was used over the guidewire to enlarge the screw hole and to determine the correct screw length.  The  reamer was advanced until the depth stop contacted of the lateral edge of the ileum.  Care was taken to manage the depth of the guidewires so it did not advanced during reaming.  Screw lengths of 45 mm were chosen for optimal fixation to not extend beyond the anterior sacral cortex or into the S1 foramen.     The 1st 45 mm x 8.5 mm screw) was firmly placed on the end of the T 30  shaft.  The screws carefully guided over the guidewire.  The screw was then advanced past the SI joint by rotating the T 30  shaft clockwise.  The guidewire was then removed.  The screw was then advanced/ratcheted until the screw head was flushed with the lateral edge of the ileum.  Optimal position was confirmed in the contralateral outlet view, pelvic inlet view and lateral views.  The  shaft was then wiggled loose from the screw.    A 2nd screw was then added superior in the same manner as was done for the 1st screw through the same incision.      The deeper tissue was closed with two interrupted 2-0 Vicryl CT1 pop offs and then a 4-0 monocryl suture was then used a a running subcuticular fashion to close the incision.  Dermabond was placed over the incision and a pressure dressing using gauze and tegaderm was placed over the incision.      The patient was monitored for approximately 30 minutes after the procedure.  Patient was given post procedure and discharge instructions to follow at home.  The patient was discharged in a stable condition. The patient will follow up in 1 week in clinic.

## 2024-12-13 NOTE — ANESTHESIA POSTPROCEDURE EVALUATION
Anesthesia Post Evaluation    Patient: Silvia Cotton    Procedure(s) Performed: Procedure(s) (LRB):  right-sided sacroiliac joint TransLoc fusion (Right)    Final Anesthesia Type: MAC      Patient location during evaluation: PACU  Patient participation: Yes- Able to Participate  Level of consciousness: awake and alert and oriented  Post-procedure vital signs: reviewed and stable  Pain management: adequate  Airway patency: patent    PONV status at discharge: No PONV  Anesthetic complications: no      Cardiovascular status: hemodynamically stable  Respiratory status: unassisted  Hydration status: euvolemic  Follow-up not needed.              Vitals Value Taken Time   /69 12/13/24 1520   Temp 36.2 °C (97.1 °F) 12/13/24 1520   Pulse 75 12/13/24 1520   Resp 16 12/13/24 1520   SpO2 95 % 12/13/24 1520         Event Time   Out of Recovery 14:55:00         Pain/Lashawn Score: Pain Rating Prior to Med Admin: 6 (12/13/2024  2:33 PM)  Lashawn Score: 10 (12/13/2024  3:10 PM)

## 2024-12-13 NOTE — TRANSFER OF CARE
"Anesthesia Transfer of Care Note    Patient: Silvia Cotton    Procedure(s) Performed: Procedure(s) (LRB):  right-sided sacroiliac joint TransLoc fusion (Right)    Patient location: PACU    Anesthesia Type: general    Transport from OR: Transported from OR on room air with adequate spontaneous ventilation    Post pain: adequate analgesia    Post assessment: no apparent anesthetic complications    Post vital signs: stable    Level of consciousness: awake, alert and oriented    Nausea/Vomiting: no nausea/vomiting    Complications: none    Transfer of care protocol was followed      Last vitals: Visit Vitals  BP (!) 155/81 (BP Location: Right arm, Patient Position: Sitting)   Pulse 70   Temp 36.4 °C (97.6 °F) (Temporal)   Resp 18   Ht 5' 9" (1.753 m)   Wt 119.5 kg (263 lb 7.2 oz)   SpO2 100%   Breastfeeding No   BMI 38.90 kg/m²     "

## 2024-12-13 NOTE — ANESTHESIA PREPROCEDURE EVALUATION
12/13/2024  Silvia Cotton is a 69 y.o., female.  Past Medical History:   Diagnosis Date    Anemia     GERD (gastroesophageal reflux disease)     Hyperlipidemia     Hypertension     PONV (postoperative nausea and vomiting)     Supraventricular tachycardia     Supraventricular tachycardia 8/8/2024     Past Surgical History:   Procedure Laterality Date    APPENDECTOMY      CARPAL TUNNEL RELEASE Right 11/30/2023    Procedure: RELEASE, CARPAL TUNNEL;  Surgeon: Kiran Finley MD;  Location: [facility] OR;  Service: Orthopedics;  Laterality: Right;  right carpal tunnel release    CARPAL TUNNEL RELEASE Left 12/28/2023    Procedure: RELEASE, CARPAL TUNNEL;  Surgeon: Kiran Finley MD;  Location: [facility] OR;  Service: Orthopedics;  Laterality: Left;    CHOLECYSTECTOMY      COLONOSCOPY N/A 01/13/2017    Procedure: COLONOSCOPY;  Surgeon: Taj Nicholas MD;  Location: [facility];  Service: Endoscopy;  Laterality: N/A;    COLONOSCOPY N/A 03/10/2022    Procedure: COLONOSCOPY;  Surgeon: Bee Brock MD;  Location: [facility];  Service: Endoscopy;  Laterality: N/A;    FUSION OF SACROILIAC JOINT Left 08/14/2024    Procedure: FUSION, SACROILIAC JOINT;  Surgeon: Ilene Ortiz MD;  Location: [facility] OR;  Service: Pain Management;  Laterality: Left;  Posterior Percutaneous Sacroiliac Joint Arthrodesis Under Fluroscopy    INJECTION OF ANESTHETIC AGENT INTO SACROILIAC JOINT Left 02/27/2024    Procedure: Left SIJ Injection with local;  Surgeon: Ilene Ortiz MD;  Location: [facility] PAIN [facility]GT;  Service: Pain Management;  Laterality: Left;    INJECTION OF ANESTHETIC AGENT INTO SACROILIAC JOINT Left 05/10/2024    Procedure: Left SIJ Injection (diagnostic)-Injection #2;  Surgeon: Ilene Ortiz MD;  Location: [facility];  Service: Pain Management;  Laterality: Left;    INJECTION OF ANESTHETIC AGENT  INTO SACROILIAC JOINT Left 07/26/2024    Procedure: Left SIJ Injection with local (DIAGNOSTIC< NO STEROID);  Surgeon: Ilene Ortiz MD;  [facility];  Service: Pain Management;  Laterality: Left;    INJECTION OF ANESTHETIC AGENT INTO SACROILIAC JOINT Right 10/9/2024    Procedure: Right SIJ Injection #1 diagnostic;  Surgeon: Ilene Ortiz MD;  Location: [facility];  Service: Pain Management;  Laterality: Right;    INJECTION OF ANESTHETIC AGENT INTO SACROILIAC JOINT Right 10/25/2024    Procedure: Right SIJ Injection with local; diagnostic!;  Surgeon: Ilene Ortiz MD;  Location: [facility];  Service: Pain Management;  Laterality: Right;    INJECTION OF ANESTHETIC AGENT INTO SACROILIAC JOINT Right 11/13/2024    Procedure: Right Sacroiliac Joint (therapeutic);  Surgeon: Ilene Ortiz MD;  Location: [facility];  Service: Pain Management;  Laterality: Right;    KNEE SURGERY Left 2017    SHOULDER ARTHROSCOPY      Tonsilectomy      TONSILLECTOMY      TUBAL LIGATION  1996    Uterine Ablation           Pre-op Assessment    I have reviewed the Patient Summary Reports.     I have reviewed the Nursing Notes. I have reviewed the NPO Status.   I have reviewed the Medications.     Review of Systems  Anesthesia Hx:    PONV. History of prior surgery of interest to airway management or planning: cervical fusion. Previous anesthesia: General Performed by: Tyrone Doty CRNA  Intubation:    Induction:  Intravenous   Intubated:  Postinduction   Mask Ventilation:  Easy mask   Attempts:  1   Attempted By:  CRNA   Method of Intubation:Video laryngoscopy Blade Henriquez 3 Laryngeal View Grade: Grade I - full view of cords     Difficult Airway Encountered?: No     Complications:  None   Airway Device:  Oral endotracheal tube   Airway Device Size:  7.5   Style/Cuff Inflation:cuffed inflation Amount (mL): 7 Tube secured:20 with general anesthesia.         Personal Hx of Anesthesia complications,  Post-Operative Nausea/Vomiting                    Social:  Non-Smoker       Hematology/Oncology:       -- Anemia:                                  Cardiovascular:     Hypertension           hyperlipidemia                         Hypertension         Pulmonary:  Pulmonary Normal                       Renal/:  Chronic Renal Disease        Kidney Function/Disease             Hepatic/GI:     GERD                Neurological:        Peripheral Neuropathy                        Neuromuscular Disease   Endocrine:        Obesity / BMI > 30  Psych:  Psychiatric History                Physical Exam  General: Well nourished    Airway:  Mallampati: III / II  Mouth Opening: Normal  TM Distance: 4 - 6 cm  Tongue: Normal  Neck ROM: Normal ROM    Dental:  Dentures, Edentulous    Chest/Lungs:  Normal Respiratory Rate      Anesthesia Plan  Type of Anesthesia, risks & benefits discussed:    Anesthesia Type: Gen ETT  Intra-op Monitoring Plan: Standard ASA Monitors  Post Op Pain Control Plan: multimodal analgesia and IV/PO Opioids PRN  Induction:  IV  Informed Consent: Informed consent signed with the Patient and all parties understand the risks and agree with anesthesia plan.  All questions answered. Patient consented to blood products? No  ASA Score: 3  Day of Surgery Review of History & Physical: H&P Update referred to the surgeon/provider.    Ready For Surgery From Anesthesia Perspective.     .

## 2024-12-13 NOTE — ANESTHESIA PROCEDURE NOTES
Intubation    Date/Time: 12/13/2024 12:36 PM    Performed by: Shania Moseley CRNA  Authorized by: Marcelo Braden MD    Intubation:     Induction:  Intravenous    Intubated:  Postinduction    Mask Ventilation:  Easy mask    Attempts:  1    Attempted By:  CRNA    Method of Intubation:  Video laryngoscopy    Blade:  Henriquez 3    Laryngeal View Grade: Grade I - full view of cords      Difficult Airway Encountered?: No      Complications:  None    Airway Device:  Oral endotracheal tube    Airway Device Size:  7.5    Style/Cuff Inflation:  Cuffed (inflated to minimal occlusive pressure)    Inflation Amount (mL):  7    Tube secured:  21    Secured at:  The teeth    Placement Verified By:  Capnometry    Complicating Factors:  Obesity and short neck    Findings Post-Intubation:  BS equal bilateral and atraumatic/condition of teeth unchanged

## 2024-12-15 ENCOUNTER — NURSE TRIAGE (OUTPATIENT)
Dept: ADMINISTRATIVE | Facility: CLINIC | Age: 69
End: 2024-12-15
Payer: MEDICARE

## 2024-12-16 ENCOUNTER — TELEPHONE (OUTPATIENT)
Dept: PAIN MEDICINE | Facility: CLINIC | Age: 69
End: 2024-12-16
Payer: MEDICARE

## 2024-12-16 VITALS
OXYGEN SATURATION: 95 % | DIASTOLIC BLOOD PRESSURE: 69 MMHG | WEIGHT: 263.44 LBS | BODY MASS INDEX: 39.02 KG/M2 | HEART RATE: 75 BPM | RESPIRATION RATE: 16 BRPM | TEMPERATURE: 97 F | SYSTOLIC BLOOD PRESSURE: 157 MMHG | HEIGHT: 69 IN

## 2024-12-16 NOTE — TELEPHONE ENCOUNTER
Pt had joint fusion on Friday and reports has blood seeping around bandage.pt states the bandage is saturated with blood. Per protocol instructed pt to go to ED now.   Reason for Disposition   Sounds like a serious complication to the triager    Additional Information   Negative: [1] Major abdominal surgical incision AND [2] wound gaping open AND [3] visible internal organs   Negative: Sounds like a life-threatening emergency to the triager   Negative: [1] Bleeding from incision AND [2] won't stop after 10 minutes of direct pressure   Negative: [1] Bleeding (more than a few drops) from incision AND [2] tracheostomy or blood vessel surgery (e.g., carotid endarterectomy, femoral bypass graft, kidney dialysis fistula)   Negative: [1] Widespread rash AND [2] bright red, sunburn-like   Negative: Severe pain in the incision   Negative: [1] Incision gaping open AND [2] < 48 hours since wound re-opened   Negative: [1] Incision gaping open AND [2] length of opening > 2 inches (5 cm)   Negative: Patient sounds very sick or weak to the triager    Protocols used: Post-Op Incision Symptoms and [id number]

## 2024-12-16 NOTE — TELEPHONE ENCOUNTER
Reached out to patient to schedule appointment from messages. Apt has been made.   Pt understand. All questions answered. (Pt is doing good with her bandages )    Ajit Jules  Certified Medical Assistant

## 2024-12-18 NOTE — PROGRESS NOTES
Established Patient Interventional Pain Note     The patient location is: home  The chief complaint leading to consultation is: L SIJ pain  Visit type: Virtual visit with synchronous audio and video  Total time spent with patient: 11-15m  Each patient to whom he or she provides medical services by telemedicine is: (1) informed of the relationship between the physician and patient and the respective role of any other health care provider with respect to management of the patient; and (2) notified that he or she may decline to receive medical services by telemedicine and may withdraw from such care at any time.        Referring Physician: No ref. provider found    PCP: Stephani Valdivia MD    Chief Complaint:   Right-sided sacroiliac joint pain       SUBJECTIVE:  Interval Hx: 12/19/2024  Patient presents status post right-sided trans log sacroiliac joint fusion 12/13/2024      Today patient reports 80% improvement in right-sided sacroiliac joint pain following her fusion.  She denies more distal radiculopathy into the lower extremities or feet.  She continues to report at least 80% sustained relief of prior left-sided fusion 08/14/2024.  She has mild tenderness overlying right sacroiliac joint site.  She reported mild drainage from the site which her  re-dressed.  Pain today is rated a 4/10.  She denies any purulence, erythema, exudate from the incision site as well as no fevers, chills, pruritus.      Interval history 11/19/2024  Patient presents status post right-sided SI joint injection diagnostic 10/09/2024, right-sided diagnostic SI joint injection 10/25/2024 and right-sided therapeutic SI joint injection 11/13/2024.  Patient reports 95% relief following both diagnostic injections and 80% sustained relief following therapeutic injection.  Today she reports exacerbation of right-sided sacroiliac joint pain.  Today she reports pain overlying the right sacroiliac joint.  Pain is constant and today is rated a  6/10.  Pain is exacerbated from moving from sitting to standing and with prolonged standing.  She denies more distal radiculopathy into the lower extremities or feet.  She has continued physician directed physical therapy exercises for bilateral SI joint pain over the last 8 weeks from 09/19/2024 through 11/19/2024.  He continues gabapentin per her PCP.    Interval history 09/25/2024  Patient presents status post left-sided sacroiliac TransLoc fusion 08/14/2024.  Patient reports 100% relief in left-sided sacroiliac joint pain following her procedure.  She reports mild residual soreness on the left side with over exertion.  Patient would like to pursue this procedure on the right side.  Today she reports pain overlying the right sacroiliac joint.  Pain is constant and today is rated a 6/10.  Pain can be exacerbated with moving from sitting to a standing position prolonged sitting or standing.  She denies more distal radiculopathy into the lower extremities or feet.  She has continued physician directed physical therapy exercises for bilateral SI joint pain over the last 8 weeks from 07/25/2024 through 09/25/2024 with marginal improvement in her symptoms.  She was continued gabapentin per PCP, Dr. Valdivia with noticeable improvement in her pain.      Interval History 08/21/2024  Patient presents status post left-sided sacroiliac joint fusion 08/14/2024.       Patient reports mi procedural soreness overlying left buttock.  Pain today is rated a 5/10.  She denies any drainage, purulence at the incision site.  With dressing change this morning, postoperatively placed Steri-Strips were removed.  She denies any fevers, chills.  Patient is interested in pursuing right-sided sacroiliac joint fusion.  Today she reports pain overlying the right sacroiliac joint.  Pain today is rated a 6/10.  Pain can be exacerbated with standing and with ambulation and positional changes.  She denies radicular symptoms in the lower  extremities, lower extremity weakness.  She has continued physician directed physical therapy exercises for sacroiliac joint pain over the last 8 weeks from 06/21/2024 through 08/21/2024.    Interval History 07/31/2024  Patient presents status post left-sided diagnostic SI joint injection 07/26/2024 and left-sided diagnostic SI joint injection 05/10/2024 and for MRI sacroiliac joint review. Patient reports 100% relief following 2 diagnostic left-sided sacroiliac joint injections.   Today she again reports pain overlying the left-sided sacroiliac joint.  Pain today is rated an 8/10 and is constant.  Pain is exacerbated with positional changes, moving from sitting to standing and with standing and ambulation.  Her pain does significantly impact activities of daily living and quality of life.  She has continued physician directed physical therapy exercises for bilateral sacroiliac joint pain over the last 8 weeks from 05/03/2024 through 07/31/2024 with marginal improvement in her pain.  Patient would like to pursue sacroiliac joint fusion.    Interval history 07/09/2024  Patient presents for follow-up for left-sided sacroiliac joint pain.  Of note patient has insurance, [employer] denied sacroiliac fusion.  Today she again reports pain overlying the left-sided SI joint.  Pain today is rated an 8/10.  Pain is exacerbated with positional changes, moving from sitting to standing, standing and walking.  Pain interferes with activities of daily living.  She is continued physician directed physical therapy exercises for sacroiliac joint pain over the last 8 weeks from 05/09/2024 through 07/09/2024 with marginal improvement in her symptoms.  Of note patient received 80% relief with prior therapeutic and diagnostic sacroiliac joint injection.    Interval History (06/24/2024):  Patient Silvia Cotton presents today for follow-up visit.  Patient was last seen on 6/14/2024. She is scheduled for left sided sacral RFA in July. For  the past 3 weeks she has had right sided pain over the lower back/buttock for the past 3 weeks. No new injury. Pain is a 8/10. Pain is worse with crossing her legs or getting in and out of bed/car. Pain does not radiate to the legs.   Patient denies night fever/night sweats, urinary incontinence, bowel incontinence, significant weight loss and significant motor weakness.   Patient denies any other complaints or concerns at this time.    Interval History (6/14/2024):  Patient Silvia Cotton presents today for follow-up visit.  Patient was last seen on 5/10/2024 for left SIJ injection #2 with 80% relief sustained. She will still have some intermittent left sided buttock pain but overall she is doing much better. No radiculopathy into the lower extremities.   She rates her pain today a 1/10. When the pain is severe, prior to procedure, pain would go up to a 10/10.   Patient denies night fever/night sweats, urinary incontinence, bowel incontinence, significant weight loss and significant motor weakness.   Patient denies any other complaints or concerns at this time.    Interval history 04/10/2024  Patient presents status post left-sided sacroiliac joint injection 02/27/2024.  Patient reports approximately 100% relief lasting 1 week in duration which has now subsided to approximately 60% sustained relief.  She does report pain can arise with increased activity.  When pain is present it is described as sharp and stabbing in nature overlying the left sacroiliac joint.  Pain can be exacerbated with positional change, moving from sitting to standing and with standing and ambulation.  She is continued physician directed physical therapy exercises over the last 8 weeks from 02/10/2024 through 04/10/2024 with marginal improvement in her pain.  Today she denies right-sided pain.  She denies bilateral lower extremity radiculopathy, lower extremity weakness, bowel or bladder incontinence or saddle anesthesia.    HPI  02/15/2024  Silvia Cotton is a 69 y.o. female with past medical history significant for hyperlipidemia, stage 3 chronic kidney disease, GERD who presents to the clinic for the evaluation of left-sided sacroiliac joint pain, previous Dr. Galvez patient.  Today she reports pain has been present for over 5 years without inciting accident injury or trauma.  Pain is constant and today is rated a 5/10.  Pain is described as sharp and stabbing in nature.  Patient reports pain overlying the left sacroiliac joint.  Pain is exacerbated when moving from sitting to standing and with standing and ambulation.  She reports she is only able to ambulate approximately 5-10 minutes when pain is severe.  Pain is improved with topical application of heat and ice as well as prior left-sided sacroiliac joint injection.  She reports she received greater than 80% relief lasting several years in duration.  Patient has completed conventional land-based physical therapy in the past at [facility] in [place].  She has continued physician directed physical therapy exercises at home over the last 8 weeks from 12/15/2023 through 02/15/2024 with marginal improvement in her symptoms.  Today she denies any right-sided pain, lower extremity radiculopathy, lower extremity weakness or bowel or bladder incontinence.    Patient denies night fever/night sweats, urinary incontinence, bowel incontinence, significant weight loss, significant motor weakness, and loss of sensations.      Pain Disability Index Review:         12/19/2024    10:57 AM 8/21/2024     7:28 AM 6/14/2024    12:14 PM   Last 3 PDI Scores   Pain Disability Index (PDI) 28 35 15       Non-Pharmacologic Treatments:  Physical Therapy/Home Exercise: yes  Ice/Heat:yes  TENS: no  Acupuncture: no  Massage: no  Chiropractic: no    Other: no      Pain Medications:  - Adjuvant Medications: Elavil (Amitriptyline), Neurontin (Gabapentin), Trazodone (Desyrel), Venlafaxine (Effexor), and Zanaflex (  Tizanidine)    Pain Procedures:   Dr. Ortiz:  -12/13/2024: Right-sided sacroiliac joint TransLoc posterior percutaneous arthrodesis  -11/13/2024: Right-sided therapeutic SI joint injection  -10/25/2024: Right-sided diagnostic SI joint injection  -10/09/2024: Right-sided diagnostic SI joint injection  -02/27/2024: Left-sided sacroiliac joint injection with 100% relief x 1 week then 60% relief  -05/10/2024: Left SIJ injection #2 with 80% relief sustained (DIAGNOSTIC)  -07/26/2024: Left-sided SI joint injection (DIAGNOSTIC)  -08/14/2024: Left-sided sacroiliac joint TransLoc posterior percutaneous arthrodesis      Dr. Galvez:  -01/15/2019: Left-sided sacroiliac joint injection    Past Medical History:   Diagnosis Date    Anemia     GERD (gastroesophageal reflux disease)     Hyperlipidemia     Hypertension     PONV (postoperative nausea and vomiting)     Supraventricular tachycardia     Supraventricular tachycardia 8/8/2024     Past Surgical History:   Procedure Laterality Date    APPENDECTOMY      CARPAL TUNNEL RELEASE Right 11/30/2023    Procedure: RELEASE, CARPAL TUNNEL;  Surgeon: Kiran Finley MD;  Location: [facility] OR;  Service: Orthopedics;  Laterality: Right;  right carpal tunnel release    CARPAL TUNNEL RELEASE Left 12/28/2023    Procedure: RELEASE, CARPAL TUNNEL;  Surgeon: Kiran Finley MD;  Location: [facility] OR;  Service: Orthopedics;  Laterality: Left;    CHOLECYSTECTOMY      COLONOSCOPY N/A 01/13/2017    Procedure: COLONOSCOPY;  Surgeon: Taj Nicholas MD;  Location: [facility];  Service: Endoscopy;  Laterality: N/A;    COLONOSCOPY N/A 03/10/2022    Procedure: COLONOSCOPY;  Surgeon: Bee Brock MD;  Location: [facility] ENDO;  Service: Endoscopy;  Laterality: N/A;    FUSION OF SACROILIAC JOINT Left 08/14/2024    Procedure: FUSION, SACROILIAC JOINT;  Surgeon: Ilene Ortiz MD;  Location: [facility];  Service: Pain Management;  Laterality: Left;  Posterior Percutaneous Sacroiliac Joint Arthrodesis  Under Fluroscopy    INJECTION OF ANESTHETIC AGENT INTO SACROILIAC JOINT Left 02/27/2024    Procedure: Left SIJ Injection with local;  Surgeon: Ilene Ortiz MD;  Location: [facility];  Service: Pain Management;  Laterality: Left;    INJECTION OF ANESTHETIC AGENT INTO SACROILIAC JOINT Left 05/10/2024    Procedure: Left SIJ Injection (diagnostic)-Injection #2;  Surgeon: Ilene Ortiz MD;  Location: [facility];  Service: Pain Management;  Laterality: Left;    INJECTION OF ANESTHETIC AGENT INTO SACROILIAC JOINT Left 07/26/2024    Procedure: Left SIJ Injection with local (DIAGNOSTIC< NO STEROID);  Surgeon: Ilene Ortiz MD;  Location: [facility];  Service: Pain Management;  Laterality: Left;    INJECTION OF ANESTHETIC AGENT INTO SACROILIAC JOINT Right 10/9/2024    Procedure: Right SIJ Injection #1 diagnostic;  Surgeon: Ilene Ortiz MD;  Location: [facility];  Service: Pain Management;  Laterality: Right;    INJECTION OF ANESTHETIC AGENT INTO SACROILIAC JOINT Right 10/25/2024    Procedure: Right SIJ Injection with local; diagnostic!;  Surgeon: Ilene Ortiz MD;  Location: [facility];  Service: Pain Management;  Laterality: Right;    INJECTION OF ANESTHETIC AGENT INTO SACROILIAC JOINT Right 11/13/2024    Procedure: Right Sacroiliac Joint (therapeutic);  Surgeon: Ilene Ortiz MD;  Location: [facility];  Service: Pain Management;  Laterality: Right;    INJECTION OF ANESTHETIC AGENT INTO SACROILIAC JOINT Right 12/13/2024    Procedure: right-sided sacroiliac joint TransLoc fusion;  Surgeon: Ilene Ortiz MD;  Location: [facility];  Service: Pain Management;  Laterality: Right;    KNEE SURGERY Left 2017    SHOULDER ARTHROSCOPY      Tonsilectomy      TONSILLECTOMY      TUBAL LIGATION  1996    Uterine Ablation       Review of patient's allergies indicates:  No Known Allergies    Current Outpatient Medications   Medication Sig    amitriptyline (ELAVIL) 50 MG tablet TAKE 1 TABLET EVERY EVENING     "atenoloL (TENORMIN) 25 MG tablet TAKE 1 TABLET TWICE DAILY    cyanocobalamin 1,000 mcg/mL injection Inject 1 mL (1,000 mcg total) into the muscle once a week.    ferrous gluconate (FERGON) 324 MG tablet Take 324 mg by mouth 2 (two) times a day.    gabapentin (NEURONTIN) 100 MG capsule Take 1 capsule (100 mg total) by mouth 3 (three) times daily.    needle, disp, 18 G 18 x 1 " Ndle Use to draw b12 1000 mcg from vail every 7 days for 7 weeks    needle, reusable, 25 G 25 x 1 " Ndle Use to inject B12 1000 mcg every 7 days for 7 weeks    omega-3 fatty acids/fish oil (FISH OIL-OMEGA-3 FATTY ACIDS) 300-1,000 mg capsule Take by mouth once daily.    omeprazole (PRILOSEC) 40 MG capsule TAKE 1 CAPSULE EVERY DAY    ondansetron (ZOFRAN-ODT) 4 MG TbDL Take 1 tablet (4 mg total) by mouth every 6 (six) hours as needed (nausea).    oxyCODONE-acetaminophen (PERCOCET) 5-325 mg per tablet Take 1 tablet by mouth every 6 (six) hours as needed for Pain.    rosuvastatin (CRESTOR) 20 MG tablet TAKE 1 TABLET EVERY DAY    tiZANidine (ZANAFLEX) 4 MG tablet TAKE 1 TABLET TWICE DAILY    traZODone (DESYREL) 100 MG tablet TAKE 1 TABLET (100 MG TOTAL) BY MOUTH EVERY EVENING.    venlafaxine (EFFEXOR-XR) 75 MG 24 hr capsule TAKE 1 CAPSULE EVERY DAY     No current facility-administered medications for this visit.     Facility-Administered Medications Ordered in Other Visits   Medication    chlorhexidine 0.12 % solution 10 mL       Review of Systems     GENERAL:  No weight loss, malaise or fevers.  HEENT:   No recent changes in vision or hearing  NECK:  Negative for lumps, no difficulty with swallowing.  RESPIRATORY:  Negative for cough, wheezing or shortness of breath, patient denies any recent URI.  CARDIOVASCULAR:  Negative for chest pain or palpitations.  GI:  Negative for abdominal discomfort, blood in stools or black stools or change in bowel habits.  MUSCULOSKELETAL:  See HPI.  SKIN:  Negative for lesions, rash, and itching.  PSYCH:  No mood " "disorder or recent psychosocial stressors.   HEMATOLOGY/LYMPHOLOGY:  Negative for prolonged bleeding, bruising easily or swollen nodes.    NEURO:   No history of syncope, paralysis, seizures or tremors.  All other reviewed and negative other than HPI.    OBJECTIVE:    /63   Pulse 69   Resp 17   Ht 5' 9" (1.753 m)   Wt 119 kg (262 lb 5.6 oz)   BMI 38.74 kg/m²       Physical Exam    GENERAL: Well appearing, in no acute distress, alert and oriented x3.  PSYCH:  Mood and affect appropriate.  SKIN: Skin color, texture, turgor normal, no rashes or lesions.  HEAD/FACE:  Normocephalic, atraumatic. Cranial nerves grossly intact.    CV: RRR with palpation of the radial artery.  PULM: No evidence of respiratory difficulty, symmetric chest rise.  GI:  Soft and non-tender.    BACK: Straight leg raising in the sitting and supine positions is negative to radicular pain. No pain to palpation over the facet joints of the lumbar spine or spinous processes. Normal range of motion without pain reproduction.  EXTREMITIES: Peripheral joint ROM is full and pain free without obvious instability or laxity in all four extremities. No deformities, edema, or skin discoloration. Good capillary refill.  MUSCULOSKELETAL: Able to stand on heels & toes.   hip, and knee provocative maneuvers are negative.    SIJ testing: RIGHT  - TTP over SI joint: Present   - Sepideh's/ Uriel's: Positive    - Sacroiliac Distraction Test (anterior pressure): Positive  - Sacroiliac Compression Test (lateral pressure): Positive   - SacralThrust Test (posterior pressure): Positive    Facet loading test is negative bilaterally.   Bilateral upper and lower extremity strength is normal and symmetric.  No atrophy or tone abnormalities are noted.    RIGHT Lower extremity: Hip flexion 5/5, Hip Abduction 5/5, Hip Adduction 5/5, Knee extension 5/5, Knee flexion 5/5, Ankle dorsiflexion5/5, Extensor hallucis longus 5/5, Ankle plantarflexion 5/5  LEFT Lower extremity:  " Hip flexion 5/5, Hip Abduction 5/5,Hip Adduction 5/5, Knee extension 5/5, Knee flexion 5/5, Ankle dorsiflexion 5/5, Extensor hallucis longus 5/5, Ankle plantarflexion 5/5  -Normal testing knee (patellar) jerk and ankle (achilles) jerk    NEURO: Bilateral upper and lower extremity coordination and muscle stretch reflexes are physiologic and symmetric. No loss of sensation is noted.  GAIT: normal.    Imagin/10/19    X-Ray Lumbar Spine AP And Lateral  FINDINGS:  No scoliosis.  No acute fracture.  No listhesis.  Mild degenerative changes with spurring of the endplates and facet arthropathy is noted.  Degenerative changes of the sacroiliac joints are also noted.      MRI sacroiliac joint 2024  FINDINGS:  Bones: No fracture, marrow edema or suspicious osseous lesion identified.  L4-5 and L5-S1 degenerative disc disease and facet arthropathy.     Sacroiliac joints: Alignment normal.  Minimal bilateral sacroiliac osteoarthritis.  Small T2 hyperintense nutrient vessel left ilium along the inferior sacroiliac joint.  No significant abnormal subchondral marrow signal.  No erosions, subchondral marrow edema, ankylosis or other evidence of sacroiliitis.     Muscles and tendons: Within normal limits.     Miscellaneous: Mild right hydrosalpinx without surrounding inflammation.        Impression:     Minimal sacroiliac osteoarthritis without evidence of sacroiliitis.     L4-5 and L5-S1 degenerative disc disease and facet arthropathy.     Mild right hydrosalpinx without surrounding inflammation.         ASSESSMENT: 69 y.o. year old female with     1. Sacroiliitis        2. Sacroiliac joint pain        3. S/P fusion of sacroiliac joint              PLAN:   - Interventions:  Patient has 80% sustained relief status post bilateral trans block fusion for sacroiliitis and sacroiliac joint pain.  No intervention at this time.    - Anticoagulation use: No no anticoagulation     report:  Reviewed and consistent with  medication use as prescribed.    - Medications:  -Continue Gabapentin 100 mg TID per Dr. Valdivia.    -Continue Tizanidine 4 mg BID PRN per Dr. Valdivia.     - Therapy:   We discussed continuing physical therapy to help manage the patient/s painful condition. The patient was counseled that muscle strengthening will improve the long term prognosis in regards to pain and may also help increase range of motion and mobility. A referral for EXT physical therapy: [facility] in [place] was provided to the patient.    - Imaging: Reviewed available imaging (x-ray lumbar spine, MRI sacroiliac joints) with patient and answered any questions they had regarding study.     - Follow up visit:  One-month.  Virtual visit      The above plan and management options were discussed at length with patient. Patient is in agreement with the above and verbalized understanding.    - I discussed the goals of interventional chronic pain management with the patient on today's visit. We discussed a multimodal and systematic approach to pain.  This includes diagnostic and therapeutic injections, adjuvant pharmacologic treatment, physical therapy, and at times psychiatry.  I emphasized the importance of regular exercise, core strengthening and stretching, diet and weight loss as a cornerstone of long-term pain management.    - This condition does not require this patient to take time off of work, and the primary goal of our Pain Management services is to improve the patient's functional capacity.  - Patient Questions: Answered all of the patient's questions regarding diagnoses, therapy, treatment and next steps    Visit today included increased complexity associated with the care of the episodic problem of chronic pain which was addressed and continue to manage the longitudinal care of the patient due to the serious and/or complex managed problem(s) listed above.        Ilene Ortiz MD  Interventional Pain Management  [facility]  [place]    Disclaimer:  This note was prepared using voice recognition system and is likely to have sound alike errors that may have been overlooked even after proof reading.  Please call me with any questions

## 2024-12-19 ENCOUNTER — OFFICE VISIT (OUTPATIENT)
Dept: PAIN MEDICINE | Facility: CLINIC | Age: 69
End: 2024-12-19
Payer: MEDICARE

## 2024-12-19 VITALS
BODY MASS INDEX: 38.86 KG/M2 | SYSTOLIC BLOOD PRESSURE: 135 MMHG | HEART RATE: 69 BPM | HEIGHT: 69 IN | DIASTOLIC BLOOD PRESSURE: 63 MMHG | WEIGHT: 262.38 LBS | RESPIRATION RATE: 17 BRPM

## 2024-12-19 DIAGNOSIS — Z98.1 S/P FUSION OF SACROILIAC JOINT: ICD-10-CM

## 2024-12-19 DIAGNOSIS — M46.1 SACROILIITIS: Primary | ICD-10-CM

## 2024-12-19 DIAGNOSIS — M53.3 SACROILIAC JOINT PAIN: ICD-10-CM

## 2024-12-19 PROCEDURE — 99999 PR PBB SHADOW E&M-EST. PATIENT-LVL IV: CPT | Mod: PBBFAC,HCNC,, | Performed by: ANESTHESIOLOGY

## 2024-12-30 DIAGNOSIS — M25.521 RIGHT ELBOW PAIN: Primary | ICD-10-CM

## 2025-01-03 RX ORDER — ROSUVASTATIN CALCIUM 20 MG/1
20 TABLET, COATED ORAL
Qty: 90 TABLET | Refills: 2 | Status: SHIPPED | OUTPATIENT
Start: 2025-01-03

## 2025-01-03 RX ORDER — VENLAFAXINE HYDROCHLORIDE 75 MG/1
75 CAPSULE, EXTENDED RELEASE ORAL
Qty: 90 CAPSULE | Refills: 2 | Status: SHIPPED | OUTPATIENT
Start: 2025-01-03

## 2025-01-03 NOTE — TELEPHONE ENCOUNTER
No care due was identified.  Health [facility] Embedded Care Due Messages. Reference number: [id number].   1/03/2025 1:26:11 AM CST

## 2025-01-04 NOTE — TELEPHONE ENCOUNTER
Refill Routing Note   Medication(s) are not appropriate for processing by [facility] for the following reason(s):        Drug-disease interaction    [facility] action(s):  Defer  Approve      Medication Therapy Plan: Drug-Disease: venlafaxine and Chronic kidney disease, stage 3a      Appointments  past 12m or future 3m with PCP    Date Provider   Last Visit   8/13/2024 Stephani Valdivia MD   Next Visit   2/20/2025 Stephani Valdivia MD   ED visits in past 90 days: 0        Note composed:6:01 PM 01/03/2025

## 2025-01-14 ENCOUNTER — OFFICE VISIT (OUTPATIENT)
Dept: ORTHOPEDICS | Facility: CLINIC | Age: 70
End: 2025-01-14
Payer: MEDICARE

## 2025-01-14 ENCOUNTER — HOSPITAL ENCOUNTER (OUTPATIENT)
Dept: RADIOLOGY | Facility: HOSPITAL | Age: 70
Discharge: HOME | End: 2025-01-14
Attending: ORTHOPAEDIC SURGERY
Payer: MEDICARE

## 2025-01-14 VITALS — WEIGHT: 262.38 LBS | HEIGHT: 69 IN | BODY MASS INDEX: 38.86 KG/M2

## 2025-01-14 DIAGNOSIS — M25.521 RIGHT ELBOW PAIN: ICD-10-CM

## 2025-01-14 DIAGNOSIS — M77.11 LATERAL EPICONDYLITIS OF RIGHT ELBOW: Primary | ICD-10-CM

## 2025-01-14 PROCEDURE — 1101F PT FALLS ASSESS-DOCD LE1/YR: CPT | Mod: HCNC,CPTII,S$GLB, | Performed by: ORTHOPAEDIC SURGERY

## 2025-01-14 PROCEDURE — 3288F FALL RISK ASSESSMENT DOCD: CPT | Mod: HCNC,CPTII,S$GLB, | Performed by: ORTHOPAEDIC SURGERY

## 2025-01-14 PROCEDURE — 20551 NJX 1 TENDON ORIGIN/INSJ: CPT | Mod: HCNC,RT,S$GLB, | Performed by: ORTHOPAEDIC SURGERY

## 2025-01-14 PROCEDURE — 3008F BODY MASS INDEX DOCD: CPT | Mod: HCNC,CPTII,S$GLB, | Performed by: ORTHOPAEDIC SURGERY

## 2025-01-14 PROCEDURE — 99999 PR PBB SHADOW E&M-EST. PATIENT-LVL III: CPT | Mod: PBBFAC,HCNC,, | Performed by: ORTHOPAEDIC SURGERY

## 2025-01-14 PROCEDURE — 1125F AMNT PAIN NOTED PAIN PRSNT: CPT | Mod: HCNC,CPTII,S$GLB, | Performed by: ORTHOPAEDIC SURGERY

## 2025-01-14 PROCEDURE — 1160F RVW MEDS BY RX/DR IN RCRD: CPT | Mod: HCNC,CPTII,S$GLB, | Performed by: ORTHOPAEDIC SURGERY

## 2025-01-14 PROCEDURE — 73080 X-RAY EXAM OF ELBOW: CPT | Mod: TC,HCNC,RT

## 2025-01-14 PROCEDURE — 99213 OFFICE O/P EST LOW 20 MIN: CPT | Mod: HCNC,25,S$GLB, | Performed by: ORTHOPAEDIC SURGERY

## 2025-01-14 PROCEDURE — 1159F MED LIST DOCD IN RCRD: CPT | Mod: HCNC,CPTII,S$GLB, | Performed by: ORTHOPAEDIC SURGERY

## 2025-01-14 PROCEDURE — 73080 X-RAY EXAM OF ELBOW: CPT | Mod: 26,HCNC,RT, | Performed by: RADIOLOGY

## 2025-01-14 RX ORDER — TRIAMCINOLONE ACETONIDE 40 MG/ML
40 INJECTION, SUSPENSION INTRA-ARTICULAR; INTRAMUSCULAR
Status: DISCONTINUED | OUTPATIENT
Start: 2025-01-14 | End: 2025-01-14 | Stop reason: HOSPADM

## 2025-01-14 RX ADMIN — TRIAMCINOLONE ACETONIDE 40 MG: 40 INJECTION, SUSPENSION INTRA-ARTICULAR; INTRAMUSCULAR at 11:01

## 2025-01-14 NOTE — PROGRESS NOTES
Subjective:     Patient ID: Silvia Cotton is a 69 y.o. female.    Chief Complaint: Pain of the Right Elbow      HPI:  The patient is a 69-year-old female with right elbow lateral epicondylitis present for the last several months who requests injection today.  She has tried tennis elbow brace without improvement  Past Medical History:   Diagnosis Date    Anemia     GERD (gastroesophageal reflux disease)     Hyperlipidemia     Hypertension     PONV (postoperative nausea and vomiting)     Supraventricular tachycardia     Supraventricular tachycardia 8/8/2024     Past Surgical History:   Procedure Laterality Date    APPENDECTOMY      CARPAL TUNNEL RELEASE Right 11/30/2023    Procedure: RELEASE, CARPAL TUNNEL;  Surgeon: Kiran Finley MD;  Location: [facility] OR;  Service: Orthopedics;  Laterality: Right;  right carpal tunnel release    CARPAL TUNNEL RELEASE Left 12/28/2023    Procedure: RELEASE, CARPAL TUNNEL;  Surgeon: Kiran Finley MD;  Location: [facility] OR;  Service: Orthopedics;  Laterality: Left;    CHOLECYSTECTOMY      COLONOSCOPY N/A 01/13/2017    Procedure: COLONOSCOPY;  Surgeon: Tja Nicholas MD;  Location: [facility] ENDO;  Service: Endoscopy;  Laterality: N/A;    COLONOSCOPY N/A 03/10/2022    Procedure: COLONOSCOPY;  Surgeon: Bee Brock MD;  Location: [facility] ENDO;  Service: Endoscopy;  Laterality: N/A;    FUSION OF SACROILIAC JOINT Left 08/14/2024    Procedure: FUSION, SACROILIAC JOINT;  Surgeon: Ilene Ortiz MD;  Location: [facility] OR;  Service: Pain Management;  Laterality: Left;  Posterior Percutaneous Sacroiliac Joint Arthrodesis Under Fluroscopy    INJECTION OF ANESTHETIC AGENT INTO SACROILIAC JOINT Left 02/27/2024    Procedure: Left SIJ Injection with local;  Surgeon: Ilene Ortiz MD;  Location: [facility] MGT;  Service: Pain Management;  Laterality: Left;    INJECTION OF ANESTHETIC AGENT INTO SACROILIAC JOINT Left 05/10/2024    Procedure: Left SIJ Injection (diagnostic)-Injection #2;   Surgeon: Ilene Ortiz MD;  Location: [facility] PAIN MGT;  Service: Pain Management;  Laterality: Left;    INJECTION OF ANESTHETIC AGENT INTO SACROILIAC JOINT Left 07/26/2024    Procedure: Left SIJ Injection with local (DIAGNOSTIC< NO STEROID);  Surgeon: Ilene Ortiz MD;  Location: [facility] PAIN MGT;  Service: Pain Management;  Laterality: Left;    INJECTION OF ANESTHETIC AGENT INTO SACROILIAC JOINT Right 10/9/2024    Procedure: Right SIJ Injection #1 diagnostic;  Surgeon: Ilene Ortiz MD;  Location: [facility] PAIN MGT;  Service: Pain Management;  Laterality: Right;    INJECTION OF ANESTHETIC AGENT INTO SACROILIAC JOINT Right 10/25/2024    Procedure: Right SIJ Injection with local; diagnostic!;  Surgeon: Ilene Ortiz MD;  Location: [facility] PAIN MGT;  Service: Pain Management;  Laterality: Right;    INJECTION OF ANESTHETIC AGENT INTO SACROILIAC JOINT Right 11/13/2024    Procedure: Right Sacroiliac Joint (therapeutic);  Surgeon: Ilene Ortiz MD;  Location: [facility] PAIN MGT;  Service: Pain Management;  Laterality: Right;    INJECTION OF ANESTHETIC AGENT INTO SACROILIAC JOINT Right 12/13/2024    Procedure: right-sided sacroiliac joint TransLoc fusion;  Surgeon: Ilene Ortiz MD;  Location: [facility] OR;  Service: Pain Management;  Laterality: Right;    KNEE SURGERY Left 2017    SHOULDER ARTHROSCOPY      Tonsilectomy      TONSILLECTOMY      TUBAL LIGATION  1996    Uterine Ablation       Family History   Problem Relation Name Age of Onset    Cataracts Mother Rin Lass     Heart block Mother Rin Lass     Arthritis Mother Rin Lass     Depression Mother Rin Lass     Heart disease Mother Rin Lass     Cataracts Father Farhad Lass     Arthritis Father Farhad Lass     Heart disease Father Farhad Lass     Hypertension Father Farhad Lass     Hypertension Sister      Hypertension Sister      Hypertension Brother Joao Lass     Heart disease Brother Joao Lass     Glaucoma Neg Hx      Diabetes Neg Hx      Breast cancer Neg Hx       Colon cancer Neg Hx      Ovarian cancer Neg Hx       Social History     Socioeconomic History    Marital status:    Tobacco Use    Smoking status: Never     Passive exposure: Never    Smokeless tobacco: Never   Substance and Sexual Activity    Alcohol use: Not Currently    Drug use: Not Currently    Sexual activity: Not Currently     Partners: Male     Birth control/protection: Post-menopausal, None     Social Drivers of Health     Financial Resource Strain: Medium Risk (9/25/2024)    Overall Financial Resource Strain (CARDIA)     Difficulty of Paying Living Expenses: Somewhat hard   Food Insecurity: No Food Insecurity (9/25/2024)    Hunger Vital Sign     Worried About Running Out of Food in the Last Year: Never true     Ran Out of Food in the Last Year: Never true   Transportation Needs: No Transportation Needs (9/25/2024)    PRAPARE - Transportation     Lack of Transportation (Medical): No     Lack of Transportation (Non-Medical): No   Physical Activity: Inactive (9/25/2024)    Exercise Vital Sign     Days of Exercise per Week: 0 days     Minutes of Exercise per Session: 0 min   Stress: No Stress Concern Present (9/25/2024)    [employer] - Occupational Stress Questionnaire     Feeling of Stress : Not at all   Housing Stability: Low Risk  (9/25/2024)    Housing Stability Vital Sign     Unable to Pay for Housing in the Last Year: No     Homeless in the Last Year: No     Medication List with Changes/Refills   Current Medications    AMITRIPTYLINE (ELAVIL) 50 MG TABLET    TAKE 1 TABLET EVERY EVENING    ATENOLOL (TENORMIN) 25 MG TABLET    TAKE 1 TABLET TWICE DAILY    CYANOCOBALAMIN 1,000 MCG/ML INJECTION    Inject 1 mL (1,000 mcg total) into the muscle once a week.    FERROUS GLUCONATE (FERGON) 324 MG TABLET    Take 324 mg by mouth 2 (two) times a day.    GABAPENTIN (NEURONTIN) 100 MG CAPSULE    Take 1 capsule (100 mg total) by mouth 3 (three) times daily.    NEEDLE, DISP, 18 G 18 X  "1 " NDLE    Use to draw b12 1000 mcg from [place] every 7 days for 7 weeks    NEEDLE, REUSABLE, 25 G 25 X 1 " NDLE    Use to inject B12 1000 mcg every 7 days for 7 weeks    OMEGA-3 FATTY ACIDS/FISH OIL (FISH OIL-OMEGA-3 FATTY ACIDS) 300-1,000 MG CAPSULE    Take by mouth once daily.    OMEPRAZOLE (PRILOSEC) 40 MG CAPSULE    TAKE 1 CAPSULE EVERY DAY    ONDANSETRON (ZOFRAN-ODT) 4 MG TBDL    Take 1 tablet (4 mg total) by mouth every 6 (six) hours as needed (nausea).    ROSUVASTATIN (CRESTOR) 20 MG TABLET    TAKE 1 TABLET EVERY DAY    TIZANIDINE (ZANAFLEX) 4 MG TABLET    TAKE 1 TABLET TWICE DAILY    TRAZODONE (DESYREL) 100 MG TABLET    TAKE 1 TABLET (100 MG TOTAL) BY MOUTH EVERY EVENING.    VENLAFAXINE (EFFEXOR-XR) 75 MG 24 HR CAPSULE    TAKE 1 CAPSULE EVERY DAY     Review of patient's allergies indicates:  No Known Allergies  Review of Systems   Constitutional: Negative for malaise/fatigue.   HENT:  Negative for hearing loss.    Eyes:  Negative for double vision and visual disturbance.   Cardiovascular:  Positive for palpitations. Negative for chest pain.   Respiratory:  Negative for shortness of breath.    Endocrine: Negative for cold intolerance.   Hematologic/Lymphatic: Does not bruise/bleed easily.   Skin:  Negative for poor wound healing and suspicious lesions.   Musculoskeletal:  Positive for back pain. Negative for gout, joint pain and joint swelling.   Gastrointestinal:  Positive for heartburn. Negative for nausea and vomiting.   Genitourinary:  Negative for dysuria.   Neurological:  Positive for numbness, paresthesias and sensory change.   Psychiatric/Behavioral:  Positive for altered mental status. Negative for depression, memory loss and substance abuse. The patient has insomnia. The patient is not nervous/anxious.    Allergic/Immunologic: Negative for persistent infections.       Objective:   Body mass index is 38.74 kg/m².  There were no vitals filed for this visit.             General    Constitutional: She " is oriented to person, place, and time. She appears well-developed and well-nourished. No distress.   HENT:   Head: Normocephalic.   Eyes: EOM are normal.   Pulmonary/Chest: Effort normal.   Neurological: She is oriented to person, place, and time.   Psychiatric: She has a normal mood and affect.             Right Hand/Wrist Exam     Other     Neuorologic Exam    Median Distribution: normal  Ulnar Distribution: normal  Radial Distribution: normal      Right Elbow Exam     Inspection   Scars: absent  Effusion: absent    Pain   The patient exhibits pain of the lateral epicondyle    Other   Sensation: normal    Comments:  The patient has tenderness well localized right elbow lateral epicondyle with pain on resisted wrist and finger extension          Vascular Exam       Capillary Refill  Right Hand: normal capillary refill          Relevant imaging results reviewed and interpreted by me, discussed with the patient and / or family today radiographs right elbow were normal  Assessment:     Encounter Diagnosis   Name Primary?    Lateral epicondylitis of right elbow Yes        Plan:       The patient was injected right elbow lateral epicondyle with 1 cc Kenalog and 1 cc 2% plain lidocaine under sterile technique.  She was instructed in appropriate use of the tennis elbow brace.  She was given literature on tenjet procedure with Dr. Ruiz.  She will wait at least 3 months between injections.              Disclaimer: This note was prepared using a voice recognition system and is likely to have sound alike errors within the text.

## 2025-01-14 NOTE — PROCEDURES
Tendon Origin: L elbow    Date/Time: 1/14/2025 11:30 AM    Performed by: Kiran Finley MD  Authorized by: Kiran Finley MD    Consent Done?:  Yes (Verbal)  Timeout: prior to procedure the correct patient, procedure, and site was verified    Indications:  Pain  Timeout: prior to procedure the correct patient, procedure, and site was verified    Location:  Elbow  Site:  L elbow  Prep: patient was prepped and draped in usual sterile fashion    Ultrasonic Guidance for Needle Placement?: No    Needle size:  25 G  Approach:  Anterolateral  Medications:  40 mg triamcinolone acetonide 40 mg/mL     no

## 2025-01-23 ENCOUNTER — OFFICE VISIT (OUTPATIENT)
Dept: PAIN MEDICINE | Facility: CLINIC | Age: 70
End: 2025-01-23
Payer: MEDICARE

## 2025-01-23 ENCOUNTER — PATIENT MESSAGE (OUTPATIENT)
Dept: PAIN MEDICINE | Facility: CLINIC | Age: 70
End: 2025-01-23

## 2025-01-23 DIAGNOSIS — M53.3 SACROILIAC JOINT PAIN: ICD-10-CM

## 2025-01-23 DIAGNOSIS — M46.1 SACROILIITIS: Primary | ICD-10-CM

## 2025-01-23 DIAGNOSIS — Z98.1 S/P FUSION OF SACROILIAC JOINT: ICD-10-CM

## 2025-01-23 NOTE — PROGRESS NOTES
Established Patient Interventional Pain Note     The patient location is: home  The chief complaint leading to consultation is: R SIJ pain  Visit type: Virtual visit with synchronous audio and video  Total time spent with patient: 11-15m  Each patient to whom he or she provides medical services by telemedicine is: (1) informed of the relationship between the physician and patient and the respective role of any other health care provider with respect to management of the patient; and (2) notified that he or she may decline to receive medical services by telemedicine and may withdraw from such care at any time.        Referring Physician: No ref. provider found    PCP: Stephani Valdivia MD    Chief Complaint:   Right-sided sacroiliac joint pain       SUBJECTIVE:  Interval history 01/23/2025  Patient presents status post right-sided trans loc sacroiliac joint fusion 12/13/2024.  Patient reports at least 90% sustained relief across both sacroiliac joints following bilateral fusion.  Today she reports improvement in range of motion and functionality.  This week she was able to play with her grandchildren outside secondary to her improved pain levels and functionality.  Today she denies significant lower extremity weakness, bowel or bladder incontinence or saddle anesthesia.  She continues antispasmodics and gabapentin per primary care provider.      Interval Hx: 12/19/2024  Patient presents status post right-sided trans loc sacroiliac joint fusion 12/13/2024      Today patient reports 80% improvement in right-sided sacroiliac joint pain following her fusion.  She denies more distal radiculopathy into the lower extremities or feet.  She continues to report at least 80% sustained relief of prior left-sided fusion 08/14/2024.  She has mild tenderness overlying right sacroiliac joint site.  She reported mild drainage from the site which her  re-dressed.  Pain today is rated a 4/10.  She denies any purulence, erythema,  exudate from the incision site as well as no fevers, chills, pruritus.      Interval history 11/19/2024  Patient presents status post right-sided SI joint injection diagnostic 10/09/2024, right-sided diagnostic SI joint injection 10/25/2024 and right-sided therapeutic SI joint injection 11/13/2024.  Patient reports 95% relief following both diagnostic injections and 80% sustained relief following therapeutic injection.  Today she reports exacerbation of right-sided sacroiliac joint pain.  Today she reports pain overlying the right sacroiliac joint.  Pain is constant and today is rated a 6/10.  Pain is exacerbated from moving from sitting to standing and with prolonged standing.  She denies more distal radiculopathy into the lower extremities or feet.  She has continued physician directed physical therapy exercises for bilateral SI joint pain over the last 8 weeks from 09/19/2024 through 11/19/2024.  He continues gabapentin per her PCP.    Interval history 09/25/2024  Patient presents status post left-sided sacroiliac TransLoc fusion 08/14/2024.  Patient reports 100% relief in left-sided sacroiliac joint pain following her procedure.  She reports mild residual soreness on the left side with over exertion.  Patient would like to pursue this procedure on the right side.  Today she reports pain overlying the right sacroiliac joint.  Pain is constant and today is rated a 6/10.  Pain can be exacerbated with moving from sitting to a standing position prolonged sitting or standing.  She denies more distal radiculopathy into the lower extremities or feet.  She has continued physician directed physical therapy exercises for bilateral SI joint pain over the last 8 weeks from 07/25/2024 through 09/25/2024 with marginal improvement in her symptoms.  She was continued gabapentin per PCP, Dr. Valdivia with noticeable improvement in her pain.      Interval History 08/21/2024  Patient presents status post left-sided sacroiliac joint  fusion 08/14/2024.       Patient reports mi procedural soreness overlying left buttock.  Pain today is rated a 5/10.  She denies any drainage, purulence at the incision site.  With dressing change this morning, postoperatively placed Steri-Strips were removed.  She denies any fevers, chills.  Patient is interested in pursuing right-sided sacroiliac joint fusion.  Today she reports pain overlying the right sacroiliac joint.  Pain today is rated a 6/10.  Pain can be exacerbated with standing and with ambulation and positional changes.  She denies radicular symptoms in the lower extremities, lower extremity weakness.  She has continued physician directed physical therapy exercises for sacroiliac joint pain over the last 8 weeks from 06/21/2024 through 08/21/2024.    Interval History 07/31/2024  Patient presents status post left-sided diagnostic SI joint injection 07/26/2024 and left-sided diagnostic SI joint injection 05/10/2024 and for MRI sacroiliac joint review. Patient reports 100% relief following 2 diagnostic left-sided sacroiliac joint injections.   Today she again reports pain overlying the left-sided sacroiliac joint.  Pain today is rated an 8/10 and is constant.  Pain is exacerbated with positional changes, moving from sitting to standing and with standing and ambulation.  Her pain does significantly impact activities of daily living and quality of life.  She has continued physician directed physical therapy exercises for bilateral sacroiliac joint pain over the last 8 weeks from 05/03/2024 through 07/31/2024 with marginal improvement in her pain.  Patient would like to pursue sacroiliac joint fusion.    Interval history 07/09/2024  Patient presents for follow-up for left-sided sacroiliac joint pain.  Of note patient has insurance, [employer], denied sacroiliac fusion.  Today she again reports pain overlying the left-sided SI joint.  Pain today is rated an 8/10.  Pain is exacerbated with positional changes,  moving from sitting to standing, standing and walking.  Pain interferes with activities of daily living.  She is continued physician directed physical therapy exercises for sacroiliac joint pain over the last 8 weeks from 05/09/2024 through 07/09/2024 with marginal improvement in her symptoms.  Of note patient received 80% relief with prior therapeutic and diagnostic sacroiliac joint injection.    Interval History (06/24/2024):  Patient Silvia Cotton presents today for follow-up visit.  Patient was last seen on 6/14/2024. She is scheduled for left sided sacral RFA in July. For the past 3 weeks she has had right sided pain over the lower back/buttock for the past 3 weeks. No new injury. Pain is a 8/10. Pain is worse with crossing her legs or getting in and out of bed/car. Pain does not radiate to the legs.   Patient denies night fever/night sweats, urinary incontinence, bowel incontinence, significant weight loss and significant motor weakness.   Patient denies any other complaints or concerns at this time.    Interval History (6/14/2024):  Patient Silvia Cotton presents today for follow-up visit.  Patient was last seen on 5/10/2024 for left SIJ injection #2 with 80% relief sustained. She will still have some intermittent left sided buttock pain but overall she is doing much better. No radiculopathy into the lower extremities.   She rates her pain today a 1/10. When the pain is severe, prior to procedure, pain would go up to a 10/10.   Patient denies night fever/night sweats, urinary incontinence, bowel incontinence, significant weight loss and significant motor weakness.   Patient denies any other complaints or concerns at this time.    Interval history 04/10/2024  Patient presents status post left-sided sacroiliac joint injection 02/27/2024.  Patient reports approximately 100% relief lasting 1 week in duration which has now subsided to approximately 60% sustained relief.  She does report pain can arise with  increased activity.  When pain is present it is described as sharp and stabbing in nature overlying the left sacroiliac joint.  Pain can be exacerbated with positional change, moving from sitting to standing and with standing and ambulation.  She is continued physician directed physical therapy exercises over the last 8 weeks from 02/10/2024 through 04/10/2024 with marginal improvement in her pain.  Today she denies right-sided pain.  She denies bilateral lower extremity radiculopathy, lower extremity weakness, bowel or bladder incontinence or saddle anesthesia.    HPI 02/15/2024  Silvia Cotton is a 69 y.o. female with past medical history significant for hyperlipidemia, stage 3 chronic kidney disease, GERD who presents to the clinic for the evaluation of left-sided sacroiliac joint pain, previous Dr. Galvez patient.  Today she reports pain has been present for over 5 years without inciting accident injury or trauma.  Pain is constant and today is rated a 5/10.  Pain is described as sharp and stabbing in nature.  Patient reports pain overlying the left sacroiliac joint.  Pain is exacerbated when moving from sitting to standing and with standing and ambulation.  She reports she is only able to ambulate approximately 5-10 minutes when pain is severe.  Pain is improved with topical application of heat and ice as well as prior left-sided sacroiliac joint injection.  She reports she received greater than 80% relief lasting several years in duration.  Patient has completed conventional land-based physical therapy in the past at [facility] in [place].  She has continued physician directed physical therapy exercises at home over the last 8 weeks from 12/15/2023 through 02/15/2024 with marginal improvement in her symptoms.  Today she denies any right-sided pain, lower extremity radiculopathy, lower extremity weakness or bowel or bladder incontinence.    Patient denies night fever/night sweats, urinary incontinence, bowel  incontinence, significant weight loss, significant motor weakness, and loss of sensations.      Pain Disability Index Review:         12/19/2024    10:57 AM 8/21/2024     7:28 AM 6/14/2024    12:14 PM   Last 3 PDI Scores   Pain Disability Index (PDI) 28 35 15       Non-Pharmacologic Treatments:  Physical Therapy/Home Exercise: yes  Ice/Heat:yes  TENS: no  Acupuncture: no  Massage: no  Chiropractic: no    Other: no      Pain Medications:  - Adjuvant Medications: Elavil (Amitriptyline), Neurontin (Gabapentin), Trazodone (Desyrel), Venlafaxine (Effexor), and Zanaflex ( Tizanidine)    Pain Procedures:   Dr. Ortiz:  -12/13/2024: Right-sided sacroiliac joint TransLoc posterior percutaneous arthrodesis  -11/13/2024: Right-sided therapeutic SI joint injection  -10/25/2024: Right-sided diagnostic SI joint injection  -10/09/2024: Right-sided diagnostic SI joint injection  -02/27/2024: Left-sided sacroiliac joint injection with 100% relief x 1 week then 60% relief  -05/10/2024: Left SIJ injection #2 with 80% relief sustained (DIAGNOSTIC)  -07/26/2024: Left-sided SI joint injection (DIAGNOSTIC)  -08/14/2024: Left-sided sacroiliac joint TransLoc posterior percutaneous arthrodesis      Dr. Galvez:  -01/15/2019: Left-sided sacroiliac joint injection    Past Medical History:   Diagnosis Date    Anemia     GERD (gastroesophageal reflux disease)     Hyperlipidemia     Hypertension     PONV (postoperative nausea and vomiting)     Supraventricular tachycardia     Supraventricular tachycardia 8/8/2024     Past Surgical History:   Procedure Laterality Date    APPENDECTOMY      CARPAL TUNNEL RELEASE Right 11/30/2023    Procedure: RELEASE, CARPAL TUNNEL;  Surgeon: Kiran Finley MD;  Location: [facility] OR;  Service: Orthopedics;  Laterality: Right;  right carpal tunnel release    CARPAL TUNNEL RELEASE Left 12/28/2023    Procedure: RELEASE, CARPAL TUNNEL;  Surgeon: Kiran Finley MD;  Location: [facility] OR;  Service: Orthopedics;   Laterality: Left;    CHOLECYSTECTOMY      COLONOSCOPY N/A 01/13/2017    Procedure: COLONOSCOPY;  Surgeon: Taj Nicholas MD;  Location: [facility] ENDO;  Service: Endoscopy;  Laterality: N/A;    COLONOSCOPY N/A 03/10/2022    Procedure: COLONOSCOPY;  Surgeon: Bee Brock MD;  Location: [facility] ENDO;  Service: Endoscopy;  Laterality: N/A;    FUSION OF SACROILIAC JOINT Left 08/14/2024    Procedure: FUSION, SACROILIAC JOINT;  Surgeon: Ilene Ortiz MD;  Location: [facility] OR;  Service: Pain Management;  Laterality: Left;  Posterior Percutaneous Sacroiliac Joint Arthrodesis Under Fluroscopy    INJECTION OF ANESTHETIC AGENT INTO SACROILIAC JOINT Left 02/27/2024    Procedure: Left SIJ Injection with local;  Surgeon: Ilene Ortiz MD;  Location: [facility] PAIN MGT;  Service: Pain Management;  Laterality: Left;    INJECTION OF ANESTHETIC AGENT INTO SACROILIAC JOINT Left 05/10/2024    Procedure: Left SIJ Injection (diagnostic)-Injection #2;  Surgeon: Ilene Ortiz MD;  Location: [facility] PAIN MGT;  Service: Pain Management;  Laterality: Left;    INJECTION OF ANESTHETIC AGENT INTO SACROILIAC JOINT Left 07/26/2024    Procedure: Left SIJ Injection with local (DIAGNOSTIC< NO STEROID);  Surgeon: Ilene Ortiz MD;  Location: [facility] PAIN MGT;  Service: Pain Management;  Laterality: Left;    INJECTION OF ANESTHETIC AGENT INTO SACROILIAC JOINT Right 10/9/2024    Procedure: Right SIJ Injection #1 diagnostic;  Surgeon: Ilene Ortiz MD;  Location: [facility] PAIN MGT;  Service: Pain Management;  Laterality: Right;    INJECTION OF ANESTHETIC AGENT INTO SACROILIAC JOINT Right 10/25/2024    Procedure: Right SIJ Injection with local; diagnostic!;  Surgeon: Ilene Ortiz MD;  Location: [facility] PAIN MGT;  Service: Pain Management;  Laterality: Right;    INJECTION OF ANESTHETIC AGENT INTO SACROILIAC JOINT Right 11/13/2024    Procedure: Right Sacroiliac Joint (therapeutic);  Surgeon: Ilene Ortiz MD;  Location: [facility] PAIN MGT;  Service: Pain Management;  " Laterality: Right;    INJECTION OF ANESTHETIC AGENT INTO SACROILIAC JOINT Right 12/13/2024    Procedure: right-sided sacroiliac joint TransLoc fusion;  Surgeon: Ilene Ortiz MD;  Location: [facility];  Service: Pain Management;  Laterality: Right;    KNEE SURGERY Left 2017    SHOULDER ARTHROSCOPY      Tonsilectomy      TONSILLECTOMY      TUBAL LIGATION  1996    Uterine Ablation       Review of patient's allergies indicates:  No Known Allergies    Current Outpatient Medications   Medication Sig    amitriptyline (ELAVIL) 50 MG tablet TAKE 1 TABLET EVERY EVENING    atenoloL (TENORMIN) 25 MG tablet TAKE 1 TABLET TWICE DAILY    cyanocobalamin 1,000 mcg/mL injection Inject 1 mL (1,000 mcg total) into the muscle once a week.    ferrous gluconate (FERGON) 324 MG tablet Take 324 mg by mouth 2 (two) times a day.    gabapentin (NEURONTIN) 100 MG capsule Take 1 capsule (100 mg total) by mouth 3 (three) times daily.    needle, disp, 18 G 18 x 1 " Ndle Use to draw b12 1000 mcg from vail every 7 days for 7 weeks    needle, reusable, 25 G 25 x 1 " Ndle Use to inject B12 1000 mcg every 7 days for 7 weeks    omega-3 fatty acids/fish oil (FISH OIL-OMEGA-3 FATTY ACIDS) 300-1,000 mg capsule Take by mouth once daily.    omeprazole (PRILOSEC) 40 MG capsule TAKE 1 CAPSULE EVERY DAY    ondansetron (ZOFRAN-ODT) 4 MG TbDL Take 1 tablet (4 mg total) by mouth every 6 (six) hours as needed (nausea).    rosuvastatin (CRESTOR) 20 MG tablet TAKE 1 TABLET EVERY DAY    tiZANidine (ZANAFLEX) 4 MG tablet TAKE 1 TABLET TWICE DAILY    traZODone (DESYREL) 100 MG tablet TAKE 1 TABLET (100 MG TOTAL) BY MOUTH EVERY EVENING.    venlafaxine (EFFEXOR-XR) 75 MG 24 hr capsule TAKE 1 CAPSULE EVERY DAY     No current facility-administered medications for this visit.     Facility-Administered Medications Ordered in Other Visits   Medication    chlorhexidine 0.12 % solution 10 mL       Review of Systems     GENERAL:  No weight loss, malaise or fevers.  HEENT:   No " recent changes in vision or hearing  NECK:  Negative for lumps, no difficulty with swallowing.  RESPIRATORY:  Negative for cough, wheezing or shortness of breath, patient denies any recent URI.  CARDIOVASCULAR:  Negative for chest pain or palpitations.  GI:  Negative for abdominal discomfort, blood in stools or black stools or change in bowel habits.  MUSCULOSKELETAL:  See HPI.  SKIN:  Negative for lesions, rash, and itching.  PSYCH:  No mood disorder or recent psychosocial stressors.   HEMATOLOGY/LYMPHOLOGY:  Negative for prolonged bleeding, bruising easily or swollen nodes.    NEURO:   No history of syncope, paralysis, seizures or tremors.  All other reviewed and negative other than HPI.    OBJECTIVE:    There were no vitals taken for this visit.      Physical Exam    GENERAL: Well appearing, in no acute distress, alert and oriented x3.  PSYCH:  Mood and affect appropriate.  SKIN: Skin color, texture, turgor normal, no rashes or lesions.  HEAD/FACE:  Normocephalic, atraumatic. Cranial nerves grossly intact.    CV: RRR with palpation of the radial artery.  PULM: No evidence of respiratory difficulty, symmetric chest rise.  GI:  Soft and non-tender.    BACK: Straight leg raising in the sitting and supine positions is negative to radicular pain. No pain to palpation over the facet joints of the lumbar spine or spinous processes. Normal range of motion without pain reproduction.  EXTREMITIES: Peripheral joint ROM is full and pain free without obvious instability or laxity in all four extremities. No deformities, edema, or skin discoloration. Good capillary refill.  MUSCULOSKELETAL: Able to stand on heels & toes.   hip, and knee provocative maneuvers are negative.    SIJ testing: RIGHT  - TTP over SI joint: Present   - Sepideh's/ Uriel's: Positive    - Sacroiliac Distraction Test (anterior pressure): Positive  - Sacroiliac Compression Test (lateral pressure): Positive   - SacralThrust Test (posterior pressure):  Positive    Facet loading test is negative bilaterally.   Bilateral upper and lower extremity strength is normal and symmetric.  No atrophy or tone abnormalities are noted.    RIGHT Lower extremity: Hip flexion 5/5, Hip Abduction 5/5, Hip Adduction 5/5, Knee extension 5/5, Knee flexion 5/5, Ankle dorsiflexion5/5, Extensor hallucis longus 5/5, Ankle plantarflexion 5/5  LEFT Lower extremity:  Hip flexion 5/5, Hip Abduction 5/5,Hip Adduction 5/5, Knee extension 5/5, Knee flexion 5/5, Ankle dorsiflexion 5/5, Extensor hallucis longus 5/5, Ankle plantarflexion 5/5  -Normal testing knee (patellar) jerk and ankle (achilles) jerk    NEURO: Bilateral upper and lower extremity coordination and muscle stretch reflexes are physiologic and symmetric. No loss of sensation is noted.  GAIT: normal.    Imagin/10/19    X-Ray Lumbar Spine AP And Lateral  FINDINGS:  No scoliosis.  No acute fracture.  No listhesis.  Mild degenerative changes with spurring of the endplates and facet arthropathy is noted.  Degenerative changes of the sacroiliac joints are also noted.      MRI sacroiliac joint 2024  FINDINGS:  Bones: No fracture, marrow edema or suspicious osseous lesion identified.  L4-5 and L5-S1 degenerative disc disease and facet arthropathy.     Sacroiliac joints: Alignment normal.  Minimal bilateral sacroiliac osteoarthritis.  Small T2 hyperintense nutrient vessel left ilium along the inferior sacroiliac joint.  No significant abnormal subchondral marrow signal.  No erosions, subchondral marrow edema, ankylosis or other evidence of sacroiliitis.     Muscles and tendons: Within normal limits.     Miscellaneous: Mild right hydrosalpinx without surrounding inflammation.        Impression:     Minimal sacroiliac osteoarthritis without evidence of sacroiliitis.     L4-5 and L5-S1 degenerative disc disease and facet arthropathy.     Mild right hydrosalpinx without surrounding inflammation.         ASSESSMENT: 69 y.o. year old  female with     1. Sacroiliitis        2. Sacroiliac joint pain        3. S/P fusion of sacroiliac joint                PLAN:   - Interventions:  Patient has 90% sustained relief status post bilateral trans block fusion for sacroiliitis and sacroiliac joint pain.  No intervention at this time.    - Anticoagulation use: No no anticoagulation     report:  Reviewed and consistent with medication use as prescribed.    - Medications:  -Continue Gabapentin 100 mg TID per Dr. Valdivia.    -Continue Tizanidine 4 mg BID PRN per Dr. Valdivia.     - Therapy:   We discussed continuing physical therapy to help manage the patient/s painful condition. The patient was counseled that muscle strengthening will improve the long term prognosis in regards to pain and may also help increase range of motion and mobility. A referral for EXT physical therapy: [facility] in [place] was provided to the patient.    - Imaging: Reviewed available imaging (x-ray lumbar spine, MRI sacroiliac joints) with patient and answered any questions they had regarding study.     - Follow up visit: 3-month.  Virtual visit      The above plan and management options were discussed at length with patient. Patient is in agreement with the above and verbalized understanding.    - I discussed the goals of interventional chronic pain management with the patient on today's visit. We discussed a multimodal and systematic approach to pain.  This includes diagnostic and therapeutic injections, adjuvant pharmacologic treatment, physical therapy, and at times psychiatry.  I emphasized the importance of regular exercise, core strengthening and stretching, diet and weight loss as a cornerstone of long-term pain management.    - This condition does not require this patient to take time off of work, and the primary goal of our Pain Management services is to improve the patient's functional capacity.  - Patient Questions: Answered all of the patient's questions regarding  diagnoses, therapy, treatment and next steps    Visit today included increased complexity associated with the care of the episodic problem of chronic pain which was addressed and continue to manage the longitudinal care of the patient due to the serious and/or complex managed problem(s) listed above.        Ilene Ortiz MD  Interventional Pain Management  [facility]    Disclaimer:  This note was prepared using voice recognition system and is likely to have sound alike errors that may have been overlooked even after proof reading.  Please call me with any questions

## 2025-01-25 RX ORDER — OMEPRAZOLE 40 MG/1
40 CAPSULE, DELAYED RELEASE ORAL
Qty: 90 CAPSULE | Refills: 1 | Status: SHIPPED | OUTPATIENT
Start: 2025-01-25

## 2025-01-25 NOTE — TELEPHONE ENCOUNTER
Refill Decision Note   Silvia Cotton  is requesting a refill authorization.  Brief Assessment and Rationale for Refill:  Approve     Medication Therapy Plan:        Comments:     Note composed:11:26 AM 01/25/2025

## 2025-01-25 NOTE — TELEPHONE ENCOUNTER
No care due was identified.  Health [facility] Embedded Care Due Messages. Reference number: [id number].   1/25/2025 12:51:09 AM CST

## 2025-02-13 ENCOUNTER — LAB VISIT (OUTPATIENT)
Dept: LAB | Facility: HOSPITAL | Age: 70
End: 2025-02-13
Attending: FAMILY MEDICINE
Payer: MEDICARE

## 2025-02-13 DIAGNOSIS — D50.0 IRON DEFICIENCY ANEMIA DUE TO CHRONIC BLOOD LOSS: ICD-10-CM

## 2025-02-13 DIAGNOSIS — E53.8 B12 DEFICIENCY: ICD-10-CM

## 2025-02-13 DIAGNOSIS — E78.2 MIXED HYPERLIPIDEMIA: ICD-10-CM

## 2025-02-13 DIAGNOSIS — F39 MOOD DISORDER: ICD-10-CM

## 2025-02-13 LAB
ALBUMIN SERPL BCP-MCNC: 3.7 G/DL (ref 3.5–5.2)
ALP SERPL-CCNC: 65 U/L (ref 40–150)
ALT SERPL W/O P-5'-P-CCNC: 15 U/L (ref 10–44)
ANION GAP SERPL CALC-SCNC: 9 MMOL/L (ref 8–16)
AST SERPL-CCNC: 35 U/L (ref 10–40)
BASOPHILS # BLD AUTO: 0.03 K/UL (ref 0–0.2)
BASOPHILS NFR BLD: 0.5 % (ref 0–1.9)
BILIRUB SERPL-MCNC: 0.5 MG/DL (ref 0.1–1)
BUN SERPL-MCNC: 16 MG/DL (ref 8–23)
CALCIUM SERPL-MCNC: 9.3 MG/DL (ref 8.7–10.5)
CHLORIDE SERPL-SCNC: 108 MMOL/L (ref 95–110)
CHOLEST SERPL-MCNC: 159 MG/DL (ref 120–199)
CHOLEST/HDLC SERPL: 3.2 {RATIO} (ref 2–5)
CO2 SERPL-SCNC: 25 MMOL/L (ref 23–29)
CREAT SERPL-MCNC: 1 MG/DL (ref 0.5–1.4)
DIFFERENTIAL METHOD BLD: ABNORMAL
EOSINOPHIL # BLD AUTO: 0.2 K/UL (ref 0–0.5)
EOSINOPHIL NFR BLD: 2.3 % (ref 0–8)
ERYTHROCYTE [DISTWIDTH] IN BLOOD BY AUTOMATED COUNT: 14.5 % (ref 11.5–14.5)
EST. GFR  (NO RACE VARIABLE): >60 ML/MIN/1.73 M^2
ESTIMATED AVG GLUCOSE: 103 MG/DL (ref 68–131)
GLUCOSE SERPL-MCNC: 80 MG/DL (ref 70–110)
HBA1C MFR BLD: 5.2 % (ref 4–5.6)
HCT VFR BLD AUTO: 40.6 % (ref 37–48.5)
HDLC SERPL-MCNC: 50 MG/DL (ref 40–75)
HDLC SERPL: 31.4 % (ref 20–50)
HGB BLD-MCNC: 12.6 G/DL (ref 12–16)
IMM GRANULOCYTES # BLD AUTO: 0.01 K/UL (ref 0–0.04)
IMM GRANULOCYTES NFR BLD AUTO: 0.2 % (ref 0–0.5)
LDLC SERPL CALC-MCNC: 89.4 MG/DL (ref 63–159)
LYMPHOCYTES # BLD AUTO: 2.3 K/UL (ref 1–4.8)
LYMPHOCYTES NFR BLD: 34.9 % (ref 18–48)
MCH RBC QN AUTO: 30.7 PG (ref 27–31)
MCHC RBC AUTO-ENTMCNC: 31 G/DL (ref 32–36)
MCV RBC AUTO: 99 FL (ref 82–98)
MONOCYTES # BLD AUTO: 0.5 K/UL (ref 0.3–1)
MONOCYTES NFR BLD: 7.9 % (ref 4–15)
NEUTROPHILS # BLD AUTO: 3.6 K/UL (ref 1.8–7.7)
NEUTROPHILS NFR BLD: 54.2 % (ref 38–73)
NONHDLC SERPL-MCNC: 109 MG/DL
NRBC BLD-RTO: 0 /100 WBC
PLATELET # BLD AUTO: 253 K/UL (ref 150–450)
PMV BLD AUTO: 10.4 FL (ref 9.2–12.9)
POTASSIUM SERPL-SCNC: 4.4 MMOL/L (ref 3.5–5.1)
PROT SERPL-MCNC: 6.6 G/DL (ref 6–8.4)
RBC # BLD AUTO: 4.1 M/UL (ref 4–5.4)
SODIUM SERPL-SCNC: 142 MMOL/L (ref 136–145)
TRIGL SERPL-MCNC: 98 MG/DL (ref 30–150)
VIT B12 SERPL-MCNC: 1220 PG/ML (ref 210–950)
WBC # BLD AUTO: 6.59 K/UL (ref 3.9–12.7)

## 2025-02-13 PROCEDURE — 80053 COMPREHEN METABOLIC PANEL: CPT | Mod: HCNC | Performed by: FAMILY MEDICINE

## 2025-02-13 PROCEDURE — 82607 VITAMIN B-12: CPT | Mod: HCNC | Performed by: FAMILY MEDICINE

## 2025-02-13 PROCEDURE — 83036 HEMOGLOBIN GLYCOSYLATED A1C: CPT | Mod: DBM,HCNC | Performed by: FAMILY MEDICINE

## 2025-02-13 PROCEDURE — 80061 LIPID PANEL: CPT | Mod: HCNC | Performed by: FAMILY MEDICINE

## 2025-02-13 PROCEDURE — 85025 COMPLETE CBC W/AUTO DIFF WBC: CPT | Mod: HCNC | Performed by: FAMILY MEDICINE

## 2025-02-20 ENCOUNTER — OFFICE VISIT (OUTPATIENT)
Dept: FAMILY MEDICINE | Facility: CLINIC | Age: 70
End: 2025-02-20
Payer: MEDICARE

## 2025-02-20 VITALS
SYSTOLIC BLOOD PRESSURE: 130 MMHG | HEART RATE: 109 BPM | DIASTOLIC BLOOD PRESSURE: 80 MMHG | RESPIRATION RATE: 16 BRPM | HEIGHT: 69 IN | WEIGHT: 266.75 LBS | TEMPERATURE: 97 F | BODY MASS INDEX: 39.51 KG/M2

## 2025-02-20 DIAGNOSIS — J01.90 ACUTE BACTERIAL SINUSITIS: Primary | ICD-10-CM

## 2025-02-20 DIAGNOSIS — Z12.31 BREAST CANCER SCREENING BY MAMMOGRAM: ICD-10-CM

## 2025-02-20 DIAGNOSIS — E66.01 SEVERE OBESITY (BMI 35.0-39.9) WITH COMORBIDITY: ICD-10-CM

## 2025-02-20 DIAGNOSIS — N18.31 CHRONIC KIDNEY DISEASE, STAGE 3A: ICD-10-CM

## 2025-02-20 DIAGNOSIS — E78.2 MIXED HYPERLIPIDEMIA: ICD-10-CM

## 2025-02-20 DIAGNOSIS — F51.04 PSYCHOPHYSIOLOGICAL INSOMNIA: ICD-10-CM

## 2025-02-20 DIAGNOSIS — B96.89 ACUTE BACTERIAL SINUSITIS: Primary | ICD-10-CM

## 2025-02-20 DIAGNOSIS — E53.8 B12 DEFICIENCY: ICD-10-CM

## 2025-02-20 DIAGNOSIS — D50.0 IRON DEFICIENCY ANEMIA DUE TO CHRONIC BLOOD LOSS: ICD-10-CM

## 2025-02-20 RX ORDER — AMOXICILLIN AND CLAVULANATE POTASSIUM 875; 125 MG/1; MG/1
1 TABLET, FILM COATED ORAL EVERY 12 HOURS
Qty: 14 TABLET | Refills: 0 | Status: SHIPPED | OUTPATIENT
Start: 2025-02-20 | End: 2025-02-27

## 2025-02-20 RX ORDER — BENZONATATE 200 MG/1
200 CAPSULE ORAL 3 TIMES DAILY PRN
Qty: 30 CAPSULE | Refills: 0 | Status: SHIPPED | OUTPATIENT
Start: 2025-02-20 | End: 2025-03-02

## 2025-02-20 NOTE — PROGRESS NOTES
Chief Complaint:    Chief Complaint   Patient presents with    Follow-up     6 mo f/u    Cough     X 2 days        History of Present Illness:    History of Present Illness    Patient presents today with sinus congestion and cough She reports right-sided sinus pressure and ear discomfort. She used ear drops which provided temporary relief but symptoms have returned. At night, she experiences fluid sensation in her ear requiring positional changes. She has a dry cough during the day that becomes productive in the morning. She denies fever. She continues depression medication and Trazodone. She has switched from B12 injections to oral B12 supplements.      ROS:  General: denies fever, denies chills, denies fatigue, denies weight gain, denies weight loss, denies loss of appetite  Eyes: denies vision changes, denies blurry vision, denies eye pain, denies eye discharge  ENT: admits ear pain, denies hearing loss, denies tinnitus, admits nasal congestion, denies sore throat  Cardiovascular: denies chest pain, denies palpitations, denies lower extremity edema  Respiratory: admits cough, denies shortness of breath, denies wheezing, denies sputum production  Endocrine: denies polyuria, denies polydipsia, denies heat intolerance, denies cold intolerance  Gastrointestinal: denies abdominal pain, denies heartburn, denies nausea, denies vomiting, denies diarrhea, denies constipation, denies blood in stool  Genitourinary: denies dysuria, denies urgency, denies frequency, denies hematuria, denies nocturia, denies incontinence  Heme & Lymphatic: denies easy or excessive bleeding, denies easy bruising, denies swollen lymph nodes  Musculoskeletal: denies muscle pain, denies back pain, denies joint pain, denies joint swelling  Skin: denies rash, denies lesion, denies itching, denies skin texture changes, denies skin color changes  Neurological: denies headache, denies dizziness, denies numbness, denies tingling, denies seizure  activity, denies speech difficulty, denies memory loss, denies confusion  Psychiatric: denies anxiety, denies depression, denies sleep difficulty           Past Medical History:   Diagnosis Date    Anemia     GERD (gastroesophageal reflux disease)     Hyperlipidemia     Hypertension     PONV (postoperative nausea and vomiting)     Supraventricular tachycardia     Supraventricular tachycardia 8/8/2024       Social History:  Social History     Socioeconomic History    Marital status:    Tobacco Use    Smoking status: Never     Passive exposure: Never    Smokeless tobacco: Never   Substance and Sexual Activity    Alcohol use: Not Currently    Drug use: Not Currently    Sexual activity: Not Currently     Partners: Male     Birth control/protection: Post-menopausal, None     Social Drivers of Health     Financial Resource Strain: Medium Risk (9/25/2024)    Overall Financial Resource Strain (CARDIA)     Difficulty of Paying Living Expenses: Somewhat hard   Food Insecurity: No Food Insecurity (9/25/2024)    Hunger Vital Sign     Worried About Running Out of Food in the Last Year: Never true     Ran Out of Food in the Last Year: Never true   Transportation Needs: No Transportation Needs (9/25/2024)    PRAPARE - Transportation     Lack of Transportation (Medical): No     Lack of Transportation (Non-Medical): No   Physical Activity: Inactive (9/25/2024)    Exercise Vital Sign     Days of Exercise per Week: 0 days     Minutes of Exercise per Session: 0 min   Stress: No Stress Concern Present (9/25/2024)    [employer] - Occupational Stress Questionnaire     Feeling of Stress : Not at all   Housing Stability: Unknown (9/25/2024)    Housing Stability Vital Sign     Unable to Pay for Housing in the Last Year: No     Homeless in the Last Year: No       Family History:   family history includes Arthritis in her father and mother; Cataracts in her father and mother; Depression in her mother; Heart  "block in her mother; Heart disease in her brother, father, and mother; Hypertension in her brother, father, sister, and sister.    Health Maintenance   Topic Date Due    TETANUS VACCINE  12/01/2024    Mammogram  03/18/2025    Annual UACr  08/09/2025    Lipid Panel  02/13/2026    DEXA Scan  08/23/2026    Colorectal Cancer Screening  03/10/2027    Hemoglobin A1c (Diabetic Prevention Screening)  02/13/2028    Hepatitis C Screening  Completed    Shingles Vaccine  Completed    Influenza Vaccine  Completed    COVID-19 Vaccine  Completed    RSV Vaccine (Age 60+ and Pregnant patients)  Completed    Pneumococcal Vaccines (Age 50+)  Completed       Exam:Physical     Vital Signs  Temp: 97.4 °F (36.3 °C)  Pulse: 109  Resp: 16  BP: 130/80  BP Location: Left arm  Patient Position: Sitting  Pain Score: 0-No pain  Height and Weight  Height: 5' 9" (175.3 cm)  Weight: 121 kg (266 lb 12.1 oz)  BSA (Calculated - sq m): 2.43 sq meters  BMI (Calculated): 39.4  Weight in (lb) to have BMI = 25: 168.9]    Body mass index is 39.39 kg/m².    Physical Exam    General: Well-developed. Well-nourished. No acute distress.  Eyes: EOMI. Sclerae anicteric.  HENT: Normocephalic. Atraumatic. Nares patent. Moist oral mucosa.  Cardiovascular: Regular rate. Regular rhythm. No murmurs. No rubs. No gallops. Normal S1, S2.  Respiratory: Normal respiratory effort. Clear to auscultation bilaterally. No rales. No rhonchi. No wheezing.  Musculoskeletal: No  obvious deformity.  Extremities: No lower extremity edema.  Neurological: Alert & oriented x3. No slurred speech. Normal gait.  Psychiatric: Normal mood. Normal affect. Good insight. Good judgment.  Skin: Warm. Dry. No rash.           Assessment:        ICD-10-CM ICD-9-CM   1. Acute bacterial sinusitis  J01.90 461.9    B96.89    2. Iron deficiency anemia due to chronic blood loss  D50.0 280.0   3. Chronic kidney disease, stage 3a  N18.31 585.3   4. B12 deficiency  E53.8 266.2   5. Psychophysiological insomnia "  F51.04 307.42   6. Mixed hyperlipidemia  E78.2 272.2   7. Severe obesity (BMI 35.0-39.9) with comorbidity  E66.01 278.01   8. Breast cancer screening by mammogram  Z12.31 V76.12         Plan:    Assessment & Plan    MEDICAL DECISION MAKING:  - Diagnosed sinus infection based on symptoms and physical exam  - Noted patient's home blood pressure readings are within acceptable range  - Reviewed recent lab results: B12 levels, kidney function, blood sugar, and cholesterol all within normal limits  - Will discontinue B12 injections due to improved B12 levels with oral supplementation  - Determined no need to recheck B12 levels, will only order CBC for next lab work  - Considered patient's history of depression and sleep issues in treatment plan    PATIENT EDUCATION:  - Explained that having had COVID-19 does not necessarily lead to worse outcomes for subsequent illnesses, unless the patient is immunocompromised or has pre-existing conditions  - Clarified that while some individuals with pre-existing conditions may have worse consequences from COVID-19, outcomes can vary significantly among patients    ACTION ITEMS/LIFESTYLE:  - Patient to decrease oral B12 supplement intake to daily  - Patient to continue avoiding NSAIDs    MEDICATIONS:  - Started Augmentin for sinus infection; take with food to prevent stomach upset  - Started cough medicine as needed for nighttime symptoms  - Continued depression medication at current dosage  - Continued Trazodone at current dosage of 7  - Decreased oral B12 supplement to daily    ORDERS:  - CBC ordered for next visit    FOLLOW UP:  - Follow up for mammogram as recommended by Dr. Garcia. Office will arrange mammogram appointment for patient.         Silvia was seen today for follow-up and cough.    Diagnoses and all orders for this visit:    Acute bacterial sinusitis    Iron deficiency anemia due to chronic blood loss  -     Lipid Panel; Future  -     Comprehensive Metabolic Panel;  Future  -     CBC Auto Differential; Future    Chronic kidney disease, stage 3a    B12 deficiency    Psychophysiological insomnia    Mixed hyperlipidemia  -     Lipid Panel; Future  -     Comprehensive Metabolic Panel; Future  -     CBC Auto Differential; Future    Severe obesity (BMI 35.0-39.9) with comorbidity    Breast cancer screening by mammogram  -     Mammo Digital Screening Bilat; Future    Other orders  -     amoxicillin-clavulanate 875-125mg (AUGMENTIN) 875-125 mg per tablet; Take 1 tablet by mouth every 12 (twelve) hours. for 7 days  -     benzonatate (TESSALON) 200 MG capsule; Take 1 capsule (200 mg total) by mouth 3 (three) times daily as needed for Cough.        Follow up in about 6 months (around 8/20/2025).      Stephani Valdivia MD

## 2025-02-25 ENCOUNTER — PATIENT MESSAGE (OUTPATIENT)
Dept: FAMILY MEDICINE | Facility: CLINIC | Age: 70
End: 2025-02-25
Payer: MEDICARE

## 2025-03-11 ENCOUNTER — PATIENT MESSAGE (OUTPATIENT)
Dept: FAMILY MEDICINE | Facility: CLINIC | Age: 70
End: 2025-03-11
Payer: MEDICARE

## 2025-03-11 DIAGNOSIS — R05.9 COUGH, UNSPECIFIED TYPE: Primary | ICD-10-CM

## 2025-03-11 RX ORDER — PREDNISONE 50 MG/1
50 TABLET ORAL DAILY
Qty: 5 TABLET | Refills: 0 | Status: SHIPPED | OUTPATIENT
Start: 2025-03-11

## 2025-03-11 RX ORDER — CEPHALEXIN 500 MG/1
500 CAPSULE ORAL EVERY 8 HOURS
Qty: 21 CAPSULE | Refills: 0 | Status: SHIPPED | OUTPATIENT
Start: 2025-03-11

## 2025-03-11 NOTE — TELEPHONE ENCOUNTER
No care due was identified.  [facility] Embedded Care Due Messages. Reference number: [id number].   3/11/2025 4:33:48 PM CDT

## 2025-03-17 ENCOUNTER — RESULTS FOLLOW-UP (OUTPATIENT)
Dept: FAMILY MEDICINE | Facility: CLINIC | Age: 70
End: 2025-03-17

## 2025-03-17 ENCOUNTER — HOSPITAL ENCOUNTER (OUTPATIENT)
Dept: RADIOLOGY | Facility: HOSPITAL | Age: 70
Discharge: HOME | End: 2025-03-17
Attending: FAMILY MEDICINE
Payer: MEDICARE

## 2025-03-17 ENCOUNTER — OFFICE VISIT (OUTPATIENT)
Dept: URGENT CARE | Facility: CLINIC | Age: 70
End: 2025-03-17
Payer: MEDICARE

## 2025-03-17 VITALS
WEIGHT: 266 LBS | BODY MASS INDEX: 39.4 KG/M2 | HEIGHT: 69 IN | SYSTOLIC BLOOD PRESSURE: 158 MMHG | OXYGEN SATURATION: 97 % | DIASTOLIC BLOOD PRESSURE: 89 MMHG | TEMPERATURE: 98 F | RESPIRATION RATE: 18 BRPM | HEART RATE: 97 BPM

## 2025-03-17 DIAGNOSIS — R05.9 COUGH, UNSPECIFIED TYPE: ICD-10-CM

## 2025-03-17 DIAGNOSIS — I10 ESSENTIAL HYPERTENSION: ICD-10-CM

## 2025-03-17 DIAGNOSIS — I47.10 SUPRAVENTRICULAR TACHYCARDIA: ICD-10-CM

## 2025-03-17 DIAGNOSIS — R05.8 POST-VIRAL COUGH SYNDROME: Primary | ICD-10-CM

## 2025-03-17 PROCEDURE — 99214 OFFICE O/P EST MOD 30 MIN: CPT | Mod: S$GLB,,, | Performed by: NURSE PRACTITIONER

## 2025-03-17 PROCEDURE — 71046 X-RAY EXAM CHEST 2 VIEWS: CPT | Mod: TC,HCNC,PO

## 2025-03-17 PROCEDURE — 71046 X-RAY EXAM CHEST 2 VIEWS: CPT | Mod: 26,HCNC,, | Performed by: STUDENT IN AN ORGANIZED HEALTH CARE EDUCATION/TRAINING PROGRAM

## 2025-03-17 RX ORDER — ATENOLOL 25 MG/1
25 TABLET ORAL 2 TIMES DAILY
Qty: 180 TABLET | Refills: 3 | Status: SHIPPED | OUTPATIENT
Start: 2025-03-17

## 2025-03-17 RX ORDER — ALBUTEROL SULFATE 90 UG/1
2 INHALANT RESPIRATORY (INHALATION) EVERY 4 HOURS PRN
Qty: 8 G | Refills: 1 | Status: SHIPPED | OUTPATIENT
Start: 2025-03-17

## 2025-03-17 RX ORDER — TRAZODONE HYDROCHLORIDE 100 MG/1
100 TABLET ORAL NIGHTLY
Qty: 90 TABLET | Refills: 3 | Status: SHIPPED | OUTPATIENT
Start: 2025-03-17

## 2025-03-17 NOTE — PROGRESS NOTES
"Subjective:      Patient ID: Silvia Cotton is a 69 y.o. female.    Vitals:  height is 5' 9" (1.753 m) and weight is 120.7 kg (266 lb). Her oral temperature is 98 °F (36.7 °C). Her blood pressure is 158/89 (abnormal) and her pulse is 97. Her respiration is 18 and oxygen saturation is 97%.     Chief Complaint: Cough    Pt c/o a cough for the past month states she has been on antibiotics twice for the cough and its not getting any better.  Her initial visit was February 20 with with PCP.  She was diagnosed with a sinus infection and given Augmentin along with Tessalon Perles.  She states that she was tested for flu and COVID at that visit and they were negative however, I am not seeing any tests in the computer.  She continued with coughing and primary care doctor sent in prednisone 50 mg x 5 days which she has completed.  She states she thinks the prednisone helped break things up a little, however, she continues with dry frequent cough.  She denies shortness of breath or chest pain.  She is taking 12 hour Mucinex daily.  She denies fever.    Cough  This is a new problem. The current episode started more than 1 month ago. The problem has been unchanged. The problem occurs constantly. The cough is Productive of sputum. Associated symptoms include ear congestion, headaches, nasal congestion, postnasal drip, rhinorrhea and wheezing. Pertinent negatives include no chest pain, chills, eye redness, fever, heartburn, hemoptysis, myalgias, rash, sore throat, shortness of breath, sweats or weight loss. Nothing aggravates the symptoms. She has tried OTC cough suppressant and prescription cough suppressant for the symptoms. The treatment provided mild relief.       Constitution: Negative for activity change, appetite change, chills, sweating and fever.   HENT:  Positive for postnasal drip. Negative for ear discharge and sore throat.    Neck: Negative for neck pain and neck stiffness.   Cardiovascular:  Negative for chest " pain.   Eyes:  Negative for eye pain, eye redness and blurred vision.   Respiratory:  Positive for cough and wheezing. Negative for bloody sputum and shortness of breath.    Gastrointestinal:  Negative for abdominal pain, nausea, vomiting, diarrhea and heartburn.   Genitourinary:  Negative for dysuria, frequency and urgency.   Musculoskeletal:  Negative for pain and muscle ache.   Skin:  Negative for rash.   Neurological:  Positive for headaches. Negative for dizziness, light-headedness and disorientation.   Psychiatric/Behavioral:  Negative for disorientation, confusion, agitation and nervous/anxious. The patient is not nervous/anxious.       Objective:     Physical Exam   Constitutional: She is oriented to person, place, and time. She appears well-developed. She is cooperative.  Non-toxic appearance. She does not appear ill. No distress.   HENT:   Head: Normocephalic and atraumatic.   Ears:   Right Ear: Hearing, tympanic membrane, external ear and ear canal normal.   Left Ear: Hearing, tympanic membrane, external ear and ear canal normal.   Nose: Nose normal. No mucosal edema, rhinorrhea or nasal deformity. No epistaxis. Right sinus exhibits no maxillary sinus tenderness and no frontal sinus tenderness. Left sinus exhibits no maxillary sinus tenderness and no frontal sinus tenderness.   Mouth/Throat: Uvula is midline, oropharynx is clear and moist and mucous membranes are normal. No trismus in the jaw. Normal dentition. No uvula swelling. No oropharyngeal exudate, posterior oropharyngeal edema or posterior oropharyngeal erythema.   Eyes: Conjunctivae and lids are normal. No scleral icterus.   Neck: Trachea normal and phonation normal. Neck supple. No edema present. No erythema present. No neck rigidity present.   Cardiovascular: Normal rate, regular rhythm, normal heart sounds and normal pulses.   Pulmonary/Chest: Effort normal and breath sounds normal. No respiratory distress. She has no decreased breath sounds.  She has no rhonchi.   Abdominal: Normal appearance.   Musculoskeletal: Normal range of motion.         General: No deformity. Normal range of motion.   Neurological: She is alert and oriented to person, place, and time. She exhibits normal muscle tone. Coordination normal.   Skin: Skin is warm, dry, intact, not diaphoretic and not pale.   Psychiatric: Her speech is normal and behavior is normal. Judgment and thought content normal.   Nursing note and vitals reviewed.      Assessment:     1. Post-viral cough syndrome    2. Cough, unspecified type    3. Supraventricular tachycardia    4. Essential hypertension        Plan:       Post-viral cough syndrome    Cough, unspecified type  -     albuterol (PROVENTIL/VENTOLIN HFA) 90 mcg/actuation inhaler; Inhale 2 puffs into the lungs every 4 (four) hours as needed for Wheezing or Shortness of Breath.  Dispense: 8 g; Refill: 1  -     inhalation spacing device; Use as directed for inhalation.  Dispense: 1 each; Refill: 0    Supraventricular tachycardia  This chronic condition affected today's medical decision making and treatment plan.         Essential hypertension  Bp high today.  On atenolol b.i.d.   This chronic condition affected today's medical decision making and treatment plan.         Patient has normal breath sounds with good air movement in clinic today   She had messaged her primary care doctor earlier today who ordered a chest x-ray and refilled her cough syrup.  I have encouraged her to go ahead and proceed with chest x-ray as ordered today.  She will do that right after this visit.  We do not have x-ray in house at this time.  We will add an albuterol inhaler every 4 hours as needed along with a spacer device to ensure that she gets the medicine into her lungs.  Primary care doctor refilled her cough syrup as well.  She declined a refill on Tessalon Perles.  I would like for her to continue her Mucinex 12 hour with lots of fluids.    Blood pressure is high in  clinic today.  She is prescribed atenolol twice a day which she is taking.  She has a history of SVT.  Will hold off on decongestants due to his comorbidity.          Addendum:  3:37 p.m.: Reviewed chest x-ray report.  Called patient.  Informed her of normal chest x-ray.  Continue treatment as discussed.  Follow-up with PCP if not improving with treatment.

## 2025-03-17 NOTE — TELEPHONE ENCOUNTER
No care due was identified.  [facility] Embedded Care Due Messages. Reference number: [id number].   3/17/2025 1:26:02 AM CDT

## 2025-03-18 NOTE — TELEPHONE ENCOUNTER
Refill Decision Note   Silvia Yury  is requesting a refill authorization.  Brief Assessment and Rationale for Refill:  Approve     Medication Therapy Plan:         Comments:     Note composed:7:30 PM 03/17/2025

## 2025-04-10 ENCOUNTER — HOSPITAL ENCOUNTER (OUTPATIENT)
Dept: RADIOLOGY | Facility: HOSPITAL | Age: 70
Discharge: HOME | End: 2025-04-10
Attending: FAMILY MEDICINE
Payer: MEDICARE

## 2025-04-10 VITALS — HEIGHT: 69 IN | WEIGHT: 264.56 LBS | BODY MASS INDEX: 39.18 KG/M2

## 2025-04-10 DIAGNOSIS — Z12.31 BREAST CANCER SCREENING BY MAMMOGRAM: ICD-10-CM

## 2025-04-10 PROCEDURE — 77063 BREAST TOMOSYNTHESIS BI: CPT | Mod: 26,HCNC,, | Performed by: STUDENT IN AN ORGANIZED HEALTH CARE EDUCATION/TRAINING PROGRAM

## 2025-04-10 PROCEDURE — 77067 SCR MAMMO BI INCL CAD: CPT | Mod: 26,HCNC,, | Performed by: STUDENT IN AN ORGANIZED HEALTH CARE EDUCATION/TRAINING PROGRAM

## 2025-04-10 PROCEDURE — 77067 SCR MAMMO BI INCL CAD: CPT | Mod: TC,HCNC

## 2025-04-16 RX ORDER — OMEPRAZOLE 40 MG/1
40 CAPSULE, DELAYED RELEASE ORAL
Qty: 90 CAPSULE | Refills: 3 | Status: SHIPPED | OUTPATIENT
Start: 2025-04-16

## 2025-04-16 NOTE — TELEPHONE ENCOUNTER
No care due was identified.  [facility] Embedded Care Due Messages. Reference number: [id number].   4/16/2025 1:12:19 AM CDT

## 2025-04-16 NOTE — TELEPHONE ENCOUNTER
Refill Decision Note   Silvia Yury  is requesting a refill authorization.  Brief Assessment and Rationale for Refill:  Approve     Medication Therapy Plan:         Comments:     Note composed:10:14 AM 04/16/2025

## 2025-04-16 NOTE — PROGRESS NOTES
"Established Patient Interventional Pain Note     The patient location is: home  The chief complaint leading to consultation is: R SIJ pain  Visit type: Virtual visit with synchronous audio and video  Total time spent with patient: 11-15m  Each patient to whom he or she provides medical services by telemedicine is: (1) informed of the relationship between the physician and patient and the respective role of any other health care provider with respect to management of the patient; and (2) notified that he or she may decline to receive medical services by telemedicine and may withdraw from such care at any time.        Referring Physician: No ref. provider found    PCP: Stephani Valdivia MD    Chief Complaint:   Right-sided sacroiliac joint pain       SUBJECTIVE:  Interval history 04/17/2025  Patient presents status post right-sided trans blocks sacroiliac joint fusion 12/13/2024 and left-sided trans blocks sacroiliac joint fusion 08/14/2024.  Patient continues to report 100% sustained relief overlying bilateral lower back and SI joint territories following her bilateral fusion.  She reports she is doing "great!" Today patient has working in her [employer] with her chickens and reports improved range of motion and functionality following her surgery.  Today she denies significant lower extremity weakness, bowel or bladder incontinence or saddle anesthesia.      Interval history 01/23/2025  Patient presents status post right-sided trans loc sacroiliac joint fusion 12/13/2024.  Patient reports at least 90% sustained relief across both sacroiliac joints following bilateral fusion.  Today she reports improvement in range of motion and functionality.  This week she was able to play with her grandchildren outside secondary to her improved pain levels and functionality.  Today she denies significant lower extremity weakness, bowel or bladder incontinence or saddle anesthesia.  She continues antispasmodics and gabapentin per primary care " provider.      Interval Hx: 12/19/2024  Patient presents status post right-sided trans loc sacroiliac joint fusion 12/13/2024      Today patient reports 80% improvement in right-sided sacroiliac joint pain following her fusion.  She denies more distal radiculopathy into the lower extremities or feet.  She continues to report at least 80% sustained relief of prior left-sided fusion 08/14/2024.  She has mild tenderness overlying right sacroiliac joint site.  She reported mild drainage from the site which her  re-dressed.  Pain today is rated a 4/10.  She denies any purulence, erythema, exudate from the incision site as well as no fevers, chills, pruritus.      Interval history 11/19/2024  Patient presents status post right-sided SI joint injection diagnostic 10/09/2024, right-sided diagnostic SI joint injection 10/25/2024 and right-sided therapeutic SI joint injection 11/13/2024.  Patient reports 95% relief following both diagnostic injections and 80% sustained relief following therapeutic injection.  Today she reports exacerbation of right-sided sacroiliac joint pain.  Today she reports pain overlying the right sacroiliac joint.  Pain is constant and today is rated a 6/10.  Pain is exacerbated from moving from sitting to standing and with prolonged standing.  She denies more distal radiculopathy into the lower extremities or feet.  She has continued physician directed physical therapy exercises for bilateral SI joint pain over the last 8 weeks from 09/19/2024 through 11/19/2024.  He continues gabapentin per her PCP.    Interval history 09/25/2024  Patient presents status post left-sided sacroiliac TransLoc fusion 08/14/2024.  Patient reports 100% relief in left-sided sacroiliac joint pain following her procedure.  She reports mild residual soreness on the left side with over exertion.  Patient would like to pursue this procedure on the right side.  Today she reports pain overlying the right sacroiliac joint.   Pain is constant and today is rated a 6/10.  Pain can be exacerbated with moving from sitting to a standing position prolonged sitting or standing.  She denies more distal radiculopathy into the lower extremities or feet.  She has continued physician directed physical therapy exercises for bilateral SI joint pain over the last 8 weeks from 07/25/2024 through 09/25/2024 with marginal improvement in her symptoms.  She was continued gabapentin per PCP, Dr. Valdivia with noticeable improvement in her pain.      Interval History 08/21/2024  Patient presents status post left-sided sacroiliac joint fusion 08/14/2024.       Patient reports mi procedural soreness overlying left buttock.  Pain today is rated a 5/10.  She denies any drainage, purulence at the incision site.  With dressing change this morning, postoperatively placed Steri-Strips were removed.  She denies any fevers, chills.  Patient is interested in pursuing right-sided sacroiliac joint fusion.  Today she reports pain overlying the right sacroiliac joint.  Pain today is rated a 6/10.  Pain can be exacerbated with standing and with ambulation and positional changes.  She denies radicular symptoms in the lower extremities, lower extremity weakness.  She has continued physician directed physical therapy exercises for sacroiliac joint pain over the last 8 weeks from 06/21/2024 through 08/21/2024.    Interval History 07/31/2024  Patient presents status post left-sided diagnostic SI joint injection 07/26/2024 and left-sided diagnostic SI joint injection 05/10/2024 and for MRI sacroiliac joint review. Patient reports 100% relief following 2 diagnostic left-sided sacroiliac joint injections.   Today she again reports pain overlying the left-sided sacroiliac joint.  Pain today is rated an 8/10 and is constant.  Pain is exacerbated with positional changes, moving from sitting to standing and with standing and ambulation.  Her pain does significantly impact activities of  daily living and quality of life.  She has continued physician directed physical therapy exercises for bilateral sacroiliac joint pain over the last 8 weeks from 05/03/2024 through 07/31/2024 with marginal improvement in her pain.  Patient would like to pursue sacroiliac joint fusion.    Interval history 07/09/2024  Patient presents for follow-up for left-sided sacroiliac joint pain.  Of note patient has insurance, [employer], denied sacroiliac fusion.  Today she again reports pain overlying the left-sided SI joint.  Pain today is rated an 8/10.  Pain is exacerbated with positional changes, moving from sitting to standing, standing and walking.  Pain interferes with activities of daily living.  She is continued physician directed physical therapy exercises for sacroiliac joint pain over the last 8 weeks from 05/09/2024 through 07/09/2024 with marginal improvement in her symptoms.  Of note patient received 80% relief with prior therapeutic and diagnostic sacroiliac joint injection.    Interval History (06/24/2024):  Patient Silvia Cotton presents today for follow-up visit.  Patient was last seen on 6/14/2024. She is scheduled for left sided sacral RFA in July. For the past 3 weeks she has had right sided pain over the lower back/buttock for the past 3 weeks. No new injury. Pain is a 8/10. Pain is worse with crossing her legs or getting in and out of bed/car. Pain does not radiate to the legs.   Patient denies night fever/night sweats, urinary incontinence, bowel incontinence, significant weight loss and significant motor weakness.   Patient denies any other complaints or concerns at this time.    Interval History (6/14/2024):  Patient Silvia Cotton presents today for follow-up visit.  Patient was last seen on 5/10/2024 for left SIJ injection #2 with 80% relief sustained. She will still have some intermittent left sided buttock pain but overall she is doing much better. No radiculopathy into the lower extremities.    She rates her pain today a 1/10. When the pain is severe, prior to procedure, pain would go up to a 10/10.   Patient denies night fever/night sweats, urinary incontinence, bowel incontinence, significant weight loss and significant motor weakness.   Patient denies any other complaints or concerns at this time.    Interval history 04/10/2024  Patient presents status post left-sided sacroiliac joint injection 02/27/2024.  Patient reports approximately 100% relief lasting 1 week in duration which has now subsided to approximately 60% sustained relief.  She does report pain can arise with increased activity.  When pain is present it is described as sharp and stabbing in nature overlying the left sacroiliac joint.  Pain can be exacerbated with positional change, moving from sitting to standing and with standing and ambulation.  She is continued physician directed physical therapy exercises over the last 8 weeks from 02/10/2024 through 04/10/2024 with marginal improvement in her pain.  Today she denies right-sided pain.  She denies bilateral lower extremity radiculopathy, lower extremity weakness, bowel or bladder incontinence or saddle anesthesia.    HPI 02/15/2024  Silvia Cotton is a 69 y.o. female with past medical history significant for hyperlipidemia, stage 3 chronic kidney disease, GERD who presents to the clinic for the evaluation of left-sided sacroiliac joint pain, previous Dr. Galvez patient.  Today she reports pain has been present for over 5 years without inciting accident injury or trauma.  Pain is constant and today is rated a 5/10.  Pain is described as sharp and stabbing in nature.  Patient reports pain overlying the left sacroiliac joint.  Pain is exacerbated when moving from sitting to standing and with standing and ambulation.  She reports she is only able to ambulate approximately 5-10 minutes when pain is severe.  Pain is improved with topical application of heat and ice as well as prior  left-sided sacroiliac joint injection.  She reports she received greater than 80% relief lasting several years in duration.  Patient has completed conventional land-based physical therapy in the past at [facility] in [place].  She has continued physician directed physical therapy exercises at home over the last 8 weeks from 12/15/2023 through 02/15/2024 with marginal improvement in her symptoms.  Today she denies any right-sided pain, lower extremity radiculopathy, lower extremity weakness or bowel or bladder incontinence.    Patient denies night fever/night sweats, urinary incontinence, bowel incontinence, significant weight loss, significant motor weakness, and loss of sensations.      Pain Disability Index Review:         12/19/2024    10:57 AM 8/21/2024     7:28 AM 6/14/2024    12:14 PM   Last 3 PDI Scores   Pain Disability Index (PDI) 28 35 15       Non-Pharmacologic Treatments:  Physical Therapy/Home Exercise: yes  Ice/Heat:yes  TENS: no  Acupuncture: no  Massage: no  Chiropractic: no    Other: no      Pain Medications:  - Adjuvant Medications: Elavil (Amitriptyline), Neurontin (Gabapentin), Trazodone (Desyrel), Venlafaxine (Effexor), and Zanaflex ( Tizanidine)    Pain Procedures:   Dr. Ortiz:  -12/13/2024: Right-sided sacroiliac joint TransLoc posterior percutaneous arthrodesis  -11/13/2024: Right-sided therapeutic SI joint injection  -10/25/2024: Right-sided diagnostic SI joint injection  -10/09/2024: Right-sided diagnostic SI joint injection  -02/27/2024: Left-sided sacroiliac joint injection with 100% relief x 1 week then 60% relief  -05/10/2024: Left SIJ injection #2 with 80% relief sustained (DIAGNOSTIC)  -07/26/2024: Left-sided SI joint injection (DIAGNOSTIC)  -08/14/2024: Left-sided sacroiliac joint TransLoc posterior percutaneous arthrodesis      Dr. Galvez:  -01/15/2019: Left-sided sacroiliac joint injection    Past Medical History:   Diagnosis Date    Anemia     GERD (gastroesophageal reflux disease)      Hyperlipidemia     Hypertension     PONV (postoperative nausea and vomiting)     Supraventricular tachycardia     Supraventricular tachycardia 8/8/2024     Past Surgical History:   Procedure Laterality Date    APPENDECTOMY      CARPAL TUNNEL RELEASE Right 11/30/2023    Procedure: RELEASE, CARPAL TUNNEL;  Surgeon: Kiran Finley MD;  Location: [facility] OR;  Service: Orthopedics;  Laterality: Right;  right carpal tunnel release    CARPAL TUNNEL RELEASE Left 12/28/2023    Procedure: RELEASE, CARPAL TUNNEL;  Surgeon: Kiran Finley MD;  Location: [facility] OR;  Service: Orthopedics;  Laterality: Left;    CHOLECYSTECTOMY      COLONOSCOPY N/A 01/13/2017    Procedure: COLONOSCOPY;  Surgeon: Taj Nicholas MD;  Location: [facility] ENDO;  Service: Endoscopy;  Laterality: N/A;    COLONOSCOPY N/A 03/10/2022    Procedure: COLONOSCOPY;  Surgeon: Bee Brock MD;  Location: [facility] ENDO;  Service: Endoscopy;  Laterality: N/A;    FUSION OF SACROILIAC JOINT Left 08/14/2024    Procedure: FUSION, SACROILIAC JOINT;  Surgeon: Ilene Ortiz MD;  Location: [facility] OR;  Service: Pain Management;  Laterality: Left;  Posterior Percutaneous Sacroiliac Joint Arthrodesis Under Fluroscopy    INJECTION OF ANESTHETIC AGENT INTO SACROILIAC JOINT Left 02/27/2024    Procedure: Left SIJ Injection with local;  Surgeon: Ilene Ortiz MD;  Location: [facility] PAIN MGT;  Service: Pain Management;  Laterality: Left;    INJECTION OF ANESTHETIC AGENT INTO SACROILIAC JOINT Left 05/10/2024    Procedure: Left SIJ Injection (diagnostic)-Injection #2;  Surgeon: Ilene Ortiz MD;  Location: [facility] PAIN MGT;  Service: Pain Management;  Laterality: Left;    INJECTION OF ANESTHETIC AGENT INTO SACROILIAC JOINT Left 07/26/2024    Procedure: Left SIJ Injection with local (DIAGNOSTIC< NO STEROID);  Surgeon: Ilene Ortiz MD;  Location: [facility] PAIN MGT;  Service: Pain Management;  Laterality: Left;    INJECTION OF ANESTHETIC AGENT INTO SACROILIAC JOINT Right  10/9/2024    Procedure: Right SIJ Injection #1 diagnostic;  Surgeon: Ilene Ortiz MD;  Location: [facility] PAIN MGT;  Service: Pain Management;  Laterality: Right;    INJECTION OF ANESTHETIC AGENT INTO SACROILIAC JOINT Right 10/25/2024    Procedure: Right SIJ Injection with local; diagnostic!;  Surgeon: Ilene Ortiz MD;  Location: [facility] PAIN MGT;  Service: Pain Management;  Laterality: Right;    INJECTION OF ANESTHETIC AGENT INTO SACROILIAC JOINT Right 11/13/2024    Procedure: Right Sacroiliac Joint (therapeutic);  Surgeon: Ilene Ortiz MD;  Location: [facility] PAIN MGT;  Service: Pain Management;  Laterality: Right;    INJECTION OF ANESTHETIC AGENT INTO SACROILIAC JOINT Right 12/13/2024    Procedure: right-sided sacroiliac joint TransLoc fusion;  Surgeon: Ilene Ortiz MD;  Location: [facility];  Service: Pain Management;  Laterality: Right;    KNEE SURGERY Left 2017    SHOULDER ARTHROSCOPY      Tonsilectomy      TONSILLECTOMY      TUBAL LIGATION  1996    Uterine Ablation       Review of patient's allergies indicates:  No Known Allergies    Current Outpatient Medications   Medication Sig    albuterol (PROVENTIL/VENTOLIN HFA) 90 mcg/actuation inhaler Inhale 2 puffs into the lungs every 4 (four) hours as needed for Wheezing or Shortness of Breath.    amitriptyline (ELAVIL) 50 MG tablet TAKE 1 TABLET EVERY EVENING    atenoloL (TENORMIN) 25 MG tablet TAKE 1 TABLET TWICE DAILY    cephALEXin (KEFLEX) 500 MG capsule Take 1 capsule (500 mg total) by mouth every 8 (eight) hours.    cyanocobalamin 1,000 mcg/mL injection Inject 1 mL (1,000 mcg total) into the muscle once a week. (Patient not taking: Reported on 3/17/2025)    ferrous gluconate (FERGON) 324 MG tablet Take 324 mg by mouth 2 (two) times a day. (Patient not taking: Reported on 3/17/2025)    gabapentin (NEURONTIN) 100 MG capsule Take 1 capsule (100 mg total) by mouth 3 (three) times daily.    inhalation spacing device Use as directed for inhalation.    needle, disp,  "18 G 18 x 1 " Ndle Use to draw b12 1000 mcg from vail every 7 days for 7 weeks    needle, reusable, 25 G 25 x 1 " Ndle Use to inject B12 1000 mcg every 7 days for 7 weeks    omega-3 fatty acids/fish oil (FISH OIL-OMEGA-3 FATTY ACIDS) 300-1,000 mg capsule Take by mouth once daily.    omeprazole (PRILOSEC) 40 MG capsule TAKE 1 CAPSULE EVERY DAY    ondansetron (ZOFRAN-ODT) 4 MG TbDL Take 1 tablet (4 mg total) by mouth every 6 (six) hours as needed (nausea).    predniSONE (DELTASONE) 50 MG Tab Take 1 tablet (50 mg total) by mouth once daily. (Patient not taking: Reported on 3/17/2025)    rosuvastatin (CRESTOR) 20 MG tablet TAKE 1 TABLET EVERY DAY    tiZANidine (ZANAFLEX) 4 MG tablet TAKE 1 TABLET TWICE DAILY    traZODone (DESYREL) 100 MG tablet TAKE 1 TABLET EVERY EVENING    venlafaxine (EFFEXOR-XR) 75 MG 24 hr capsule TAKE 1 CAPSULE EVERY DAY     No current facility-administered medications for this visit.     Facility-Administered Medications Ordered in Other Visits   Medication    chlorhexidine 0.12 % solution 10 mL       Review of Systems     GENERAL:  No weight loss, malaise or fevers.  HEENT:   No recent changes in vision or hearing  NECK:  Negative for lumps, no difficulty with swallowing.  RESPIRATORY:  Negative for cough, wheezing or shortness of breath, patient denies any recent URI.  CARDIOVASCULAR:  Negative for chest pain or palpitations.  GI:  Negative for abdominal discomfort, blood in stools or black stools or change in bowel habits.  MUSCULOSKELETAL:  See HPI.  SKIN:  Negative for lesions, rash, and itching.  PSYCH:  No mood disorder or recent psychosocial stressors.   HEMATOLOGY/LYMPHOLOGY:  Negative for prolonged bleeding, bruising easily or swollen nodes.    NEURO:   No history of syncope, paralysis, seizures or tremors.  All other reviewed and negative other than HPI.    OBJECTIVE:    There were no vitals taken for this visit.      Physical Exam    GENERAL: Well appearing, in no acute distress, " alert and oriented x3.  PSYCH:  Mood and affect appropriate.  SKIN: Skin color, texture, turgor normal, no rashes or lesions.  HEAD/FACE:  Normocephalic, atraumatic. Cranial nerves grossly intact.    CV: RRR with palpation of the radial artery.  PULM: No evidence of respiratory difficulty, symmetric chest rise.  GI:  Soft and non-tender.    BACK: Straight leg raising in the sitting and supine positions is negative to radicular pain. No pain to palpation over the facet joints of the lumbar spine or spinous processes. Normal range of motion without pain reproduction.  EXTREMITIES: Peripheral joint ROM is full and pain free without obvious instability or laxity in all four extremities. No deformities, edema, or skin discoloration. Good capillary refill.  MUSCULOSKELETAL: Able to stand on heels & toes.   hip, and knee provocative maneuvers are negative.    SIJ testing: RIGHT  - TTP over SI joint: Present   - Sepideh's/ Uriel's: Positive    - Sacroiliac Distraction Test (anterior pressure): Positive  - Sacroiliac Compression Test (lateral pressure): Positive   - SacralThrust Test (posterior pressure): Positive    Facet loading test is negative bilaterally.   Bilateral upper and lower extremity strength is normal and symmetric.  No atrophy or tone abnormalities are noted.    RIGHT Lower extremity: Hip flexion 5/5, Hip Abduction 5/5, Hip Adduction 5/5, Knee extension 5/5, Knee flexion 5/5, Ankle dorsiflexion5/5, Extensor hallucis longus 5/5, Ankle plantarflexion 5/5  LEFT Lower extremity:  Hip flexion 5/5, Hip Abduction 5/5,Hip Adduction 5/5, Knee extension 5/5, Knee flexion 5/5, Ankle dorsiflexion 5/5, Extensor hallucis longus 5/5, Ankle plantarflexion 5/5  -Normal testing knee (patellar) jerk and ankle (achilles) jerk    NEURO: Bilateral upper and lower extremity coordination and muscle stretch reflexes are physiologic and symmetric. No loss of sensation is noted.  GAIT: normal.    Imagin/10/19    X-Ray Lumbar  Spine AP And Lateral  FINDINGS:  No scoliosis.  No acute fracture.  No listhesis.  Mild degenerative changes with spurring of the endplates and facet arthropathy is noted.  Degenerative changes of the sacroiliac joints are also noted.      MRI sacroiliac joint 07/19/2024  FINDINGS:  Bones: No fracture, marrow edema or suspicious osseous lesion identified.  L4-5 and L5-S1 degenerative disc disease and facet arthropathy.     Sacroiliac joints: Alignment normal.  Minimal bilateral sacroiliac osteoarthritis.  Small T2 hyperintense nutrient vessel left ilium along the inferior sacroiliac joint.  No significant abnormal subchondral marrow signal.  No erosions, subchondral marrow edema, ankylosis or other evidence of sacroiliitis.     Muscles and tendons: Within normal limits.     Miscellaneous: Mild right hydrosalpinx without surrounding inflammation.        Impression:     Minimal sacroiliac osteoarthritis without evidence of sacroiliitis.     L4-5 and L5-S1 degenerative disc disease and facet arthropathy.     Mild right hydrosalpinx without surrounding inflammation.         ASSESSMENT: 69 y.o. year old female with     1. Sacroiliitis        2. Sacroiliac joint pain        3. S/P fusion of sacroiliac joint                  PLAN:   - Interventions:  Patient has 90% sustained relief status post bilateral trans block fusion for sacroiliitis and sacroiliac joint pain.  No intervention at this time.    - Anticoagulation use: No no anticoagulation     report:  Reviewed and consistent with medication use as prescribed.    - Medications:  -Continue Gabapentin 100 mg TID per Dr. Valdivia.    -Continue Tizanidine 4 mg BID PRN per Dr. Valdivia.     - Therapy:   We discussed continuing physical therapy to help manage the patient/s painful condition. The patient was counseled that muscle strengthening will improve the long term prognosis in regards to pain and may also help increase range of motion and mobility. A referral for EXT  physical therapy: [facility] in [place] was provided to the patient.    - Imaging: Reviewed available imaging (x-ray lumbar spine, MRI sacroiliac joints) with patient and answered any questions they had regarding study.     - Follow up visit: 6-month.  Virtual visit.  Scheduling ticket sent      The above plan and management options were discussed at length with patient. Patient is in agreement with the above and verbalized understanding.    - I discussed the goals of interventional chronic pain management with the patient on today's visit. We discussed a multimodal and systematic approach to pain.  This includes diagnostic and therapeutic injections, adjuvant pharmacologic treatment, physical therapy, and at times psychiatry.  I emphasized the importance of regular exercise, core strengthening and stretching, diet and weight loss as a cornerstone of long-term pain management.    - This condition does not require this patient to take time off of work, and the primary goal of our Pain Management services is to improve the patient's functional capacity.  - Patient Questions: Answered all of the patient's questions regarding diagnoses, therapy, treatment and next steps    Visit today included increased complexity associated with the care of the episodic problem of chronic pain which was addressed and continue to manage the longitudinal care of the patient due to the serious and/or complex managed problem(s) listed above.        Ilene Ortiz MD  Interventional Pain Management  [facility]    Disclaimer:  This note was prepared using voice recognition system and is likely to have sound alike errors that may have been overlooked even after proof reading.  Please call me with any questions

## 2025-04-17 ENCOUNTER — PATIENT MESSAGE (OUTPATIENT)
Dept: PAIN MEDICINE | Facility: CLINIC | Age: 70
End: 2025-04-17

## 2025-04-17 ENCOUNTER — OFFICE VISIT (OUTPATIENT)
Dept: PAIN MEDICINE | Facility: CLINIC | Age: 70
End: 2025-04-17
Payer: MEDICARE

## 2025-04-17 DIAGNOSIS — M53.3 SACROILIAC JOINT PAIN: ICD-10-CM

## 2025-04-17 DIAGNOSIS — Z98.1 S/P FUSION OF SACROILIAC JOINT: ICD-10-CM

## 2025-04-17 DIAGNOSIS — M46.1 SACROILIITIS: Primary | ICD-10-CM

## 2025-04-24 ENCOUNTER — OFFICE VISIT (OUTPATIENT)
Dept: OPHTHALMOLOGY | Facility: CLINIC | Age: 70
End: 2025-04-24
Payer: MEDICARE

## 2025-04-24 DIAGNOSIS — H25.13 CATARACT, NUCLEAR SCLEROTIC SENILE, BILATERAL: Primary | ICD-10-CM

## 2025-04-24 DIAGNOSIS — H52.4 HYPEROPIA WITH ASTIGMATISM AND PRESBYOPIA, BILATERAL: ICD-10-CM

## 2025-04-24 DIAGNOSIS — H52.203 HYPEROPIA WITH ASTIGMATISM AND PRESBYOPIA, BILATERAL: ICD-10-CM

## 2025-04-24 DIAGNOSIS — H52.03 HYPEROPIA WITH ASTIGMATISM AND PRESBYOPIA, BILATERAL: ICD-10-CM

## 2025-04-24 PROCEDURE — 92015 DETERMINE REFRACTIVE STATE: CPT | Mod: HCNC,S$GLB,, | Performed by: OPTOMETRIST

## 2025-04-24 PROCEDURE — 3044F HG A1C LEVEL LT 7.0%: CPT | Mod: HCNC,CPTII,S$GLB, | Performed by: OPTOMETRIST

## 2025-04-24 PROCEDURE — 99999 PR PBB SHADOW E&M-EST. PATIENT-LVL III: CPT | Mod: PBBFAC,HCNC,, | Performed by: OPTOMETRIST

## 2025-04-24 PROCEDURE — 1159F MED LIST DOCD IN RCRD: CPT | Mod: HCNC,CPTII,S$GLB, | Performed by: OPTOMETRIST

## 2025-04-24 PROCEDURE — 92014 COMPRE OPH EXAM EST PT 1/>: CPT | Mod: HCNC,S$GLB,, | Performed by: OPTOMETRIST

## 2025-04-24 NOTE — PROGRESS NOTES
SUBJECTIVE  Silvia Cotton is 69 y.o. female  Corrected distance visual acuity was 20/20 in the right eye and 20/20 in the left eye.   Chief Complaint   Patient presents with    Annual Exam          HPI    Patient states vision is doing well. Denies any pain or discomfort. No   other ocular complaints   Last edited by Vanessa Newberry on 4/24/2025 10:53 AM.         Assessment /Plan :  1. Cataract, nuclear sclerotic senile, bilateral  Cataracts are not visually significant and not affecting activities of daily living.   Annual observation is recommended at this time.   Patient to call or return to clinic with any significant change in vision prior to next visit.    2. Hyperopia with astigmatism and presbyopia, bilateral  Dispense Final Rx for glasses.  RTC 1 year  Discussed above and answered questions.

## 2025-05-07 ENCOUNTER — PATIENT MESSAGE (OUTPATIENT)
Dept: PAIN MEDICINE | Facility: CLINIC | Age: 70
End: 2025-05-07
Payer: MEDICARE

## 2025-05-29 RX ORDER — AMITRIPTYLINE HYDROCHLORIDE 50 MG/1
50 TABLET, FILM COATED ORAL NIGHTLY
Qty: 90 TABLET | Refills: 2 | Status: SHIPPED | OUTPATIENT
Start: 2025-05-29

## 2025-05-29 NOTE — TELEPHONE ENCOUNTER
Refill Routing Note   Medication(s) are not appropriate for processing by [facility] for the following reason(s):        Drug-disease interaction    [facility] action(s):  Defer      Medication Therapy Plan: High Drug-Disease: amitriptyline and Supraventricular tachycardia    Pharmacist review requested: Yes     Appointments  past 12m or future 3m with PCP    Date Provider   Last Visit   2/20/2025 Stephani Valdivia MD   Next Visit   8/21/2025 Stephani Valdivia MD   ED visits in past 90 days: 0        Note composed:12:12 PM 05/29/2025

## 2025-05-29 NOTE — TELEPHONE ENCOUNTER
Refill Decision Note   Silvia Yury  is requesting a refill authorization.  Brief Assessment and Rationale for Refill:  Approve     Medication Therapy Plan:         Pharmacist review requested: Yes   Extended chart review required: Yes   Comments:     Note composed:12:33 PM 05/29/2025

## 2025-05-29 NOTE — TELEPHONE ENCOUNTER
No care due was identified.  [facility] Embedded Care Due Messages. Reference number: [id number].   5/29/2025 1:31:19 AM CDT

## 2025-06-05 NOTE — TELEPHONE ENCOUNTER
Patient: Adeel Boothe  MRN: [id number]    Pre-sedation Evaluation:  Sedation necessary for: Immobility  Requesting service: Peds orthopedics    History of Present Illness:   Adeel is a 4 year old male with hx of intermittent severe lower back pain presenting for deep sedation for MRI lumbar spine. Patient has had back pain for 3 months, XR showed volume loss at L5 and given lack of improvement with rest, NSAIDS, Orthopedics recommending MRI of lumbar spine. No recent URI symptoms, no hx of snoring or apnea overnight. No recent cough, congestion or rhinorrhea, did have a visit for pink eye 1 week ago which has since resolved and was not associated with URI symptoms or fevers. No hx of asthma or breathing issues. No previous experience with anesthesia or deep sedation. Currently on Ofloxacin eye drops. Last meal was last night. Had apple juice at 945 this AM. No know medication allergies.        Medical History[1]    Principle problems:  Patient Active Problem List    Diagnosis Date Noted    Acute right-sided low back pain without sciatica 2025    Palpable lymph node 2025    Allergic enteritis of small intestine 2023    Blood in stool 2023     Allergies:  Allergies[2]  PTA/Current Medications:  Prescriptions Prior to Admission[3]  Current Medications[4]  Past Surgical History:   has no past surgical history on file.    Recent sedation/surgery (24 hours) No    Review of Systems:  Please check all that apply: No significant medical history        NPO guidelines met: Yes    Physical Exam    Airway  TM distance: >3 FB  Neck ROM: full  Comments: Unable to assess Mallampati     Cardiovascular   Rhythm: regular  Rate: normal    (-) murmur     Dental    Pulmonary - normal examBreath sounds clear to auscultation  (-) rhonchi, decreased breath sounds, wheezes, rales         Plan    ASA 2     Deep                [1]   Past Medical History:  Diagnosis Date    Health examination for  8 to 28  Returned call to patient.  She says that she received a notification that an earlier appt was available.  Made her aware of the available appt times.  She rescheduled her mammogram for 9:40 am today, and annual remains at 11:30 am.  Says that she will check in for annual after mammo.  Made her aware that she will be seen as soon as possible, but to keep in mind that her appointment is at 11:30 am, she verbalized understanding.   days old 2022    Examination of infant 8 to 28 days old    Health examination for  under 8 days old 2022    Encounter for routine  health examination under 8 days of age   [2] No Known Allergies  [3] (Not in a hospital admission)  [4]   Current Outpatient Medications   Medication Sig Dispense Refill    ofloxacin (Ocuflox) 0.3 % ophthalmic solution Administer 1 drop into both eyes 2 times a day for 10 days. 5 mL 0     Current Facility-Administered Medications   Medication Dose Route Frequency Provider Last Rate Last Admin    lidocaine (LMX) 4 % cream   Topical Once PRN Cheikh Rodriguez MD        lidocaine buffered injection (via j-tip) 0.2 mL  0.2 mL subcutaneous q5 min PRN Cheikh Rodriguez MD        lidocaine PF (Xylocaine) 10 mg/mL (1 %) injection 10 mg  1 mL intravenous Once Cheikh Rodriguez MD        propofol (Diprivan) bolus from bag 13.4 mg  1 mg/kg intravenous q5 min PRN Cheikh Rodriguez MD        propofol (Diprivan) infusion  3 mg/kg/hr intravenous Continuous Cheikh Rodriguez MD

## 2025-06-15 RX ORDER — ROSUVASTATIN CALCIUM 20 MG/1
20 TABLET, COATED ORAL
Qty: 90 TABLET | Refills: 2 | Status: SHIPPED | OUTPATIENT
Start: 2025-06-15

## 2025-06-15 NOTE — TELEPHONE ENCOUNTER
No care due was identified.  [facility] Embedded Care Due Messages. Reference number: [id number].   6/15/2025 12:15:20 PM CDT

## 2025-06-16 RX ORDER — VENLAFAXINE HYDROCHLORIDE 75 MG/1
75 CAPSULE, EXTENDED RELEASE ORAL
Qty: 90 CAPSULE | Refills: 2 | Status: SHIPPED | OUTPATIENT
Start: 2025-06-16

## 2025-06-16 NOTE — TELEPHONE ENCOUNTER
Refill Routing Note   Medication(s) are not appropriate for processing by [facility] for the following reason(s):        Required vitals abnormal    [facility] action(s):  Approve  Defer             Appointments  past 12m or future 3m with PCP    Date Provider   Last Visit   2/20/2025 Stephani Valdivia MD   Next Visit   8/21/2025 Stephani Valdivia MD   ED visits in past 90 days: 0        Note composed:11:39 PM 06/15/2025

## 2025-07-09 ENCOUNTER — OFFICE VISIT (OUTPATIENT)
Dept: URGENT CARE | Facility: CLINIC | Age: 70
End: 2025-07-09
Payer: MEDICARE

## 2025-07-09 VITALS
SYSTOLIC BLOOD PRESSURE: 167 MMHG | TEMPERATURE: 98 F | HEART RATE: 101 BPM | DIASTOLIC BLOOD PRESSURE: 89 MMHG | RESPIRATION RATE: 20 BRPM | OXYGEN SATURATION: 96 %

## 2025-07-09 DIAGNOSIS — S91.012A LACERATION OF LEFT ANKLE, INITIAL ENCOUNTER: Primary | ICD-10-CM

## 2025-07-09 DIAGNOSIS — I10 ELEVATED BLOOD PRESSURE READING WITH DIAGNOSIS OF HYPERTENSION: ICD-10-CM

## 2025-07-09 DIAGNOSIS — Z23 NEED FOR TD VACCINE: ICD-10-CM
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
## 2025-07-09 PROCEDURE — 90714 TD VACC NO PRESV 7 YRS+ IM: CPT | Mod: S$GLB,,, | Performed by: NURSE PRACTITIONER

## 2025-07-09 PROCEDURE — 12002 RPR S/N/AX/GEN/TRNK2.6-7.5CM: CPT | Mod: S$GLB,,, | Performed by: NURSE PRACTITIONER

## 2025-07-09 PROCEDURE — 99213 OFFICE O/P EST LOW 20 MIN: CPT | Mod: 25,S$GLB,, | Performed by: NURSE PRACTITIONER

## 2025-07-09 PROCEDURE — 90471 IMMUNIZATION ADMIN: CPT | Mod: S$GLB,,, | Performed by: NURSE PRACTITIONER

## 2025-07-09 RX ORDER — MUPIROCIN 20 MG/G
OINTMENT TOPICAL 3 TIMES DAILY
Qty: 30 G | Refills: 1 | Status: SHIPPED | OUTPATIENT
Start: 2025-07-09 | End: 2025-07-16

## 2025-07-09 RX ORDER — CEPHALEXIN 500 MG/1
500 CAPSULE ORAL EVERY 12 HOURS
Qty: 6 CAPSULE | Refills: 0 | Status: SHIPPED | OUTPATIENT
Start: 2025-07-09 | End: 2025-07-12

## 2025-07-09 RX ORDER — LIDOCAINE HYDROCHLORIDE AND EPINEPHRINE 10; 10 UG/ML; MG/ML
2 INJECTION, SOLUTION INFILTRATION; PERINEURAL
Status: COMPLETED | OUTPATIENT
Start: 2025-07-09 | End: 2025-07-09

## 2025-07-09 RX ADMIN — LIDOCAINE HYDROCHLORIDE AND EPINEPHRINE 2 ML: 10; 10 INJECTION, SOLUTION INFILTRATION; PERINEURAL at 12:07

## 2025-07-09 NOTE — PROGRESS NOTES
Subjective:      Patient ID: Silvia Cotton is a 69 y.o. female.    Vitals:  oral temperature is 97.9 °F (36.6 °C). Her blood pressure is 167/89 (abnormal) and her pulse is 101. Her respiration is 20 and oxygen saturation is 96%.     Chief Complaint: Laceration    Pt c/o a laceration to her left ankle that happened this morning when she was walking through her chicken coop and got cut on a screw about an hour ago.    Laceration   The incident occurred 1 to 3 hours ago. The laceration is located on the Left leg. The laceration is 5 cm in size. The laceration mechanism was a nail. The pain is at a severity of 8/10. The pain is moderate. The pain has been Constant since onset. It is unknown if a foreign body is present. Her tetanus status is out of date.       Skin:  Positive for laceration. Negative for erythema.      Objective:     Vitals:    07/09/25 1045   BP: (!) 167/89   BP Location: Left arm   Patient Position: Sitting   Pulse: 101   Resp: 20   Temp: 97.9 °F (36.6 °C)   TempSrc: Oral   SpO2: 96%        Physical Exam   Constitutional: She is oriented to person, place, and time. She appears well-developed.   HENT:   Head: Normocephalic and atraumatic. Head is without abrasion, without contusion and without laceration.   Ears:   Right Ear: External ear normal.   Left Ear: External ear normal.   Nose: Nose normal.   Mouth/Throat: Oropharynx is clear and moist and mucous membranes are normal. Mucous membranes are moist.   Eyes: Conjunctivae, EOM and lids are normal.   Neck: Trachea normal and phonation normal. Neck supple.   Cardiovascular: Normal rate, regular rhythm and normal heart sounds.   Pulmonary/Chest: Effort normal and breath sounds normal. No stridor. No respiratory distress.   Musculoskeletal: Normal range of motion.         General: Normal range of motion.   Neurological: She is alert and oriented to person, place, and time. She displays no weakness.   Skin: Skin is warm, intact and no rash. Capillary  "refill takes less than 2 seconds. No abrasion, No burn, No bruising, No erythema and No ecchymosis   Psychiatric: Her speech is normal and behavior is normal. Mood, judgment and thought content normal.   Nursing note and vitals reviewed.        Assessment:     1. Laceration of left ankle, initial encounter    2. Need for Td vaccine    3. Contact with sharp object as cause of accidental injury, initial encounter    4. Elevated blood pressure reading with diagnosis of hypertension        Plan:   Laceration Repair    Date/Time: 2025 10:45 AM    Performed by: Eleonora Carpio NP  Authorized by: Eleonora Carpio NP  Consent Done: Yes  Consent: Verbal consent obtained  Risks and benefits: risks, benefits and alternatives were discussed  Consent given by: patient  Patient understanding: patient states understanding of the procedure being performed  Patient consent: the patient's understanding of the procedure matches consent given  Procedure consent: procedure consent matches procedure scheduled  Relevant documents: relevant documents present and verified  Site marked: the operative site was marked  Imaging studies: imaging studies available  Required items: required blood products, implants, devices, and special equipment available  Patient identity confirmed: , name and verbally with patient  Time out: Immediately prior to procedure a "time out" was called to verify the correct patient, procedure, equipment, support staff and site/side marked as required.  Body area: lower extremity  Location details: left lower leg  Laceration length: 4.5 cm  Foreign bodies: no foreign bodies  Tendon involvement: none  Nerve involvement: none  Vascular damage: no  Anesthesia: local infiltration    Anesthesia:  Local Anesthetic: lidocaine 1% with epinephrine  Anesthetic total: 2 mL    Patient sedated: no  Preparation: Patient was prepped and draped in the usual sterile fashion.  Irrigation solution: saline  Irrigation method: " "syringe  Amount of cleaning: standard  Debridement: none  Degree of undermining: none  Skin closure: 4-0 nylon  Number of sutures: 5  Technique: simple  Approximation: close  Approximation difficulty: simple  Dressing: Steri-Strips and non-stick sterile dressing  Patient tolerance: Patient tolerated the procedure well with no immediate complications  Comments: NV intact        Admin Td vaccine   Reviewed and discussed elevated BP ;      Monitor blood pressure in evening   Diet lifestyle changes  Follow up with PCP if persistent elevations   Any "worst" headache, chest pain or SOB, weakness, extremity weakness [id number]      Laceration of left ankle, initial encounter  -     Td (Tenivac) IM vaccine (>/= 8 yo)  -     cephALEXin (KEFLEX) 500 MG capsule; Take 1 capsule (500 mg total) by mouth every 12 (twelve) hours. for 3 days  Dispense: 6 capsule; Refill: 0  -     mupirocin (BACTROBAN) 2 % ointment; Apply topically 3 (three) times daily. Until healed for 7 days  Dispense: 30 g; Refill: 1  -     LIDOcaine-EPINEPHrine 1%-1:100,000 injection 2 mL  -     Laceration Repair    Need for Td vaccine  -     Td (Tenivac) IM vaccine (>/= 8 yo)    Contact with sharp object as cause of accidental injury, initial encounter  -     Laceration Repair    Elevated blood pressure reading with diagnosis of hypertension      Patient Instructions   Keep wound dry x 1 day   Avoid soaking or immersing  in standing water   If any signs of infection---  >redness, tenderness, or purulent drainage, return for evaluation    SUTURE/STITCHES REMOVAL IN 10-14 DAYS     Follow up WITH [facility] AS NEEDED      No follow-ups on file.               "

## 2025-07-09 NOTE — PROCEDURES
"Laceration Repair    Date/Time: 2025 10:45 AM    Performed by: Eleonora Carpio NP  Authorized by: Eleonora Carpio NP  Consent Done: Yes  Consent: Verbal consent obtained  Risks and benefits: risks, benefits and alternatives were discussed  Consent given by: patient  Patient understanding: patient states understanding of the procedure being performed  Patient consent: the patient's understanding of the procedure matches consent given  Procedure consent: procedure consent matches procedure scheduled  Relevant documents: relevant documents present and verified  Site marked: the operative site was marked  Imaging studies: imaging studies available  Required items: required blood products, implants, devices, and special equipment available  Patient identity confirmed: , name and verbally with patient  Time out: Immediately prior to procedure a "time out" was called to verify the correct patient, procedure, equipment, support staff and site/side marked as required.  Body area: lower extremity  Location details: left lower leg  Laceration length: 4.5 cm  Foreign bodies: no foreign bodies  Tendon involvement: none  Nerve involvement: none  Vascular damage: no  Anesthesia: local infiltration    Anesthesia:  Local Anesthetic: lidocaine 1% with epinephrine  Anesthetic total: 2 mL    Patient sedated: no  Preparation: Patient was prepped and draped in the usual sterile fashion.  Irrigation solution: saline  Irrigation method: syringe  Amount of cleaning: standard  Debridement: none  Degree of undermining: none  Skin closure: 4-0 nylon  Number of sutures: 5  Technique: simple  Approximation: close  Approximation difficulty: simple  Dressing: Steri-Strips and non-stick sterile dressing  Patient tolerance: Patient tolerated the procedure well with no immediate complications  Comments: NV intact         "

## 2025-07-09 NOTE — PATIENT INSTRUCTIONS
Keep wound dry x 1 day   Avoid soaking or immersing  in standing water   If any signs of infection---  >redness, tenderness, or purulent drainage, return for evaluation    SUTURE/STITCHES REMOVAL IN 10-14 DAYS     Follow up WITH [facility] AS NEEDED

## 2025-07-31 ENCOUNTER — TELEPHONE (OUTPATIENT)
Dept: PAIN MEDICINE | Facility: CLINIC | Age: 70
End: 2025-07-31
Payer: MEDICARE

## 2025-07-31 NOTE — TELEPHONE ENCOUNTER
Reached out to patient to reschedule appointment because the provider will be out of clinic.  Apt has been made . All questions answered.       Mary Gutierrez

## 2025-08-11 RX ORDER — TIZANIDINE 4 MG/1
4 TABLET ORAL 2 TIMES DAILY
Qty: 180 TABLET | Refills: 3 | Status: SHIPPED | OUTPATIENT
Start: 2025-08-11

## 2025-08-14 ENCOUNTER — LAB VISIT (OUTPATIENT)
Dept: LAB | Facility: HOSPITAL | Age: 70
End: 2025-08-14
Attending: FAMILY MEDICINE
Payer: MEDICARE

## 2025-08-14 DIAGNOSIS — D50.0 IRON DEFICIENCY ANEMIA DUE TO CHRONIC BLOOD LOSS: ICD-10-CM

## 2025-08-14 DIAGNOSIS — E78.2 MIXED HYPERLIPIDEMIA: ICD-10-CM

## 2025-08-14 LAB
ABSOLUTE EOSINOPHIL (OHS): 0.28 K/UL
ABSOLUTE MONOCYTE (OHS): 0.56 K/UL (ref 0.3–1)
ABSOLUTE NEUTROPHIL COUNT (OHS): 3.22 K/UL (ref 1.8–7.7)
ALBUMIN SERPL BCP-MCNC: 4 G/DL (ref 3.5–5.2)
ALP SERPL-CCNC: 69 UNIT/L (ref 40–150)
ALT SERPL W/O P-5'-P-CCNC: 14 UNIT/L (ref 0–55)
ANION GAP (OHS): 12 MMOL/L (ref 8–16)
AST SERPL-CCNC: 43 UNIT/L (ref 0–50)
BASOPHILS # BLD AUTO: 0.04 K/UL
BASOPHILS NFR BLD AUTO: 0.7 %
BILIRUB SERPL-MCNC: 0.6 MG/DL (ref 0.1–1)
BUN SERPL-MCNC: 10 MG/DL (ref 8–23)
CALCIUM SERPL-MCNC: 9.2 MG/DL (ref 8.7–10.5)
CHLORIDE SERPL-SCNC: 105 MMOL/L (ref 95–110)
CHOLEST SERPL-MCNC: 137 MG/DL (ref 120–199)
CHOLEST/HDLC SERPL: 3.6 {RATIO} (ref 2–5)
CO2 SERPL-SCNC: 23 MMOL/L (ref 23–29)
CREAT SERPL-MCNC: 1.2 MG/DL (ref 0.5–1.4)
ERYTHROCYTE [DISTWIDTH] IN BLOOD BY AUTOMATED COUNT: 14.1 % (ref 11.5–14.5)
GFR SERPLBLD CREATININE-BSD FMLA CKD-EPI: 50 ML/MIN/1.73/M2
GLUCOSE SERPL-MCNC: 93 MG/DL (ref 70–110)
HCT VFR BLD AUTO: 38.9 % (ref 37–48.5)
HDLC SERPL-MCNC: 38 MG/DL (ref 40–75)
HDLC SERPL: 27.7 % (ref 20–50)
HGB BLD-MCNC: 12.3 GM/DL (ref 12–16)
IMM GRANULOCYTES # BLD AUTO: 0.01 K/UL (ref 0–0.04)
IMM GRANULOCYTES NFR BLD AUTO: 0.2 % (ref 0–0.5)
LDLC SERPL CALC-MCNC: 64.6 MG/DL (ref 63–159)
LYMPHOCYTES # BLD AUTO: 1.82 K/UL (ref 1–4.8)
MCH RBC QN AUTO: 30.7 PG (ref 27–31)
MCHC RBC AUTO-ENTMCNC: 31.6 G/DL (ref 32–36)
MCV RBC AUTO: 97 FL (ref 82–98)
NONHDLC SERPL-MCNC: 99 MG/DL
NUCLEATED RBC (/100WBC) (OHS): 0 /100 WBC
PLATELET # BLD AUTO: 240 K/UL (ref 150–450)
PMV BLD AUTO: 11.8 FL (ref 9.2–12.9)
POTASSIUM SERPL-SCNC: 4.6 MMOL/L (ref 3.5–5.1)
PROT SERPL-MCNC: 6.9 GM/DL (ref 6–8.4)
RBC # BLD AUTO: 4.01 M/UL (ref 4–5.4)
RELATIVE EOSINOPHIL (OHS): 4.7 %
RELATIVE LYMPHOCYTE (OHS): 30.7 % (ref 18–48)
RELATIVE MONOCYTE (OHS): 9.4 % (ref 4–15)
RELATIVE NEUTROPHIL (OHS): 54.3 % (ref 38–73)
SODIUM SERPL-SCNC: 140 MMOL/L (ref 136–145)
TRIGL SERPL-MCNC: 172 MG/DL (ref 30–150)
WBC # BLD AUTO: 5.93 K/UL (ref 3.9–12.7)

## 2025-08-14 PROCEDURE — 80061 LIPID PANEL: CPT | Mod: HCNC

## 2025-08-14 PROCEDURE — 85025 COMPLETE CBC W/AUTO DIFF WBC: CPT | Mod: HCNC

## 2025-08-14 PROCEDURE — 80053 COMPREHEN METABOLIC PANEL: CPT | Mod: HCNC

## 2025-08-14 PROCEDURE — 36415 COLL VENOUS BLD VENIPUNCTURE: CPT | Mod: HCNC,PN

## 2025-08-27 ENCOUNTER — OFFICE VISIT (OUTPATIENT)
Dept: FAMILY MEDICINE | Facility: CLINIC | Age: 70
End: 2025-08-27
Payer: MEDICARE

## 2025-08-27 VITALS
WEIGHT: 250.56 LBS | HEIGHT: 69 IN | SYSTOLIC BLOOD PRESSURE: 138 MMHG | DIASTOLIC BLOOD PRESSURE: 82 MMHG | TEMPERATURE: 99 F | OXYGEN SATURATION: 94 % | BODY MASS INDEX: 37.11 KG/M2 | HEART RATE: 100 BPM

## 2025-08-27 DIAGNOSIS — Z00.00 WELL ADULT EXAM: Primary | ICD-10-CM

## 2025-08-27 DIAGNOSIS — D50.0 IRON DEFICIENCY ANEMIA DUE TO CHRONIC BLOOD LOSS: ICD-10-CM

## 2025-08-27 DIAGNOSIS — E66.01 SEVERE OBESITY (BMI 35.0-39.9) WITH COMORBIDITY: ICD-10-CM

## 2025-08-27 DIAGNOSIS — N18.31 CHRONIC KIDNEY DISEASE, STAGE 3A: ICD-10-CM

## 2025-08-27 DIAGNOSIS — F51.04 CHRONIC INSOMNIA: ICD-10-CM

## 2025-08-27 DIAGNOSIS — R00.0 TACHYCARDIA: ICD-10-CM

## 2025-08-27 DIAGNOSIS — E78.2 MIXED HYPERLIPIDEMIA: ICD-10-CM

## 2025-08-27 PROCEDURE — 99999 PR PBB SHADOW E&M-EST. PATIENT-LVL IV: CPT | Mod: PBBFAC,HCNC,, | Performed by: FAMILY MEDICINE

## (undated) DEVICE — COVER LIGHT HANDLE 80/CA

## (undated) DEVICE — PAD ABD 8X10 STERILE

## (undated) DEVICE — COVER CAMERA OPERATING ROOM

## (undated) DEVICE — NDL HYPO STD REG BVL 22GX1.5IN

## (undated) DEVICE — CONTAINER SPECIMEN OR STER 4OZ

## (undated) DEVICE — TOWEL OR DISP STRL BLUE 4/PK

## (undated) DEVICE — SCRUB HIBICLENS 4% CHG 4OZ

## (undated) DEVICE — SOL 9P NACL IRR PIC IL

## (undated) DEVICE — PACK BASIC SETUP SC BR

## (undated) DEVICE — SUT VICRYL 4-0 ANTIBACT

## (undated) DEVICE — SUT 4-0 ETHILON 18 PS-2

## (undated) DEVICE — DRAPE INCISE IOBAN 2 23X17IN

## (undated) DEVICE — UNDERGLOVES BIOGEL PI SIZE 8.5

## (undated) DEVICE — UNDERGLOVE BIOGEL PI SZ 6.5 LF

## (undated) DEVICE — POSITIONER HEAD DONUT 9IN FOAM

## (undated) DEVICE — SUPPORT ULNA NERVE PROTECTOR

## (undated) DEVICE — SUT VICRYL PLUS 2-0 CT1 18

## (undated) DEVICE — GLOVE SURGICAL LATEX SZ 6.5

## (undated) DEVICE — APPLICATOR CHLORAPREP ORN 26ML

## (undated) DEVICE — DRAPE C-ARMOR EQUIPMENT COVER

## (undated) DEVICE — DRAPE C-ARM/MOBILE XRAY 44X80

## (undated) DEVICE — DRAPE HAND STERILE

## (undated) DEVICE — KIT TURNOVER

## (undated) DEVICE — GLOVE SURGICAL LATEX SZ 7

## (undated) DEVICE — ALCOHOL 70% ISOP RUBBING 4OZ

## (undated) DEVICE — PAD CAST SPECIALIST STRL 3

## (undated) DEVICE — DRAPE THREE-QTR REINF 53X77IN

## (undated) DEVICE — SPONGE COTTON TRAY 4X4IN

## (undated) DEVICE — DRAPE LAP T SHT W/ INSTR PAD

## (undated) DEVICE — BANDAGE ESMARK ELASTIC ST 4X9

## (undated) DEVICE — DRESSING XEROFORM FOIL PK 1X8

## (undated) DEVICE — TOURNIQUET SB QC SP 24X4IN

## (undated) DEVICE — GLOVE SURG BIOGEL LATEX SZ 7.5

## (undated) DEVICE — NDL 22GA X1 1/2 REG BEVEL

## (undated) DEVICE — DRAPE U SPLIT SHEET 54X76IN

## (undated) DEVICE — GOWN NONREINF SET-IN SLV 2XL

## (undated) DEVICE — DRESSING TRANS 4X4 TEGADERM

## (undated) DEVICE — COVER OVERHEAD SURG LT BLUE

## (undated) DEVICE — HEADREST PRONESAFE DERMAPROX

## (undated) DEVICE — GLOVE SURG ULTRA TOUCH 6

## (undated) DEVICE — NDL SPINAL 22GX5

## (undated) DEVICE — BANDAGE ELASTIC 3X5 VELCRO ST

## (undated) DEVICE — SYR 10CC LUER LOCK

## (undated) DEVICE — GOWN POLY REINF BRTH SLV XL

## (undated) DEVICE — UNDERGLOVES BIOGEL PI SIZE 7.5

## (undated) DEVICE — ELECTRODE REM PLYHSV RETURN 9

## (undated) DEVICE — NDL SAFETY 25G X 1.5 ECLIPSE

## (undated) DEVICE — UNDERGLOVES BIOGEL PI SZ 7 LF

## (undated) DEVICE — SUT MONOCRYL 4-0 PS-2

## (undated) DEVICE — SUT MONOCRYL 4.0 PS2 CP496G

## (undated) DEVICE — SUT VICRYL 3-0 27 SH

## (undated) DEVICE — GLOVE BIOGEL SZ 8 1/2

## (undated) DEVICE — STAPLER SKIN PROXIMATE REG

## (undated) DEVICE — UNDERGLOVES BIOGEL PI SZ 6 LF